# Patient Record
Sex: FEMALE | Race: WHITE | Employment: OTHER | URBAN - METROPOLITAN AREA
[De-identification: names, ages, dates, MRNs, and addresses within clinical notes are randomized per-mention and may not be internally consistent; named-entity substitution may affect disease eponyms.]

---

## 2017-05-09 ENCOUNTER — HOSPITAL ENCOUNTER (OUTPATIENT)
Dept: PREADMISSION TESTING | Age: 77
Discharge: HOME | End: 2017-05-09
Payer: MEDICARE

## 2017-05-09 VITALS
RESPIRATION RATE: 16 BRPM | HEART RATE: 84 BPM | WEIGHT: 119 LBS | HEIGHT: 63 IN | DIASTOLIC BLOOD PRESSURE: 84 MMHG | TEMPERATURE: 97.9 F | SYSTOLIC BLOOD PRESSURE: 177 MMHG | BODY MASS INDEX: 21.09 KG/M2 | OXYGEN SATURATION: 97 %

## 2017-05-09 LAB
ABO + RH BLD: NORMAL
ALBUMIN SERPL BCP-MCNC: 3.8 G/DL (ref 3.5–5)
ALBUMIN/GLOB SERPL: 0.9 {RATIO} (ref 1.1–2.2)
ALP SERPL-CCNC: 133 U/L (ref 45–117)
ALT SERPL-CCNC: 26 U/L (ref 12–78)
ANION GAP BLD CALC-SCNC: 12 MMOL/L (ref 5–15)
APPEARANCE UR: CLEAR
APTT PPP: 28.9 SEC (ref 22.1–32.5)
AST SERPL W P-5'-P-CCNC: 19 U/L (ref 15–37)
ATRIAL RATE: 76 BPM
BACTERIA URNS QL MICRO: NEGATIVE /HPF
BASOPHILS # BLD AUTO: 0 K/UL (ref 0–0.1)
BASOPHILS # BLD: 0 % (ref 0–1)
BILIRUB SERPL-MCNC: 0.3 MG/DL (ref 0.2–1)
BILIRUB UR QL: NEGATIVE
BLOOD GROUP ANTIBODIES SERPL: NORMAL
BUN SERPL-MCNC: 15 MG/DL (ref 6–20)
BUN/CREAT SERPL: 14 (ref 12–20)
CALCIUM SERPL-MCNC: 8.9 MG/DL (ref 8.5–10.1)
CALCULATED P AXIS, ECG09: 42 DEGREES
CALCULATED R AXIS, ECG10: -45 DEGREES
CALCULATED T AXIS, ECG11: 14 DEGREES
CHLORIDE SERPL-SCNC: 98 MMOL/L (ref 97–108)
CO2 SERPL-SCNC: 25 MMOL/L (ref 21–32)
COLOR UR: NORMAL
CREAT SERPL-MCNC: 1.07 MG/DL (ref 0.55–1.02)
DIAGNOSIS, 93000: NORMAL
EOSINOPHIL # BLD: 0.1 K/UL (ref 0–0.4)
EOSINOPHIL NFR BLD: 1 % (ref 0–7)
EPITH CASTS URNS QL MICRO: NORMAL /LPF
ERYTHROCYTE [DISTWIDTH] IN BLOOD BY AUTOMATED COUNT: 13.8 % (ref 11.5–14.5)
EST. AVERAGE GLUCOSE BLD GHB EST-MCNC: 140 MG/DL
GLOBULIN SER CALC-MCNC: 4.1 G/DL (ref 2–4)
GLUCOSE SERPL-MCNC: 85 MG/DL (ref 65–100)
GLUCOSE UR STRIP.AUTO-MCNC: NEGATIVE MG/DL
HBA1C MFR BLD: 6.5 % (ref 4.2–6.3)
HCT VFR BLD AUTO: 35.3 % (ref 35–47)
HGB BLD-MCNC: 12.1 G/DL (ref 11.5–16)
HGB UR QL STRIP: NEGATIVE
HYALINE CASTS URNS QL MICRO: NORMAL /LPF (ref 0–5)
INR PPP: 1 (ref 0.9–1.1)
KETONES UR QL STRIP.AUTO: NEGATIVE MG/DL
LEUKOCYTE ESTERASE UR QL STRIP.AUTO: NEGATIVE
LYMPHOCYTES # BLD AUTO: 21 % (ref 12–49)
LYMPHOCYTES # BLD: 1.4 K/UL (ref 0.8–3.5)
MCH RBC QN AUTO: 30.7 PG (ref 26–34)
MCHC RBC AUTO-ENTMCNC: 34.3 G/DL (ref 30–36.5)
MCV RBC AUTO: 89.6 FL (ref 80–99)
MONOCYTES # BLD: 0.6 K/UL (ref 0–1)
MONOCYTES NFR BLD AUTO: 8 % (ref 5–13)
NEUTS SEG # BLD: 4.8 K/UL (ref 1.8–8)
NEUTS SEG NFR BLD AUTO: 70 % (ref 32–75)
NITRITE UR QL STRIP.AUTO: NEGATIVE
P-R INTERVAL, ECG05: 182 MS
PH UR STRIP: 6 [PH] (ref 5–8)
PLATELET # BLD AUTO: 386 K/UL (ref 150–400)
POTASSIUM SERPL-SCNC: 4 MMOL/L (ref 3.5–5.1)
PROT SERPL-MCNC: 7.9 G/DL (ref 6.4–8.2)
PROT UR STRIP-MCNC: NEGATIVE MG/DL
PROTHROMBIN TIME: 9.7 SEC (ref 9–11.1)
Q-T INTERVAL, ECG07: 406 MS
QRS DURATION, ECG06: 92 MS
QTC CALCULATION (BEZET), ECG08: 456 MS
RBC # BLD AUTO: 3.94 M/UL (ref 3.8–5.2)
RBC #/AREA URNS HPF: NORMAL /HPF (ref 0–5)
SODIUM SERPL-SCNC: 135 MMOL/L (ref 136–145)
SP GR UR REFRACTOMETRY: 1.02 (ref 1–1.03)
SPECIMEN EXP DATE BLD: NORMAL
THERAPEUTIC RANGE,PTTT: NORMAL SECS (ref 58–77)
UA: UC IF INDICATED,UAUC: NORMAL
UROBILINOGEN UR QL STRIP.AUTO: 0.2 EU/DL (ref 0.2–1)
VENTRICULAR RATE, ECG03: 76 BPM
WBC # BLD AUTO: 6.9 K/UL (ref 3.6–11)
WBC URNS QL MICRO: NORMAL /HPF (ref 0–4)

## 2017-05-09 PROCEDURE — 85730 THROMBOPLASTIN TIME PARTIAL: CPT | Performed by: ORTHOPAEDIC SURGERY

## 2017-05-09 PROCEDURE — 83036 HEMOGLOBIN GLYCOSYLATED A1C: CPT | Performed by: ORTHOPAEDIC SURGERY

## 2017-05-09 PROCEDURE — 80053 COMPREHEN METABOLIC PANEL: CPT | Performed by: ORTHOPAEDIC SURGERY

## 2017-05-09 PROCEDURE — 85025 COMPLETE CBC W/AUTO DIFF WBC: CPT | Performed by: ORTHOPAEDIC SURGERY

## 2017-05-09 PROCEDURE — 85610 PROTHROMBIN TIME: CPT | Performed by: ORTHOPAEDIC SURGERY

## 2017-05-09 PROCEDURE — 81001 URINALYSIS AUTO W/SCOPE: CPT | Performed by: ORTHOPAEDIC SURGERY

## 2017-05-09 PROCEDURE — 93005 ELECTROCARDIOGRAM TRACING: CPT

## 2017-05-09 PROCEDURE — 86900 BLOOD TYPING SEROLOGIC ABO: CPT | Performed by: ANESTHESIOLOGY

## 2017-05-09 PROCEDURE — 36415 COLL VENOUS BLD VENIPUNCTURE: CPT | Performed by: ORTHOPAEDIC SURGERY

## 2017-05-09 RX ORDER — METOPROLOL SUCCINATE 25 MG/1
25 TABLET, EXTENDED RELEASE ORAL DAILY
COMMUNITY
End: 2017-05-20

## 2017-05-09 RX ORDER — ACETAMINOPHEN AND CODEINE PHOSPHATE 300; 30 MG/1; MG/1
1 TABLET ORAL
COMMUNITY
End: 2017-05-20

## 2017-05-09 RX ORDER — CYANOCOBALAMIN 1000 UG/ML
1000 INJECTION, SOLUTION INTRAMUSCULAR; SUBCUTANEOUS
COMMUNITY
End: 2021-06-29

## 2017-05-09 RX ORDER — RIZATRIPTAN BENZOATE 5 MG/1
5 TABLET ORAL AS NEEDED
COMMUNITY
End: 2017-05-28

## 2017-05-09 RX ORDER — CALC/MAG/B COMPLEX/D3/HERB 61
TABLET ORAL AS NEEDED
COMMUNITY
End: 2019-04-18

## 2017-05-09 RX ORDER — DICLOFENAC SODIUM 50 MG/1
75 TABLET, DELAYED RELEASE ORAL 2 TIMES DAILY
COMMUNITY
End: 2017-05-20

## 2017-05-09 RX ORDER — FLUTICASONE PROPIONATE AND SALMETEROL 100; 50 UG/1; UG/1
1 POWDER RESPIRATORY (INHALATION) 2 TIMES DAILY
COMMUNITY
End: 2022-02-16

## 2017-05-09 RX ORDER — HYDROCODONE BITARTRATE AND ACETAMINOPHEN 5; 325 MG/1; MG/1
TABLET ORAL
COMMUNITY
End: 2017-05-20

## 2017-05-09 RX ORDER — CHOLECALCIFEROL (VITAMIN D3) 125 MCG
1 CAPSULE ORAL
COMMUNITY
End: 2017-05-20

## 2017-05-09 RX ORDER — LEVOTHYROXINE SODIUM 88 UG/1
88 TABLET ORAL
COMMUNITY

## 2017-05-09 RX ORDER — DILTIAZEM HYDROCHLORIDE 240 MG/1
240 CAPSULE, COATED, EXTENDED RELEASE ORAL DAILY
COMMUNITY
End: 2017-05-28

## 2017-05-09 RX ORDER — CYCLOBENZAPRINE HCL 5 MG
5 TABLET ORAL
COMMUNITY
End: 2017-05-20

## 2017-05-09 NOTE — PERIOP NOTES
[facility]  PREOPERATIVE INSTRUCTIONS    Surgery Date:  5/15/2017  Surgery arrival time given by surgeon: NO   If no,[facility] [address] staff will call you between 4 PM- 8 PM the day before surgery with your arrival time. If your surgery is on a Monday, we will call you the preceding Friday. Please call [phone] after 8 PM if you did not receive your arrival time. 1. Please report at the designated time to the 2nd [address] [facility]. Bring your insurance card, photo identification, and any copayment ( if applicable). 2. You must have a responsible adult to drive you home. You need to have a responsible adult to stay with you the first 24 hours after surgery if you are going home the same day of your surgery and you should not drive a car for 24 hours following your surgery. 3. Nothing to eat or drink after midnight the night before surgery. This includes no water, gum, mints, coffee, juice, etc.  Please note special instructions, if applicable, below for medications. 4. MEDICATIONS TO TAKE THE MORNING OF SURGERY WITH A SIP OF WATER: Cartia XT, Prevacid, Levothyroxine, Metoprolol. Use Advair inhaler. 5. You MAY take your pain medication the day of surgery with a sip of water. 6. No alcoholic beverages 24 hours before or after your surgery. 7. If you are being admitted to the hospital,please leave personal belongings/luggage in your car until you have an assigned hospital room number. 8. Stop Aspirin and/or any non-steroidal anti-inflammatory drugs (i.e. Ibuprofen, Naproxen, Advil, Aleve) as directed by your prescribing physician. You may take Tylenol. Stop herbal supplements 1 week prior to  surgery. 9. If you are currently taking Plavix, Coumadin,or any other blood-thinning/anticoagulant medication contact your prescribing physician for instructions. 10. Please wear comfortable clothes. Wear your glasses instead of contacts. We ask that all money, jewelry and valuables be left at home.  Wear no make up, particularly yves, the day of surgery. 11.  All body piercings, rings,and jewelry need to be removed and left at home. Please wear your hair loose or down. Please no pony-tails, buns, or any metal hair accessories. If you shower the morning of surgery, please do not apply any lotions, powders, or deodorants afterwards. Do not shave any body area within 24 hours of your surgery. 12. Please follow all instructions to avoid any potential surgical cancellation. 13. Should your physical condition change, (i.e. fever, cold, flu, etc.) please notify your surgeon as soon as possible. 14. It is important to be on time. If a situation occurs where you may be delayed, please call:  [phone]  on the day of surgery. 15. The Preadmission Testing staff can be reached at 21 [phone]. 16. Special Instructions:  · Use Chlorhexidine Care Fusion wash and sponges 3 days prior to surgery as instructed. · Incentive spirometer given with instructions to practice at home and bring back to the hospital on the day of surgery. · [facility] will contact you if your Hemoglobin A1C is greater than 7.5. · Ensure/Glucerna  sample, nutritional information, and Ensure/Glucerna coupon given. · Pain pamphlet and Call Don't Fall reminder reviewed with patient. ·  parking is complimentary Monday - Friday 7 am - 5 pm  · Bring PTA Medication list day of surgery with the last doses taken documented  The patient was contacted  in person. She  verbalize  understanding of all instructions does not  need reinforcement.

## 2017-05-09 NOTE — H&P
PAT Pre-Op History & Physical    Patient: Herman Lam                  MRN: [id number]          SSN: [id number]  YOB: 1940          Age: 68 y.o. Sex: female                Subjective:   Patient is a 68 y.o.  female who presents with history of chronic lower back pain that began around the first of the year per patient report. Patient denies any trauma or injury that preceded the onset of her lower back pain. Patient states pain has escalated significantly over the last month. Rates her lower back pain 5/10. States that it radiates from her lower back into her left buttock and down her left leg. Describes the pain as a constant ache that can be cramping or sharp also at times. Also c/o intermittent tingling in her left foot. States that her back  pain is exacerbated by standing or walking and can be diminished by lying down. Has failed NSAIDs, applying heat /ice, PT, narcotic pain medication, and oral steroids. The patient was evaluated in the surgeon's office and it was determined that the most appropriate plan of care is to proceed with surgical intervention. Patient's PCP Anastacio Monsivais MD    The patient was recently treated for a UTI by her PCP's office. Rx for Bactrim DS BID x7 days was filled 5/01/2017. Patient's BP elevated in PAT (R= 170/85, L= 177/84). Patient denies any headache, visual changes,etc. States that her BP has been elevated lately in PCP office also- normally states -150 range.          Past Medical History:   Diagnosis Date    Anemia     Arthritis     osteoporosis    Asthma     Broken ribs     Cancer ([facility] [address]     melanoma     GERD (gastroesophageal reflux disease)     Hypertension     Migraines     Osteoporosis     Rheumatic fever     x2 without residual    Thyroid disease       Past Surgical History:   Procedure Laterality Date    HX ABOVE KNEE AMPUTATION Left 05/2015    remove Tillman's neuroma    HX ADENOIDECTOMY  1946  HX BREAST BIOPSY  1972    HX CATARACT REMOVAL Bilateral 2009, 2011    HX GYN  1969    Hysterectomy    HX HEENT  1996    sinus surgery    HX HEENT      deviated septum    HX ORTHOPAEDIC Left 2006    Tarsal Tunnel Release     HX ORTHOPAEDIC Right 2010,2016    basal joint surgery    HX ORTHOPAEDIC Left 2012    basal joint surgery     HX OTHER SURGICAL      TVT Sling    HX OTHER SURGICAL      Tillman's Neuroma removal     HX SEPTOPLASTY      HX TONSIL AND ADENOIDECTOMY      HX TONSILLECTOMY  1946    HX UROLOGICAL  2010    TVT sling      Prior to Admission medications    Medication Sig Start Date End Date Taking? Authorizing Provider   levothyroxine (SYNTHROID) 100 mcg tablet Take 100 mcg by mouth Daily (before breakfast). Yes Historical Provider   dilTIAZem CD (CARTIA XT) 240 mg ER capsule Take 240 mg by mouth daily. Yes Historical Provider   metoprolol succinate (TOPROL-XL) 25 mg XL tablet Take 25 mg by mouth daily. Yes Historical Provider   fluticasone-salmeterol (ADVAIR DISKUS) 100-50 mcg/dose diskus inhaler Take 1 Puff by inhalation two (2) times a day. Yes Historical Provider   diclofenac EC (VOLTAREN) 50 mg EC tablet Take 75 mg by mouth two (2) times a day. Yes Historical Provider   cyanocobalamin (VITAMIN B-12) 1,000 mcg/mL injection 1,000 mcg by IntraMUSCular route every thirty (30) days. Yes Historical Provider   rizatriptan (MAXALT) 5 mg tablet Take 5 mg by mouth as needed for Migraine. May repeat in 2 hours if needed   Yes Historical Provider   naproxen sodium (ALEVE) 220 mg cap Take 1 Tab by mouth daily as needed. Yes Historical Provider   acetaminophen-codeine (TYLENOL-CODEINE #3) 300-30 mg per tablet Take 1 Tab by mouth every four (4) hours as needed for Pain. Yes Historical Provider   cyclobenzaprine (FLEXERIL) 5 mg tablet Take 5 mg by mouth daily as needed for Muscle Spasm(s).    Yes Historical Provider   HYDROcodone-acetaminophen (NORCO) 5-325 mg per tablet Take  by mouth every four (4) hours as needed for Pain. Yes Historical Provider   lansoprazole (PREVACID) 15 mg capsule Take  by mouth Daily (before breakfast). Yes Historical Provider   Cetirizine (ZYRTEC) 10 mg cap Take 1 Cap by mouth nightly. Yes Historical Provider   losartan (COZAAR) 50 mg tablet Take 50 mg by mouth daily. Yes Aiyana Damon MD   fluticasone (FLONASE) 50 mcg/actuation nasal spray 2 Sprays by Both Nostrils route nightly. Yes Aiyana Damon MD   estradiol (ESTRACE) 1 mg tablet Take 1 mg by mouth daily. Yes Aiyana Damon MD   pregabalin (LYRICA) 50 mg capsule Take 50 mg by mouth nightly as needed. Yes Aiyana Damon MD     Current Outpatient Prescriptions   Medication Sig    levothyroxine (SYNTHROID) 100 mcg tablet Take 100 mcg by mouth Daily (before breakfast).  dilTIAZem CD (CARTIA XT) 240 mg ER capsule Take 240 mg by mouth daily.  metoprolol succinate (TOPROL-XL) 25 mg XL tablet Take 25 mg by mouth daily.  fluticasone-salmeterol (ADVAIR DISKUS) 100-50 mcg/dose diskus inhaler Take 1 Puff by inhalation two (2) times a day.  diclofenac EC (VOLTAREN) 50 mg EC tablet Take 75 mg by mouth two (2) times a day.  cyanocobalamin (VITAMIN B-12) 1,000 mcg/mL injection 1,000 mcg by IntraMUSCular route every thirty (30) days.  rizatriptan (MAXALT) 5 mg tablet Take 5 mg by mouth as needed for Migraine. May repeat in 2 hours if needed    naproxen sodium (ALEVE) 220 mg cap Take 1 Tab by mouth daily as needed.  acetaminophen-codeine (TYLENOL-CODEINE #3) 300-30 mg per tablet Take 1 Tab by mouth every four (4) hours as needed for Pain.  cyclobenzaprine (FLEXERIL) 5 mg tablet Take 5 mg by mouth daily as needed for Muscle Spasm(s).  HYDROcodone-acetaminophen (NORCO) 5-325 mg per tablet Take  by mouth every four (4) hours as needed for Pain.  lansoprazole (PREVACID) 15 mg capsule Take  by mouth Daily (before breakfast).  Cetirizine (ZYRTEC) 10 mg cap Take 1 Cap by mouth nightly.     losartan (COZAAR) 50 mg tablet Take 50 mg by mouth daily.  fluticasone (FLONASE) 50 mcg/actuation nasal spray 2 Sprays by Both Nostrils route nightly.  estradiol (ESTRACE) 1 mg tablet Take 1 mg by mouth daily.  pregabalin (LYRICA) 50 mg capsule Take 50 mg by mouth nightly as needed. No current facility-administered medications for this encounter. Allergies   Allergen Reactions    Ace Inhibitors Angioedema      Social History   Substance Use Topics    Smoking status: Never Smoker    Smokeless tobacco: Never Used    Alcohol use 1.2 oz/week     2 Glasses of wine per week      History   Drug Use No     Family History   Problem Relation Age of Onset    Hypertension Mother     Stroke Mother      TIA    Heart Disease Mother     Alzheimer Mother     Hypertension Father     Asthma Father     Colon Cancer Father     COPD Father     Asthma Brother          Review of Systems    Patient denies difficulty swallowing, mouth sores, or loose teeth. Patient denies any recent dental procedures or any planned prior to surgery. Patient denies chest pain, tightness, pain radiating down left arm, palpitations. Denies dizziness, visual disturbances, or lightheadedness. Patient denies shortness of breath, wheezing, cough, fever, or chills. Patient denies diarrhea, constipation, or abdominal pain. Patient denies urinary problems including dysuria, hesitancy, urgency, or incontinence. Denies skin breakdown, rashes, insect bites or open area. C/o lower back pain. Objective:     Patient Vitals for the past 24 hrs:   Temp Pulse Resp BP SpO2   17 1555 - - - 177/84 -   17 1512 97.9 °F (36.6 °C) 84 16 180/79 97 %     Temp (24hrs), Av.9 °F (36.6 °C), Min:97.9 °F (36.6 °C), Max:97.9 °F (36.6 °C)    Body mass index is 21.08 kg/(m^2). Wt Readings from Last 1 Encounters:   17 54 kg (119 lb)        Physical Exam:     General: Pleasant,  cooperative, no apparent distress, appears stated age.    Eyes: Conjunctivae/corneas clear. EOMs intact. Nose: Nares normal.   Mouth/Throat: Lips, mucosa, and tongue normal. Teeth and gums normal.   Neck: Supple, symmetrical, trachea midline. Back: Symmetric   Lungs: Clear to auscultation bilaterally. Heart: Regular rate and rhythm, S1, S2 normal. No murmur, click, rub or gallop. Abdomen: Soft, non-tender. Bowel sounds normal. No distention. Musculoskeletal:  Lumbar ROM limited by discomfort. Extremities:  Extremities normal, atraumatic, no cyanosis or edema. Calves                                 supple, non tender to palpation. Pulses: 2+ and symmetric bilateral upper extremities. Cap. refill <2 seconds   Skin: Skin color, texture, turgor normal.  Actinic keratosis noted on lower extremities. Neurologic: CN II-XII grossly intact. Alert and oriented x3. Labs:   Recent Results (from the past 67 hour(s))   CULTURE, MRSA    Collection Time: 05/09/17  3:47 PM   Result Value Ref Range    Special Requests: NO SPECIAL REQUESTS      Culture result: MRSA NOT PRESENT      Culture result:            Screening of patient nares for MRSA is for surveillance purposes and, if positive, to facilitate isolation considerations in high risk settings. It is not intended for automatic decolonization interventions per se as regimens are not sufficiently effective to warrant routine use. CBC WITH AUTOMATED DIFF    Collection Time: 05/09/17  4:19 PM   Result Value Ref Range    WBC 6.9 3.6 - 11.0 K/uL    RBC 3.94 3.80 - 5.20 M/uL    HGB 12.1 11.5 - 16.0 g/dL    HCT 35.3 35.0 - 47.0 %    MCV 89.6 80.0 - 99.0 FL    MCH 30.7 26.0 - 34.0 PG    MCHC 34.3 30.0 - 36.5 g/dL    RDW 13.8 11.5 - 14.5 %    PLATELET 535 363 - 178 K/uL    NEUTROPHILS 70 32 - 75 %    LYMPHOCYTES 21 12 - 49 %    MONOCYTES 8 5 - 13 %    EOSINOPHILS 1 0 - 7 %    BASOPHILS 0 0 - 1 %    ABS. NEUTROPHILS 4.8 1.8 - 8.0 K/UL    ABS. LYMPHOCYTES 1.4 0.8 - 3.5 K/UL    ABS. MONOCYTES 0.6 0.0 - 1.0 K/UL    ABS.  EOSINOPHILS 0.1 0.0 - 0.4 K/UL    ABS. BASOPHILS 0.0 0.0 - 0.1 K/UL   METABOLIC PANEL, COMPREHENSIVE    Collection Time: 05/09/17  4:19 PM   Result Value Ref Range    Sodium 135 (L) 136 - 145 mmol/L    Potassium 4.0 3.5 - 5.1 mmol/L    Chloride 98 97 - 108 mmol/L    CO2 25 21 - 32 mmol/L    Anion gap 12 5 - 15 mmol/L    Glucose 85 65 - 100 mg/dL    BUN 15 6 - 20 MG/DL    Creatinine 1.07 (H) 0.55 - 1.02 MG/DL    BUN/Creatinine ratio 14 12 - 20      GFR est AA >60 >60 ml/min/1.73m2    GFR est non-AA 50 (L) >60 ml/min/1.73m2    Calcium 8.9 8.5 - 10.1 MG/DL    Bilirubin, total 0.3 0.2 - 1.0 MG/DL    ALT (SGPT) 26 12 - 78 U/L    AST (SGOT) 19 15 - 37 U/L    Alk.  phosphatase 133 (H) 45 - 117 U/L    Protein, total 7.9 6.4 - 8.2 g/dL    Albumin 3.8 3.5 - 5.0 g/dL    Globulin 4.1 (H) 2.0 - 4.0 g/dL    A-G Ratio 0.9 (L) 1.1 - 2.2     HEMOGLOBIN A1C WITH EAG    Collection Time: 05/09/17  4:19 PM   Result Value Ref Range    Hemoglobin A1c 6.5 (H) 4.2 - 6.3 %    Est. average glucose 140 mg/dL   PROTHROMBIN TIME + INR    Collection Time: 05/09/17  4:19 PM   Result Value Ref Range    INR 1.0 0.9 - 1.1      Prothrombin time 9.7 9.0 - 11.1 sec   PTT    Collection Time: 05/09/17  4:19 PM   Result Value Ref Range    aPTT 28.9 22.1 - 32.5 sec    aPTT, therapeutic range     58.0 - 77.0 SECS   URINALYSIS W/ REFLEX CULTURE    Collection Time: 05/09/17  4:19 PM   Result Value Ref Range    Color YELLOW/STRAW      Appearance CLEAR CLEAR      Specific gravity 1.016 1.003 - 1.030      pH (UA) 6.0 5.0 - 8.0      Protein NEGATIVE  NEG mg/dL    Glucose NEGATIVE  NEG mg/dL    Ketone NEGATIVE  NEG mg/dL    Bilirubin NEGATIVE  NEG      Blood NEGATIVE  NEG      Urobilinogen 0.2 0.2 - 1.0 EU/dL    Nitrites NEGATIVE  NEG      Leukocyte Esterase NEGATIVE  NEG      WBC 0-4 0 - 4 /hpf    RBC 0-5 0 - 5 /hpf    Epithelial cells FEW FEW /lpf    Bacteria NEGATIVE  NEG /hpf    UA:UC IF INDICATED CULTURE NOT INDICATED BY UA RESULT CNI      Hyaline cast 0-2 0 - 5 /lpf TYPE & SCREEN    Collection Time: 05/09/17  4:19 PM   Result Value Ref Range    Crossmatch Expiration 05/18/2017     ABO/Rh(D) A POSITIVE     Antibody screen NEG    EKG, 12 LEAD, INITIAL    Collection Time: 05/09/17  4:34 PM   Result Value Ref Range    Ventricular Rate 76 BPM    Atrial Rate 76 BPM    P-R Interval 182 ms    QRS Duration 92 ms    Q-T Interval 406 ms    QTC Calculation (Bezet) 456 ms    Calculated P Axis 42 degrees    Calculated R Axis -45 degrees    Calculated T Axis 14 degrees    Diagnosis       Normal sinus rhythm  Left anterior fascicular block  Voltage criteria for left ventricular hypertrophy  Abnormal ECG  No previous ECGs available  Confirmed by Bernice Soria MD, [id number] ([id number]) on 5/9/2017 4:53:07 PM         Assessment:     Lumbar stenosis    Elevated BP    Plan:     Scheduled for L4- S1 laminectomy, L4- L5 fusion, instrumentation, TLIF, bone graft. Labs and EKG done per surgeon's orders. Labs reviewed- unremarkable except creatinine elevated slightly (1.07) and GFR decreased (50)- were 0.93/60 on PCP labs drawn 3/222/2017. Also HgbA1C elevated 6.5- was 5.9 in PCP office 3/22/2017. Alk. Phosphatase elevated (133)- was 66 3/22 on PCP labs. CMP  results faxed to surgeon's office and HgbA1C/ CMP results faxed to PCP office. EKG done in PAT compared with EKG from PCP done 4/21/2015- discussed with Dr. Wale Francois (anesthesia). Also discussed METS are \"other\" as patient cannot do much because of back pain but METS>4 until lower back pain escalated. Patient may proceed with surgery without further evaluation/testing per anesthesia. Requested last office note from PCP (Dr. Yolanda Estrella). Received note dated 4/13/2017 , lab results from 3/2017, and copy of spine MRI results. Documents reviewed and placed on chart. PCP notes /66 on 4/13/2017 and 167/65 on 4/07/2017. PCP note attributes elevated BP to pain.     Antonietta Parker NP

## 2017-05-10 LAB
BACTERIA SPEC CULT: NORMAL
BACTERIA SPEC CULT: NORMAL
SERVICE CMNT-IMP: NORMAL

## 2017-05-13 ENCOUNTER — ANESTHESIA EVENT (OUTPATIENT)
Dept: SURGERY | Age: 77
DRG: 459 | End: 2017-05-13
Payer: MEDICARE

## 2017-05-15 ENCOUNTER — ANESTHESIA (OUTPATIENT)
Dept: SURGERY | Age: 77
DRG: 459 | End: 2017-05-15
Payer: MEDICARE

## 2017-05-15 ENCOUNTER — HOSPITAL ENCOUNTER (INPATIENT)
Age: 77
LOS: 5 days | Discharge: HOME HEALTH | DRG: 459 | End: 2017-05-20
Attending: ORTHOPAEDIC SURGERY | Admitting: ORTHOPAEDIC SURGERY
Payer: MEDICARE

## 2017-05-15 ENCOUNTER — APPOINTMENT (OUTPATIENT)
Dept: GENERAL RADIOLOGY | Age: 77
DRG: 459 | End: 2017-05-15
Attending: ORTHOPAEDIC SURGERY
Payer: MEDICARE

## 2017-05-15 PROBLEM — M48.062 LUMBAR STENOSIS WITH NEUROGENIC CLAUDICATION: Status: ACTIVE | Noted: 2017-05-15

## 2017-05-15 PROCEDURE — 74011000250 HC RX REV CODE- 250: Performed by: ORTHOPAEDIC SURGERY

## 2017-05-15 PROCEDURE — C1713 ANCHOR/SCREW BN/BN,TIS/BN: HCPCS | Performed by: ORTHOPAEDIC SURGERY

## 2017-05-15 PROCEDURE — 0SG00AJ FUSION OF LUMBAR VERTEBRAL JOINT WITH INTERBODY FUSION DEVICE, POSTERIOR APPROACH, ANTERIOR COLUMN, OPEN APPROACH: ICD-10-PCS | Performed by: ORTHOPAEDIC SURGERY

## 2017-05-15 PROCEDURE — 77030034850: Performed by: ORTHOPAEDIC SURGERY

## 2017-05-15 PROCEDURE — 77030021668 HC NEB PREFIL KT VYRM -A

## 2017-05-15 PROCEDURE — 77030018846 HC SOL IRR STRL H20 ICUM -A: Performed by: ORTHOPAEDIC SURGERY

## 2017-05-15 PROCEDURE — 0SB20ZZ EXCISION OF LUMBAR VERTEBRAL DISC, OPEN APPROACH: ICD-10-PCS | Performed by: ORTHOPAEDIC SURGERY

## 2017-05-15 PROCEDURE — 77030008684 HC TU ET CUF COVD -B: Performed by: ANESTHESIOLOGY

## 2017-05-15 PROCEDURE — 77030032490 HC SLV COMPR SCD KNE COVD -B: Performed by: ORTHOPAEDIC SURGERY

## 2017-05-15 PROCEDURE — 77030013079 HC BLNKT BAIR HGGR 3M -A: Performed by: ANESTHESIOLOGY

## 2017-05-15 PROCEDURE — 74011000272 HC RX REV CODE- 272: Performed by: ORTHOPAEDIC SURGERY

## 2017-05-15 PROCEDURE — 74011000250 HC RX REV CODE- 250

## 2017-05-15 PROCEDURE — 77030008771 HC TU NG SALEM SUMP -A: Performed by: ANESTHESIOLOGY

## 2017-05-15 PROCEDURE — 01NR0ZZ RELEASE SACRAL NERVE, OPEN APPROACH: ICD-10-PCS | Performed by: ORTHOPAEDIC SURGERY

## 2017-05-15 PROCEDURE — 77030018719 HC DRSG PTCH ANTIMIC J&J -A: Performed by: ORTHOPAEDIC SURGERY

## 2017-05-15 PROCEDURE — 74011250636 HC RX REV CODE- 250/636: Performed by: ORTHOPAEDIC SURGERY

## 2017-05-15 PROCEDURE — 76210000016 HC OR PH I REC 1 TO 1.5 HR: Performed by: ORTHOPAEDIC SURGERY

## 2017-05-15 PROCEDURE — 77030026188 HC BN CANC CHP CRSH PR LIFV -E: Performed by: ORTHOPAEDIC SURGERY

## 2017-05-15 PROCEDURE — 77030037202 HC SPCR SPN BULL TIP PEK VBR IBF REGE -I1: Performed by: ORTHOPAEDIC SURGERY

## 2017-05-15 PROCEDURE — 77030026438 HC STYL ET INTUB CARD -A: Performed by: ANESTHESIOLOGY

## 2017-05-15 PROCEDURE — 77030002933 HC SUT MCRYL J&J -A: Performed by: ORTHOPAEDIC SURGERY

## 2017-05-15 PROCEDURE — 77030029099 HC BN WAX SSPC -A: Performed by: ORTHOPAEDIC SURGERY

## 2017-05-15 PROCEDURE — 65270000029 HC RM PRIVATE

## 2017-05-15 PROCEDURE — 77030033067 HC SUT PDO STRATFX SPIR J&J -B: Performed by: ORTHOPAEDIC SURGERY

## 2017-05-15 PROCEDURE — 77030037134 HC WRAP COMPR BACK THER SOLM -B

## 2017-05-15 PROCEDURE — 74011250637 HC RX REV CODE- 250/637: Performed by: ORTHOPAEDIC SURGERY

## 2017-05-15 PROCEDURE — 77030031139 HC SUT VCRL2 J&J -A: Performed by: ORTHOPAEDIC SURGERY

## 2017-05-15 PROCEDURE — 77030012406 HC DRN WND PENRS BARD -A: Performed by: ORTHOPAEDIC SURGERY

## 2017-05-15 PROCEDURE — 77030004391 HC BUR FLUT MEDT -C: Performed by: ORTHOPAEDIC SURGERY

## 2017-05-15 PROCEDURE — 74011250636 HC RX REV CODE- 250/636

## 2017-05-15 PROCEDURE — 77030020782 HC GWN BAIR PAWS FLX 3M -B

## 2017-05-15 PROCEDURE — 76010000173 HC OR TIME 3 TO 3.5 HR INTENSV-TIER 1: Performed by: ORTHOPAEDIC SURGERY

## 2017-05-15 PROCEDURE — 77030003666 HC NDL SPINAL BD -A: Performed by: ORTHOPAEDIC SURGERY

## 2017-05-15 PROCEDURE — 77030018723 HC ELCTRD BLD COVD -A: Performed by: ORTHOPAEDIC SURGERY

## 2017-05-15 PROCEDURE — 77030034274 HC GRFT BN CHIP ALLGRFT 10CC REGE -I: Performed by: ORTHOPAEDIC SURGERY

## 2017-05-15 PROCEDURE — 76060000037 HC ANESTHESIA 3 TO 3.5 HR: Performed by: ORTHOPAEDIC SURGERY

## 2017-05-15 PROCEDURE — 77030034479 HC ADH SKN CLSR PRINEO J&J -B: Performed by: ORTHOPAEDIC SURGERY

## 2017-05-15 PROCEDURE — 77030027138 HC INCENT SPIROMETER -A

## 2017-05-15 PROCEDURE — 76001 XR FLUOROSCOPY OVER 60 MINUTES: CPT

## 2017-05-15 PROCEDURE — 01NB0ZZ RELEASE LUMBAR NERVE, OPEN APPROACH: ICD-10-PCS | Performed by: ORTHOPAEDIC SURGERY

## 2017-05-15 PROCEDURE — 74011250636 HC RX REV CODE- 250/636: Performed by: ANESTHESIOLOGY

## 2017-05-15 PROCEDURE — 77030032490 HC SLV COMPR SCD KNE COVD -B

## 2017-05-15 PROCEDURE — 77030003161 HC GRFT DURA MTRX INLC -E: Performed by: ORTHOPAEDIC SURGERY

## 2017-05-15 DEVICE — 5.5MM CURVED ROD 40MM TI ALLOY
Type: IMPLANTABLE DEVICE | Site: SPINE LUMBAR | Status: FUNCTIONAL
Brand: TAURUS

## 2017-05-15 DEVICE — SCR BNE SPNE 6.5X45MM TI -- STREAMLINE TL: Type: IMPLANTABLE DEVICE | Site: SPINE LUMBAR | Status: FUNCTIONAL

## 2017-05-15 DEVICE — GRAFT BNE 30CC 1 4MM CANC CRUSH CHIP READIGRFT: Type: IMPLANTABLE DEVICE | Site: SPINE LUMBAR | Status: FUNCTIONAL

## 2017-05-15 DEVICE — SCR SET SPNE STREAMLINE TL --: Type: IMPLANTABLE DEVICE | Site: SPINE LUMBAR | Status: FUNCTIONAL

## 2017-05-15 DEVICE — SPACER SPNL 0.46CC W9XH10X8XL22MM PEEK CNVX BULL TIP TANT: Type: IMPLANTABLE DEVICE | Site: SPINE LUMBAR | Status: FUNCTIONAL

## 2017-05-15 DEVICE — DURAGEN® SUTURABLE DURAL REGENERATION MATRIX, 2 IN X 2 IN (5 CM X 5 CM)
Type: IMPLANTABLE DEVICE | Site: SPINE LUMBAR | Status: FUNCTIONAL
Brand: DURAGEN® SUTURABLE

## 2017-05-15 DEVICE — SCR BNE SPNE 6.5X50MM TI -- STREAMLINE TL: Type: IMPLANTABLE DEVICE | Site: SPINE LUMBAR | Status: FUNCTIONAL

## 2017-05-15 DEVICE — GRAFT BNE SUB 10CC CORT CANC DBM CRYOGENICALLY PRESERVED: Type: IMPLANTABLE DEVICE | Site: SPINE LUMBAR | Status: FUNCTIONAL

## 2017-05-15 RX ORDER — HYDROMORPHONE HYDROCHLORIDE 1 MG/ML
.25-1 INJECTION, SOLUTION INTRAMUSCULAR; INTRAVENOUS; SUBCUTANEOUS
Status: DISCONTINUED | OUTPATIENT
Start: 2017-05-15 | End: 2017-05-15 | Stop reason: HOSPADM

## 2017-05-15 RX ORDER — SUCCINYLCHOLINE CHLORIDE 20 MG/ML
INJECTION INTRAMUSCULAR; INTRAVENOUS AS NEEDED
Status: DISCONTINUED | OUTPATIENT
Start: 2017-05-15 | End: 2017-05-15 | Stop reason: HOSPADM

## 2017-05-15 RX ORDER — ONDANSETRON 2 MG/ML
4 INJECTION INTRAMUSCULAR; INTRAVENOUS
Status: DISCONTINUED | OUTPATIENT
Start: 2017-05-15 | End: 2017-05-20 | Stop reason: HOSPADM

## 2017-05-15 RX ORDER — SODIUM CHLORIDE, SODIUM LACTATE, POTASSIUM CHLORIDE, CALCIUM CHLORIDE 600; 310; 30; 20 MG/100ML; MG/100ML; MG/100ML; MG/100ML
100 INJECTION, SOLUTION INTRAVENOUS CONTINUOUS
Status: DISCONTINUED | OUTPATIENT
Start: 2017-05-15 | End: 2017-05-15 | Stop reason: HOSPADM

## 2017-05-15 RX ORDER — VANCOMYCIN HYDROCHLORIDE 1 G/20ML
INJECTION, POWDER, LYOPHILIZED, FOR SOLUTION INTRAVENOUS AS NEEDED
Status: DISCONTINUED | OUTPATIENT
Start: 2017-05-15 | End: 2017-05-15 | Stop reason: HOSPADM

## 2017-05-15 RX ORDER — SODIUM CHLORIDE 9 MG/ML
125 INJECTION, SOLUTION INTRAVENOUS CONTINUOUS
Status: DISCONTINUED | OUTPATIENT
Start: 2017-05-15 | End: 2017-05-16

## 2017-05-15 RX ORDER — SODIUM CHLORIDE 0.9 % (FLUSH) 0.9 %
5-10 SYRINGE (ML) INJECTION EVERY 8 HOURS
Status: DISCONTINUED | OUTPATIENT
Start: 2017-05-15 | End: 2017-05-20 | Stop reason: HOSPADM

## 2017-05-15 RX ORDER — SODIUM CHLORIDE 0.9 % (FLUSH) 0.9 %
5-10 SYRINGE (ML) INJECTION AS NEEDED
Status: DISCONTINUED | OUTPATIENT
Start: 2017-05-15 | End: 2017-05-20 | Stop reason: HOSPADM

## 2017-05-15 RX ORDER — FENTANYL CITRATE 50 UG/ML
INJECTION, SOLUTION INTRAMUSCULAR; INTRAVENOUS AS NEEDED
Status: DISCONTINUED | OUTPATIENT
Start: 2017-05-15 | End: 2017-05-15 | Stop reason: HOSPADM

## 2017-05-15 RX ORDER — RIZATRIPTAN BENZOATE 5 MG/1
5 TABLET, ORALLY DISINTEGRATING ORAL AS NEEDED
Status: DISCONTINUED | OUTPATIENT
Start: 2017-05-15 | End: 2017-05-20 | Stop reason: HOSPADM

## 2017-05-15 RX ORDER — ROCURONIUM BROMIDE 10 MG/ML
INJECTION, SOLUTION INTRAVENOUS AS NEEDED
Status: DISCONTINUED | OUTPATIENT
Start: 2017-05-15 | End: 2017-05-15 | Stop reason: HOSPADM

## 2017-05-15 RX ORDER — METOPROLOL SUCCINATE 25 MG/1
25 TABLET, EXTENDED RELEASE ORAL DAILY
Status: DISCONTINUED | OUTPATIENT
Start: 2017-05-16 | End: 2017-05-17

## 2017-05-15 RX ORDER — SODIUM CHLORIDE 0.9 % (FLUSH) 0.9 %
5-10 SYRINGE (ML) INJECTION AS NEEDED
Status: DISCONTINUED | OUTPATIENT
Start: 2017-05-15 | End: 2017-05-15 | Stop reason: HOSPADM

## 2017-05-15 RX ORDER — MIDAZOLAM HYDROCHLORIDE 1 MG/ML
INJECTION, SOLUTION INTRAMUSCULAR; INTRAVENOUS AS NEEDED
Status: DISCONTINUED | OUTPATIENT
Start: 2017-05-15 | End: 2017-05-15 | Stop reason: HOSPADM

## 2017-05-15 RX ORDER — OXYCODONE AND ACETAMINOPHEN 10; 325 MG/1; MG/1
1 TABLET ORAL
Status: DISCONTINUED | OUTPATIENT
Start: 2017-05-15 | End: 2017-05-19

## 2017-05-15 RX ORDER — SODIUM CHLORIDE 0.9 % (FLUSH) 0.9 %
5-10 SYRINGE (ML) INJECTION EVERY 8 HOURS
Status: DISCONTINUED | OUTPATIENT
Start: 2017-05-15 | End: 2017-05-15 | Stop reason: HOSPADM

## 2017-05-15 RX ORDER — VANCOMYCIN HYDROCHLORIDE 1 G/20ML
INJECTION, POWDER, LYOPHILIZED, FOR SOLUTION INTRAVENOUS
Status: DISCONTINUED | OUTPATIENT
Start: 1840-12-31 | End: 2017-05-15 | Stop reason: HOSPADM

## 2017-05-15 RX ORDER — FLUTICASONE PROPIONATE 50 MCG
2 SPRAY, SUSPENSION (ML) NASAL
Status: DISCONTINUED | OUTPATIENT
Start: 2017-05-15 | End: 2017-05-20 | Stop reason: HOSPADM

## 2017-05-15 RX ORDER — LEVOTHYROXINE SODIUM 50 UG/1
100 TABLET ORAL
Status: DISCONTINUED | OUTPATIENT
Start: 2017-05-16 | End: 2017-05-20 | Stop reason: HOSPADM

## 2017-05-15 RX ORDER — ACETAMINOPHEN 325 MG/1
650 TABLET ORAL
Status: DISCONTINUED | OUTPATIENT
Start: 2017-05-15 | End: 2017-05-20 | Stop reason: HOSPADM

## 2017-05-15 RX ORDER — ZOLPIDEM TARTRATE 5 MG/1
5 TABLET ORAL
Status: DISCONTINUED | OUTPATIENT
Start: 2017-05-15 | End: 2017-05-20 | Stop reason: HOSPADM

## 2017-05-15 RX ORDER — HYDROMORPHONE HYDROCHLORIDE 2 MG/ML
INJECTION, SOLUTION INTRAMUSCULAR; INTRAVENOUS; SUBCUTANEOUS AS NEEDED
Status: DISCONTINUED | OUTPATIENT
Start: 2017-05-15 | End: 2017-05-15 | Stop reason: HOSPADM

## 2017-05-15 RX ORDER — LIDOCAINE HYDROCHLORIDE 10 MG/ML
0.1 INJECTION, SOLUTION EPIDURAL; INFILTRATION; INTRACAUDAL; PERINEURAL AS NEEDED
Status: DISCONTINUED | OUTPATIENT
Start: 2017-05-15 | End: 2017-05-15 | Stop reason: HOSPADM

## 2017-05-15 RX ORDER — DEXAMETHASONE SODIUM PHOSPHATE 4 MG/ML
INJECTION, SOLUTION INTRA-ARTICULAR; INTRALESIONAL; INTRAMUSCULAR; INTRAVENOUS; SOFT TISSUE AS NEEDED
Status: DISCONTINUED | OUTPATIENT
Start: 2017-05-15 | End: 2017-05-15 | Stop reason: HOSPADM

## 2017-05-15 RX ORDER — CEFAZOLIN SODIUM IN 0.9 % NACL 2 G/50 ML
2 INTRAVENOUS SOLUTION, PIGGYBACK (ML) INTRAVENOUS ONCE
Status: DISCONTINUED | OUTPATIENT
Start: 2017-05-15 | End: 2017-05-15 | Stop reason: HOSPADM

## 2017-05-15 RX ORDER — POVIDONE-IODINE 10 %
SOLUTION, NON-ORAL TOPICAL AS NEEDED
Status: DISCONTINUED | OUTPATIENT
Start: 2017-05-15 | End: 2017-05-15 | Stop reason: HOSPADM

## 2017-05-15 RX ORDER — LIDOCAINE HYDROCHLORIDE 20 MG/ML
INJECTION, SOLUTION EPIDURAL; INFILTRATION; INTRACAUDAL; PERINEURAL AS NEEDED
Status: DISCONTINUED | OUTPATIENT
Start: 2017-05-15 | End: 2017-05-15 | Stop reason: HOSPADM

## 2017-05-15 RX ORDER — CEFAZOLIN SODIUM IN 0.9 % NACL 2 G/50 ML
2 INTRAVENOUS SOLUTION, PIGGYBACK (ML) INTRAVENOUS EVERY 8 HOURS
Status: COMPLETED | OUTPATIENT
Start: 2017-05-15 | End: 2017-05-17

## 2017-05-15 RX ORDER — CETIRIZINE HCL 10 MG
10 TABLET ORAL DAILY
Status: DISCONTINUED | OUTPATIENT
Start: 2017-05-16 | End: 2017-05-16

## 2017-05-15 RX ORDER — HYDROMORPHONE HYDROCHLORIDE 1 MG/ML
1 INJECTION, SOLUTION INTRAMUSCULAR; INTRAVENOUS; SUBCUTANEOUS
Status: DISCONTINUED | OUTPATIENT
Start: 2017-05-15 | End: 2017-05-19

## 2017-05-15 RX ORDER — FLUTICASONE FUROATE AND VILANTEROL 100; 25 UG/1; UG/1
1 POWDER RESPIRATORY (INHALATION) DAILY
Status: DISCONTINUED | OUTPATIENT
Start: 2017-05-16 | End: 2017-05-20 | Stop reason: HOSPADM

## 2017-05-15 RX ORDER — CEFAZOLIN SODIUM 1 G/3ML
INJECTION, POWDER, FOR SOLUTION INTRAMUSCULAR; INTRAVENOUS AS NEEDED
Status: DISCONTINUED | OUTPATIENT
Start: 2017-05-15 | End: 2017-05-15 | Stop reason: HOSPADM

## 2017-05-15 RX ORDER — DOCUSATE SODIUM 100 MG/1
100 CAPSULE, LIQUID FILLED ORAL 2 TIMES DAILY
Status: DISCONTINUED | OUTPATIENT
Start: 2017-05-15 | End: 2017-05-19

## 2017-05-15 RX ORDER — DILTIAZEM HYDROCHLORIDE 240 MG/1
240 CAPSULE, COATED, EXTENDED RELEASE ORAL DAILY
Status: DISCONTINUED | OUTPATIENT
Start: 2017-05-16 | End: 2017-05-16

## 2017-05-15 RX ORDER — PROPOFOL 10 MG/ML
INJECTION, EMULSION INTRAVENOUS AS NEEDED
Status: DISCONTINUED | OUTPATIENT
Start: 2017-05-15 | End: 2017-05-15 | Stop reason: HOSPADM

## 2017-05-15 RX ORDER — LOSARTAN POTASSIUM 50 MG/1
50 TABLET ORAL DAILY
Status: DISCONTINUED | OUTPATIENT
Start: 2017-05-16 | End: 2017-05-20 | Stop reason: HOSPADM

## 2017-05-15 RX ORDER — DIAZEPAM 5 MG/1
5 TABLET ORAL
Status: DISCONTINUED | OUTPATIENT
Start: 2017-05-15 | End: 2017-05-19

## 2017-05-15 RX ORDER — ONDANSETRON 2 MG/ML
INJECTION INTRAMUSCULAR; INTRAVENOUS AS NEEDED
Status: DISCONTINUED | OUTPATIENT
Start: 2017-05-15 | End: 2017-05-15 | Stop reason: HOSPADM

## 2017-05-15 RX ORDER — DIPHENHYDRAMINE HCL 25 MG
25 CAPSULE ORAL
Status: DISCONTINUED | OUTPATIENT
Start: 2017-05-15 | End: 2017-05-20 | Stop reason: HOSPADM

## 2017-05-15 RX ORDER — HYDROMORPHONE HCL IN 0.9% NACL 15 MG/30ML
PATIENT CONTROLLED ANALGESIA VIAL INTRAVENOUS
Status: DISPENSED | OUTPATIENT
Start: 2017-05-15 | End: 2017-05-16

## 2017-05-15 RX ORDER — PROPOFOL 10 MG/ML
VIAL (ML) INTRAVENOUS
Status: DISCONTINUED | OUTPATIENT
Start: 2017-05-15 | End: 2017-05-15 | Stop reason: HOSPADM

## 2017-05-15 RX ORDER — NALOXONE HYDROCHLORIDE 0.4 MG/ML
0.4 INJECTION, SOLUTION INTRAMUSCULAR; INTRAVENOUS; SUBCUTANEOUS AS NEEDED
Status: DISCONTINUED | OUTPATIENT
Start: 2017-05-15 | End: 2017-05-20 | Stop reason: HOSPADM

## 2017-05-15 RX ORDER — PANTOPRAZOLE SODIUM 40 MG/1
40 TABLET, DELAYED RELEASE ORAL
Status: DISCONTINUED | OUTPATIENT
Start: 2017-05-16 | End: 2017-05-20 | Stop reason: HOSPADM

## 2017-05-15 RX ADMIN — ROCURONIUM BROMIDE 15 MG: 10 INJECTION, SOLUTION INTRAVENOUS at 08:39

## 2017-05-15 RX ADMIN — MIDAZOLAM HYDROCHLORIDE 2 MG: 1 INJECTION, SOLUTION INTRAMUSCULAR; INTRAVENOUS at 07:30

## 2017-05-15 RX ADMIN — Medication: at 19:17

## 2017-05-15 RX ADMIN — SODIUM CHLORIDE 125 ML/HR: 900 INJECTION, SOLUTION INTRAVENOUS at 11:30

## 2017-05-15 RX ADMIN — SODIUM CHLORIDE 125 ML/HR: 900 INJECTION, SOLUTION INTRAVENOUS at 19:17

## 2017-05-15 RX ADMIN — DOCUSATE SODIUM 100 MG: 100 CAPSULE ORAL at 15:08

## 2017-05-15 RX ADMIN — Medication: at 11:15

## 2017-05-15 RX ADMIN — OXYCODONE HYDROCHLORIDE AND ACETAMINOPHEN 1 TABLET: 10; 325 TABLET ORAL at 18:18

## 2017-05-15 RX ADMIN — PROPOFOL 140 MG: 10 INJECTION, EMULSION INTRAVENOUS at 07:43

## 2017-05-15 RX ADMIN — CEFAZOLIN 2 G: 1 INJECTION, POWDER, FOR SOLUTION INTRAMUSCULAR; INTRAVENOUS; PARENTERAL at 22:06

## 2017-05-15 RX ADMIN — CEFAZOLIN SODIUM 2 G: 1 INJECTION, POWDER, FOR SOLUTION INTRAMUSCULAR; INTRAVENOUS at 07:57

## 2017-05-15 RX ADMIN — HYDROMORPHONE HYDROCHLORIDE 1 MG: 1 INJECTION, SOLUTION INTRAMUSCULAR; INTRAVENOUS; SUBCUTANEOUS at 11:50

## 2017-05-15 RX ADMIN — SODIUM CHLORIDE, SODIUM LACTATE, POTASSIUM CHLORIDE, AND CALCIUM CHLORIDE: 600; 310; 30; 20 INJECTION, SOLUTION INTRAVENOUS at 09:51

## 2017-05-15 RX ADMIN — ROCURONIUM BROMIDE 5 MG: 10 INJECTION, SOLUTION INTRAVENOUS at 07:43

## 2017-05-15 RX ADMIN — SODIUM CHLORIDE, SODIUM LACTATE, POTASSIUM CHLORIDE, AND CALCIUM CHLORIDE: 600; 310; 30; 20 INJECTION, SOLUTION INTRAVENOUS at 08:01

## 2017-05-15 RX ADMIN — Medication 10 ML: at 22:14

## 2017-05-15 RX ADMIN — HYDROMORPHONE HYDROCHLORIDE 0.5 MG: 2 INJECTION, SOLUTION INTRAMUSCULAR; INTRAVENOUS; SUBCUTANEOUS at 10:25

## 2017-05-15 RX ADMIN — FENTANYL CITRATE 100 MCG: 50 INJECTION, SOLUTION INTRAMUSCULAR; INTRAVENOUS at 07:43

## 2017-05-15 RX ADMIN — LIDOCAINE HYDROCHLORIDE 60 MG: 20 INJECTION, SOLUTION EPIDURAL; INFILTRATION; INTRACAUDAL; PERINEURAL at 07:43

## 2017-05-15 RX ADMIN — DEXAMETHASONE SODIUM PHOSPHATE 8 MG: 4 INJECTION, SOLUTION INTRA-ARTICULAR; INTRALESIONAL; INTRAMUSCULAR; INTRAVENOUS; SOFT TISSUE at 07:56

## 2017-05-15 RX ADMIN — Medication 75 MCG/KG/MIN: at 08:08

## 2017-05-15 RX ADMIN — SODIUM CHLORIDE, SODIUM LACTATE, POTASSIUM CHLORIDE, AND CALCIUM CHLORIDE 100 ML/HR: 600; 310; 30; 20 INJECTION, SOLUTION INTRAVENOUS at 07:30

## 2017-05-15 RX ADMIN — DOCUSATE SODIUM 100 MG: 100 CAPSULE ORAL at 22:13

## 2017-05-15 RX ADMIN — DIAZEPAM 5 MG: 5 TABLET ORAL at 22:06

## 2017-05-15 RX ADMIN — CEFAZOLIN 2 G: 1 INJECTION, POWDER, FOR SOLUTION INTRAMUSCULAR; INTRAVENOUS; PARENTERAL at 15:11

## 2017-05-15 RX ADMIN — OXYCODONE HYDROCHLORIDE AND ACETAMINOPHEN 1 TABLET: 10; 325 TABLET ORAL at 22:13

## 2017-05-15 RX ADMIN — FENTANYL CITRATE 50 MCG: 50 INJECTION, SOLUTION INTRAMUSCULAR; INTRAVENOUS at 08:02

## 2017-05-15 RX ADMIN — SUCCINYLCHOLINE CHLORIDE 100 MG: 20 INJECTION INTRAMUSCULAR; INTRAVENOUS at 07:44

## 2017-05-15 RX ADMIN — HYDROMORPHONE HYDROCHLORIDE 0.5 MG: 2 INJECTION, SOLUTION INTRAMUSCULAR; INTRAVENOUS; SUBCUTANEOUS at 10:15

## 2017-05-15 RX ADMIN — FENTANYL CITRATE 50 MCG: 50 INJECTION, SOLUTION INTRAMUSCULAR; INTRAVENOUS at 08:20

## 2017-05-15 RX ADMIN — FLUTICASONE PROPIONATE 2 SPRAY: 50 SPRAY, METERED NASAL at 22:13

## 2017-05-15 RX ADMIN — ONDANSETRON 4 MG: 2 INJECTION INTRAMUSCULAR; INTRAVENOUS at 10:22

## 2017-05-15 RX ADMIN — FENTANYL CITRATE 50 MCG: 50 INJECTION, SOLUTION INTRAMUSCULAR; INTRAVENOUS at 07:51

## 2017-05-15 NOTE — PROGRESS NOTES
Met with pt, her fiance, son and DIL. Pt lives next door to her fiance and will be staying with him at [address] Pt has a RW, rollator, cane, transport chair, and her fiance has a chair lift. Pt chose [facility]. Referral sent via Natanael Ulien and accepted. CM to follow. Thanks.   Tova Perez LCSW    Care Management Interventions  Transition of Care Consult (CM Consult): [address]: No  Reason Outside [place]: Physician referred to specific agency  Physical Therapy Consult: Yes  Occupational Therapy Consult: Yes  Current Support Network: Own Home, Lives with Spouse  Confirm Follow Up Transport: Family  Plan discussed with Pt/Family/Caregiver: Yes  Freedom of Choice Offered: Yes  Discharge Location  Discharge Placement: Home with home health ([facility])

## 2017-05-15 NOTE — OP NOTES
[address] [facility]  [address] [facility], [address] [facility]    OPERATIVE REPORT      NAME: Lilian Ellsworth    AGE: 68 y.o. YOB: 1940    MEDICAL RECORD NUMBER: [id number]    DATE OF SURGERY: 5/15/2017    PREOPERATIVE DIAGNOSES:  1. Lumbar stenosis. 2. Acquired lumbar spondylolisthesis. POSTOPERATIVE DIAGNOSES:  1. Lumbar stenosis. 2. Acquired lumbar spondylolisthesis. OPERATIVE PROCEDURES:   1. Laminectomy, partial facetectomy, and foraminotomy of L4 and L5.   2. Laminectomy of S1.   3. Posterolateral fusion and posterior lumbar interbody fusion of L4-5. 4. Pedicle screw instrumentation with pedicle screws for L4 and L5 bilaterally. 5. Application of biomechanical intervertebral body device at L4-5 with [place]. 6. Morselized allograft for spine surgery and local autograft for spine surgery with stem cells. SURGEON: Mack Baker MD     ASSISTANT: ERMIAS Diaz    ANESTHESIA: General    ESTIMATED BLOOD LOSS: 541    COMPLICATIONS: None apparent    NEUROMONITORING: We used SSEPs and spontaneous EMGs with pedicle screw stimulation    INDICATION: The patient is a very pleasant 68 y.o. female with debilitating leg pain and back pain. She failed conservative measures. She elected to proceed with operative intervention. She was aware of the risks, benefits, and alternatives. She provided informed consent. DESCRIPTION OF PROCEDURE: The patient was identified in the preoperative holding area. The lumbar spine was marked by me. She was transferred to the operating room where general anesthesia was given. She was also given perioperative antibiotics. She was placed prone on the [place] [facility] table. All bony prominences were well padded. The lumbar spine was prepped and draped in the usual standard fashion. We performed a surgical time out. I made a skin incision from L3 to S1. I exposed the posterior lumbar spine in standard fashion.  I took intraoperative fluoroscopy to verify our levels. I exposed the transverse processes of L4 and L5 bilaterally. I then began my decompression by performing a laminectomy of L4. I also performed a partial facetectomy and foraminotomy to decompress the thecal sac and nerve roots of L4. I also performed a laminectomy of L5 with bilateral partial facetectomy and foraminotomy to decompress the thecal sac and traversing L5 nerve roots. I decompressed the stenosis found within the foramen and lateral to the foramen for L4-L5 on the left side. At this point our transforaminal approach to L4-L5 on the left side was complete with exposure of the left L4-5 disc space. I also performed a laminectomy of S1. I then performed a standard discectomy at L4-5. I prepared the endplates to bleeding bone. I performed trial sizing. I placed a biomechanical device into L4-5 with the appropriate amount of tension and alignment. I placed bone graft. I then cannulated our pedicles for L4 and L5 bilaterally in standard fashion. I probed each pedicle. I copiously irrigated the entire wound. I decorticated our fusion beds bilaterally with a high-speed juancho. I placed our bone graft into our fusions beds bilaterally. I then placed pedicle screws for L4 and L5 bilaterally in standard fashion under fluoroscopic guidance. I had good purchase for each pedicle. I then stimulated all 4 pedicle screws with pedicle screw stimulation. The pedicle screws were found to be within the appropriate range of amplitude. I then placed contoured rods on top of the pedicles bilaterally. The rods were locked to the pedicle screws according to the 's specification with set screws. We had good hemostasis. There was no CSF leaking. The fusion beds were packed with morselized allograft, local autograft, and cells. The exposed neurologic elements were protected with Duragen. I injected the soft tissues with a pain management cocktail.  A deep drain was placed. The wound was closed in layers. A sterile dressing was applied. The patient was extubated and transferred to the recovery room in good medical condition. Dr. Jadyn SCHMIDT, performed the above procedures.      Jadyn Sparrow MD  5/15/2017

## 2017-05-15 NOTE — PERIOP NOTES
TRANSFER - OUT REPORT:    Verbal report given to Leyla Flores on Alvin Nyhan  being transferred to  for routine post - op       Report consisted of patients Situation, Background, Assessment and   Recommendations(SBAR). Information from the following report(s) SBAR and MAR was reviewed with the receiving nurse. Opportunity for questions and clarification was provided.       Patient transported with:   O2 @ 2 liters  Registered Nurse

## 2017-05-15 NOTE — ANESTHESIA PREPROCEDURE EVALUATION
Anesthetic History   No history of anesthetic complications            Review of Systems / Medical History  Patient summary reviewed, nursing notes reviewed and pertinent labs reviewed    Pulmonary            Asthma        Neuro/Psych   Within defined limits           Cardiovascular    Hypertension              Exercise tolerance: >4 METS  Comments: metoprolol 4;30 am   GI/Hepatic/Renal     GERD           Endo/Other      Hypothyroidism       Other Findings            Physical Exam    Airway  Mallampati: II  TM Distance: < 4 cm  Neck ROM: normal range of motion   Mouth opening: Normal     Cardiovascular  Regular rate and rhythm,  S1 and S2 normal,  no murmur, click, rub, or gallop  Rhythm: regular  Rate: normal         Dental    Dentition: Caps/crowns and Implants     Pulmonary  Breath sounds clear to auscultation               Abdominal  GI exam deferred       Other Findings            Anesthetic Plan    ASA: 2  Anesthesia type: general          Induction: Intravenous  Anesthetic plan and risks discussed with: Patient

## 2017-05-15 NOTE — PROGRESS NOTES
Primary Nurse Silverio Vo RN and Angie Black RN performed a dual skin assessment on this patient. No impairment noted except surgical incision to back and hemavac site to (L) lower back.   Linwood score is 19

## 2017-05-15 NOTE — IP AVS SNAPSHOT
Yael Osei [address] 1007 [facility] 
[phone] Patient: Rickie Patel MRN: [id number] [facility]:[birth year] You are allergic to the following Allergen Reactions Ace Inhibitors Angioedema Recent Documentation Height Weight BMI OB Status Smoking Status 1.6 m 53.9 kg 21.05 kg/m2 Postmenopausal Never Smoker Emergency Contacts Name Discharge Info Relation Home Work Mobile Jose Carter DISCHARGE CAREGIVER [3] Boyfriend [17] [phone] DaDoreen calzada DISCHARGE CAREGIVER [3] Daughter [21] [phone] Woo  13. CAREGIVER [3] Child [2] [phone] About your hospitalization You were admitted on:  May 15, 2017 You last received care in the:  [facility] 4M POST SURG ORT 2 You were discharged on:  May 20, 2017 Unit phone number:  [phone] Why you were hospitalized Your primary diagnosis was:  Lumbar Stenosis With Neurogenic Claudication Your diagnoses also included:  Acute Pulmonary Embolism (Hcc), Essential Hypertension, Acquired Hypothyroidism, Asthma, Gerd (Gastroesophageal Reflux Disease), Osteoporosis Providers Seen During Your Hospitalizations Provider Role Specialty Primary office phone Josephine Wang MD Attending Provider Orthopedic Surgery [phone] Your Primary Care Physician (PCP) Primary Care Physician Office Phone Office Fax Adria Dia [phone] Follow-up Information Follow up With Details Comments Contact Info AT [place] [id number] [phone] ERMIAS Rogers In 3 weeks as previously scheduled [address] [facility] [address] 1007 [facility] 
[phone] Juan Mae MD Schedule an appointment as soon as possible for a visit  [address] [facility]AM[facility] 1007 [facility] 
[phone] Current Discharge Medication List  
  
START taking these medications Dose & Instructions Dispensing Information Comments Morning Noon Evening Bedtime * apixaban 5 mg tablet Commonly known as:  Kasey Naylor Your last dose was: Your next dose is:    
   
   
 Dose:  10 mg Take 2 Tabs by mouth two (2) times a day for 13 doses. Quantity:  26 Tab Refills:  0  
     
   
   
   
  
 * apixaban 5 mg tablet Commonly known as:  Kasey Naylor Start taking on:  5/27/2017 Your last dose was: Your next dose is:    
   
   
 Dose:  5 mg Take 1 Tab by mouth two (2) times a day for 184 days. Quantity:  368 Tab Refills:  0  
     
   
   
   
  
 docusate sodium 100 mg capsule Commonly known as:  Nehal Skinner Your last dose was: Your next dose is:    
   
   
 Dose:  100 mg Take 1 Cap by mouth two (2) times a day. Quantity:  60 Cap Refills:  2  
     
   
   
   
  
 oxyCODONE-acetaminophen 5-325 mg per tablet Commonly known as:  PERCOCET Your last dose was: Your next dose is: Take 1-2 tablets by mouth every 4 to 6 hours as needed for pain Quantity:  90 Tab Refills:  0  
     
   
   
   
  
 promethazine 25 mg tablet Commonly known as:  PHENERGAN Your last dose was: Your next dose is:    
   
   
 Dose:  25 mg Take 1 Tab by mouth every six (6) hours as needed for Nausea. Quantity:  60 Tab Refills:  2  
     
   
   
   
  
 * Notice: This list has 2 medication(s) that are the same as other medications prescribed for you. Read the directions carefully, and ask your doctor or other care provider to review them with you. CONTINUE these medications which have CHANGED Dose & Instructions Dispensing Information Comments Morning Noon Evening Bedtime  
 cyclobenzaprine 10 mg tablet Commonly known as:  FLEXERIL What changed: - medication strength 
- how much to take - when to take this Your last dose was: Your next dose is:    
   
   
 Dose:  10 mg Take 1 Tab by mouth three (3) times daily as needed for Muscle Spasm(s). Quantity:  60 Tab Refills:  2  
     
   
   
   
  
 metoprolol succinate 50 mg XL tablet Commonly known as:  TOPROL-XL What changed:   
- medication strength 
- how much to take Your last dose was: Your next dose is:    
   
   
 Dose:  50 mg Take 1 Tab by mouth daily. Quantity:  30 Tab Refills:  0 CONTINUE these medications which have NOT CHANGED Dose & Instructions Dispensing Information Comments Morning Noon Evening Bedtime ADVAIR DISKUS 100-50 mcg/dose diskus inhaler Generic drug:  fluticasone-salmeterol Your last dose was: Your next dose is:    
   
   
 Dose:  1 Puff Take 1 Puff by inhalation two (2) times a day. Refills:  0  
     
   
   
   
  
 CARTIA  mg ER capsule Generic drug:  dilTIAZem CD Your last dose was: Your next dose is:    
   
   
 Dose:  240 mg Take 240 mg by mouth daily. Refills:  0  
     
   
   
   
  
 fluticasone 50 mcg/actuation nasal spray Commonly known as:  Ala Lips Your last dose was: Your next dose is:    
   
   
 Dose:  2 Spray 2 Sprays by Both Nostrils route nightly. Refills:  0  
     
   
   
   
  
 levothyroxine 100 mcg tablet Commonly known as:  SYNTHROID Your last dose was: Your next dose is:    
   
   
 Dose:  100 mcg Take 100 mcg by mouth Daily (before breakfast). Refills:  0  
     
   
   
   
  
 losartan 50 mg tablet Commonly known as:  COZAAR Your last dose was: Your next dose is:    
   
   
 Dose:  50 mg Take 50 mg by mouth daily. Refills:  0 MAXALT 5 mg tablet Generic drug:  rizatriptan Your last dose was: Your next dose is: Dose:  5 mg Take 5 mg by mouth as needed for Migraine. May repeat in 2 hours if needed Refills:  0  
     
   
   
   
  
 pregabalin 50 mg capsule Commonly known as:  Lexie Varner Your last dose was: Your next dose is:    
   
   
 Dose:  50 mg Take 50 mg by mouth nightly as needed. Refills:  0 PREVACID 15 mg capsule Generic drug:  lansoprazole Your last dose was: Your next dose is: Take  by mouth Daily (before breakfast). Refills:  0  
     
   
   
   
  
 VITAMIN B-12 1,000 mcg/mL injection Generic drug:  cyanocobalamin Your last dose was: Your next dose is:    
   
   
 Dose:  1000 mcg  
1,000 mcg by IntraMUSCular route every thirty (30) days. Refills:  0 ZyrTEC 10 mg Cap Generic drug:  Cetirizine Your last dose was: Your next dose is:    
   
   
 Dose:  1 Cap Take 1 Cap by mouth nightly. Refills:  0 STOP taking these medications ALEVE 220 mg Cap Generic drug:  naproxen sodium  
   
  
 diclofenac EC 50 mg EC tablet Commonly known as:  VOLTAREN  
   
  
 estradiol 1 mg tablet Commonly known as:  ESTRACE HYDROcodone-acetaminophen 5-325 mg per tablet Commonly known as:  Deandre [place] #3 300-30 mg per tablet Generic drug:  acetaminophen-codeine Where to Get Your Medications Information on where to get these meds will be given to you by the nurse or doctor. ! Ask your nurse or doctor about these medications  
  apixaban 5 mg tablet  
 apixaban 5 mg tablet  
 cyclobenzaprine 10 mg tablet  
 docusate sodium 100 mg capsule  
 metoprolol succinate 50 mg XL tablet  
 oxyCODONE-acetaminophen 5-325 mg per tablet  
 promethazine 25 mg tablet Discharge Instructions HOME DISCHARGE INSTRUCTIONS Lilian Ellsworth / [id number] : 1940 Admission date: 5/15/2017 Discharge date: 5/19/2017 12:35 PM  
 
New Medications: 
- Eliquis 2 tablets at 2 times/ day for 7 days total. Then take Eliquis 1 tablet, 2 times/ day for 6 months. Changed Medications: 
Increased Toprol XL to 50 mg daily Lumbar Spinal Surgery Discharge Instructions Dr. Renee Tomlinson [address] [place] [address] [phone] Activity:   
? You are going home a well person, be as active as possible. Your only exercise should be walking. Start with short frequent walks and increase your walking distance each day. Start with walking twice a day for 5 minutes and increase your distance each day 2-3 minutes until you reach 25 minutes twice a day. Limit the amount of time you sit to 20-30 minute intervals. Sitting for prolonged periods of time will be uncomfortable for you following your surgery. ? No bending, lifting (of 5lbs or more), twisting, or straining. ? Do not drive until your doctor states you may do so. However, you may ride in a car for short distances. ? If you are required to wear a brace, you should wear it at all times when you are out of bed. You may remove it when sleeping unless your physician advises you against it. ? When you are in the bed, you may lay on your back or on either side. Do not lie on your stomach. ? You may resume sexual relations 6 weeks after surgery. ? Continue to use your incentive spirometer for deep breathing exercises. Driving: ? You may not drive or return to work until instructed by your physician. However, you may ride in the car for short periods of time. Brace: ? If you have a back brace, you should wear your brace at all times when you are out of bed. Do not wear the brace while in bed or showering. ? Remember to always wear a cotton t-shirt underneath your brace. ? Do not bend or twist when your brace is off. Diet: 
? You may resume your regular home diet as tolerated.   If your throat is sore, you should eat soft foods for the first couple of days. ? Be sure to drink plenty of fluids; it is important to keep yourself hydrated. ? Avoid alcoholic beverages and ABSOLUTELY NO tobacco products. Tobacco products will interfere with your healing. If you continue to use tobacco, you may end up needing another surgery in the future. Dressing: You have on a Prineo dressing. This is a waterproof bacteriostatic dressing that requires no post-operative care. Please do not peel the dressing off, or apply any oil based products, as they may expedite the deterioration of the mesh. The dressing will slowly chip off on its own. 
? Do not rub or apply lotion or ointments to incision site. Shower: ? You may shower 5 days after your surgery. ? You may remove your brace during showers. ? NO not use tub baths, swimming pools or Jacuzzis. Medications: 
? Check with your physician before taking any anti-inflammatory medications. (Advil, Aleve, Ibuprofen, Aspirin) ? Take your pain medication as directed ? Do NOT take additional Tylenol if your prescribed pain medication has acetaminophen in it (Endocet/Percocet, Lortab, Norco) ? It is important to have regular bowel movements. Pain medications can be constipating. Stool softeners, warm prune juice, increasing your water intake, and increasing fiber in your diet can help in preventing constipation. ? Do NOT take laxatives if at all possible except in severe situations. It can result in a vicious cycle of constipation and diarrhea. Stockings: ? You have been given T.E.D. stockings to wear. Continue to wear these for 7 days after your discharge. Put them on in the morning and take them off at night. They are used to increase your circulation and prevent blood clots from forming in your legs. Follow-Up ? Please call [facility] to schedule your 1st post-operative appointment. This should be approximately 3 weeks from your surgery date. Notify your physician in you develop any of the following conditions: 
? Fever above 101 degrees for 24 hours. ? Nausea or vomiting. ? Severe headache. 
? Inability to urinate ? Loss of bowel or bladder function (sudden onset of incontinence) ? Changes in sensation in your extremities (numbness, tingling, loss of color). ? Severe pain or pain not relieved by medications. ? Redness, swelling, or drainage from your incision. ? Persistent pain in the chest.  
? Pain in the calf of either leg. 
? Increased weakness (if this is greater than before your surgery). If you have any questions about your dressing contact your Orthopaedic Surgeons office. STOP TAKING TYLENOL #3 AND NORCO/HYDROCODONE. YOU WERE GIVEN A PRESCRIPTION FOR ENDOCET/PERCOCET TODAY AT [place] [address]. DO NOT TAKE ANY OTHER NARCOTIC PAIN MEDICATIONS 
 
STOP TAKING ANY PREVIOUSLY PRESCRIBED FLEXERIL. YOU WERE GIVEN A NEW PRESCRIPTION FOR FLEXERIL TODAY AT [place] [address] 
 
STOP TAKING DICLOFENAC AND ALEVE. 
 
** IMPORTANT: UNDER YOUR HONEYCOMB DRESSING, YOU HAVE A PRINEO ADHESIVE MESH DIRECTLY OVER YOUR INCISION. THIS WAS STERILELY GLUED TO YOUR SKIN DURING SURGERY. DO NOT REMOVE THIS. NO ONE ELSE SHOULD REMOVE IT EITHER. IT WILL COME OFF ON ITS OWN OVER TIME 
 
* WEAR YOUR BRACE AS ADVISED * NO DRIVING UNTIL YOU ARE CLEARED TO DO SO BY YOUR SURGEON 
 
* LIMIT LIFTING, BENDING AND TWISTING. NO LIFTING MORE THAN 5 LBS * MAKE SURE YOU ARE GETTING GOOD NUTRITION (Lean Protein, Vitamin D AND Calcium) * DO NOT TAKE ANY NSAIDs FOR THE FIRST 3 MONTHS AFTER SURGERY (such as Advil/Ibuprofen/Motrin, Aleve/Naproxen/Naprosyn, Diclofenac, Celebrex, Meloxicam, Indomethacin, Goody's powder, BC powder etc.) * NO NICOTINE PRODUCTS * FULLY READ YOUR DISCHARGE INSTRUCTIONS Nutrition Recommendations for Discharge: 
 
Continue Oral Nutrition Supplements at discharge: Ensure Enlive or Ensure Plus 1-2 times per day  
for 30 days unless otherwise directed by your Primary Care Physician. This product can be purchased at your local grocery store or drug store and online. Jacob Pike, RD 
 _ Discharge Orders None [employer] Announcement We are excited to announce that we are making your provider's discharge notes available to you in [employer]. You will see these notes when they are completed and signed by the physician that discharged you from your recent hospital stay. If you have any questions or concerns about any information you see in [employer], please call the Health Information Department where you were seen or reach out to your Primary Care Provider for more information about your plan of care. Introducing [facility]! Dear Melanie Alvarez: Thank you for requesting a [employer] account. Our records indicate that you already have an active [employer] account. You can access your account anytime at [email or web address] Did you know that you can access your hospital and ER discharge instructions at any time in [employer]? You can also review all of your test results from your hospital stay or ER visit. Additional Information If you have questions, please visit the Frequently Asked Questions section of the [employer] website at [email or web address] Remember, [employer] is NOT to be used for urgent needs. For medical emergencies, dial 911. Now available from your iPhone and Android! General Information Please provide this summary of care documentation to your next provider. Patient Signature:  ____________________________________________________________ Date:  ____________________________________________________________  
  
Eduardo Schultz Provider Signature:  ____________________________________________________________ Date:  ____________________________________________________________

## 2017-05-15 NOTE — H&P
Date of Surgery Update:  Herman Field was seen and examined. History and physical has been reviewed. The patient has been examined.  There have been no significant clinical changes since the completion of the originally dated History and Physical.    Signed By: Candi Lai MD     May 15, 2017 7:30 AM

## 2017-05-16 LAB
ANION GAP BLD CALC-SCNC: 9 MMOL/L (ref 5–15)
BUN SERPL-MCNC: 13 MG/DL (ref 6–20)
BUN/CREAT SERPL: 14 (ref 12–20)
CALCIUM SERPL-MCNC: 7.7 MG/DL (ref 8.5–10.1)
CHLORIDE SERPL-SCNC: 104 MMOL/L (ref 97–108)
CO2 SERPL-SCNC: 25 MMOL/L (ref 21–32)
CREAT SERPL-MCNC: 0.92 MG/DL (ref 0.55–1.02)
ERYTHROCYTE [DISTWIDTH] IN BLOOD BY AUTOMATED COUNT: 13.7 % (ref 11.5–14.5)
GLUCOSE SERPL-MCNC: 121 MG/DL (ref 65–100)
HCT VFR BLD AUTO: 25.8 % (ref 35–47)
HGB BLD-MCNC: 8.5 G/DL (ref 11.5–16)
MCH RBC QN AUTO: 29.4 PG (ref 26–34)
MCHC RBC AUTO-ENTMCNC: 32.9 G/DL (ref 30–36.5)
MCV RBC AUTO: 89.3 FL (ref 80–99)
PLATELET # BLD AUTO: 260 K/UL (ref 150–400)
POTASSIUM SERPL-SCNC: 4 MMOL/L (ref 3.5–5.1)
RBC # BLD AUTO: 2.89 M/UL (ref 3.8–5.2)
SODIUM SERPL-SCNC: 138 MMOL/L (ref 136–145)
WBC # BLD AUTO: 13.1 K/UL (ref 3.6–11)

## 2017-05-16 PROCEDURE — 85027 COMPLETE CBC AUTOMATED: CPT | Performed by: ORTHOPAEDIC SURGERY

## 2017-05-16 PROCEDURE — 65270000029 HC RM PRIVATE

## 2017-05-16 PROCEDURE — 77010033678 HC OXYGEN DAILY

## 2017-05-16 PROCEDURE — 97116 GAIT TRAINING THERAPY: CPT

## 2017-05-16 PROCEDURE — 97165 OT EVAL LOW COMPLEX 30 MIN: CPT

## 2017-05-16 PROCEDURE — 97530 THERAPEUTIC ACTIVITIES: CPT

## 2017-05-16 PROCEDURE — 74011250637 HC RX REV CODE- 250/637: Performed by: ORTHOPAEDIC SURGERY

## 2017-05-16 PROCEDURE — 80048 BASIC METABOLIC PNL TOTAL CA: CPT | Performed by: ORTHOPAEDIC SURGERY

## 2017-05-16 PROCEDURE — 74011250636 HC RX REV CODE- 250/636: Performed by: ORTHOPAEDIC SURGERY

## 2017-05-16 PROCEDURE — 36415 COLL VENOUS BLD VENIPUNCTURE: CPT | Performed by: ORTHOPAEDIC SURGERY

## 2017-05-16 PROCEDURE — 97535 SELF CARE MNGMENT TRAINING: CPT

## 2017-05-16 PROCEDURE — 97161 PT EVAL LOW COMPLEX 20 MIN: CPT

## 2017-05-16 RX ORDER — DILTIAZEM HYDROCHLORIDE 240 MG/1
240 CAPSULE, COATED, EXTENDED RELEASE ORAL
Status: DISCONTINUED | OUTPATIENT
Start: 2017-05-16 | End: 2017-05-20 | Stop reason: HOSPADM

## 2017-05-16 RX ORDER — CETIRIZINE HCL 10 MG
10 TABLET ORAL
Status: DISCONTINUED | OUTPATIENT
Start: 2017-05-16 | End: 2017-05-20 | Stop reason: HOSPADM

## 2017-05-16 RX ADMIN — DIAZEPAM 5 MG: 5 TABLET ORAL at 20:54

## 2017-05-16 RX ADMIN — LEVOTHYROXINE SODIUM 100 MCG: 0.05 TABLET ORAL at 05:40

## 2017-05-16 RX ADMIN — CETIRIZINE HYDROCHLORIDE 10 MG: 10 TABLET, FILM COATED ORAL at 20:55

## 2017-05-16 RX ADMIN — DILTIAZEM HYDROCHLORIDE 240 MG: 240 CAPSULE, COATED, EXTENDED RELEASE ORAL at 20:55

## 2017-05-16 RX ADMIN — CEFAZOLIN 2 G: 1 INJECTION, POWDER, FOR SOLUTION INTRAMUSCULAR; INTRAVENOUS; PARENTERAL at 20:55

## 2017-05-16 RX ADMIN — OXYCODONE HYDROCHLORIDE AND ACETAMINOPHEN 1 TABLET: 10; 325 TABLET ORAL at 16:23

## 2017-05-16 RX ADMIN — FLUTICASONE PROPIONATE 2 SPRAY: 50 SPRAY, METERED NASAL at 20:56

## 2017-05-16 RX ADMIN — SODIUM CHLORIDE 125 ML/HR: 900 INJECTION, SOLUTION INTRAVENOUS at 12:07

## 2017-05-16 RX ADMIN — DOCUSATE SODIUM 100 MG: 100 CAPSULE ORAL at 10:05

## 2017-05-16 RX ADMIN — OXYCODONE HYDROCHLORIDE AND ACETAMINOPHEN 1 TABLET: 10; 325 TABLET ORAL at 20:55

## 2017-05-16 RX ADMIN — OXYCODONE HYDROCHLORIDE AND ACETAMINOPHEN 1 TABLET: 10; 325 TABLET ORAL at 12:07

## 2017-05-16 RX ADMIN — Medication 10 ML: at 05:41

## 2017-05-16 RX ADMIN — Medication: at 07:34

## 2017-05-16 RX ADMIN — DOCUSATE SODIUM 100 MG: 100 CAPSULE ORAL at 17:35

## 2017-05-16 RX ADMIN — SODIUM CHLORIDE 125 ML/HR: 900 INJECTION, SOLUTION INTRAVENOUS at 03:00

## 2017-05-16 RX ADMIN — Medication 10 ML: at 14:35

## 2017-05-16 RX ADMIN — METOPROLOL SUCCINATE 25 MG: 25 TABLET, FILM COATED, EXTENDED RELEASE ORAL at 12:07

## 2017-05-16 RX ADMIN — CEFAZOLIN 2 G: 1 INJECTION, POWDER, FOR SOLUTION INTRAMUSCULAR; INTRAVENOUS; PARENTERAL at 05:40

## 2017-05-16 RX ADMIN — PANTOPRAZOLE SODIUM 40 MG: 40 TABLET, DELAYED RELEASE ORAL at 05:40

## 2017-05-16 RX ADMIN — OXYCODONE HYDROCHLORIDE AND ACETAMINOPHEN 1 TABLET: 10; 325 TABLET ORAL at 02:32

## 2017-05-16 RX ADMIN — FLUTICASONE FUROATE AND VILANTEROL TRIFENATATE 1 PUFF: 100; 25 POWDER RESPIRATORY (INHALATION) at 10:10

## 2017-05-16 RX ADMIN — Medication 10 ML: at 20:56

## 2017-05-16 RX ADMIN — DIAZEPAM 5 MG: 5 TABLET ORAL at 05:40

## 2017-05-16 RX ADMIN — CEFAZOLIN 2 G: 1 INJECTION, POWDER, FOR SOLUTION INTRAMUSCULAR; INTRAVENOUS; PARENTERAL at 14:35

## 2017-05-16 NOTE — PROGRESS NOTES
Bedside and Verbal shift change report given to Mark Poon (oncoming nurse) by Tito Gannon (offgoing nurse). Report included the following information SBAR, Kardex and Recent Results.

## 2017-05-16 NOTE — PROGRESS NOTES
Bedside and Verbal shift change report given to Brett Salmon (oncoming nurse) by Rachele Mata RN (offgoing nurse). Report included the following information SBAR, Kardex, OR Summary, Procedure Summary, Intake/Output and MAR.

## 2017-05-16 NOTE — PROGRESS NOTES
ORTHOPAEDIC LUMBAR FUSION PROGRESS NOTE    NAME:     Liz Cosme   :       1940   MRN:       [id number]   DATE:      2017    POD:    1 Day Post-Op  S/P:    Procedure(s):  L4-S1 LAMINECTOMY / L4-5 FUSION /INSTRUMENTATION / TLIF / BONEGRAFT    SUBJECTIVE:    C/O back pain along surgical incision  No leg pain or numbness  Denies nausea/vomiting, chest pain, headache or shortness of breath  Pain controlled    Hgb 8.5, will monitor    Recent Labs      17   HGB  8.5*   HCT  25.8*   NA  138   K  4.0   CL  104   CO2  25   BUN  13   CREA  0.92   GLU  121*     Patient Vitals for the past 12 hrs:   BP Temp Pulse Resp SpO2   17 0419 115/57 98.2 °F (36.8 °C) 92 16 96 %   17 0235 151/58 98.4 °F (36.9 °C) 80 16 95 %   17 0020 140/65 98.8 °F (37.1 °C) 84 16 95 %       EXAM:  Dressings clean, dry and intact   Positive strength/ROM bilat lower ext.   Neuro intact to sensation  Calves, soft & nontender  BL LEs NVID      PLAN:  D/C PCA, change to PO pain medications  PT/OT, OOB  Advance regular diet      Alem Prince, [address]  Orthopaedic Surgery  Physician Assistant to Dr. Alessandra Gonzalez

## 2017-05-16 NOTE — INTERDISCIPLINARY ROUNDS
[address]    Patient Information    Name: Magdi Gonzalez  Age: 68 y.o. Admission Date: 5/15/2017  Surgery/Procedure: Procedure(s):  L4-S1 LAMINECTOMY / L4-5 FUSION /INSTRUMENTATION / TLIF / BONEGRAFT  Attending Provider: Liana Novak MD  Surgeon: Ainsley Bardales   Problem List: Active Problems:    Lumbar stenosis with neurogenic claudication (5/15/2017)      During rounds the following quality care indicators and evidence based practices were addressed :       PT/OT: Patient mobility - walked to door and back with PT. Pain Assessment  Pain Intensity 1: 5 (05/15/17 9480)  Pain Location 1: Back  Pain Intervention(s) 1: Medication (see MAR)  Patient Stated Pain Goal: 3    Pain meds: Dilaudid PCA, percocet  Antibiotics completed: Yes  Anticoagulation:      SCDs: in place  Bowels:     RRAT Score:      Readmit Risk Tool  Support Systems: Child(rodrigo), Family member(s)    Discharge Management/Planning:    Readmit Risk Assessment Information:   Low Risk            9       Total Score        4 More than 1 Admission in calendar year    5 Patient Insurance is Medicare, Medicaid or Self Pay        Criteria that do not apply:    Relationship with PCP    Patient Living Status    Patient Length of Stay > 5    Charlson Comorbidity Score         Readmit Risk Tool  Support Systems: Child(rodrigo), Family member(s)    [address]: at home care  Rehab/SNF Facility:     Anticipated Date of Discharge: Thursday    Interdisciplinary team rounds were held with the following team members: Nurse Practitioner, Case Management, Nursing, Pharmacy, and Rehab. See clinical pathway and/or care plan for interventions and desired outcomes.

## 2017-05-16 NOTE — PROGRESS NOTES
Problem: Mobility Impaired (Adult and Pediatric)  Goal: *Acute Goals and Plan of Care (Insert Text)  Physical Therapy Goals  Initiated 5/16/2017  1. Patient will move from supine to sit and sit to supine , scoot up and down and roll side to side in bed with modified independence within 7 day(s). 2. Patient will transfer from bed to chair and chair to bed with modified independence using the least restrictive device within 7 day(s). 3. Patient will perform sit to stand with modified independence within 7 day(s). 4. Patient will ambulate with modified independence for 150 feet with the least restrictive device within 7 day(s). 5. Patient will ascend/descend 4 stairs with 1 handrail(s) with minimal assistance/contact guard assist within 7 day(s). PHYSICAL THERAPY EVALUATION  Patient: Mary Roberts (77 y.o. female)  Date: 5/16/2017  Primary Diagnosis: LUMBAR STENOSIS   Lumbar stenosis with neurogenic claudication  Procedure(s) (LRB):  L4-S1 LAMINECTOMY / L4-5 FUSION /INSTRUMENTATION / TLIF / BONEGRAFT (N/A) 1 Day Post-Op   Precautions:   Back, Fall (LSO brace)      ASSESSMENT :  Based on the objective data described below, the patient presents with anxiety, incisional back pain, mild radicular pain to left hip, intact sensation, and decreased but intact strength at least 3/5 or greater throughout BLEs. Patient with decreased balance first time standing post op spine surgery. Patient was amb with occasional use of cane prior to amb but independent community ambulator. She is staying with demetrice at discharge (demetrice lives next door to her) and he has stair lift into home. She has steps she will eventually need to negotiate at her home. Patient educated on spine precautions and fall prevention - up with assistance only. Patient verbalizes good understanding and up in chair in NAD. Anticipate good progress with PT and home with HHPT.  NO DME needs     Patient will benefit from skilled intervention to address the above impairments. Patients rehabilitation potential is considered to be Excellent  Factors which may influence rehabilitation potential include:   [X]         None noted  [ ]         Mental ability/status  [ ]         Medical condition  [ ]         Home/family situation and support systems  [ ]         Safety awareness  [ ]         Pain tolerance/management  [ ]         Other:        PLAN :  Recommendations and Planned Interventions:  [X]           Bed Mobility Training             [ ]    Neuromuscular Re-Education  [X]           Transfer Training                   [ ]    Orthotic/Prosthetic Training  [X]           Gait Training                         [ ]    Modalities  [X]           Therapeutic Exercises           [ ]    Edema Management/Control  [X]           Therapeutic Activities            [X]    Patient and Family Training/Education  [ ]           Other (comment):     Frequency/Duration: Patient will be followed by physical therapy  twice daily to address goals. Discharge Recommendations: Home Health  Further Equipment Recommendations for Discharge: none       SUBJECTIVE:   Patient stated The pain in my legs is much better since sx.  My left side was more involved      OBJECTIVE DATA SUMMARY:   HISTORY:    Past Medical History:   Diagnosis Date    Anemia      Arthritis       osteoporosis    Asthma      Broken ribs 2006     fell    Cancer ([facility] [address]       melanoma     GERD (gastroesophageal reflux disease)      Hypertension      Migraines      Osteoporosis      Rheumatic fever       x2 without residual    Thyroid disease       Past Surgical History:   Procedure Laterality Date    HX ABOVE KNEE AMPUTATION Left 05/2015     remove Tillman's neuroma    HX BREAST BIOPSY   1972    HX CATARACT REMOVAL Bilateral 2009, 2011    HX GYN   1969     Hysterectomy    HX HEENT   1996     sinus surgery    HX HEENT         deviated septum    HX ORTHOPAEDIC Left 2006     Tarsal Tunnel Release     HX ORTHOPAEDIC Right 2010,2016     basal joint surgery    HX ORTHOPAEDIC Left 2012     basal joint surgery     HX OTHER SURGICAL         TVT Sling    HX OTHER SURGICAL         Tillman's Neuroma removal     HX SEPTOPLASTY        HX TONSIL AND ADENOIDECTOMY        HX TONSILLECTOMY   1946    HX UROLOGICAL   2010     TVT sling     Prior Level of Function/Home Situation: independent community amb with SPC occassionally  Personal factors and/or comorbidities impacting plan of care: Osteoporosis , falls     Home Situation  Home Environment: Private residence  # Steps to Enter: 10  One/Two Story Residence: Two story, live on 1st floor  Lift Chair Available: Yes  Living Alone: Yes  Support Systems: Child(rodrigo), Family member(s)  Patient Expects to be Discharged to[de-identified] Private residence  Current DME Used/Available at Home: None     EXAMINATION/PRESENTATION/DECISION MAKING:   Critical Behavior:  Neurologic State: Alert, Appropriate for age  Orientation Level: Oriented X4  Cognition: Appropriate for age attention/concentration     Hearing: Auditory  Auditory Impairment: Hard of hearing, bilateral, Hearing aid(s)  Hearing Aids/Status: Bilateral  Skin:  hemovac drain and PCA     Range Of Motion: WFLs x BLES                          Strength:  WFLs at least 3/5 or greater BLEs                       Tone & Sensation:    intact to LT BLES                 Functional Mobility:  Bed Mobility:  Rolling: Minimum assistance  Supine to Sit: Moderate assistance     Scooting: Minimum assistance  Transfers:  Sit to Stand: Moderate assistance  Stand to Sit: Moderate assistance  Stand Pivot Transfers: Moderate assistance     Bed to Chair: Moderate assistance              Balance: static stance good Dynamic : Fair but requires constant support RW     Ambulation/Gait Training:  Distance (ft): 45 Feet (ft)  Assistive Device: Brace/Splint;Gait belt;Walker, rolling  Ambulation - Level of Assistance: Minimal assistance; Additional time;Assist x2        Gait Abnormalities: Antalgic;Decreased step clearance                                              Therapeutic Exercises:   Initiated ankle pumps and heel slides     Functional Measure:  Barthel Index:      Bathin  Bladder: 10  Bowels: 10  Groomin  Dressin  Feeding: 10  Mobility: 10  Stairs: 0  Toilet Use: 5  Transfer (Bed to Chair and Back): 10  Total: 65         Barthel and G-code impairment scale:  Percentage of impairment CH  0% CI  1-19% CJ  20-39% CK  40-59% CL  60-79% CM  80-99% CN  100%   Barthel Score 0-100 100 99-80 79-60 59-40 20-39 1-19    0   Barthel Score 0-20 20 17-19 13-16 9-12 5-8 1-4 0      The Barthel ADL Index: Guidelines  1. The index should be used as a record of what a patient does, not as a record of what a patient could do. 2. The main aim is to establish degree of independence from any help, physical or verbal, however minor and for whatever reason. 3. The need for supervision renders the patient not independent. 4. A patient's performance should be established using the best available evidence. Asking the patient, friends/relatives and nurses are the usual sources, but direct observation and common sense are also important. However direct testing is not needed. 5. Usually the patient's performance over the preceding 24-48 hours is important, but occasionally longer periods will be relevant. 6. Middle categories imply that the patient supplies over 50 per cent of the effort. 7. Use of aids to be independent is allowed. Sukhjinder Moe., Barthel, D.W. ([id number]). Functional evaluation: the Barthel Index. [address] [facility] (14)2. BARBARA Cooper, Benton Grey., [facility].[address] (). Measuring the change indisability after inpatient rehabilitation; comparison of the responsiveness of the Barthel Index and Functional [place] Measure. Journal of Neurology, Neurosurgery, and Psychiatry, 66(4), 206-423.   JUAN C Bentley, JOSHUA Ames, & Osmani Caba M.A. (2004.) Assessment of post-stroke quality of life in cost-effectiveness studies: The usefulness of the Barthel Index and the EuroQoL-5D. Quality of Life Research, 13, 428-43         G codes: In compliance with CMSs Claims Based Outcome Reporting, the following G-code set was chosen for this patient based on their primary functional limitation being treated: The outcome measure chosen to determine the severity of the functional limitation was the Barthel Index with a score of 65/100 which was correlated with the impairment scale. · Mobility - Walking and Moving Around:               - CURRENT STATUS:    CJ - 20%-39% impaired, limited or restricted               - GOAL STATUS:           CI - 1%-19% impaired, limited or restricted               - D/C STATUS:                       ---------------To be determined---------------      Physical Therapy Evaluation Charge Determination   History Examination Presentation Decision-Making   LOW Complexity : Zero comorbidities / personal factors that will impact the outcome / POC LOW Complexity : 1-2 Standardized tests and measures addressing body structure, function, activity limitation and / or participation in recreation  LOW Complexity : Stable, uncomplicated  Other outcome measures Barthel Index LOW       Based on the above components, the patient evaluation is determined to be of the following complexity level: LOW      Pain:  Pain Scale 1: Numeric (0 - 10)  Pain Intensity 1: 7              Activity Tolerance:   Fair- /88 110 96% RA with elevated pain after mobility, informed RN  Please refer to the flowsheet for vital signs taken during this treatment.   After treatment:   [X]         Patient left in no apparent distress sitting up in chair  [ ]         Patient left in no apparent distress in bed  [X]         Call bell left within reach  [X]         Nursing notified  [X]         Caregiver present  [ ]         Bed alarm activated      COMMUNICATION/EDUCATION:   The patients plan of care was discussed with: Registered Nurse.  [X]         Fall prevention education was provided and the patient/caregiver indicated understanding. [X]         Patient/family have participated as able in goal setting and plan of care. [ ]         Patient/family agree to work toward stated goals and plan of care. [ ]         Patient understands intent and goals of therapy, but is neutral about his/her participation. [ ]         Patient is unable to participate in goal setting and plan of care.      Thank you for this referral.  Antonia Daniel, PT   Time Calculation: 40 mins

## 2017-05-16 NOTE — PROGRESS NOTES
Problem: Self Care Deficits Care Plan (Adult)  Goal: *Acute Goals and Plan of Care (Insert Text)  Occupational Therapy Goals  Initiated 5/16/2017    1. Patient will perform lower body dressing with supervision/set-up using adaptive dressing aides PRN within 7 days. 2. Patient will perform toilet transfer with supervision/set-up using rolling walker within 7 days. 3. Patient will perform all aspects of toileting at modified independence within 7 days. 4. Patient will don/doff back brace at supervision/set-up within 7 days. 5. Patient will verbalize/demonstrate 3/3 back precautions during ADL tasks without cues within 7 days. OCCUPATIONAL THERAPY EVALUATION  Patient: Polo Palumbo (89 y.o. female)  Date: 5/16/2017  Primary Diagnosis: LUMBAR STENOSIS   Lumbar stenosis with neurogenic claudication  Procedure(s) (LRB):  L4-S1 LAMINECTOMY / L4-5 FUSION /INSTRUMENTATION / TLIF / BONEGRAFT (N/A) 1 Day Post-Op   Precautions:  Back, Fall (LSO brace)      ASSESSMENT :  Based on the objective data described below, the patient presents with decreased activity tolerance following admission on 5/15 for spinal surgery. PTA, patient lives alone and was completely independent with all care. Patient has arranged to stay with her fiance at discharge; He lives next door, will be available to assist 24/7, and has a lot of DME available at his home (stair lift, grab bars, shower chair, ect). Patient will need a BSC to be placed over standard commode; Per patient preference, asisted with searching for product online to be delivered prior to d/c home. Patient was able to recall all spinal precautions; She required mod verbal cues to maintain with activity. Additionally, patient was educated on log roll technique, brace application, body mechanics, environmental set-up, and adaptive ADL techniques. Patient required min A for both bed mobility and OOB transfers using rolling walker.   Patient is independent with feeding and grooming, required min A for UB dressing (including donning brace), and mod to max A for LB ADLs. Patient would benefit from continued skilled OT to progress towards goals with focus on LB dressing with use of long handled reacher, toileting hygiene, and bathing. Suspect she will not have any follow-up OT needs at discharge. Patient will benefit from skilled intervention to address the above impairments. Patients rehabilitation potential is considered to be Good  Factors which may influence rehabilitation potential include:   [X]             None noted  [ ]             Mental ability/status  [ ]             Medical condition  [ ]             Home/family situation and support systems  [ ]             Safety awareness  [ ]             Pain tolerance/management  [ ]             Other:        PLAN :  Recommendations and Planned Interventions:  [X]               Self Care Training                  [X]        Therapeutic Activities  [X]               Functional Mobility Training    [ ]        Cognitive Retraining  [X]               Therapeutic Exercises           [X]        Endurance Activities  [ ]               Balance Training                   [ ]        Neuromuscular Re-Education  [ ]               Visual/Perceptual Training     [X]   Home Safety Training  [X]               Patient Education                 [X]        Family Training/Education  [ ]               Other (comment):     Frequency/Duration: Patient will be followed by occupational therapy 5 times a week to address goals. Discharge Recommendations: Likely none for OT  Further Equipment Recommendations for Discharge: bedside commode- per patient request, assisted her with searching and purchasing BSC from [facility] to be delivered to her home prior to discharge       SUBJECTIVE:   Patient stated Sandra Rahman will definitely need that for home can you help me find it on [facility].  re: BSC      OBJECTIVE DATA SUMMARY:   HISTORY:   Past Medical History: Diagnosis Date    Anemia      Arthritis       osteoporosis    Asthma      Broken ribs 2006     fell    Cancer ([facility] [address]       melanoma     GERD (gastroesophageal reflux disease)      Hypertension      Migraines      Osteoporosis      Rheumatic fever       x2 without residual    Thyroid disease       Past Surgical History:   Procedure Laterality Date    HX ABOVE KNEE AMPUTATION Left 05/2015     remove Tillman's neuroma    HX BREAST BIOPSY   1972    HX CATARACT REMOVAL Bilateral 2009, 2011    HX GYN   1969     Hysterectomy    HX HEENT   1996     sinus surgery    HX HEENT         deviated septum    HX ORTHOPAEDIC Left 2006     Tarsal Tunnel Release     HX ORTHOPAEDIC Right 2010,2016     basal joint surgery    HX ORTHOPAEDIC Left 2012     basal joint surgery     HX OTHER SURGICAL         TVT Sling    HX OTHER SURGICAL         Tillman's Neuroma removal     HX SEPTOPLASTY        HX TONSIL AND ADENOIDECTOMY        HX TONSILLECTOMY   1946    HX UROLOGICAL   2010     TVT sling        Prior Level of Function/Home Situation: Patient lives alone. Patient reports independence to mod I level with ADLs and functional mobility PTA. Home Situation  Home Environment: Private residence  # Steps to Enter: 10  One/Two Story Residence: Two story  Lift Chair Available: Yes  Living Alone: Yes (plans to stay with fiance)  Support Systems: Family member(s), Friends \ neighbors (fiance)  Patient Expects to be Discharged to[de-identified] Private residence  Current DME Used/Available at Home: frankie Nuñez, [address] chair, Grab bars, Cane, straight  Tub or Shower Type: Shower  [X]  Right hand dominant             [ ]  Left hand dominant     EXAMINATION OF PERFORMANCE DEFICITS:  Cognitive/Behavioral Status:  Neurologic State: Alert  Orientation Level: Oriented X4  Cognition: Appropriate decision making; Appropriate for age attention/concentration; Appropriate safety awareness  Perception: Appears intact  Perseveration: No perseveration noted  Safety/Judgement: Awareness of environment;Good awareness of safety precautions     Skin: Intact in the uppers     Edema: None noted in the uppers     Hearing: Auditory  Auditory Impairment: Hard of hearing, bilateral, Hearing aid(s)  Hearing Aids/Status: Bilateral     Vision/Perceptual:    Tracking: Able to track stimulus in all quadrants w/o difficulty    Diplopia: No    Acuity: Within Defined Limits       Range of Motion:  WDL in the uppers     Strength:  Decreased but functional in the uppers      Coordination:  Fine Motor Skills-Upper: Left Intact; Right Intact    Gross Motor Skills-Upper: Left Intact; Right Intact     Tone & Sensation:  Tone: normal  Sensation: Intact     Balance:  Sitting: Intact  Standing: Intact; With support     Functional Mobility and Transfers for ADLs:  Bed Mobility:  Rolling: Minimum assistance;Assist x1;Additional time (mod verbal cues for log roll technique)  Supine to Sit: Minimum assistance;Assist x1;Additional time  Sit to Supine:  (pt remained up at end of tx)  Scooting: Supervision     Transfers:  Sit to Stand: Minimum assistance;Assist x1  Stand to Sit: Minimum assistance;Assist x1  Bed to Chair: Minimum assistance;Assist x1  Toilet Transfer : Minimum assistance;Assist x1     ADL Assessment:  Feeding: Independent     Oral Facial Hygiene/Grooming: Independent (from sitting position)     Bathing: Maximum assistance     Upper Body Dressing: Minimum assistance (including back brace application)     Lower Body Dressing: Maximum assistance     Toileting: Moderate assistance      Dressing brace: Patient instructed and demonstrated to don/doff velcro on brace using dominant side, keeping non-dominant side intact. Patient instructed and demonstrated in meantime of being able to stand with back against wall to don/doff brace, to don/doff seated using lap and bed/chair surface to support brace while manipulating.   Home safety: Patient instructed on home modifications and safety (raise height of ADL objects, appropriate height of chair surfaces, recliner safety, change of floor surfaces, clear pathways) to increase independence and fall prevention. Patient indicated understanding. Standing: Patient instructed to walk up to sink/counter top/surfaces, step into walker, square off while using objects, slide objects along surfaces, to increase adherence to back precautions and fall prevention. Patient instructed to increase amount of time standing in order to increase independence and tolerance with ADLs. Cognitive Retraining  Safety/Judgement: Awareness of environment;Good awareness of safety precautions     Functional Measure:  Barthel Index:      Bathin  Bladder: 0 (maddox intact)  Bowels: 10  Groomin  Dressin  Feeding: 10  Mobility: 10  Stairs: 0  Toilet Use: 5  Transfer (Bed to Chair and Back): 10  Total: 55         Barthel and G-code impairment scale:  Percentage of impairment CH  0% CI  1-19% CJ  20-39% CK  40-59% CL  60-79% CM  80-99% CN  100%   Barthel Score 0-100 100 99-80 79-60 59-40 20-39 1-19    0   Barthel Score 0-20 20 17-19 13-16 9-12 5-8 1-4 0      The Barthel ADL Index: Guidelines  1. The index should be used as a record of what a patient does, not as a record of what a patient could do. 2. The main aim is to establish degree of independence from any help, physical or verbal, however minor and for whatever reason. 3. The need for supervision renders the patient not independent. 4. A patient's performance should be established using the best available evidence. Asking the patient, friends/relatives and nurses are the usual sources, but direct observation and common sense are also important. However direct testing is not needed. 5. Usually the patient's performance over the preceding 24-48 hours is important, but occasionally longer periods will be relevant.   6. Middle categories imply that the patient supplies over 50 per cent of the effort. 7. Use of aids to be independent is allowed. Tosin Edward., Barthel, D.W. ([id number]). Functional evaluation: the Barthel Index. [address] (14)2. BARBARA Da Silva, Jatin Mejia.Kelly., [place], [address] (1999). Measuring the change indisability after inpatient rehabilitation; comparison of the responsiveness of the Barthel Index and Functional [place] Measure. Journal of Neurology, Neurosurgery, and Psychiatry, 66(4), 306-934. GODWIN FrostJ.BACILIO, JOSHUA Ames, & Gray Alvarado M.A. (2004.) Assessment of post-stroke quality of life in cost-effectiveness studies: The usefulness of the Barthel Index and the EuroQoL-5D. Quality of Life Research, 13, 227-54         G codes: In compliance with CMSs Claims Based Outcome Reporting, the following G-code set was chosen for this patient based on their primary functional limitation being treated: The outcome measure chosen to determine the severity of the functional limitation was the Barthel Index with a score of 55/100 which was correlated with the impairment scale.       · Self Care:               - CURRENT STATUS:    CK - 40%-59% impaired, limited or restricted               - GOAL STATUS:           CJ - 20%-39% impaired, limited or restricted               - D/C STATUS:                       ---------------To be determined---------------      Occupational Therapy Evaluation Charge Determination   History Examination Decision-Making   LOW Complexity : Brief history review  LOW Complexity : 1-3 performance deficits relating to physical, cognitive , or psychosocial skils that result in activity limitations and / or participation restrictions  LOW Complexity : No comorbidities that affect functional and no verbal or physical assistance needed to complete eval tasks       Based on the above components, the patient evaluation is determined to be of the following complexity level: LOW   Pain:  Pain Scale 1: Numeric (0 - 10)  Pain Intensity 1: 7              Activity Tolerance:   Patient tolerated eval well. After treatment:   [X] Patient left in no apparent distress sitting up in chair  [ ] Patient left in no apparent distress in bed  [X] Call bell left within reach  [X] Nursing notified  [ ] Caregiver present  [X] Chair alarm activated      COMMUNICATION/EDUCATION:   The patients plan of care was discussed with: Physical Therapist, Registered Nurse and patient. .  [X] Home safety education was provided and the patient/caregiver indicated understanding. [X] Patient/family have participated as able in goal setting and plan of care. [X] Patient/family agree to work toward stated goals and plan of care. [ ] Patient understands intent and goals of therapy, but is neutral about his/her participation. [ ] Patient is unable to participate in goal setting and plan of care. This patients plan of care is appropriate for delegation to [facility].      Thank you for this referral.  Darius Bailey OTR/L  Time Calculation: 44 mins

## 2017-05-16 NOTE — PROGRESS NOTES
Problem: Mobility Impaired (Adult and Pediatric)  Goal: *Acute Goals and Plan of Care (Insert Text)  Physical Therapy Goals  Initiated 5/16/2017  1. Patient will move from supine to sit and sit to supine , scoot up and down and roll side to side in bed with modified independence within 7 day(s). 2. Patient will transfer from bed to chair and chair to bed with modified independence using the least restrictive device within 7 day(s). 3. Patient will perform sit to stand with modified independence within 7 day(s). 4. Patient will ambulate with modified independence for 150 feet with the least restrictive device within 7 day(s). 5. Patient will ascend/descend 4 stairs with 1 handrail(s) with minimal assistance/contact guard assist within 7 day(s). PHYSICAL THERAPY TREATMENT  Patient: Herman Lam (33 y.o. female)  Date: 5/16/2017  Precautions: Back, Fall (LSO brace)      ASSESSMENT:  Better this afternoon. Progressed to 150' - smooth steady step through bernadette with less hesitancy and pain. Back to bed as patient reports she been up twice this afternoon previously. Progressing well. PCA due to be discontinued later this pm per RN. Progression toward goals:  [ ]      Improving appropriately and progressing toward goals  [X]      Improving slowly and progressing toward goals  [ ]      Not making progress toward goals and plan of care will be adjusted       PLAN:  Patient continues to benefit from skilled intervention to address the above impairments. Continue treatment per established plan of care. Discharge Recommendations:  Home Health  Further Equipment Recommendations for Discharge:  none       SUBJECTIVE:   Patient stated I feel like I have done day's work.    The patient stated 3/3 back precautions. Reviewed all 3 with patient.       OBJECTIVE DATA SUMMARY:   Functional Mobility Training:  Bed Mobility:  Log Rolling: Minimum assistance;Assist x1;Additional time (mod verbal cues for log roll technique)  Supine to Sit: Minimum assistance;Assist x1;Additional time  Sit to Supine:  (pt remained up at end of tx)  Scooting: Supervision        Brace donned with minimal assistance/contact guard assist   Transfers:  Sit to Stand: Minimum assistance;Assist x1  Stand to Sit: Minimum assistance;Assist x1  Stand Pivot Transfers: Minimum assistance     Bed to Chair: Minimum assistance;Assist x1                    Ambulation/Gait Training:  Distance (ft): 150 Feet (ft)  Assistive Device: Brace/Splint;Gait belt;Walker, rolling  Ambulation - Level of Assistance: Minimal assistance; Additional time;Assist x2        Gait Abnormalities: Antalgic;Decreased step clearance        Therapeutic Exercises: Ankle pumps  Pain:  Pain Scale 1: Numeric (0 - 10)  Pain Intensity 1: 3  Pain Location 1: Back  Pain Orientation 1: Mid  Pain Description 1: Aching; Sore  Pain Intervention(s) 1: Medication (see MAR)  Activity Tolerance:   fair  Please refer to the flowsheet for vital signs taken during this treatment.   After treatment:   [ ]  Patient left in no apparent distress sitting up in chair  [X]  Patient left in no apparent distress in bed  [X]  Call bell left within reach  [ ]  Nursing notified  [X]  Caregiver present  [ ]  Bed alarm activated      COMMUNICATION/COLLABORATION:   The patients plan of care was discussed with: Registered Nurse     Kathia Corea PT   Time Calculation: 30 mins

## 2017-05-17 LAB
ANION GAP BLD CALC-SCNC: 7 MMOL/L (ref 5–15)
BUN SERPL-MCNC: 10 MG/DL (ref 6–20)
BUN/CREAT SERPL: 14 (ref 12–20)
CALCIUM SERPL-MCNC: 8.3 MG/DL (ref 8.5–10.1)
CHLORIDE SERPL-SCNC: 106 MMOL/L (ref 97–108)
CO2 SERPL-SCNC: 27 MMOL/L (ref 21–32)
CREAT SERPL-MCNC: 0.73 MG/DL (ref 0.55–1.02)
ERYTHROCYTE [DISTWIDTH] IN BLOOD BY AUTOMATED COUNT: 14.4 % (ref 11.5–14.5)
GLUCOSE SERPL-MCNC: 97 MG/DL (ref 65–100)
HCT VFR BLD AUTO: 27 % (ref 35–47)
HGB BLD-MCNC: 8.5 G/DL (ref 11.5–16)
MCH RBC QN AUTO: 29.2 PG (ref 26–34)
MCHC RBC AUTO-ENTMCNC: 31.5 G/DL (ref 30–36.5)
MCV RBC AUTO: 92.8 FL (ref 80–99)
PLATELET # BLD AUTO: 249 K/UL (ref 150–400)
POTASSIUM SERPL-SCNC: 3.8 MMOL/L (ref 3.5–5.1)
RBC # BLD AUTO: 2.91 M/UL (ref 3.8–5.2)
SODIUM SERPL-SCNC: 140 MMOL/L (ref 136–145)
WBC # BLD AUTO: 7.2 K/UL (ref 3.6–11)

## 2017-05-17 PROCEDURE — 85027 COMPLETE CBC AUTOMATED: CPT | Performed by: ORTHOPAEDIC SURGERY

## 2017-05-17 PROCEDURE — 80048 BASIC METABOLIC PNL TOTAL CA: CPT | Performed by: ORTHOPAEDIC SURGERY

## 2017-05-17 PROCEDURE — 74011250637 HC RX REV CODE- 250/637: Performed by: PHYSICIAN ASSISTANT

## 2017-05-17 PROCEDURE — 65270000029 HC RM PRIVATE

## 2017-05-17 PROCEDURE — 74011250637 HC RX REV CODE- 250/637: Performed by: ORTHOPAEDIC SURGERY

## 2017-05-17 PROCEDURE — 97535 SELF CARE MNGMENT TRAINING: CPT

## 2017-05-17 PROCEDURE — 36415 COLL VENOUS BLD VENIPUNCTURE: CPT | Performed by: ORTHOPAEDIC SURGERY

## 2017-05-17 PROCEDURE — 74011250637 HC RX REV CODE- 250/637: Performed by: FAMILY MEDICINE

## 2017-05-17 PROCEDURE — 97530 THERAPEUTIC ACTIVITIES: CPT

## 2017-05-17 PROCEDURE — 74011250636 HC RX REV CODE- 250/636: Performed by: ORTHOPAEDIC SURGERY

## 2017-05-17 PROCEDURE — 74011250636 HC RX REV CODE- 250/636: Performed by: FAMILY MEDICINE

## 2017-05-17 PROCEDURE — 97116 GAIT TRAINING THERAPY: CPT

## 2017-05-17 RX ORDER — HYDRALAZINE HYDROCHLORIDE 20 MG/ML
10 INJECTION INTRAMUSCULAR; INTRAVENOUS ONCE
Status: COMPLETED | OUTPATIENT
Start: 2017-05-17 | End: 2017-05-17

## 2017-05-17 RX ORDER — HYDRALAZINE HYDROCHLORIDE 20 MG/ML
10 INJECTION INTRAMUSCULAR; INTRAVENOUS
Status: DISCONTINUED | OUTPATIENT
Start: 2017-05-17 | End: 2017-05-17

## 2017-05-17 RX ORDER — METOPROLOL SUCCINATE 50 MG/1
50 TABLET, EXTENDED RELEASE ORAL DAILY
Status: DISCONTINUED | OUTPATIENT
Start: 2017-05-18 | End: 2017-05-20 | Stop reason: HOSPADM

## 2017-05-17 RX ORDER — HYDRALAZINE HYDROCHLORIDE 20 MG/ML
20 INJECTION INTRAMUSCULAR; INTRAVENOUS
Status: DISCONTINUED | OUTPATIENT
Start: 2017-05-17 | End: 2017-05-18

## 2017-05-17 RX ORDER — MAG HYDROX/ALUMINUM HYD/SIMETH 200-200-20
30 SUSPENSION, ORAL (FINAL DOSE FORM) ORAL
Status: DISCONTINUED | OUTPATIENT
Start: 2017-05-17 | End: 2017-05-20 | Stop reason: HOSPADM

## 2017-05-17 RX ORDER — CYCLOBENZAPRINE HCL 10 MG
5 TABLET ORAL
Status: DISCONTINUED | OUTPATIENT
Start: 2017-05-17 | End: 2017-05-20 | Stop reason: HOSPADM

## 2017-05-17 RX ADMIN — HYDROMORPHONE HYDROCHLORIDE 1 MG: 1 INJECTION, SOLUTION INTRAMUSCULAR; INTRAVENOUS; SUBCUTANEOUS at 16:29

## 2017-05-17 RX ADMIN — ALUMINUM HYDROXIDE, MAGNESIUM HYDROXIDE, AND SIMETHICONE 30 ML: 200; 200; 20 SUSPENSION ORAL at 21:10

## 2017-05-17 RX ADMIN — CEFAZOLIN 2 G: 1 INJECTION, POWDER, FOR SOLUTION INTRAMUSCULAR; INTRAVENOUS; PARENTERAL at 05:18

## 2017-05-17 RX ADMIN — OXYCODONE HYDROCHLORIDE AND ACETAMINOPHEN 1 TABLET: 10; 325 TABLET ORAL at 22:06

## 2017-05-17 RX ADMIN — DIAZEPAM 5 MG: 5 TABLET ORAL at 20:33

## 2017-05-17 RX ADMIN — LEVOTHYROXINE SODIUM 100 MCG: 0.05 TABLET ORAL at 05:20

## 2017-05-17 RX ADMIN — CYCLOBENZAPRINE HYDROCHLORIDE 5 MG: 10 TABLET, FILM COATED ORAL at 14:09

## 2017-05-17 RX ADMIN — OXYCODONE HYDROCHLORIDE AND ACETAMINOPHEN 1 TABLET: 10; 325 TABLET ORAL at 14:09

## 2017-05-17 RX ADMIN — DIAZEPAM 5 MG: 5 TABLET ORAL at 03:08

## 2017-05-17 RX ADMIN — OXYCODONE HYDROCHLORIDE AND ACETAMINOPHEN 1 TABLET: 10; 325 TABLET ORAL at 05:18

## 2017-05-17 RX ADMIN — CYCLOBENZAPRINE HYDROCHLORIDE 5 MG: 10 TABLET, FILM COATED ORAL at 22:01

## 2017-05-17 RX ADMIN — OXYCODONE HYDROCHLORIDE AND ACETAMINOPHEN 1 TABLET: 10; 325 TABLET ORAL at 18:46

## 2017-05-17 RX ADMIN — METOPROLOL SUCCINATE 25 MG: 25 TABLET, FILM COATED, EXTENDED RELEASE ORAL at 08:05

## 2017-05-17 RX ADMIN — CYCLOBENZAPRINE HYDROCHLORIDE 5 MG: 10 TABLET, FILM COATED ORAL at 00:32

## 2017-05-17 RX ADMIN — DIAZEPAM 5 MG: 5 TABLET ORAL at 10:36

## 2017-05-17 RX ADMIN — HYDRALAZINE HYDROCHLORIDE 10 MG: 20 INJECTION INTRAMUSCULAR; INTRAVENOUS at 18:47

## 2017-05-17 RX ADMIN — DOCUSATE SODIUM 100 MG: 100 CAPSULE ORAL at 18:46

## 2017-05-17 RX ADMIN — FLUTICASONE PROPIONATE 2 SPRAY: 50 SPRAY, METERED NASAL at 20:34

## 2017-05-17 RX ADMIN — Medication 10 ML: at 22:01

## 2017-05-17 RX ADMIN — FLUTICASONE FUROATE AND VILANTEROL TRIFENATATE 1 PUFF: 100; 25 POWDER RESPIRATORY (INHALATION) at 14:09

## 2017-05-17 RX ADMIN — DILTIAZEM HYDROCHLORIDE 240 MG: 240 CAPSULE, COATED, EXTENDED RELEASE ORAL at 20:33

## 2017-05-17 RX ADMIN — DOCUSATE SODIUM 100 MG: 100 CAPSULE ORAL at 08:05

## 2017-05-17 RX ADMIN — PANTOPRAZOLE SODIUM 40 MG: 40 TABLET, DELAYED RELEASE ORAL at 05:23

## 2017-05-17 RX ADMIN — Medication 10 ML: at 05:18

## 2017-05-17 RX ADMIN — LOSARTAN POTASSIUM 50 MG: 50 TABLET, FILM COATED ORAL at 08:05

## 2017-05-17 RX ADMIN — OXYCODONE HYDROCHLORIDE AND ACETAMINOPHEN 1 TABLET: 10; 325 TABLET ORAL at 00:32

## 2017-05-17 RX ADMIN — CETIRIZINE HYDROCHLORIDE 10 MG: 10 TABLET, FILM COATED ORAL at 20:33

## 2017-05-17 RX ADMIN — OXYCODONE HYDROCHLORIDE AND ACETAMINOPHEN 1 TABLET: 10; 325 TABLET ORAL at 10:36

## 2017-05-17 NOTE — PROGRESS NOTES
Patient having muscle spasm from top of foot to L outer thigh. She takes flexeril at home. Called ortho on call. Order received.

## 2017-05-17 NOTE — PROGRESS NOTES
Elevated Blood pressure 204/96  Contacted Kristen, [address] regarding patient's elevated blood pressure. Which was elevated this morning and normalized temporarily after morning meds, but is elevated again with no other BP meds scheduled. Patient otherwise asymptomatic NO reports of chest pain shoulder pain, H/A, no unilateral weakness, or facial droop or speech deficit. Debby Elizalde to order hospitalist consult to manage blood pressure.

## 2017-05-17 NOTE — ROUTINE PROCESS
Bedside and Verbal shift change report given to Asia Mohan RN (oncoming nurse) by Mike Pathak RN (offgoing nurse). Report included the following information SBAR, Procedure Summary, Intake/Output and MAR.

## 2017-05-17 NOTE — PROGRESS NOTES
Problem: Self Care Deficits Care Plan (Adult)  Goal: *Acute Goals and Plan of Care (Insert Text)  Occupational Therapy Goals  Initiated 5/16/2017    1. Patient will perform lower body dressing with supervision/set-up using adaptive dressing aides PRN within 7 days. 2. Patient will perform toilet transfer with supervision/set-up using rolling walker within 7 days. 3. Patient will perform all aspects of toileting at modified independence within 7 days. 4. Patient will don/doff back brace at supervision/set-up within 7 days. 5. Patient will verbalize/demonstrate 3/3 back precautions during ADL tasks without cues within 7 days. OCCUPATIONAL THERAPY TREATMENT  Patient: Erika Alas (3 y.o. female)  Date: 5/17/2017  Diagnosis: LUMBAR STENOSIS   Lumbar stenosis with neurogenic claudication <principal problem not specified>  Procedure(s) (LRB):  L4-S1 LAMINECTOMY / L4-5 FUSION /INSTRUMENTATION / TLIF / BONEGRAFT (N/A) 2 Days Post-Op  Precautions: Back, Fall (LSO brace)   Chart, occupational therapy assessment, plan of care, and goals were reviewed. ASSESSMENT:  Ms. David Black was received in bed agreeable to activity with fiance present today. Patient limited today by increased pain and associated anxiety related to pain; RN provided medication for both during treatment. She was able to recall all precautions however required mod verbal cues/reminders to maintain with activity. Patient required up to mod A to come to sitting EOB. Re-education provided for back brace application and min A needed. Patient able to transfer into the bathroom with min A using rolling walker for toileting and LB dressing. She initially requested to complete sponge bath however declined once on her feet due to increased pain; RN confirms PCT already assisted pt with bathing this AM.  Patient educated on safe and proper use of reacher both for LB dressing and other functional activities, mod A required to don pants.   Patient was returned to the chair with PT entering room at end of tx. Patient's progress slow today due to poor pain control. Will continue to follow for progression of OT goals. Progression toward goals:  [X]          Improving appropriately and progressing toward goals  [ ]          Improving slowly and progressing toward goals  [ ]          Not making progress toward goals and plan of care will be adjusted       PLAN:  Patient continues to benefit from skilled intervention to address the above impairments. Continue treatment per established plan of care. Discharge Recommendations:  Home health vs. None for OT pending progress  Further Equipment Recommendations for Discharge:  Ordered BSC from [facility] to be delivered prior to d/c home; owns 2 reachers; no new equipment recs        SUBJECTIVE:   Patient stated The pain has really got me worked up today.   Patient with increased pain + anxiety today as compared to previous tx day. OBJECTIVE DATA SUMMARY:   Cognitive/Behavioral Status:  Neurologic State: Alert  Orientation Level: Oriented X4  Cognition: Follows commands  Perception: Appears intact  Perseveration: No perseveration noted  Safety/Judgement: Awareness of environment;Good awareness of safety precautions  Functional Mobility and Transfers for ADLs:              Bed Mobility:  Rolling: Assist x1;Additional time;Minimum assistance  Supine to Sit: Moderate assistance; Additional time;Assist x1  Sit to Supine:  (pt remained up at end of tx )  Scooting: Minimum assistance; Additional time;Assist x1                Transfers:  Sit to Stand: Minimum assistance;Assist x1;Additional time  Functional Transfers  Toilet Transfer : Minimum assistance;Assist x1;Additional time     Balance:   good-sitting   good to fair overall- standing     ADL Intervention:  Grooming  Grooming Assistance: Supervision/set up  Washing Face: Supervision/set-up  Brushing/Combing Hair: Supervision/set-up  Cues: Verbal cues provided Upper Body Dressing Assistance  Dressing Assistance: Minimum assistance  Orthotics(Brace): Minimum assistance  Shirt simulation with hospital gown: Minimum  assistance  Cues: Verbal cues provided;Physical assistance;Visual cues provided     Lower Body Dressing Assistance  Pants With Elastic Waist: Moderate assistance  Leg Crossed Method Used: No (attempted-unable to maintain position )  Position Performed: Seated in chair  Cues: Verbal cues provided;Physical assistance  Adaptive Equipment Used: Reacher     Toileting  Toileting Assistance: Moderate assistance  Bladder Hygiene: Minimum assistance  Bowel Hygiene: Minimum assistance  Clothing Management: Moderate assistance  Cues: Verbal cues provided;Physical assistance for pants up  Adaptive Equipment: Grab bars     Dressing brace: Patient instructed and demonstrated to don/doff velcro on brace using dominant side, keeping non-dominant side intact. Patient instructed and demonstrated in meantime of being able to stand with back against wall to don/doff brace, to don/doff seated using lap and bed/chair surface to support brace while manipulating. Home safety: Patient instructed on home modifications and safety (raise height of ADL objects, appropriate height of chair surfaces, recliner safety, change of floor surfaces, clear pathways) to increase independence and fall prevention. Patient indicated understanding. Standing: Patient instructed to walk up to sink/counter top/surfaces, step into walker, square off while using objects, slide objects along surfaces, to increase adherence to back precautions and fall prevention. Patient instructed to increase amount of time standing in order to increase independence and tolerance with ADLs.        Cognitive Retraining  Safety/Judgement: Awareness of environment;Good awareness of safety precautions     Pain:  Pain Scale 1: Numeric (0 - 10)  Pain Intensity 1: 8  Pain Location 1: Back  Pain Orientation 1: Mid  Pain Description 1: Gnawing; Shooting  Pain Intervention(s) 1: Medication administered by RN during tx; Educated on movement, positioning, and ice for pain relief     Activity Tolerance:    Patient with fair activity tolerance-limited by pain and increased anxiety  After treatment:   [X]  Patient left in no apparent distress sitting up in chair with PT entering room   [ ]  Patient left in no apparent distress in bed  [X]  Call bell left within reach  [X]  Nursing notified  [X]  Caregiver present-fiance  [ ]  Bed alarm activated      COMMUNICATION/COLLABORATION:   The patients plan of care was discussed with: Physical Therapist, Registered Nurse and patient.      Regan Logan OTR/L  Time Calculation: 41 mins

## 2017-05-17 NOTE — PROGRESS NOTES
ORTHOPAEDIC LUMBAR FUSION PROGRESS NOTE    NAME:     Erika Alas   :       1940   MRN:       [id number]   DATE:      2017    POD:    2 Days Post-Op  S/P:    Procedure(s):  L4-S1 LAMINECTOMY / L4-5 FUSION /INSTRUMENTATION / TLIF / BONEGRAFT    SUBJECTIVE:    C/O back pain along surgical incision, has had some L leg spasms  No radiating leg pain or numbness  Denies nausea/vomiting, chest pain, headache or shortness of breath  Pain controlled    Recent Labs      17   0312   HGB  8.5*   HCT  27.0*   NA  140   K  3.8   CL  106   CO2  27   BUN  10   CREA  0.73   GLU  97     Patient Vitals for the past 12 hrs:   BP Temp Pulse Resp SpO2   17 0737 (!) 204/86 - - - -   17 0731 (!) 190/101 98.4 °F (36.9 °C) 82 16 96 %   17 0403 162/69 98.4 °F (36.9 °C) 80 18 95 %   17 0335 197/77 98.5 °F (36.9 °C) 80 18 95 %   17 0303 (!) 200/92 98.5 °F (36.9 °C) 88 18 93 %   17 2256 182/78 97.6 °F (36.4 °C) 96 18 94 %       EXAM:  Dressings clean, dry and intact   Positive strength/ROM bilat lower ext.   Neuro intact to sensation  Calves, soft & nontender  BL LEs NVID      PLAN:  Continue PO pain medications  PT/OT, OOB  Advance regular diet      Beverley Kohler [place]  Orthopaedic Surgery  Physician Assistant to Dr. Rob Keyes

## 2017-05-17 NOTE — PROGRESS NOTES
Bedside and Verbal shift change report given to Shyla Schuler (oncoming nurse) by Emerita Bonner (offgoing nurse). Report included the following information SBAR, Kardex and Recent Results.

## 2017-05-17 NOTE — CONSULTS
[address] Medicine Residency       Medical Consult Report    Patient:  Herman Lam  MRN:  [id number]  YOB: 1940  Age:  68 y.o. Primary care provider:  Anastacio Monsivais MD    Date of admission:  5/15/2017    Date of consultation:  5/17/2017    Requesting physician:  Dr Candi Lai    Reason for consultation:  Medical management                                History of present illness  Herman Lam is a 68 y.o. female s/p L4-S1 LAMINECTOMY / L4-5 FUSION /INSTRUMENTATION / TLIF / BONEGRAFT POD 1 and we were consulted for medical management of HTN, hypothyroidism, osteoporosis, asthma, anemia, GERD and migraine. Patient denies any complaints. Tolerating PO. No nausea, vomiting, abdominal pain, chest pain, SOB, palpitations. Reports having elevated BP recently at PCP in 150s-160s/80s.        Past Medical History:   Diagnosis Date    Anemia     Arthritis     osteoporosis    Asthma     Broken ribs 2006    fell    Cancer ([facility] [address]     melanoma     GERD (gastroesophageal reflux disease)     Hypertension     Migraines     Osteoporosis     Rheumatic fever     x2 without residual    Thyroid disease       Past Surgical History:   Procedure Laterality Date    HX ABOVE KNEE AMPUTATION Left 05/2015    remove Tillman's neuroma    HX BREAST BIOPSY  1972    HX CATARACT REMOVAL Bilateral 2009, 2011    HX GYN  1969    Hysterectomy    HX HEENT  1996    sinus surgery    HX HEENT      deviated septum    HX ORTHOPAEDIC Left 2006    Tarsal Tunnel Release     HX ORTHOPAEDIC Right 2010,2016    basal joint surgery    HX ORTHOPAEDIC Left 2012    basal joint surgery     HX OTHER SURGICAL      TVT Sling    HX OTHER SURGICAL      Tillman's Neuroma removal     HX SEPTOPLASTY      HX TONSIL AND ADENOIDECTOMY      HX TONSILLECTOMY  1946    HX UROLOGICAL  2010    TVT sling        Prior to Admission medications    Medication Sig Start Date End Date Taking? Authorizing Provider   levothyroxine (SYNTHROID) 100 mcg tablet Take 100 mcg by mouth Daily (before breakfast). Yes Historical Provider   dilTIAZem CD (CARTIA XT) 240 mg ER capsule Take 240 mg by mouth daily. Yes Historical Provider   metoprolol succinate (TOPROL-XL) 25 mg XL tablet Take 25 mg by mouth daily. Yes Historical Provider   fluticasone-salmeterol (ADVAIR DISKUS) 100-50 mcg/dose diskus inhaler Take 1 Puff by inhalation two (2) times a day. Yes Historical Provider   cyanocobalamin (VITAMIN B-12) 1,000 mcg/mL injection 1,000 mcg by IntraMUSCular route every thirty (30) days. Yes Historical Provider   cyclobenzaprine (FLEXERIL) 5 mg tablet Take 5 mg by mouth daily as needed for Muscle Spasm(s). Yes Historical Provider   HYDROcodone-acetaminophen (NORCO) 5-325 mg per tablet Take  by mouth every four (4) hours as needed for Pain. Yes Historical Provider   lansoprazole (PREVACID) 15 mg capsule Take  by mouth Daily (before breakfast). Yes Historical Provider   Cetirizine (ZYRTEC) 10 mg cap Take 1 Cap by mouth nightly. Yes Historical Provider   losartan (COZAAR) 50 mg tablet Take 50 mg by mouth daily. Yes Aiyana Damon MD   fluticasone (FLONASE) 50 mcg/actuation nasal spray 2 Sprays by Both Nostrils route nightly. Yes Aiyana Damon MD   estradiol (ESTRACE) 1 mg tablet Take 1 mg by mouth daily. Yes Aiyana Damon MD   diclofenac EC (VOLTAREN) 50 mg EC tablet Take 75 mg by mouth two (2) times a day. Historical Provider   rizatriptan (MAXALT) 5 mg tablet Take 5 mg by mouth as needed for Migraine. May repeat in 2 hours if needed    Historical Provider   naproxen sodium (ALEVE) 220 mg cap Take 1 Tab by mouth daily as needed. Historical Provider   acetaminophen-codeine (TYLENOL-CODEINE #3) 300-30 mg per tablet Take 1 Tab by mouth every four (4) hours as needed for Pain. Historical Provider   pregabalin (LYRICA) 50 mg capsule Take 50 mg by mouth nightly as needed. Phys Marisol, MD     Current Facility-Administered Medications   Medication Dose Route Frequency    cyclobenzaprine (FLEXERIL) tablet 5 mg  5 mg Oral TID PRN    cetirizine (ZYRTEC) tablet 10 mg  10 mg Oral QHS    dilTIAZem CD (CARDIZEM CD) capsule 240 mg  240 mg Oral QHS    losartan (COZAAR) tablet 50 mg  50 mg Oral DAILY    rizatriptan (MAXALT-MLT) rapid dissolve tablet 5 mg  5 mg Oral PRN    fluticasone-vilanterol (BREO ELLIPTA) 100mcg-25mcg/puff  1 Puff Inhalation DAILY    metoprolol succinate (TOPROL-XL) XL tablet 25 mg  25 mg Oral DAILY    fluticasone (FLONASE) 50 mcg/actuation nasal spray 2 Spray  2 Spray Both Nostrils QHS    pantoprazole (PROTONIX) tablet 40 mg  40 mg Oral ACB    levothyroxine (SYNTHROID) tablet 100 mcg  100 mcg Oral ACB    sodium chloride (NS) flush 5-10 mL  5-10 mL IntraVENous Q8H    sodium chloride (NS) flush 5-10 mL  5-10 mL IntraVENous PRN    diazePAM (VALIUM) tablet 5 mg  5 mg Oral Q6H PRN    acetaminophen (TYLENOL) tablet 650 mg  650 mg Oral Q4H PRN    oxyCODONE-acetaminophen (PERCOCET 10)  mg per tablet 1 Tab  1 Tab Oral Q4H PRN    HYDROmorphone (PF) (DILAUDID) injection 1 mg  1 mg IntraVENous Q4H PRN    naloxone (NARCAN) injection 0.4 mg  0.4 mg IntraVENous PRN    ondansetron (ZOFRAN) injection 4 mg  4 mg IntraVENous Q4H PRN    diphenhydrAMINE (BENADRYL) capsule 25 mg  25 mg Oral Q4H PRN    docusate sodium (COLACE) capsule 100 mg  100 mg Oral BID    zolpidem (AMBIEN) tablet 5 mg  5 mg Oral QHS PRN     Allergies   Allergen Reactions    Ace Inhibitors Angioedema      Family History   Problem Relation Age of Onset    Hypertension Mother     Stroke Mother      TIA    Heart Disease Mother     Alzheimer Mother     Hypertension Father     Asthma Father     Colon Cancer Father     COPD Father     Asthma Brother         Social history  Living situation  x  Independent, fiancee lives next door     With family care      Assisted living      SNF Ambulates  x  Independently      With cane       Assisted walker             History   Smoking Status    Never Smoker   Smokeless Tobacco    Never Used       Illicit drug use  x  None     Prior use      Current use        Review of systems  Positives are bolded, otherwise negative    Constitutional: fever, chills, decreased appetite, fatigue  Eyes: blurry vision  Ears, Nose, Mouth, Throat, and Face: ear pain, nasal congestion, rhinorrhea, sore throat  Respiratory: cough, SOB  Cardiovascular: chest pain, palpitations   Gastrointestinal: nausea, vomiting, diarrhea, abdominal pain, No BM since admission, is passing gas  Genitourinary: increased frequency of urination, burning  Integument/Breast: rash  Hematologic/Lymphatic: bleeding, bruising  Musculoskeletal: back pain  Neurological: headache, unilateral weakness  Extremities: edema  Behavioral/Psychiatric: anxiety, depression    Physical Examination   Visit Vitals    BP (!) 204/94 (BP 1 Location: Left arm, BP Patient Position: At rest)    Pulse 83    Temp 98.4 °F (36.9 °C)    Resp 18    Ht 5' 3\" (1.6 m)    Wt 118 lb 13.3 oz (53.9 kg)    SpO2 94%    BMI 21.05 kg/m2       O2 Flow Rate (L/min): 2 l/min     O2 Device: Room air    General:  Alert, cooperative, no distress    Head:  Normocephalic, without obvious abnormality, atraumatic   Eyes:  Conjunctivae/corneas clear. PERRL, EOMs intact   E/N/M/T: Nares normal. Septum midline.  No nasal drainage or sinus tenderness  Lips, mucosa, and tongue normal   Teeth and gums normal  Clear oropharynx   Neck: Normal appearance and movements, symmetrical, trachea midline  No palpable adenopathy  No thyroid enlargement, tenderness or nodules  No carotid bruit   Normal JVP   Lungs:   Symmetrical chest expansion and respiratory effort  Clear to auscultation bilaterally   Chest wall:  No tenderness or deformity   Heart:  Regular rhythm   Sounds normal; no murmur, click, rub or gallop   Abdomen:   Soft, no tenderness  Bowel sounds normal  No masses or hepatosplenomegaly   Back: Dressing clean and intact, vac in place   Extremities: Extremities normal, atraumatic  No cyanosis or edema  No DVT signs   Pulses 2+ and symmetric all extremities   Musculo-      skeletal: Gait not tested  Normal symmetry, ROM, strength and tone   Neuro: Normal cranial nerves  Normal reflexes and sensation   Psych: Alert, oriented x3  Normal affect, judgement and insight     24 Hour Results:  Recent Results (from the past 24 hour(s))   METABOLIC PANEL, BASIC    Collection Time: 05/17/17  3:12 AM   Result Value Ref Range    Sodium 140 136 - 145 mmol/L    Potassium 3.8 3.5 - 5.1 mmol/L    Chloride 106 97 - 108 mmol/L    CO2 27 21 - 32 mmol/L    Anion gap 7 5 - 15 mmol/L    Glucose 97 65 - 100 mg/dL    BUN 10 6 - 20 MG/DL    Creatinine 0.73 0.55 - 1.02 MG/DL    BUN/Creatinine ratio 14 12 - 20      GFR est AA >60 >60 ml/min/1.73m2    GFR est non-AA >60 >60 ml/min/1.73m2    Calcium 8.3 (L) 8.5 - 10.1 MG/DL   CBC W/O DIFF    Collection Time: 05/17/17  3:12 AM   Result Value Ref Range    WBC 7.2 3.6 - 11.0 K/uL    RBC 2.91 (L) 3.80 - 5.20 M/uL    HGB 8.5 (L) 11.5 - 16.0 g/dL    HCT 27.0 (L) 35.0 - 47.0 %    MCV 92.8 80.0 - 99.0 FL    MCH 29.2 26.0 - 34.0 PG    MCHC 31.5 30.0 - 36.5 g/dL    RDW 14.4 11.5 - 14.5 %    PLATELET 829 047 - 442 K/uL     Recent Labs      05/17/17 0312 05/16/17 0229   WBC  7.2  13.1*   HGB  8.5*  8.5*   HCT  27.0*  25.8*   PLT  249  260     Recent Labs      05/17/17 0312 05/16/17 0229   NA  140  138   K  3.8  4.0   CL  106  104   CO2  27  25   GLU  97  121*   BUN  10  13   CREA  0.73  0.92   CA  8.3*  7.7*     Impression/Recomendations   Edilberto Golden is a 68 y.o. female s/p L4-S1 LAMINECTOMY / L4-5 FUSION /INSTRUMENTATION / TLIF / BONEGRAFT POD 1 and we were consulted for medical management of HTN, hypothyroidism, osteoporosis, asthma, anemia, GERD and migraine. Hypertension: 204/94.  Goal <150/90  - hydralazine 10 mg IV now, may repeat if BP continues to be elevated > 180/100  - as BP elevated at home, will increase home toprol Xl to 50 mg every day  - continue home diltiazem CD and cozaar  - discussed as hx of angioedema w/ lisinopril, should ideally avoid cozaar, patient will further discuss this w/ PCP outpatient  - monitor BP per unit protocol  - need fu w/ PCP w/in 1 week after discharge    Hypothyroidism: No TSH on file. - continue home sythroid    Asthma: stable   - continue home advair ([facility] formulary available)    GERD: stable  - continue home prevacid (protonox formulary available)    Osteoporosis: no DEXA on file. Reports being on estrace. - discussed that w/ age, hx of recent surgery and prolonged use of estrace that she is at higher risk of blood clots w/ estrace. Recommend discontinuation of estrace at discharge    Post op:  L4-S1 LAMINECTOMY / L4-5 FUSION /INSTRUMENTATION / TLIF / BONEGRAFT POD 1   - management per primary team       DVT prophylaxis: as per primary team, SCDs  Pain control: as per primary team  Dispo: as per primary team     Thank you very much for allowing us to participate in the care of this pleasant patient. The family medicine service will continue to follow the patient's medical progress along with you. Signed by:     Breanna Lopez MD   Family Medicine Resident (PGY-3)    May 17, 2017       [facility] Attending Addendum:  I saw and evaluated the patient, performing the key elements of the service. I discussed the findings, assessment and plan with the resident and agree with the resident's findings and plan as documented in the resident's note.     The patient was seen on 5/18/2017   This lady is s/p laminectomy  We are consulted for management of medical problems  She reports feeling dizzy whenever she stands up    Vitals:    05/18/17 0052 05/18/17 0139 05/18/17 0412 05/18/17 0755   BP: 162/72 142/57 138/64 152/74   Pulse: (!) 110 (!) 110 (!) 104 (!) 102   Resp:   19 18   Temp:   97.8 °F (36.6 °C) 98.5 °F (36.9 °C)   SpO2:   94% 98%   Weight:       Height:         VS-tachycardic  Lungs-clear      Assessment/Plan:   S/p Laminectomy  Near syncopal episodes-will check orthostatic vitals  Ct chest for persistent tachycardia- r/o PE    Hospital Problems  Never Reviewed          Codes Class Noted POA    Lumbar stenosis with neurogenic claudication ICD-10-CM: M48.06  ICD-9-CM: 724.03  5/15/2017 Unknown

## 2017-05-17 NOTE — PROGRESS NOTES
Problem: Mobility Impaired (Adult and Pediatric)  Goal: *Acute Goals and Plan of Care (Insert Text)  Physical Therapy Goals  Initiated 5/16/2017  1. Patient will move from supine to sit and sit to supine , scoot up and down and roll side to side in bed with modified independence within 7 day(s). 2. Patient will transfer from bed to chair and chair to bed with modified independence using the least restrictive device within 7 day(s). 3. Patient will perform sit to stand with modified independence within 7 day(s). 4. Patient will ambulate with modified independence for 150 feet with the least restrictive device within 7 day(s). 5. Patient will ascend/descend 4 stairs with 1 handrail(s) with minimal assistance/contact guard assist within 7 day(s). PHYSICAL THERAPY TREATMENT  Patient: Mandy Martell (70 y.o. female)  Date: 5/17/2017  Precautions: Back, Fall (LSO brace)      ASSESSMENT:  Please see am notation as well. Significant pain control issue today. Patient with poor strength throughout upper body and BLE. Decreased step clearance noted more pronounced LLE. Patient with high pain and anxiety and BP substantially elevated throughout today 200s/90s. Patient did not have strength BLEs for management of pants during toileting without loss of balance and full assistance. Pending progress tomorrow may need to consider alternate discharge plan to rehab vs home with home health as originally planned. Progression toward goals:  [ ]      Improving appropriately and progressing toward goals  [ ]      Improving slowly and progressing toward goals  [ ]      Not making progress toward goals and plan of care will be adjusted       PLAN:  Patient continues to benefit from skilled intervention to address the above impairments. Continue treatment per established plan of care.   Discharge Recommendations:  Rehab vs Home Health  Further Equipment Recommendations for Discharge:  None new       SUBJECTIVE:   Patient stated The pain is overwhelming.    The patient stated 3/3 back precautions. Reviewed all 3 with patient. OBJECTIVE DATA SUMMARY:   Functional Mobility Training:  Bed Mobility:  Log Rolling: Assist x1;Additional time;Minimum assistance  Supine to Sit: Moderate assistance; Additional time;Assist x1  Sit to Supine: Moderate assistance; Additional time;Assist x1  Scooting: Minimum assistance; Additional time;Assist x1        Brace donned with  moderate assistance    Transfers:  Sit to Stand: Minimum assistance; Additional time  Stand to Sit: Moderate assistance; Additional time  Stand Pivot Transfers: Additional time     Bed to Chair: Minimum assistance; Additional time                    Ambulation/Gait Training:  Distance (ft): 50 Feet (ft)  Assistive Device: Brace/Splint;Gait belt;Walker, rolling  Ambulation - Level of Assistance: Minimal assistance        Gait Abnormalities: Antalgic;Decreased step clearance (left foot- decreased clearance)                 Stairs - Level of Assistance:  (unable to attempt)     Therapeutic Exercises: Ankle pumps, gentle heel slides  Pain:  Pain Scale 1: Numeric (0 - 10)  Pain Intensity 1: 5  Pain Location 1: Back  Pain Orientation 1: Lower;Mid  Pain Description 1: Aching  Pain Intervention(s) 1: Medication (see MAR)  Activity Tolerance:   poor  Please refer to the flowsheet for vital signs taken during this treatment.   After treatment:   [ ]  Patient left in no apparent distress sitting up in chair  [X]  Patient left in no apparent distress in bed  [X]  Call bell left within reach  [X]  Nursing notified  [ ]  Caregiver present  [ ]  Bed alarm activated      COMMUNICATION/COLLABORATION:   The patients plan of care was discussed with: Registered Nurse     Олег Harry, PT   Time Calculation: 19 mins

## 2017-05-17 NOTE — PROGRESS NOTES
Problem: Mobility Impaired (Adult and Pediatric)  Goal: *Acute Goals and Plan of Care (Insert Text)  Physical Therapy Goals  Initiated 5/16/2017  1. Patient will move from supine to sit and sit to supine , scoot up and down and roll side to side in bed with modified independence within 7 day(s). 2. Patient will transfer from bed to chair and chair to bed with modified independence using the least restrictive device within 7 day(s). 3. Patient will perform sit to stand with modified independence within 7 day(s). 4. Patient will ambulate with modified independence for 150 feet with the least restrictive device within 7 day(s). 5. Patient will ascend/descend 4 stairs with 1 handrail(s) with minimal assistance/contact guard assist within 7 day(s). PHYSICAL THERAPY TREATMENT  Patient: Narcisa Dodd (52 y.o. female)  Date: 5/17/2017  Diagnosis: LUMBAR STENOSIS   Lumbar stenosis with neurogenic claudication <principal problem not specified>  Procedure(s) (LRB):  L4-S1 LAMINECTOMY / L4-5 FUSION /INSTRUMENTATION / TLIF / BONEGRAFT (N/A) 2 Days Post-Op  Precautions: Back, Fall (LSO brace)      ASSESSMENT:  PCA has been discontinued. Patient's pain quite elevated this am 10/10 with movement- ache all over but centralized back pain. Patient received finishing OT and deliberating sitting in chair vs BTB. Patient agreed to minimal walk with PT and then focus on bed mobility. Reviewed precautions and patient still struggles with pain with sit<>stand and avoiding bending spine. Patient still struggles bed mobility requiring mod assist and cues. High pain and high anxiety. Mild impair decreased clearance left foot with decreased heel strike with gait. No loss of balance or foot/toe drag noted this session.  Will see pre medicated for pain and anxiety later this pm.     Progression toward goals:  [ ]      Improving appropriately and progressing toward goals  [X]      Improving slowly and progressing toward goals  [ ] Not making progress toward goals and plan of care will be adjusted       PLAN:  Patient continues to benefit from skilled intervention to address the above impairments. Continue treatment per established plan of care. Discharge Recommendations:  Home Health  Further Equipment Recommendations for Discharge:  None new       SUBJECTIVE:   Patient stated I am hurting all over.    The patient stated 3/3 back precautions. Reviewed all 3 with patient. OBJECTIVE DATA SUMMARY:   Critical Behavior:  Neurologic State: Alert  Orientation Level: Oriented X4  Cognition: Follows commands  Safety/Judgement: Awareness of environment, Good awareness of safety precautions  Functional Mobility Training:  Bed Mobility:  Log Rolling: Assist x1;Additional time;Minimum assistance  Supine to Sit: Moderate assistance; Additional time;Assist x1  Sit to Supine: Moderate assistance; Additional time;Assist x1  Scooting: Minimum assistance; Additional time;Assist x1        Brace doffed with total assist  Transfers:  Sit to Stand: Minimum assistance; Additional time  Stand to Sit: Moderate assistance; Additional time  Stand Pivot Transfers: Additional time     Bed to Chair: Minimum assistance; Additional time                    Balance:  Sitting: Intact  Standing: Impaired  Standing - Static: Constant support;Good  Standing - Dynamic : Fair  Ambulation/Gait Training:  Distance (ft): 50 Feet (ft)  Assistive Device: Brace/Splint;Gait belt;Walker, rolling  Ambulation - Level of Assistance: Minimal assistance        Gait Abnormalities: Antalgic;Decreased step clearance (left foot- decreased clearance)           Stairs - Level of Assistance:  (unable to attempt)     Pain:  Pain Scale 1: Numeric (0 - 10)  Pain Intensity 1: 4  Pain Location 1: Back  Pain Orientation 1: Mid  Pain Description 1: Gnawing; Shooting  Pain Intervention(s) 1: Medication (see MAR)  Activity Tolerance:   Poor- pain  Please refer to the flowsheet for vital signs taken during this treatment.   After treatment:   [ ]  Patient left in no apparent distress sitting up in chair  [X]  Patient left in no apparent distress in bed  [X]  Call bell left within reach  [X]  Nursing notified  [X]  Caregiver present  [ ]  Bed alarm activated      COMMUNICATION/COLLABORATION:   The patients plan of care was discussed with: Registered Nurse     Harsha Goodwin, PT   Time Calculation: 18 mins

## 2017-05-18 ENCOUNTER — APPOINTMENT (OUTPATIENT)
Dept: CT IMAGING | Age: 77
DRG: 459 | End: 2017-05-18
Attending: FAMILY MEDICINE
Payer: MEDICARE

## 2017-05-18 LAB
ANION GAP BLD CALC-SCNC: 8 MMOL/L (ref 5–15)
APTT PPP: 26.9 SEC (ref 22.1–32.5)
APTT PPP: 34.1 SEC (ref 22.1–32.5)
BUN SERPL-MCNC: 10 MG/DL (ref 6–20)
BUN/CREAT SERPL: 12 (ref 12–20)
CALCIUM SERPL-MCNC: 8.2 MG/DL (ref 8.5–10.1)
CHLORIDE SERPL-SCNC: 101 MMOL/L (ref 97–108)
CO2 SERPL-SCNC: 29 MMOL/L (ref 21–32)
CREAT SERPL-MCNC: 0.85 MG/DL (ref 0.55–1.02)
ERYTHROCYTE [DISTWIDTH] IN BLOOD BY AUTOMATED COUNT: 14.1 % (ref 11.5–14.5)
GLUCOSE SERPL-MCNC: 100 MG/DL (ref 65–100)
HCT VFR BLD AUTO: 29.3 % (ref 35–47)
HGB BLD-MCNC: 9.2 G/DL (ref 11.5–16)
MCH RBC QN AUTO: 28.9 PG (ref 26–34)
MCHC RBC AUTO-ENTMCNC: 31.4 G/DL (ref 30–36.5)
MCV RBC AUTO: 92.1 FL (ref 80–99)
PLATELET # BLD AUTO: 292 K/UL (ref 150–400)
POTASSIUM SERPL-SCNC: 2.9 MMOL/L (ref 3.5–5.1)
RBC # BLD AUTO: 3.18 M/UL (ref 3.8–5.2)
SODIUM SERPL-SCNC: 138 MMOL/L (ref 136–145)
THERAPEUTIC RANGE,PTTT: ABNORMAL SECS (ref 58–77)
THERAPEUTIC RANGE,PTTT: NORMAL SECS (ref 58–77)
WBC # BLD AUTO: 8.3 K/UL (ref 3.6–11)

## 2017-05-18 PROCEDURE — 74011250637 HC RX REV CODE- 250/637: Performed by: PHYSICIAN ASSISTANT

## 2017-05-18 PROCEDURE — 74011250637 HC RX REV CODE- 250/637: Performed by: FAMILY MEDICINE

## 2017-05-18 PROCEDURE — 85730 THROMBOPLASTIN TIME PARTIAL: CPT

## 2017-05-18 PROCEDURE — 85730 THROMBOPLASTIN TIME PARTIAL: CPT | Performed by: FAMILY MEDICINE

## 2017-05-18 PROCEDURE — 74011250636 HC RX REV CODE- 250/636: Performed by: FAMILY MEDICINE

## 2017-05-18 PROCEDURE — 74011250637 HC RX REV CODE- 250/637: Performed by: ORTHOPAEDIC SURGERY

## 2017-05-18 PROCEDURE — 65270000029 HC RM PRIVATE

## 2017-05-18 PROCEDURE — 97116 GAIT TRAINING THERAPY: CPT

## 2017-05-18 PROCEDURE — 36415 COLL VENOUS BLD VENIPUNCTURE: CPT | Performed by: PHYSICIAN ASSISTANT

## 2017-05-18 PROCEDURE — 97535 SELF CARE MNGMENT TRAINING: CPT

## 2017-05-18 PROCEDURE — 97530 THERAPEUTIC ACTIVITIES: CPT

## 2017-05-18 PROCEDURE — 74011636320 HC RX REV CODE- 636/320: Performed by: RADIOLOGY

## 2017-05-18 PROCEDURE — 80048 BASIC METABOLIC PNL TOTAL CA: CPT | Performed by: PHYSICIAN ASSISTANT

## 2017-05-18 PROCEDURE — 85027 COMPLETE CBC AUTOMATED: CPT | Performed by: PHYSICIAN ASSISTANT

## 2017-05-18 PROCEDURE — 71275 CT ANGIOGRAPHY CHEST: CPT

## 2017-05-18 RX ORDER — POTASSIUM CHLORIDE 1.5 G/1.77G
40 POWDER, FOR SOLUTION ORAL
Status: COMPLETED | OUTPATIENT
Start: 2017-05-18 | End: 2017-05-18

## 2017-05-18 RX ORDER — POTASSIUM CHLORIDE 7.45 MG/ML
10 INJECTION INTRAVENOUS
Status: DISCONTINUED | OUTPATIENT
Start: 2017-05-18 | End: 2017-05-18

## 2017-05-18 RX ORDER — HEPARIN SODIUM 10000 [USP'U]/100ML
18-36 INJECTION, SOLUTION INTRAVENOUS
Status: DISCONTINUED | OUTPATIENT
Start: 2017-05-18 | End: 2017-05-19

## 2017-05-18 RX ORDER — HEPARIN SODIUM 5000 [USP'U]/ML
80 INJECTION, SOLUTION INTRAVENOUS; SUBCUTANEOUS ONCE
Status: DISCONTINUED | OUTPATIENT
Start: 2017-05-18 | End: 2017-05-18

## 2017-05-18 RX ADMIN — Medication 10 ML: at 05:12

## 2017-05-18 RX ADMIN — POTASSIUM CHLORIDE 40 MEQ: 1.5 POWDER, FOR SOLUTION ORAL at 11:44

## 2017-05-18 RX ADMIN — FLUTICASONE PROPIONATE 2 SPRAY: 50 SPRAY, METERED NASAL at 22:48

## 2017-05-18 RX ADMIN — CYCLOBENZAPRINE HYDROCHLORIDE 5 MG: 10 TABLET, FILM COATED ORAL at 22:46

## 2017-05-18 RX ADMIN — OXYCODONE HYDROCHLORIDE AND ACETAMINOPHEN 1 TABLET: 10; 325 TABLET ORAL at 09:38

## 2017-05-18 RX ADMIN — OXYCODONE HYDROCHLORIDE AND ACETAMINOPHEN 1 TABLET: 10; 325 TABLET ORAL at 14:58

## 2017-05-18 RX ADMIN — FLUTICASONE FUROATE AND VILANTEROL TRIFENATATE 1 PUFF: 100; 25 POWDER RESPIRATORY (INHALATION) at 09:00

## 2017-05-18 RX ADMIN — DILTIAZEM HYDROCHLORIDE 240 MG: 240 CAPSULE, COATED, EXTENDED RELEASE ORAL at 20:42

## 2017-05-18 RX ADMIN — POTASSIUM CHLORIDE 40 MEQ: 1.5 POWDER, FOR SOLUTION ORAL at 09:37

## 2017-05-18 RX ADMIN — PANTOPRAZOLE SODIUM 40 MG: 40 TABLET, DELAYED RELEASE ORAL at 06:34

## 2017-05-18 RX ADMIN — OXYCODONE HYDROCHLORIDE AND ACETAMINOPHEN 1 TABLET: 10; 325 TABLET ORAL at 02:57

## 2017-05-18 RX ADMIN — CETIRIZINE HYDROCHLORIDE 10 MG: 10 TABLET, FILM COATED ORAL at 20:42

## 2017-05-18 RX ADMIN — LOSARTAN POTASSIUM 50 MG: 50 TABLET, FILM COATED ORAL at 09:37

## 2017-05-18 RX ADMIN — IOPAMIDOL 67 ML: 755 INJECTION, SOLUTION INTRAVENOUS at 09:02

## 2017-05-18 RX ADMIN — HEPARIN SODIUM 4300 UNITS: 5000 INJECTION, SOLUTION INTRAVENOUS; SUBCUTANEOUS at 21:35

## 2017-05-18 RX ADMIN — HEPARIN SODIUM AND DEXTROSE 18 UNITS/KG/HR: 10000; 5 INJECTION INTRAVENOUS at 11:44

## 2017-05-18 RX ADMIN — LEVOTHYROXINE SODIUM 100 MCG: 0.05 TABLET ORAL at 06:34

## 2017-05-18 RX ADMIN — Medication 10 ML: at 11:50

## 2017-05-18 RX ADMIN — OXYCODONE HYDROCHLORIDE AND ACETAMINOPHEN 1 TABLET: 10; 325 TABLET ORAL at 20:43

## 2017-05-18 RX ADMIN — DIAZEPAM 5 MG: 5 TABLET ORAL at 09:38

## 2017-05-18 RX ADMIN — HYDRALAZINE HYDROCHLORIDE 20 MG: 20 INJECTION INTRAMUSCULAR; INTRAVENOUS at 00:45

## 2017-05-18 RX ADMIN — POTASSIUM CHLORIDE 10 MEQ: 10 INJECTION, SOLUTION INTRAVENOUS at 06:34

## 2017-05-18 RX ADMIN — CYCLOBENZAPRINE HYDROCHLORIDE 5 MG: 10 TABLET, FILM COATED ORAL at 14:58

## 2017-05-18 RX ADMIN — METOPROLOL SUCCINATE 50 MG: 50 TABLET, FILM COATED, EXTENDED RELEASE ORAL at 09:37

## 2017-05-18 RX ADMIN — POTASSIUM CHLORIDE 40 MEQ: 1.5 POWDER, FOR SOLUTION ORAL at 16:06

## 2017-05-18 RX ADMIN — DOCUSATE SODIUM 100 MG: 100 CAPSULE ORAL at 09:37

## 2017-05-18 RX ADMIN — Medication 10 ML: at 20:48

## 2017-05-18 NOTE — INTERDISCIPLINARY ROUNDS
[address]    Patient Information    Name: Fish Grade  Age: 68 y.o. Admission Date: 5/15/2017  Surgery/Procedure: Procedure(s):  L4-S1 LAMINECTOMY / L4-5 FUSION /INSTRUMENTATION / TLIF / BONEGRAFT  Attending Provider: Jerson Beck MD  Surgeon: Caterina Hebert   Problem List: Active Problems:    Lumbar stenosis with neurogenic claudication (5/15/2017)      During rounds the following quality care indicators and evidence based practices were addressed :       PT/OT: Patient mobility - Walked in the [place]sey with therapy. Bed Mobility Training  Rolling: Assist x1, Additional time, Minimum assistance  Supine to Sit: Moderate assistance, Additional time, Assist x1  Sit to Supine: Moderate assistance, Additional time, Assist x1  Scooting: Minimum assistance, Additional time, Assist x1  Transfer Training  Sit to Stand: Minimum assistance, Additional time  Stand to Sit: Moderate assistance, Additional time  Bed to Chair: Minimum assistance, Additional time      Gait Training  Assistive Device: Brace/Splint, Gait belt, Walker, rolling  Ambulation - Level of Assistance: Minimal assistance  Distance (ft): 50 Feet (ft)  Stairs - Level of Assistance:  (unable to attempt)          Pain Assessment  Pain Intensity 1: 4 (05/18/17 1010)  Pain Location 1: Back  Pain Intervention(s) 1: Medication (see MAR)  Patient Stated Pain Goal: 3    Pain meds: oxycodone, diludid  Antibiotics completed:  Yes  Anticoagulation:  Heparin IV (due to dx of PE)    SCDs: in place  Bowels:     RRAT Score:      Readmit Risk Tool  Support Systems: Spouse/Significant Other/Partner, Family member(s)  Relationship with Primary Physician Group: Seen at least one time within the past 6 months    Discharge Management/Planning:    Readmit Risk Assessment Information:   Medium Risk            14       Total Score        3 Relationship with PCP    2 Patient Living Status    4 More than 1 Admission in calendar year    5 Patient Insurance is Medicare, Medicaid or Self Pay        Criteria that do not apply:    Patient Length of Stay > 5    Charlson Comorbidity Score         Readmit Risk Tool  Support Systems: Spouse/Significant Other/Partner, Family member(s)  Relationship with Primary Physician Group: Seen at least one time within the past 6 months    [facility]     Anticipated Date of Discharge: TBD    Interdisciplinary team rounds were held with the following team members: Nurse Practitioner, Case Management, Nursing, Pharmacy, and Rehab. See clinical pathway and/or care plan for interventions and desired outcomes. Michelle Mosher

## 2017-05-18 NOTE — PROGRESS NOTES
[facility] Residents regarding pt c/o reflux, no PRNs ordered. MD to place orders. 200 - [facility] notified of lab results, K+ 2.9, BP now WNL, and tachycardia. MD to place orders.

## 2017-05-18 NOTE — PROGRESS NOTES
Attempted to work with the patient for Physical Therapy, chart reviewed and discussed with nurse patient just finished working with OT and feels exhausted this afternoon requested to skip therapy. Notified nurse who agreed to monitor patient.  Thank you

## 2017-05-18 NOTE — PROGRESS NOTES
Problem: Self Care Deficits Care Plan (Adult)  Goal: *Acute Goals and Plan of Care (Insert Text)  Occupational Therapy Goals  Initiated 5/16/2017    1. Patient will perform lower body dressing with supervision/set-up using adaptive dressing aides PRN within 7 days. 2. Patient will perform toilet transfer with supervision/set-up using rolling walker within 7 days. 3. Patient will perform all aspects of toileting at modified independence within 7 days. 4. Patient will don/doff back brace at supervision/set-up within 7 days. 5. Patient will verbalize/demonstrate 3/3 back precautions during ADL tasks without cues within 7 days. OCCUPATIONAL THERAPY TREATMENT  Patient: Mandy Martell (85 y.o. female)  Date: 5/18/2017  Diagnosis: LUMBAR STENOSIS   Lumbar stenosis with neurogenic claudication <principal problem not specified>  Procedure(s) (LRB):  L4-S1 LAMINECTOMY / L4-5 FUSION /INSTRUMENTATION / TLIF / BONEGRAFT (N/A) 3 Days Post-Op  Precautions: Back, Fall (LSO brace)  Chart, occupational therapy assessment, plan of care, and goals were reviewed. ASSESSMENT:  Pt agreeable to OT. She was educated as to sock aide and donned right sock with min assist and verbal cueing. She is familiar with reacher and long handle shoe horn. She was educated as to long handle bath sponge. Pt needed min assist to don back brace correctly. She ambulated to restroom and transferred to commode with min assist and verbal cueing for hand placement . She needed min assist for clothing management and supervision for safety. Recommend home health. Progression toward goals:  [ ]          Improving appropriately and progressing toward goals  [X]          Improving slowly and progressing toward goals  [ ]          Not making progress toward goals and plan of care will be adjusted       PLAN:  Patient continues to benefit from skilled intervention to address the above impairments.   Continue treatment per established plan of care.  Discharge Recommendations:  Home health  Further Equipment Recommendations for Discharge: Bedside commode       SUBJECTIVE:   Patient stated I ordered a bedside commode.       OBJECTIVE DATA SUMMARY:   Cognitive/Behavioral Status:  Neurologic State: Alert  Orientation Level: Oriented X4  Cognition: Appropriate decision making           Functional Mobility and Transfers for ADLs:              Bed Mobility:  Rolling: Contact guard assistance  Supine to Sit: Contact guard assistance  Sit to Supine: Contact guard assistance  Scooting: Contact guard assistance                Transfers:  Sit to Stand: Contact guard assistance  Functional Transfers  Toilet Transfer : Minimum assistance  Adaptive Equipment: Bedside commode;Walker (comment)     Balance:  Sitting: Intact  Standing: Impaired; With support  Standing - Static: Good  Standing - Dynamic : Fair  ADL Intervention:                                Lower Body Dressing Assistance  Socks: Minimum assistance  Leg Crossed Method Used: No  Position Performed: Seated in chair  Cues: Don;Physical assistance;Verbal cues provided  Adaptive Equipment Used: Reacher;Sock aid  Compensatory techniques taught           Pain:  Pain Scale 1: Numeric (0 - 10)  Pain Intensity 1: 5  Pain Location 1: Back  Pain Orientation 1: Posterior  Pain Description 1: Aching  Pain Intervention(s) 1: Medication (see MAR)     Activity Tolerance:    Fair  Please refer to the flowsheet for vital signs taken during this treatment.   After treatment:   [ ]  Patient left in no apparent distress sitting up in chair  [X]  Patient left in no apparent distress in bed  [X]  Call bell left within reach  [X]  Nursing notified  [ ]  Caregiver present  [ ]  Bed alarm activated      COMMUNICATION/COLLABORATION:   The patients plan of care was discussed with: Occupational Therapist and Registered Nurse     JOEL Hamilton  Time Calculation: 30 mins

## 2017-05-18 NOTE — PROGRESS NOTES
ORTHOPAEDIC LUMBAR FUSION PROGRESS NOTE    NAME:     Aram Green   :       1940   MRN:       [id number]   DATE:      2017    POD:    3 Days Post-Op  S/P:    Procedure(s):  L4-S1 LAMINECTOMY / L4-5 FUSION /INSTRUMENTATION / TLIF / BONEGRAFT    SUBJECTIVE:    C/O back pain along surgical incision  No leg pain or numbness  Pt states that she has had 2 episodes of lightheadedness/near syncope  Denies nausea/vomiting, palpitations, chest pain, headache or shortness of breath    Recent Labs      17   0138   HGB  9.2*   HCT  29.3*   NA  138   K  2.9*   CL  101   CO2  29   BUN  10   CREA  0.85   GLU  100     Patient Vitals for the past 12 hrs:   BP Temp Pulse Resp SpO2   17 0755 152/74 98.5 °F (36.9 °C) (!) 102 18 98 %   17 0412 138/64 97.8 °F (36.6 °C) (!) 104 19 94 %   17 0139 142/57 - (!) 110 - -   17 0052 162/72 - (!) 110 - -   17 0045 197/85 - (!) 104 - -   17 0001 (!) 183/94 - (!) 104 - -   17 2328 184/90 98.7 °F (37.1 °C) (!) 102 19 95 %       EXAM:  Dressings clean, dry and intact   Positive strength/ROM bilat lower ext. Neuro intact to sensation  Calves, soft & nontender  BL LEs NVID      PLAN:  Continue PO pain medications  PT/OT, OOB w/ assistance  Advance regular diet    Spoke w/ Family Medicine Resident. They are treating her K+ of 2.9  They have a low threshold for performing a chest CTA if orthostatics and work-up are negative. Informed Resident that I agree.         Jamil Pollock, [address]  Orthopaedic Surgery  Physician Assistant to Dr. Jamil Cast

## 2017-05-18 NOTE — PROGRESS NOTES
Problem: Mobility Impaired (Adult and Pediatric)  Goal: *Acute Goals and Plan of Care (Insert Text)  Physical Therapy Goals  Initiated 5/16/2017  1. Patient will move from supine to sit and sit to supine , scoot up and down and roll side to side in bed with modified independence within 7 day(s). 2. Patient will transfer from bed to chair and chair to bed with modified independence using the least restrictive device within 7 day(s). 3. Patient will perform sit to stand with modified independence within 7 day(s). 4. Patient will ambulate with modified independence for 150 feet with the least restrictive device within 7 day(s). 5. Patient will ascend/descend 4 stairs with 1 handrail(s) with minimal assistance/contact guard assist within 7 day(s). PHYSICAL THERAPY TREATMENT  Patient: Herman Lam (58 y.o. female)  Date: 5/18/2017  Diagnosis: LUMBAR STENOSIS   Lumbar stenosis with neurogenic claudication <principal problem not specified>  Procedure(s) (LRB):  L4-S1 LAMINECTOMY / L4-5 FUSION /INSTRUMENTATION / TLIF / BONEGRAFT (N/A) 3 Days Post-Op  Precautions: Back, Fall (LSO brace)      ASSESSMENT:  Based on the objective data described below, patient tolerated treatment very well. Sit on the edge of bed with CGA, sit to stand CGA, ambulate with rolling walker CGA, decreased pace, but steady. OOB to chair as tolerated, performed some active range of motion exercise on both LE all planes. Reviewed back precaution and donning of back brace, spouse in the room and agreed with ll goals set for the patient. Notified nurse who agreed to monitor and assist back to bed when ask. Progression toward goals:  [X]      Improving appropriately and progressing toward goals  [ ]      Improving slowly and progressing toward goals  [ ]      Not making progress toward goals and plan of care will be adjusted       PLAN:  Patient continues to benefit from skilled intervention to address the above impairments.   Continue treatment per established plan of care. Discharge Recommendations:  Home Health  Further Equipment Recommendations for Discharge:  Already has DME's       SUBJECTIVE:   Patient stated Ok I'm ready.    The patient stated 3/3 back precautions. Reviewed all 3 with patient. OBJECTIVE DATA SUMMARY:   Critical Behavior:  Neurologic State: Alert  Orientation Level: Oriented X4  Cognition: Appropriate decision making  Safety/Judgement: Awareness of environment, Good awareness of safety precautions  Functional Mobility Training:  Bed Mobility:  Log Rolling: Contact guard assistance  Supine to Sit: Contact guard assistance  Sit to Supine: Contact guard assistance  Scooting: Contact guard assistance        Brace donned with  supervision/set-up   Transfers:  Sit to Stand: Contact guard assistance  Stand to Sit: Contact guard assistance  Stand Pivot Transfers: Contact guard assistance     Bed to Chair: Contact guard assistance                    Balance:  Sitting: Intact  Standing: Impaired; With support  Standing - Static: Good  Standing - Dynamic : Fair  Ambulation/Gait Training:  Distance (ft): 100 Feet (ft)  Assistive Device: Walker, rolling;Gait belt  Ambulation - Level of Assistance: Contact guard assistance     Gait Description (WDL): Exceptions to WDL  Gait Abnormalities: Antalgic                                      Therapeutic Exercises:    Instructed patient to continue active range of motion exercise on both legs while up on chair or on bed. Pain:  Pain Scale 1: Numeric (0 - 10)  Pain Intensity 1: 4  Pain Location 1: Back  Pain Orientation 1: Posterior  Pain Description 1: Aching  Pain Intervention(s) 1: Medication (see MAR)  Activity Tolerance:   Good. Please refer to the flowsheet for vital signs taken during this treatment.   After treatment:   [X]  Patient left in no apparent distress sitting up in chair  [ ]  Patient left in no apparent distress in bed  [X]  Call bell left within reach  [X]  Nursing notified  [X]  Caregiver present  [ ]  Bed alarm activated      COMMUNICATION/COLLABORATION:   The patients plan of care was discussed with: Registered Nurse and patient     Yaniv Flannery, PT,WCC.    Time Calculation: 23 mins

## 2017-05-18 NOTE — PROGRESS NOTES
Rounded on [facility] patients and provided Anointing of the Sick at request of patient    Fr. Keven Matthews

## 2017-05-18 NOTE — ROUTINE PROCESS
1104: Orders from Olimpia Daniels NP to pull drain. Bedside and Verbal shift change report given to Jonathan Boudreaux RN (oncoming nurse) by Mike Pathak RN (offgoing nurse). Report included the following information SBAR, Procedure Summary, Intake/Output and MAR.

## 2017-05-18 NOTE — PROGRESS NOTES
[address] [facility] RESIDENCY PROGRAM  PROGRESS NOTE     5/18/2017  PCP: Lucero Reid MD     Assessment/Plan:   Dominique Duran is a 68 y.o. female s/p L4-S1 LAMINECTOMY / L4-5 FUSION /INSTRUMENTATION / TLIF / BONEGRAFT POD# 2 and we were consulted for medical management of HTN, hypothyroidism, osteoporosis, asthma, anemia, GERD and migraine.     POD #2 L4-S1 Laminectomy, L4-5 fusion, instrumentation, TLIF, bonegraft: per primary     Tachycardia: HR up to 110; on RA, asymptomatic. Possibly 2/2 pain; however, will need to r/o PE, as pt postop  - CTA chest to r/o PE    Dizziness/ Lightheadedness: possibly orthostatic as sx positional.   - orthostatic VS     Hypertension: 152/74 this AM; however, up to 180s-190s/ 90s. Goal <150/90  - increase home toprol Xl to 50 mg daily  - diltiazem  mg QHS  - cozaar 50 mg daily   - discussed as hx of angioedema w/ lisinopril, should ideally avoid cozaar, patient will further discuss this w/ PCP outpatient  - monitor VS per unit protocol  - need fu w/ PCP w/in 1 week after discharge     Hypothyroidism: No TSH on file. - synthroid 100 mcg daily      Asthma: stable   - continue home advair ([employer] formulary available)     GERD: stable  - continue home prevacid (protonox formulary available)     Osteoporosis: no DEXA on file. Reports being on estrace. discussed that w/ age, hx of recent surgery and prolonged use of estrace that she is at higher risk of blood clots w/ estrace. Recommend discontinuation of estrace at discharge     PT/OT, disposition, DVT Prophylaxis & Pain Management: per primary     Thank you very much for allowing us to participate in the care of this pleasant patient. The family medicine service will continue to follow the patient's medical progress along with you. Please do not hesitate to page with any questions or to discuss the case.     Pt to be discussed with Dr. Denis Stratton, Attending Physician         Subjective:   Pt was seen and examined at bedside. Afebrile and hemodynamically stable. Concerns overnight include: lightheadedness while pt stood up to use the bedside commode. Denies chest pain, SOB, nausea, vomiting, abdominal pain. Allergies: Allergies   Allergen Reactions    Ace Inhibitors Angioedema       Objective:   Physical examination  Visit Vitals    /64 (BP 1 Location: Left arm, BP Patient Position: At rest)    Pulse (!) 104    Temp 97.8 °F (36.6 °C)    Resp 19    Ht 5' 3\" (1.6 m)    Wt 118 lb 13.3 oz (53.9 kg)    SpO2 94%    BMI 21.05 kg/m2      Temp (24hrs), Av.3 °F (36.8 °C), Min:97.8 °F (36.6 °C), Max:98.7 °F (37.1 °C)     O2 Flow Rate (L/min): 2 l/min   O2 Device: Room air      Date 17 - 17 0617 - 17 0659   Shift 8370-2154 0266-4861 24 Hour Total 6392-5041 1024-6486 24 Hour Total   I  N  T  A  K  E   P.O. 350  350         P. O. 350  350       Shift Total  (mL/kg) 350  (6.5)  350  (6.5)      O  U  T  P  U  T   Urine  (mL/kg/hr) 1600  (2.5) 300  (0.5) 1900  (1.5)         Urine Voided 400 300 700         Urine Occurrence(s) 1 x 2 x 3 x         Urine Output (mL) ([REMOVED] Urinary Catheter 05/15/17 2- way; [place]) 1200  1200       Drains 60 50 110         Output (ml) (Hemovac Anterior;Mid;Left Neck) 60 50 110       Shift Total  (mL/kg) 1660  (30.8) 350  (6.5) 2010  (37.3)      NET -1310 -350 -1660      Weight (kg) 53.9 53.9 53.9 53.9 53.9 53.9         Last 3 shifts:    1901 -  0700  In: 350 [P.O.:350]  Out: 5065 [Urine:4875; Drains:190]    General:   Alert, oriented, NAD   HEENT:   NCAT, EOMI, oral mucosa moist   Lungs:   CTAB, no wheezing. Heart:   Regular rhythm, tachycardia, no murmur   Abdomen:    Soft, non-tender, nondistended. No rebound or guarding. Extremities:  No edema or DVT signs   Pulses:  Symmetric all extremities   Skin:  Warm and dry. No rashes or lesions   Neurologic:  Oriented.  No focal deficits     Data Review:     Recent Labs 05/18/17   0138  05/17/17   0312  05/16/17 0229   WBC  8.3  7.2  13.1*   HGB  9.2*  8.5*  8.5*   HCT  29.3*  27.0*  25.8*   PLT  292  249  260     Recent Labs      05/18/17   0138  05/17/17 0312  05/16/17 0229   NA  138  140  138   K  2.9*  3.8  4.0   CL  101  106  104   CO2  29  27  25   GLU  100  97  121*   BUN  10  10  13   CREA  0.85  0.73  0.92   CA  8.2*  8.3*  7.7*     Medications reviewed  Current Facility-Administered Medications   Medication Dose Route Frequency    potassium chloride 10 mEq in 100 ml IVPB  10 mEq IntraVENous Q1H    cyclobenzaprine (FLEXERIL) tablet 5 mg  5 mg Oral TID PRN    metoprolol succinate (TOPROL-XL) XL tablet 50 mg  50 mg Oral DAILY    alum-mag hydroxide-simeth (MYLANTA) oral suspension 30 mL  30 mL Oral Q4H PRN    cetirizine (ZYRTEC) tablet 10 mg  10 mg Oral QHS    dilTIAZem CD (CARDIZEM CD) capsule 240 mg  240 mg Oral QHS    losartan (COZAAR) tablet 50 mg  50 mg Oral DAILY    rizatriptan (MAXALT-MLT) rapid dissolve tablet 5 mg  5 mg Oral PRN    fluticasone-vilanterol (BREO ELLIPTA) 100mcg-25mcg/puff  1 Puff Inhalation DAILY    fluticasone (FLONASE) 50 mcg/actuation nasal spray 2 Spray  2 Spray Both Nostrils QHS    pantoprazole (PROTONIX) tablet 40 mg  40 mg Oral ACB    levothyroxine (SYNTHROID) tablet 100 mcg  100 mcg Oral ACB    sodium chloride (NS) flush 5-10 mL  5-10 mL IntraVENous Q8H    sodium chloride (NS) flush 5-10 mL  5-10 mL IntraVENous PRN    diazePAM (VALIUM) tablet 5 mg  5 mg Oral Q6H PRN    acetaminophen (TYLENOL) tablet 650 mg  650 mg Oral Q4H PRN    oxyCODONE-acetaminophen (PERCOCET 10)  mg per tablet 1 Tab  1 Tab Oral Q4H PRN    HYDROmorphone (PF) (DILAUDID) injection 1 mg  1 mg IntraVENous Q4H PRN    naloxone (NARCAN) injection 0.4 mg  0.4 mg IntraVENous PRN    ondansetron (ZOFRAN) injection 4 mg  4 mg IntraVENous Q4H PRN    diphenhydrAMINE (BENADRYL) capsule 25 mg  25 mg Oral Q4H PRN    docusate sodium (COLACE) capsule 100 mg 100 mg Oral BID    zolpidem (AMBIEN) tablet 5 mg  5 mg Oral QHS PRN         Signed:   Bobo Page MD   Resident, Family Medicine        Attending note: Attending note to follow. .. I saw and evaluated the patient, performing the key elements of the service. I discussed the findings, assessment and plan with the resident and agree with the resident's findings and plan as documented in the resident's note.

## 2017-05-19 PROBLEM — I26.99 ACUTE PULMONARY EMBOLISM (HCC): Status: ACTIVE | Noted: 2017-05-19

## 2017-05-19 LAB
ANION GAP BLD CALC-SCNC: 7 MMOL/L (ref 5–15)
APTT PPP: 67.5 SEC (ref 22.1–32.5)
BUN SERPL-MCNC: 15 MG/DL (ref 6–20)
BUN/CREAT SERPL: 18 (ref 12–20)
CALCIUM SERPL-MCNC: 8.9 MG/DL (ref 8.5–10.1)
CHLORIDE SERPL-SCNC: 102 MMOL/L (ref 97–108)
CO2 SERPL-SCNC: 27 MMOL/L (ref 21–32)
CREAT SERPL-MCNC: 0.82 MG/DL (ref 0.55–1.02)
ERYTHROCYTE [DISTWIDTH] IN BLOOD BY AUTOMATED COUNT: 14.1 % (ref 11.5–14.5)
GLUCOSE SERPL-MCNC: 114 MG/DL (ref 65–100)
HCT VFR BLD AUTO: 30.2 % (ref 35–47)
HGB BLD-MCNC: 9.7 G/DL (ref 11.5–16)
MCH RBC QN AUTO: 29.4 PG (ref 26–34)
MCHC RBC AUTO-ENTMCNC: 32.1 G/DL (ref 30–36.5)
MCV RBC AUTO: 91.5 FL (ref 80–99)
PLATELET # BLD AUTO: 284 K/UL (ref 150–400)
POTASSIUM SERPL-SCNC: 4.2 MMOL/L (ref 3.5–5.1)
RBC # BLD AUTO: 3.3 M/UL (ref 3.8–5.2)
SODIUM SERPL-SCNC: 136 MMOL/L (ref 136–145)
THERAPEUTIC RANGE,PTTT: ABNORMAL SECS (ref 58–77)
WBC # BLD AUTO: 7.5 K/UL (ref 3.6–11)

## 2017-05-19 PROCEDURE — 97116 GAIT TRAINING THERAPY: CPT

## 2017-05-19 PROCEDURE — 85730 THROMBOPLASTIN TIME PARTIAL: CPT | Performed by: FAMILY MEDICINE

## 2017-05-19 PROCEDURE — 80048 BASIC METABOLIC PNL TOTAL CA: CPT | Performed by: PHYSICIAN ASSISTANT

## 2017-05-19 PROCEDURE — 85027 COMPLETE CBC AUTOMATED: CPT | Performed by: PHYSICIAN ASSISTANT

## 2017-05-19 PROCEDURE — 97535 SELF CARE MNGMENT TRAINING: CPT

## 2017-05-19 PROCEDURE — 74011250637 HC RX REV CODE- 250/637: Performed by: ORTHOPAEDIC SURGERY

## 2017-05-19 PROCEDURE — 74011250637 HC RX REV CODE- 250/637: Performed by: PHYSICIAN ASSISTANT

## 2017-05-19 PROCEDURE — 74011250636 HC RX REV CODE- 250/636: Performed by: NURSE PRACTITIONER

## 2017-05-19 PROCEDURE — 65270000029 HC RM PRIVATE

## 2017-05-19 PROCEDURE — 74011250637 HC RX REV CODE- 250/637: Performed by: FAMILY MEDICINE

## 2017-05-19 PROCEDURE — 36415 COLL VENOUS BLD VENIPUNCTURE: CPT | Performed by: PHYSICIAN ASSISTANT

## 2017-05-19 PROCEDURE — 74011250637 HC RX REV CODE- 250/637: Performed by: NURSE PRACTITIONER

## 2017-05-19 PROCEDURE — 74011250636 HC RX REV CODE- 250/636: Performed by: FAMILY MEDICINE

## 2017-05-19 RX ORDER — METOPROLOL SUCCINATE 50 MG/1
50 TABLET, EXTENDED RELEASE ORAL DAILY
Qty: 30 TAB | Refills: 0 | Status: SHIPPED | OUTPATIENT
Start: 2017-05-19 | End: 2019-04-18

## 2017-05-19 RX ORDER — OXYCODONE AND ACETAMINOPHEN 5; 325 MG/1; MG/1
1 TABLET ORAL
Status: DISCONTINUED | OUTPATIENT
Start: 2017-05-19 | End: 2017-05-20 | Stop reason: HOSPADM

## 2017-05-19 RX ORDER — HYDROMORPHONE HYDROCHLORIDE 1 MG/ML
1 INJECTION, SOLUTION INTRAMUSCULAR; INTRAVENOUS; SUBCUTANEOUS
Status: DISCONTINUED | OUTPATIENT
Start: 2017-05-19 | End: 2017-05-20 | Stop reason: HOSPADM

## 2017-05-19 RX ORDER — POLYETHYLENE GLYCOL 3350 17 G/17G
17 POWDER, FOR SOLUTION ORAL 2 TIMES DAILY
Status: DISCONTINUED | OUTPATIENT
Start: 2017-05-19 | End: 2017-05-20 | Stop reason: HOSPADM

## 2017-05-19 RX ORDER — OXYCODONE AND ACETAMINOPHEN 10; 325 MG/1; MG/1
1 TABLET ORAL
Status: DISCONTINUED | OUTPATIENT
Start: 2017-05-19 | End: 2017-05-20 | Stop reason: HOSPADM

## 2017-05-19 RX ORDER — AMOXICILLIN 250 MG
1 CAPSULE ORAL 2 TIMES DAILY
Status: DISCONTINUED | OUTPATIENT
Start: 2017-05-19 | End: 2017-05-20 | Stop reason: HOSPADM

## 2017-05-19 RX ADMIN — DILTIAZEM HYDROCHLORIDE 240 MG: 240 CAPSULE, COATED, EXTENDED RELEASE ORAL at 20:06

## 2017-05-19 RX ADMIN — FLUTICASONE PROPIONATE 2 SPRAY: 50 SPRAY, METERED NASAL at 20:08

## 2017-05-19 RX ADMIN — OXYCODONE HYDROCHLORIDE AND ACETAMINOPHEN 1 TABLET: 10; 325 TABLET ORAL at 15:13

## 2017-05-19 RX ADMIN — HEPARIN SODIUM AND DEXTROSE 22 UNITS/KG/HR: 10000; 5 INJECTION INTRAVENOUS at 07:30

## 2017-05-19 RX ADMIN — Medication 10 ML: at 15:14

## 2017-05-19 RX ADMIN — OXYCODONE HYDROCHLORIDE AND ACETAMINOPHEN 1 TABLET: 10; 325 TABLET ORAL at 00:48

## 2017-05-19 RX ADMIN — METOPROLOL SUCCINATE 50 MG: 50 TABLET, FILM COATED, EXTENDED RELEASE ORAL at 10:00

## 2017-05-19 RX ADMIN — APIXABAN 10 MG: 5 TABLET, FILM COATED ORAL at 12:28

## 2017-05-19 RX ADMIN — HYDROMORPHONE HYDROCHLORIDE 1 MG: 1 INJECTION, SOLUTION INTRAMUSCULAR; INTRAVENOUS; SUBCUTANEOUS at 23:42

## 2017-05-19 RX ADMIN — LEVOTHYROXINE SODIUM 100 MCG: 0.05 TABLET ORAL at 06:22

## 2017-05-19 RX ADMIN — OXYCODONE HYDROCHLORIDE AND ACETAMINOPHEN 1 TABLET: 10; 325 TABLET ORAL at 20:07

## 2017-05-19 RX ADMIN — APIXABAN 10 MG: 5 TABLET, FILM COATED ORAL at 17:12

## 2017-05-19 RX ADMIN — PANTOPRAZOLE SODIUM 40 MG: 40 TABLET, DELAYED RELEASE ORAL at 06:22

## 2017-05-19 RX ADMIN — CETIRIZINE HYDROCHLORIDE 10 MG: 10 TABLET, FILM COATED ORAL at 20:06

## 2017-05-19 RX ADMIN — DOCUSATE SODIUM -SENNOSIDES 1 TABLET: 50; 8.6 TABLET, COATED ORAL at 10:00

## 2017-05-19 RX ADMIN — POLYETHYLENE GLYCOL 3350 17 G: 17 POWDER, FOR SOLUTION ORAL at 17:12

## 2017-05-19 RX ADMIN — OXYCODONE HYDROCHLORIDE AND ACETAMINOPHEN 1 TABLET: 10; 325 TABLET ORAL at 10:00

## 2017-05-19 RX ADMIN — LOSARTAN POTASSIUM 50 MG: 50 TABLET, FILM COATED ORAL at 10:00

## 2017-05-19 RX ADMIN — Medication 10 ML: at 21:41

## 2017-05-19 RX ADMIN — OXYCODONE HYDROCHLORIDE AND ACETAMINOPHEN 1 TABLET: 10; 325 TABLET ORAL at 04:38

## 2017-05-19 RX ADMIN — DOCUSATE SODIUM -SENNOSIDES 1 TABLET: 50; 8.6 TABLET, COATED ORAL at 17:12

## 2017-05-19 RX ADMIN — CYCLOBENZAPRINE HYDROCHLORIDE 5 MG: 10 TABLET, FILM COATED ORAL at 15:14

## 2017-05-19 RX ADMIN — POLYETHYLENE GLYCOL 3350 17 G: 17 POWDER, FOR SOLUTION ORAL at 10:06

## 2017-05-19 RX ADMIN — DIAZEPAM 5 MG: 5 TABLET ORAL at 10:00

## 2017-05-19 RX ADMIN — FLUTICASONE FUROATE AND VILANTEROL TRIFENATATE 1 PUFF: 100; 25 POWDER RESPIRATORY (INHALATION) at 10:01

## 2017-05-19 RX ADMIN — CYCLOBENZAPRINE HYDROCHLORIDE 5 MG: 10 TABLET, FILM COATED ORAL at 07:29

## 2017-05-19 RX ADMIN — Medication 10 ML: at 06:23

## 2017-05-19 NOTE — PROGRESS NOTES
Problem: Mobility Impaired (Adult and Pediatric)  Goal: *Acute Goals and Plan of Care (Insert Text)  Physical Therapy Goals  Initiated 5/16/2017  1. Patient will move from supine to sit and sit to supine , scoot up and down and roll side to side in bed with modified independence within 7 day(s). 2. Patient will transfer from bed to chair and chair to bed with modified independence using the least restrictive device within 7 day(s). 3. Patient will perform sit to stand with modified independence within 7 day(s). 4. Patient will ambulate with modified independence for 150 feet with the least restrictive device within 7 day(s). 5. Patient will ascend/descend 4 stairs with 1 handrail(s) with minimal assistance/contact guard assist within 7 day(s). PHYSICAL THERAPY TREATMENT  Patient: Aram Green (10 y.o. female)  Date: 5/19/2017  Diagnosis: LUMBAR STENOSIS   Lumbar stenosis with neurogenic claudication <principal problem not specified>  Procedure(s) (LRB):  L4-S1 LAMINECTOMY / L4-5 FUSION /INSTRUMENTATION / TLIF / BONEGRAFT (N/A) 4 Days Post-Op  Precautions: Back, Fall (LSO brace)      ASSESSMENT:  Patient very groggy in am an unable to participate in PT session after getting valium and pain medications through night for spasms in her feet. Patient awake with  at bedside this afternoon upon PT arrival, agreeable to walk with PT but still reporting overall fatigue. Denied dizziness or weakness in LEs but just states she feels \"out of it\" compared with yesterday. Ambulated with SBA today and decreased antalgia but bernadette still slowed. Patient returned to chair at bedside upon completion of gait training and requested to defer therex til later in afternoon but  agreed to remind her to perform. Cont to progress but limited today due to medication effects.   Progression toward goals:  [X]      Improving appropriately and progressing toward goals  [ ]      Improving slowly and progressing toward goals  [ ]      Not making progress toward goals and plan of care will be adjusted       PLAN:  Patient continues to benefit from skilled intervention to address the above impairments. Continue treatment per established plan of care. Discharge Recommendations:  Home Health  Further Equipment Recommendations for Discharge:  None       SUBJECTIVE:   Patient stated I'm just not feeling myself still but better than this morning.    The patient stated 2/3 back precautions. Reviewed all 3 with patient. OBJECTIVE DATA SUMMARY:   Critical Behavior:  Neurologic State: Alert  Orientation Level: Oriented X4  Cognition: Follows commands  Safety/Judgement: Awareness of environment, Good awareness of safety precautions  Functional Mobility Training:  Bed Mobility:  Log Rolling: Stand-by asssistance; Additional time  Supine to Sit: Stand-by asssistance; Additional time              Brace donned with  minimal assistance/contact guard assist   Transfers:  Sit to Stand: Contact guard assistance;Assist x1  Stand to Sit: Contact guard assistance;Assist x1  Stand Pivot Transfers: Contact guard assistance                          Balance:  Sitting: Intact  Standing: Intact; With support  Standing - Static: Good  Standing - Dynamic : Fair  Ambulation/Gait Training:  Distance (ft): 75 Feet (ft)  Assistive Device: Walker, rolling;Gait belt  Ambulation - Level of Assistance: Contact guard assistance        Gait Abnormalities: Antalgic                                                 Stairs: NT           Therapeutic Exercises:   Declined  Pain:  Pain Scale 1: Numeric (0 - 10)  Pain Intensity 1: 2  Pain Location 1: Back  Pain Orientation 1: Mid  Pain Description 1: Sore  Pain Intervention(s) 1: Medication (see MAR)  Activity Tolerance:   Decreased from yesterday but overall improving     Please refer to the flowsheet for vital signs taken during this treatment.   After treatment:   [X]  Patient left in no apparent distress sitting up in chair  [ ]  Patient left in no apparent distress in bed  [X]  Call bell left within reach  [X]  Nursing notified  [X]  Caregiver present  [ ]  Bed alarm activated      COMMUNICATION/COLLABORATION:   The patients plan of care was discussed with: Registered Nurse     Jasmin Sanchez, PT   Time Calculation: 20 mins

## 2017-05-19 NOTE — ROUTINE PROCESS
Bedside and Verbal shift change report given to Rell Pemberton RN (oncoming nurse) by Benoit Tang RN (offgoing nurse). Report included the following information SBAR, Procedure Summary, Intake/Output and MAR.

## 2017-05-19 NOTE — PROGRESS NOTES
Consult for Eliquis auth noted. Info for prior auth for Eliquis [id number] and script # [id number]. Script is currently on hold awaiting prior auth at [facility] on [facility] at [phone]    Thanks.   Vero Crockett LCSW

## 2017-05-19 NOTE — PROGRESS NOTES
Problem: Self Care Deficits Care Plan (Adult)  Goal: *Acute Goals and Plan of Care (Insert Text)  Occupational Therapy Goals  Initiated 5/16/2017    1. Patient will perform lower body dressing with supervision/set-up using adaptive dressing aides PRN within 7 days. 2. Patient will perform toilet transfer with supervision/set-up using rolling walker within 7 days. 3. Patient will perform all aspects of toileting at modified independence within 7 days. 4. Patient will don/doff back brace at supervision/set-up within 7 days. 5. Patient will verbalize/demonstrate 3/3 back precautions during ADL tasks without cues within 7 days. OCCUPATIONAL THERAPY TREATMENT  Patient: [facility] (80 y.o. female)  Date: 5/19/2017  Diagnosis: LUMBAR STENOSIS   Lumbar stenosis with neurogenic claudication <principal problem not specified>  Procedure(s) (LRB):  L4-S1 LAMINECTOMY / L4-5 FUSION /INSTRUMENTATION / TLIF / BONEGRAFT (N/A) 4 Days Post-Op  Precautions: Back, Fall (LSO brace)  Chart, occupational therapy assessment, plan of care, and goals were reviewed. ASSESSMENT:  Pt agreeable to OT and stating she feels much better than this AM.  Pt donned her back brace with set-up. After ambulation to the bathroom she transferred to [facility] with contact guard and was able to perform her own bladder hygiene. She washed zenaida area in standing with set-up and donned pants with use of reacher with set-up and verbal cueing as to technique. She finished bathing her upper body in sitting with set-up and donned pullover shirt with independence. Pt brushed her teeth after set-up. RN aware pt up in the chair. Recommend home health.    Progression toward goals:  [X]          Improving appropriately and progressing toward goals  [ ]          Improving slowly and progressing toward goals  [ ]          Not making progress toward goals and plan of care will be adjusted       PLAN:  Patient continues to benefit from skilled intervention to address the above impairments. Continue treatment per established plan of care. Discharge Recommendations:  Home health  Further Equipment Recommendations for Discharge:  Pt has DME       SUBJECTIVE:   Patient stated I would like to get dressed.       OBJECTIVE DATA SUMMARY:   Cognitive/Behavioral Status:  Neurologic State: Alert  Orientation Level: Oriented X4  Cognition: Follows commands           Functional Mobility and Transfers for ADLs:              Bed Mobility:  Rolling: Stand-by asssistance; Additional time  Supine to Sit: Stand-by asssistance; Additional time                      Transfers:  Sit to Stand: Contact guard assistance;Assist x1  Functional Transfers  Toilet Transfer : Contact guard assistance     Balance:  Sitting: Intact  Standing: Intact; With support  Standing - Static: Good  Standing - Dynamic : Fair  ADL Intervention:        Grooming  Washing Hands: Contact guard assistance (standing at sink)  Brushing Teeth: Supervision/set-up (seated in chair)     Upper Body Bathing  Bathing Assistance: Supervision/set-up  Position Performed: Seated in chair     Lower Body Bathing  Perineal  : Contact guard assistance  Position Performed: Standing     Upper Body Dressing Assistance  Dressing Assistance: Supervision/set-up  Pullover Shirt: Supervision/set-up     Lower Body Dressing Assistance  Pants With Elastic Waist: Contact guard assistance  Leg Crossed Method Used: No  Position Performed: Seated in chair;Standing  Adaptive Equipment Used: Reacher     Toileting  Bladder Hygiene: Contact guard assistance           Pain:  Pain Scale 1: Numeric (0 - 10)  Pain Intensity 1: 2  Pain Location 1: Back  Pain Orientation 1: Mid  Pain Description 1: Sore        Activity Tolerance:    No signs/symptoms of distress or discomfort during OT  Please refer to the flowsheet for vital signs taken during this treatment.   After treatment:   [X]  Patient left in no apparent distress sitting up in chair  [ ]  Patient left in no apparent distress in bed  [X]  Call bell left within reach  [X]  Nursing notified  [ ]  Caregiver present  [ ]  Bed alarm activated      COMMUNICATION/COLLABORATION:   The patients plan of care was discussed with: Occupational Therapist and Registered Nurse     JOEL Hayden  Time Calculation: 30 mins

## 2017-05-19 NOTE — PROGRESS NOTES
Bedside and Verbal shift change report given to [facility] (oncoming nurse) by JAIME Ramírez RN (offgoing nurse). Report given with SBAR, Kardex, Intake/Output, MAR and Recent Results.

## 2017-05-19 NOTE — PROGRESS NOTES
If pt is dc'd over the weekend, please fax: MD AMANDA note, and PT note to [facility] fax: [phone]    Thanks.   Remi Jacob LCSW

## 2017-05-19 NOTE — PROGRESS NOTES
ORTHOPAEDIC LUMBAR FUSION PROGRESS NOTE    NAME:     Herman Lam   :       1940   MRN:       [id number]   DATE:      2017    POD:    4 Days Post-Op  S/P:    Procedure(s):  L4-S1 LAMINECTOMY / L4-5 FUSION /INSTRUMENTATION / TLIF / BONEGRAFT    SUBJECTIVE:    C/O back pain along surgical incision  No leg pain or numbness  Denies nausea/vomiting, chest pain, headache or shortness of breath  Pain controlled    Recent Labs      17   0346   HGB  9.7*   HCT  30.2*   NA  136   K  4.2   CL  102   CO2  27   BUN  15   CREA  0.82   GLU  114*     Patient Vitals for the past 12 hrs:   BP Temp Pulse Resp SpO2   17 0840 142/61 98.3 °F (36.8 °C) 82 18 96 %   17 0344 141/68 98.2 °F (36.8 °C) 84 18 95 %       EXAM:  Dressings clean, dry and intact   Positive strength/ROM bilat lower ext.   Neuro intact to sensation  Calves, soft & nontender  BL LEs NVID      PLAN:  Continue PO pain medications  PT/OT, OOB  Advance regular diet  Hemovac out, can be transitioned to an oral anticoagulant today      Rosario Brower [place]  Orthopaedic Surgery  Physician Assistant to Dr. Candi Lai

## 2017-05-19 NOTE — PROGRESS NOTES
[facility] RESIDENCY PROGRAM  PROGRESS NOTE     5/19/2017  PCP: Ankita Hudson MD     Assessment/Plan:   Aram Green is a 68 y.o. female s/p L4-S1 LAMINECTOMY / L4-5 FUSION /INSTRUMENTATION / TLIF / BONEGRAFT POD#3 and we were consulted for medical management of HTN, hypothyroidism, osteoporosis, asthma, anemia, GERD and migraine.     POD #3 L4-S1 Laminectomy, L4-5 fusion, instrumentation, TLIF, bonegraft: per primary     PE: pt found to have small LLL segmental PE 5/18 on CTA, which was ordered due to persistent tachycardia. HR improved to 80s-90s, on RA. Started on heparin drip; PTT remained therapeutic range. - start eliquis 10mg BID x 1 week. Then transition to Eliquis 5mg BID for 3-6 months  - d/c heparin drip.    - f/u 10 AM PTT    Hypertension: 141/68 this AM; remained 140s-160s/60s-80s. Goal <150/90  - Toprol Xl 50 mg daily  - diltiazem  mg QHS  - cozaar 50 mg daily   - as hx of angioedema w/ lisinopril, should ideally avoid cozaar, patient will further discuss this w/ PCP outpatient  - monitor VS per unit protocol  - need fu w/ PCP w/in 1 week after discharge     Hypothyroidism: No TSH on file. - synthroid 100 mcg daily      Asthma: stable   - continue home advair ([employer] formulary available)     GERD: stable  - continue home prevacid (protonox formulary available)     Osteoporosis: no DEXA on file. Reports being on estrace. discussed that w/ age, hx of recent surgery and prolonged use of estrace that she is at higher risk of blood clots w/ estrace. Recommend discontinuation of estrace at discharge     Dizziness/ Lightheadedness: resolved    PT/OT, disposition, DVT Prophylaxis & Pain Management: per primary     Patient stable for discharge from family medicine standpoint. Thank you very much for allowing us to participate in the care of this pleasant patient. The family medicine service will continue to follow the patient's medical progress along with you. Please do not hesitate to page with any questions or to discuss the case. Pt to be discussed with Dr. Dannielle Vazquez, Attending Physician         Subjective:   Pt was seen and examined at bedside. Afebrile and hemodynamically stable. Concerns overnight include: none. States pain has improved. States no further dizziness upon moving to commode. Denies chest pain, SOB, nausea, vomiting, abdominal pain. Allergies: Allergies   Allergen Reactions    Ace Inhibitors Angioedema       Objective:   Physical examination  Visit Vitals    /68 (BP 1 Location: Left arm, BP Patient Position: At rest)    Pulse 84    Temp 98.2 °F (36.8 °C)    Resp 18    Ht 5' 3\" (1.6 m)    Wt 118 lb 13.3 oz (53.9 kg)    SpO2 95%    BMI 21.05 kg/m2      Temp (24hrs), Av.4 °F (36.9 °C), Min:97.9 °F (36.6 °C), Max:98.7 °F (37.1 °C)     O2 Flow Rate (L/min): 2 l/min   O2 Device: Room air      Date 17 - 17 - 17 0659   Shift 5276-2628 0703-2540 24 Hour Total 3609-8663 7973-1897 24 Hour Total   I  N  T  A  K  E   I.V.  (mL/kg/hr)  168.8  (0.3) 168.8  (0.1)         Heparin Volume  168.8 168.8       Shift Total  (mL/kg)  168.8  (3.1) 168.8  (3.1)      O  U  T  P  U  T   Urine  (mL/kg/hr)            Urine Occurrence(s)  1 x 1 x       Drains 10  10         Output (ml) ([REMOVED] Hemovac Anterior;Mid;Left Neck) 10  10       Shift Total  (mL/kg) 10  (0.2)  10  (0.2)      NET -10 168.8 158.8      Weight (kg) 53.9 53.9 53.9 53.9 53.9 53.9         Last 3 shifts:     1901 -  0700  In: 168.8 [I.V.:168.8]  Out: 360 [Urine:300; Drains:60]    General:   Alert, oriented, NAD   HEENT:   NCAT, EOMI, oral mucosa moist   Lungs:   CTAB, no wheezing. Heart:   Regular rhythm, tachycardia, no murmur   Abdomen:    Soft, non-tender, nondistended. No rebound or guarding. Back:  Extremities:  surgical site c/d/i. ATTP. No erythema.    No edema or DVT signs   Pulses:  Symmetric all extremities   Skin: Warm and dry. No rashes or lesions   Neurologic:  Oriented.  No focal deficits     Data Review:     Recent Labs      05/19/17   0346  05/18/17   0138  05/17/17   0312   WBC  7.5  8.3  7.2   HGB  9.7*  9.2*  8.5*   HCT  30.2*  29.3*  27.0*   PLT  284  292  249     Recent Labs      05/19/17   0346  05/18/17   0138  05/17/17   0312   NA  136  138  140   K  4.2  2.9*  3.8   CL  102  101  106   CO2  27  29  27   GLU  114*  100  97   BUN  15  10  10   CREA  0.82  0.85  0.73   CA  8.9  8.2*  8.3*     Medications reviewed  Current Facility-Administered Medications   Medication Dose Route Frequency    senna-docusate (PERICOLACE) 8.6-50 mg per tablet 1 Tab  1 Tab Oral BID    polyethylene glycol (MIRALAX) packet 17 g  17 g Oral BID    heparin 25,000 units in D5W 250 ml infusion  18-36 Units/kg/hr IntraVENous TITRATE    cyclobenzaprine (FLEXERIL) tablet 5 mg  5 mg Oral TID PRN    metoprolol succinate (TOPROL-XL) XL tablet 50 mg  50 mg Oral DAILY    alum-mag hydroxide-simeth (MYLANTA) oral suspension 30 mL  30 mL Oral Q4H PRN    cetirizine (ZYRTEC) tablet 10 mg  10 mg Oral QHS    dilTIAZem CD (CARDIZEM CD) capsule 240 mg  240 mg Oral QHS    losartan (COZAAR) tablet 50 mg  50 mg Oral DAILY    rizatriptan (MAXALT-MLT) rapid dissolve tablet 5 mg  5 mg Oral PRN    fluticasone-vilanterol (BREO ELLIPTA) 100mcg-25mcg/puff  1 Puff Inhalation DAILY    fluticasone (FLONASE) 50 mcg/actuation nasal spray 2 Spray  2 Spray Both Nostrils QHS    pantoprazole (PROTONIX) tablet 40 mg  40 mg Oral ACB    levothyroxine (SYNTHROID) tablet 100 mcg  100 mcg Oral ACB    sodium chloride (NS) flush 5-10 mL  5-10 mL IntraVENous Q8H    sodium chloride (NS) flush 5-10 mL  5-10 mL IntraVENous PRN    diazePAM (VALIUM) tablet 5 mg  5 mg Oral Q6H PRN    acetaminophen (TYLENOL) tablet 650 mg  650 mg Oral Q4H PRN    oxyCODONE-acetaminophen (PERCOCET 10)  mg per tablet 1 Tab  1 Tab Oral Q4H PRN    HYDROmorphone (PF) (DILAUDID) injection 1 mg  1 mg IntraVENous Q4H PRN    naloxone (NARCAN) injection 0.4 mg  0.4 mg IntraVENous PRN    ondansetron (ZOFRAN) injection 4 mg  4 mg IntraVENous Q4H PRN    diphenhydrAMINE (BENADRYL) capsule 25 mg  25 mg Oral Q4H PRN    zolpidem (AMBIEN) tablet 5 mg  5 mg Oral QHS PRN         Signed:   Migue Glass MD   Resident, Family Medicine        Attending note: Attending note to follow. .. [facility] Medicine Residency Attending Addendum:  I saw and evaluated the patient, performing the key elements of the service. I discussed the findings, assessment and plan with the resident and agree with the resident's findings and plan as documented in the resident's note.     The patient was seen on 5/19/17  Breathing better  No complaints    Vitals:    05/19/17 1850 05/20/17 0306 05/20/17 0754 05/20/17 0759   BP: 156/74 154/68 192/84 178/74   Pulse: 86 90 80    Resp:  17 18    Temp:  97.8 °F (36.6 °C) 98 °F (36.7 °C)    SpO2:  97% 95%    Weight:       Height:         Lungs: clear      Assessment/Plan:   S/p laminectomy  We are consulted for medical management  Pt with PE-continue anticoagulation  eliquis on discharge    Hospital Problems  Never Reviewed          Codes Class Noted POA    Essential hypertension (Chronic) ICD-10-CM: I10  ICD-9-CM: 401.9  5/20/2017 Yes        Acquired hypothyroidism (Chronic) ICD-10-CM: E03.9  ICD-9-CM: 244.9  5/20/2017 Yes        Asthma (Chronic) ICD-10-CM: J45.909  ICD-9-CM: 493.90  5/20/2017 Yes        GERD (gastroesophageal reflux disease) (Chronic) ICD-10-CM: K21.9  ICD-9-CM: 530.81  5/20/2017 Yes        Osteoporosis (Chronic) ICD-10-CM: M81.0  ICD-9-CM: 733.00  5/20/2017 Yes        Acute pulmonary embolism (HCC) ICD-10-CM: I26.99  ICD-9-CM: 415.19  5/19/2017 No        * (Principal)Lumbar stenosis with neurogenic claudication ICD-10-CM: M48.06  ICD-9-CM: 724.03  5/15/2017 Yes

## 2017-05-19 NOTE — PROGRESS NOTES
Nutrition Assessment:    RECOMMENDATIONS/INTERVENTION(S):   Continue Regular diet  Start Ensure Enlive TID - use in between meals  Monitor PO, labs, wt, skin integrity    ASSESSMENT:   5/19:  Pt seen for LOS. 68 yof admitted for lumbar stenosis. S/p L4-S1 LAMINECTOMY / L4-5 FUSION /INSTRUMENTATION / TLIF / BONEGRAFT. Underwt BMI per age. Wt stable x 3 mo per wt hx record. Labs/meds reviewed. K previously low. . Visited pt this afternoon.  at bedside. Pain improving. Reports of decreased appetite PTA 2/2 pain. Meals still attempted - no wt loss. Ensure intake PTA. Current PO intake poor to fair. MD noted starting Ensure w/ meals but no order placed post-op. RD to start Ensure Enlive. Encouraged meal intake & use of ONS in between meals while healing taking place. Skin: surgical wound to spine, no PI or edema noted. SUBJECTIVE/OBJECTIVE:     Diet Order: Regular  % Eaten:  Patient Vitals for the past 72 hrs:   % Diet Eaten   05/19/17 0730 40 %   05/17/17 0737 80 %     Pertinent Medications: [x] Reviewed - protonix, miralax, pericolace, mylanta, zofran    Chemistries:  Lab Results   Component Value Date/Time    Sodium 136 05/19/2017 03:46 AM    Potassium 4.2 05/19/2017 03:46 AM    Chloride 102 05/19/2017 03:46 AM    CO2 27 05/19/2017 03:46 AM    Anion gap 7 05/19/2017 03:46 AM    Glucose 114 05/19/2017 03:46 AM    BUN 15 05/19/2017 03:46 AM    Creatinine 0.82 05/19/2017 03:46 AM    BUN/Creatinine ratio 18 05/19/2017 03:46 AM    GFR est AA >60 05/19/2017 03:46 AM    GFR est non-AA >60 05/19/2017 03:46 AM    Calcium 8.9 05/19/2017 03:46 AM    AST (SGOT) 19 05/09/2017 04:19 PM    Alk.  phosphatase 133 05/09/2017 04:19 PM    Protein, total 7.9 05/09/2017 04:19 PM    Albumin 3.8 05/09/2017 04:19 PM    Globulin 4.1 05/09/2017 04:19 PM    A-G Ratio 0.9 05/09/2017 04:19 PM    ALT (SGPT) 26 05/09/2017 04:19 PM      Anthropometrics: Height: 5' 3\" (160 cm) Weight: 53.9 kg (118 lb 13.3 oz)    IBW (%IBW): 52.2 kg (115 lb) (103.33 %) UBW (%UBW):   (  %)    BMI: Body mass index is 21.05 kg/(m^2). This BMI is indicative of:   [x] Underweight - per age   [] Normal    [] Overweight    []  Obesity    []  Extreme Obesity (BMI>40)  Estimated Nutrition Needs (Based on): 1547 Kcals/day (RMR (998) x 1.3 AF + 250) , 75 g (1.4 g/kg x actual body wt) Protein  Carbohydrate: At Least 130 g/day  Fluids:  mL/day    Last BM: 5/15, abd soft    [x]Active     []Hyperactive  []Hypoactive       [] Absent   BS  Skin:    [] Intact   [x] Incision  [] Breakdown   [] DTI   [] Tears/Excoriation/Abrasion  []Edema [] Other:    Wt Readings from Last 30 Encounters:   05/19/17 53.9 kg (118 lb 13.3 oz)   05/09/17 54 kg (119 lb)   03/16/16 53.5 kg (118 lb)      NUTRITION DIAGNOSES:   Problem:  Inadequate protein-energy intake     Etiology: related to decreased appetite & PO intake     Signs/Symptoms: as evidenced by Poor to fair PO intake as compared to increased energy and protein needs      NUTRITION INTERVENTIONS:  Meals/Snacks: General/healthful diet   Supplements: Commercial supplement              GOAL:   Tolerate/consume >/= 50% of meals & ONS in the next 2-4 days    Cultural, Alevism, or Ethnic Dietary Needs: None     LEARNING NEEDS (Diet, Food/Nutrient-Drug Interaction):    [] None Identified   [x] Identified and Education Provided/Documented   [] Identified and Pt declined/was not appropriate      [] Interdisciplinary Care Plan Reviewed/Documented    [x] Discharge Needs:    See D/C note   [] No Nutrition Related Discharge Needs    NUTRITION RISK:   Pt Is At Nutrition Risk  [x]     No Nutrition Risk Identified  []       PT SEEN FOR:    []  MD Consult: []Calorie Count      []Diabetic Diet Education        []Diet Education     []Electrolyte Management     []General Nutrition Management and Supplements     []Management of Tube Feeding     []TPN Recommendations    []  RN Referral:  []MST score >=2     []Enteral/Parenteral Nutrition PTA     []Pregnant: Gestational DM or Multigestation                 [] Pressure Ulcer    []  Low BMI      [x]  Length of Stay       [] Dysphagia Diet         [] Ventilator  []  Follow-up     Previous Recommendations:   [] Implemented          [] Not Implemented          [x] Not Applicable    Previous Goal:   [] Met              [] Progressing Towards Goal              [] Not Progressing Towards Goal   [x] Not Applicable            Jennifer Rodrigez, [address] [place] [address]   Pager [phone]

## 2017-05-19 NOTE — PROGRESS NOTES
Bedside and Verbal shift change report given to [employer], RN (oncoming nurse) by TOMASZ Patel (offgoing nurse). Report included the following information SBAR, Kardex, MAR and Recent Results.

## 2017-05-19 NOTE — PROGRESS NOTES
Attempted to work with the patient for Physical Therapy, chart reviewed and discussed with nurse patient still feeling groggy from the valium given earlier asked to skip morning therapy for now spouse at bedside and agreed. We will continue to follow up with the patient for therapy. Thank you.

## 2017-05-19 NOTE — PROGRESS NOTES
Attempted to work with the patient for Occupational Therapy, chart reviewed and discussed with nurse patient still feeling groggy from the valium given earlier asked to skip morning therapy for now spouse at bedside and agreed. We will continue to follow up with the patient for therapy. Thank you.

## 2017-05-19 NOTE — INTERDISCIPLINARY ROUNDS
[address]    Patient Information    Name: Santi Carrington  Age: 68 y.o. Admission Date: 5/15/2017  Surgery/Procedure: Procedure(s):  L4-S1 LAMINECTOMY / L4-5 FUSION /INSTRUMENTATION / TLIF / BONEGRAFT  Attending Provider: Roula Garvey MD  Surgeon: [unfilled]   Problem List: Active Problems:    Lumbar stenosis with neurogenic claudication (5/15/2017)      During rounds the following quality care indicators and evidence based practices were addressed :       PT/OT: Patient mobility - has not been seen today  Bed Mobility Training  Rolling: Contact guard assistance  Supine to Sit: Contact guard assistance  Sit to Supine: Contact guard assistance  Scooting: Contact guard assistance  Transfer Training  Sit to Stand: Contact guard assistance  Stand to Sit: Contact guard assistance  Bed to Chair: Contact guard assistance      Gait Training  Assistive Device: Walker, rolling, Gait belt  Ambulation - Level of Assistance: Contact guard assistance  Distance (ft): 100 Feet (ft)  Stairs - Level of Assistance:  (unable to attempt)          Pain Assessment  Pain Intensity 1: 2 (05/19/17 0515)  Pain Location 1: Foot  Pain Intervention(s) 1: Medication (see MAR)  Patient Stated Pain Goal: 2    Pain meds: percocet, dilaudid  Antibiotics completed:  Yes  Anticoagulation:  eliquis  Bernard: N   Goals For Today: Progress with PT, pain control    RRAT Score:      Readmit Risk Tool  Support Systems: Spouse/Significant Other/Partner, Family member(s)  Relationship with Primary Physician Group: Seen at least one time within the past 6 months    Discharge Management/Planning:    Readmit Risk Assessment Information:   Medium Risk            14       Total Score        3 Relationship with PCP    2 Patient Living Status    4 More than 1 Admission in calendar year    5 Patient Insurance is Medicare, Medicaid or Self Pay        Criteria that do not apply:    Patient Length of Stay > 5    Charlson Comorbidity Score Readmit Risk Tool  Support Systems: Spouse/Significant Other/Partner, Family member(s)  Relationship with Primary Physician Group: Seen at least one time within the past 6 months    [address]: unknown  Rehab/SNF Facility:  Anticipated Date of Discharge: D     Interdisciplinary team rounds were held with the following team members: Nurse Practitioner, Case Management, Nursing, Pharmacy, and Rehab. See clinical pathway and/or care plan for interventions and desired outcomes.

## 2017-05-20 VITALS
BODY MASS INDEX: 21.05 KG/M2 | OXYGEN SATURATION: 95 % | TEMPERATURE: 98 F | RESPIRATION RATE: 18 BRPM | HEIGHT: 63 IN | HEART RATE: 80 BPM | WEIGHT: 118.83 LBS | SYSTOLIC BLOOD PRESSURE: 178 MMHG | DIASTOLIC BLOOD PRESSURE: 74 MMHG

## 2017-05-20 PROBLEM — E03.9 ACQUIRED HYPOTHYROIDISM: Chronic | Status: ACTIVE | Noted: 2017-05-20

## 2017-05-20 PROBLEM — M81.0 OSTEOPOROSIS: Chronic | Status: ACTIVE | Noted: 2017-05-20

## 2017-05-20 PROBLEM — J45.909 ASTHMA: Chronic | Status: ACTIVE | Noted: 2017-05-20

## 2017-05-20 PROBLEM — I10 ESSENTIAL HYPERTENSION: Chronic | Status: ACTIVE | Noted: 2017-05-20

## 2017-05-20 PROBLEM — K21.9 GERD (GASTROESOPHAGEAL REFLUX DISEASE): Chronic | Status: ACTIVE | Noted: 2017-05-20

## 2017-05-20 LAB
ANION GAP BLD CALC-SCNC: 8 MMOL/L (ref 5–15)
APTT PPP: 29.6 SEC (ref 22.1–32.5)
BUN SERPL-MCNC: 12 MG/DL (ref 6–20)
BUN/CREAT SERPL: 14 (ref 12–20)
CALCIUM SERPL-MCNC: 8.6 MG/DL (ref 8.5–10.1)
CHLORIDE SERPL-SCNC: 101 MMOL/L (ref 97–108)
CO2 SERPL-SCNC: 28 MMOL/L (ref 21–32)
CREAT SERPL-MCNC: 0.86 MG/DL (ref 0.55–1.02)
ERYTHROCYTE [DISTWIDTH] IN BLOOD BY AUTOMATED COUNT: 14 % (ref 11.5–14.5)
GLUCOSE SERPL-MCNC: 97 MG/DL (ref 65–100)
HCT VFR BLD AUTO: 28.1 % (ref 35–47)
HGB BLD-MCNC: 9 G/DL (ref 11.5–16)
MCH RBC QN AUTO: 29.5 PG (ref 26–34)
MCHC RBC AUTO-ENTMCNC: 32 G/DL (ref 30–36.5)
MCV RBC AUTO: 92.1 FL (ref 80–99)
PLATELET # BLD AUTO: 303 K/UL (ref 150–400)
POTASSIUM SERPL-SCNC: 4.5 MMOL/L (ref 3.5–5.1)
RBC # BLD AUTO: 3.05 M/UL (ref 3.8–5.2)
SODIUM SERPL-SCNC: 137 MMOL/L (ref 136–145)
THERAPEUTIC RANGE,PTTT: NORMAL SECS (ref 58–77)
WBC # BLD AUTO: 6.7 K/UL (ref 3.6–11)

## 2017-05-20 PROCEDURE — 36415 COLL VENOUS BLD VENIPUNCTURE: CPT | Performed by: ORTHOPAEDIC SURGERY

## 2017-05-20 PROCEDURE — 97535 SELF CARE MNGMENT TRAINING: CPT

## 2017-05-20 PROCEDURE — 97530 THERAPEUTIC ACTIVITIES: CPT

## 2017-05-20 PROCEDURE — 74011250637 HC RX REV CODE- 250/637: Performed by: PHYSICIAN ASSISTANT

## 2017-05-20 PROCEDURE — 97116 GAIT TRAINING THERAPY: CPT

## 2017-05-20 PROCEDURE — 74011250637 HC RX REV CODE- 250/637: Performed by: FAMILY MEDICINE

## 2017-05-20 PROCEDURE — 80048 BASIC METABOLIC PNL TOTAL CA: CPT | Performed by: ORTHOPAEDIC SURGERY

## 2017-05-20 PROCEDURE — 74011250637 HC RX REV CODE- 250/637: Performed by: ORTHOPAEDIC SURGERY

## 2017-05-20 PROCEDURE — 85027 COMPLETE CBC AUTOMATED: CPT | Performed by: ORTHOPAEDIC SURGERY

## 2017-05-20 PROCEDURE — 74011250637 HC RX REV CODE- 250/637: Performed by: NURSE PRACTITIONER

## 2017-05-20 PROCEDURE — 85730 THROMBOPLASTIN TIME PARTIAL: CPT | Performed by: FAMILY MEDICINE

## 2017-05-20 RX ORDER — OXYCODONE AND ACETAMINOPHEN 5; 325 MG/1; MG/1
TABLET ORAL
Qty: 90 TAB | Refills: 0 | Status: SHIPPED
Start: 2017-05-20 | End: 2019-04-18

## 2017-05-20 RX ORDER — DOCUSATE SODIUM 100 MG/1
100 CAPSULE, LIQUID FILLED ORAL 2 TIMES DAILY
Qty: 60 CAP | Refills: 2 | Status: SHIPPED
Start: 2017-05-20 | End: 2019-04-18

## 2017-05-20 RX ORDER — CYCLOBENZAPRINE HCL 10 MG
10 TABLET ORAL
Qty: 60 TAB | Refills: 2 | Status: SHIPPED
Start: 2017-05-20 | End: 2019-04-18

## 2017-05-20 RX ORDER — PROMETHAZINE HYDROCHLORIDE 25 MG/1
25 TABLET ORAL
Qty: 60 TAB | Refills: 2 | Status: SHIPPED
Start: 2017-05-20 | End: 2017-05-28

## 2017-05-20 RX ADMIN — OXYCODONE HYDROCHLORIDE AND ACETAMINOPHEN 1 TABLET: 10; 325 TABLET ORAL at 08:00

## 2017-05-20 RX ADMIN — CYCLOBENZAPRINE HYDROCHLORIDE 5 MG: 10 TABLET, FILM COATED ORAL at 06:33

## 2017-05-20 RX ADMIN — FLUTICASONE FUROATE AND VILANTEROL TRIFENATATE 1 PUFF: 100; 25 POWDER RESPIRATORY (INHALATION) at 08:10

## 2017-05-20 RX ADMIN — APIXABAN 10 MG: 5 TABLET, FILM COATED ORAL at 08:00

## 2017-05-20 RX ADMIN — LOSARTAN POTASSIUM 50 MG: 50 TABLET, FILM COATED ORAL at 08:00

## 2017-05-20 RX ADMIN — OXYCODONE HYDROCHLORIDE AND ACETAMINOPHEN 1 TABLET: 10; 325 TABLET ORAL at 03:13

## 2017-05-20 RX ADMIN — POLYETHYLENE GLYCOL 3350 17 G: 17 POWDER, FOR SOLUTION ORAL at 08:00

## 2017-05-20 RX ADMIN — DOCUSATE SODIUM -SENNOSIDES 1 TABLET: 50; 8.6 TABLET, COATED ORAL at 08:00

## 2017-05-20 RX ADMIN — METOPROLOL SUCCINATE 50 MG: 50 TABLET, FILM COATED, EXTENDED RELEASE ORAL at 08:00

## 2017-05-20 RX ADMIN — Medication 10 ML: at 06:33

## 2017-05-20 RX ADMIN — OXYCODONE HYDROCHLORIDE AND ACETAMINOPHEN 1 TABLET: 10; 325 TABLET ORAL at 12:46

## 2017-05-20 RX ADMIN — PANTOPRAZOLE SODIUM 40 MG: 40 TABLET, DELAYED RELEASE ORAL at 06:32

## 2017-05-20 RX ADMIN — LEVOTHYROXINE SODIUM 100 MCG: 0.05 TABLET ORAL at 06:32

## 2017-05-20 NOTE — PROGRESS NOTES
Problem: Self Care Deficits Care Plan (Adult)  Goal: *Acute Goals and Plan of Care (Insert Text)  Occupational Therapy Goals  Initiated 5/16/2017    1. Patient will perform lower body dressing with supervision/set-up using adaptive dressing aides PRN within 7 days. 2. Patient will perform toilet transfer with supervision/set-up using rolling walker within 7 days. 3. Patient will perform all aspects of toileting at modified independence within 7 days. 4. Patient will don/doff back brace at supervision/set-up within 7 days. 5. Patient will verbalize/demonstrate 3/3 back precautions during ADL tasks without cues within 7 days. OCCUPATIONAL THERAPY TREATMENT  Patient: Magdi Gonzalez (38 y.o. female)  Date: 5/20/2017  Diagnosis: LUMBAR STENOSIS   Lumbar stenosis with neurogenic claudication Lumbar stenosis with neurogenic claudication  Procedure(s) (LRB):  L4-S1 LAMINECTOMY / L4-5 FUSION /INSTRUMENTATION / TLIF / BONEGRAFT (N/A) 5 Days Post-Op  Precautions: Back, Fall (LSO brace)       ASSESSMENT:  Patient received supine in bed, boyfriend at bedside. Patient reports no questions regarding home set up or safe techniques. Patient able to perform transfer to commode/ hygiene tasks with CGA today. Patient educated with ADL safety within the home and both she and boyfriend verbalized understanding. Progression toward goals:  [X]          Improving appropriately and progressing toward goals  [ ]          Improving slowly and progressing toward goals  [ ]          Not making progress toward goals and plan of care will be adjusted       PLAN:  Patient continues to benefit from skilled intervention to address the above impairments. Continue treatment per established plan of care. Discharge Recommendations:  Home Health  Further Equipment Recommendations for Discharge:  None noted        SUBJECTIVE:   Patient stated I wont have the bedrails at home.       OBJECTIVE DATA SUMMARY:   Cognitive/Behavioral Status:  Neurologic State: Alert  Orientation Level: Oriented X4  Cognition: Appropriate decision making, Follows commands  Safety/Judgement: Awareness of environment     Functional Mobility and Transfers for ADLs:  Bed Mobility:  Rolling: Supervision  Supine to Sit: Supervision  Sit to Supine: Supervision; Additional time     Transfers:  Sit to Stand: Contact guard assistance  Functional Transfers  Bathroom Mobility: Contact guard assistance  Toilet Transfer : Contact guard assistance     Balance:  Sitting: Intact  Standing: Intact; With support     ADL Intervention and Instruction:           Toileting  Toileting Assistance: Contact guard assistance  Bladder Hygiene: Contact guard assistance  Clothing Management: Contact guard assistance  Adaptive Equipment: Grab bars     Cognitive Retraining  Safety/Judgement: Awareness of environment  Bathing: Patient instructed and indicated understanding when bathing to not submerge wound in water, stand to shower or sponge bathe, cover wound with plastic and tape to ensure no water reaches bandage/wound without cues. Dressing lower body: Patient instructed to don brace first and on the benefits to remain seated to don all clothing to increase independence with precautions and pain management. Toileting: Patient instructed on the benefits of using flushable wet wipes for zenaida care. Also, the benefits of a reacher to aid in clothing management. Home safety: Patient instructed and indicated understanding on home modifications and safety (raise height of ADL objects, appropriate height of chair surfaces, recliner safety, change of floor surfaces, clear pathways) to increase independence and fall prevention. Standing: Patient instructed and indicated understanding to walk up to sink/counter top/surfaces, step into walker, square off while using objects, slide objects along surfaces, to increase adherence to back precautions and fall prevention.   Patient instructed to increase amount of time standing in order to increase independence and tolerance with ADLs. During prolonged standing, can open cabinet door or place foot on stool to decrease spinal pressure/increase pain. Patient instructed and indicated understanding the benefits of maintaining activity tolerance, functional mobility, and independence with self care tasks during acute stay  to ensure safe return home and to baseline. Encouraged patient to increase frequency and duration OOB, not sitting longer than 30 mins without marching/walking with staff, be out of bed for all meals, perform daily ADLs (as approved by RN/MD regarding bathing etc), and performing functional mobility to/from bathroom. Patient instruction and indicated understanding on body mechanics, ergonomics and gravitational force on the spine during different body positions to plan activities in prep for return home to complete instrumental ADLs and back to work safely. Pain:  Pain Scale 1: Numeric (0 - 10)  Pain Intensity 1: 6  Pain Location 1: Back  Pain Orientation 1: Posterior  Pain Description 1: Aching  Pain Intervention(s) 1: Medication (see MAR)  Activity Tolerance:   VSS  Please refer to the flowsheet for vital signs taken during this treatment.   After treatment:   [ ] Patient left in no apparent distress sitting up in chair  [X] Patient left in no apparent distress in bed  [X] Call bell left within reach  [X] Nursing notified  [X] Caregiver present  [ ] Bed alarm activated      COMMUNICATION/COLLABORATION:   The patients plan of care was discussed with: Physical Therapist and Registered Nurse     JOCELINE Pierre/L  Time Calculation: 15 mins

## 2017-05-20 NOTE — DISCHARGE INSTRUCTIONS
HOME DISCHARGE INSTRUCTIONS    Rickie Patel / [id number] : 1940    Admission date: 5/15/2017 Discharge date: 2017 12:35 PM     New Medications:  - Eliquis 2 tablets at 2 times/ day for 7 days total. Then take Eliquis 1 tablet, 2 times/ day for 6 months. Changed Medications:  Increased Toprol XL to 50 mg daily                          Lumbar Spinal Surgery Discharge Instructions   Dr. Myrtie Kayser  [phone]    Activity:    [facility] You are going home a well person, be as active as possible. Your only exercise should be walking. Start with short frequent walks and increase your walking distance each day. Start with walking twice a day for 5 minutes and increase your distance each day 2-3 minutes until you reach 25 minutes twice a day. Limit the amount of time you sit to 20-30 minute intervals. Sitting for prolonged periods of time will be uncomfortable for you following your surgery.  No bending, lifting (of 5lbs or more), twisting, or straining.  Do not drive until your doctor states you may do so. However, you may ride in a car for short distances.  If you are required to wear a brace, you should wear it at all times when you are out of bed. You may remove it when sleeping unless your physician advises you against it.  When you are in the bed, you may lay on your back or on either side. Do not lie on your stomach.  You may resume sexual relations 6 weeks after surgery.  Continue to use your incentive spirometer for deep breathing exercises. Driving:   You may not drive or return to work until instructed by your physician. However, you may ride in the car for short periods of time. Brace:   If you have a back brace, you should wear your brace at all times when you are out of bed. Do not wear the brace while in bed or showering.  Remember to always wear a cotton t-shirt underneath your brace.  Do not bend or twist when your brace is off.     Diet:   You may resume your regular home diet as tolerated. If your throat is sore, you should eat soft foods for the first couple of days.  Be sure to drink plenty of fluids; it is important to keep yourself hydrated.  Avoid alcoholic beverages and ABSOLUTELY NO tobacco products. Tobacco products will interfere with your healing. If you continue to use tobacco, you may end up needing another surgery in the future. Dressing: You have on a Prineo dressing. This is a waterproof bacteriostatic dressing that requires no post-operative care. Please do not peel the dressing off, or apply any oil based products, as they may expedite the deterioration of the mesh. The dressing will slowly chip off on its own.  Do not rub or apply lotion or ointments to incision site. Shower:   You may shower 5 days after your surgery.  You may remove your brace during showers.  NO not use tub baths, swimming pools or Jacuzzis. Medications:   Check with your physician before taking any anti-inflammatory medications. (Advil, Aleve, Ibuprofen, Aspirin)   Take your pain medication as directed   Do NOT take additional Tylenol if your prescribed pain medication has acetaminophen in it (Endocet/Percocet, Lortab, Norco)   It is important to have regular bowel movements. Pain medications can be constipating. Stool softeners, warm prune juice, increasing your water intake, and increasing fiber in your diet can help in preventing constipation.  Do NOT take laxatives if at all possible except in severe situations. It can result in a vicious cycle of constipation and diarrhea. Stockings:   You have been given T.E.D. stockings to wear. Continue to wear these for 7 days after your discharge. Put them on in the morning and take them off at night. They are used to increase your circulation and prevent blood clots from forming in your legs. Follow-Up    Please call ASAP to schedule your 1st post-operative appointment.  This should be approximately 3 weeks from your surgery date. Notify your physician in you develop any of the following conditions:   Fever above 101 degrees for 24 hours.  Nausea or vomiting.  Severe headache.  Inability to urinate   Loss of bowel or bladder function (sudden onset of incontinence)   Changes in sensation in your extremities (numbness, tingling, loss of color).  Severe pain or pain not relieved by medications.  Redness, swelling, or drainage from your incision.  Persistent pain in the chest.    Pain in the calf of either leg.  Increased weakness (if this is greater than before your surgery). If you have any questions about your dressing contact your Orthopaedic Surgeons office. STOP TAKING TYLENOL #3 AND NORCO/HYDROCODONE. YOU WERE GIVEN A PRESCRIPTION FOR ENDOCET/PERCOCET TODAY AT [place] [address]. DO NOT TAKE ANY OTHER NARCOTIC PAIN MEDICATIONS    STOP TAKING ANY PREVIOUSLY PRESCRIBED FLEXERIL. YOU WERE GIVEN A NEW PRESCRIPTION FOR FLEXERIL TODAY AT [place] [address]    STOP TAKING DICLOFENAC AND ALEVE.    ** IMPORTANT: UNDER YOUR HONEYCOMB DRESSING, YOU HAVE A PRINEO ADHESIVE MESH DIRECTLY OVER YOUR INCISION. THIS WAS STERILELY GLUED TO YOUR SKIN DURING SURGERY. DO NOT REMOVE THIS. NO ONE ELSE SHOULD REMOVE IT EITHER. IT WILL COME OFF ON ITS OWN OVER TIME    * WEAR YOUR BRACE AS ADVISED    * NO DRIVING UNTIL YOU ARE CLEARED TO DO SO BY YOUR SURGEON    * LIMIT LIFTING, BENDING AND TWISTING.  NO LIFTING MORE THAN 5 LBS    * MAKE SURE YOU ARE GETTING GOOD NUTRITION (Lean Protein, Vitamin D AND Calcium)    * DO NOT TAKE ANY NSAIDs FOR THE FIRST 3 MONTHS AFTER SURGERY (such as Advil/Ibuprofen/Motrin, Aleve/Naproxen/Naprosyn, Diclofenac, Celebrex, Meloxicam, Indomethacin, Goody's powder, BC powder etc.)    * NO NICOTINE PRODUCTS    * FULLY READ YOUR DISCHARGE INSTRUCTIONS      Nutrition Recommendations for Discharge:    Continue Oral Nutrition Supplements at discharge:   Ensure Enlive or Ensure Plus 1-2 times per day   for 30 days unless otherwise directed by your Primary Care Physician. This product can be purchased at your local grocery store or drug store and online.      Yelitza Ramey, RD   _

## 2017-05-20 NOTE — DISCHARGE SUMMARY
DISCHARGE SUMMARY     Patient: Arian Menard                             Medical Record Number: [id number]                : 1940  Age: 68 y.o. Admit Date: 5/15/2017  Discharge Date:   Admission Diagnosis: LUMBAR STENOSIS   Lumbar stenosis with neurogenic claudication  Discharge Diagnosis: LUMBAR STENOSIS   Procedures: Procedure(s):  L4-S1 LAMINECTOMY / L4-5 FUSION /INSTRUMENTATION / TLIF / BONEGRAFT  Surgeon: Felipe Ceron MD  Co-surgeon:   Assistants:   Anesthesia: General  Complications: None     History of Present Illness:  Arian Menard is a 68 y.o. female with a history of intractible low back and radiculopathy. Despite conservative management and after clinical and radiographic evaluation, it was determined that she suffered from lumbar stenosis  and lumbar spondylolisthesis and would benefit from Procedure(s):  L4-S1 LAMINECTOMY / L4-5 FUSION /INSTRUMENTATION / TLIF / BONEGRAFT, which she consented to undergo after a discussion of the risks, benefits, alternatives, rehab concerns, and potential complications of surgery. Hospital Course:  Arian Menard tolerated the procedure well. She was transferred  to the recovery room in stable condition. After a brief stay, the patient was then transferred to the Orthopedic floor. On postoperative day #1, the dressing was clean and dry and she was neurovascularly intact. The patient was afebrile and vital signs were stable. Calves were soft and non-tender bilaterally. Arian Menard developed 2 episodes of lightheadedness/near syncope on post-op day 3. It was also noted that she had an elevated HR. She was treated for hypokalemia. A CTA of her chest was also ordered to rule out PE. Her chest CTA was positive for pulmonary emboli. She was started on a heparin drip. Her hemovac drain was removed the following day. She was then converted to an oral anti-coagulant. She was cleared by PT and Family medicine for discharge home.   Arian Menard made excellent progress with physical therapy and was discharged to Home with Home Health in stable condition on postoperative day 5. She was provided with routine postoperative instructions and advised to follow up in my office in 3 weeks following discharge from the hospital.  She was given prescriptions for medication to control post-operative symptoms. She will also follow up with her primary care physician    Discharge Medications:  Current Discharge Medication List      START taking these medications    Details   oxyCODONE-acetaminophen (PERCOCET) 5-325 mg per tablet Take 1-2 tablets by mouth every 4 to 6 hours as needed for pain  Qty: 90 Tab, Refills: 0      promethazine (PHENERGAN) 25 mg tablet Take 1 Tab by mouth every six (6) hours as needed for Nausea. Qty: 60 Tab, Refills: 2      docusate sodium (COLACE) 100 mg capsule Take 1 Cap by mouth two (2) times a day. Qty: 60 Cap, Refills: 2      !! apixaban (ELIQUIS) 5 mg tablet Take 2 Tabs by mouth two (2) times a day for 13 doses. Qty: 26 Tab, Refills: 0      !! apixaban (ELIQUIS) 5 mg tablet Take 1 Tab by mouth two (2) times a day for 184 days. Qty: 368 Tab, Refills: 0       !! - Potential duplicate medications found. Please discuss with provider. CONTINUE these medications which have CHANGED    Details   cyclobenzaprine (FLEXERIL) 10 mg tablet Take 1 Tab by mouth three (3) times daily as needed for Muscle Spasm(s). Qty: 60 Tab, Refills: 2      metoprolol succinate (TOPROL-XL) 50 mg XL tablet Take 1 Tab by mouth daily. Qty: 30 Tab, Refills: 0         CONTINUE these medications which have NOT CHANGED    Details   levothyroxine (SYNTHROID) 100 mcg tablet Take 100 mcg by mouth Daily (before breakfast). dilTIAZem CD (CARTIA XT) 240 mg ER capsule Take 240 mg by mouth daily. fluticasone-salmeterol (ADVAIR DISKUS) 100-50 mcg/dose diskus inhaler Take 1 Puff by inhalation two (2) times a day.       cyanocobalamin (VITAMIN B-12) 1,000 mcg/mL injection 1,000 mcg by IntraMUSCular route every thirty (30) days. lansoprazole (PREVACID) 15 mg capsule Take  by mouth Daily (before breakfast). Cetirizine (ZYRTEC) 10 mg cap Take 1 Cap by mouth nightly. losartan (COZAAR) 50 mg tablet Take 50 mg by mouth daily. fluticasone (FLONASE) 50 mcg/actuation nasal spray 2 Sprays by Both Nostrils route nightly. rizatriptan (MAXALT) 5 mg tablet Take 5 mg by mouth as needed for Migraine. May repeat in 2 hours if needed      pregabalin (LYRICA) 50 mg capsule Take 50 mg by mouth nightly as needed.          STOP taking these medications       HYDROcodone-acetaminophen (NORCO) 5-325 mg per tablet Comments:   Reason for Stopping:         estradiol (ESTRACE) 1 mg tablet Comments:   Reason for Stopping:         diclofenac EC (VOLTAREN) 50 mg EC tablet Comments:   Reason for Stopping:         naproxen sodium (ALEVE) 220 mg cap Comments:   Reason for Stopping:         acetaminophen-codeine (TYLENOL-CODEINE #3) 300-30 mg per tablet Comments:   Reason for Stopping:               ERMIAS Quintanilla  5/20/2017  Orthopaedic Surgery  Physician Assistant to Dr. Jerson Beck

## 2017-05-20 NOTE — PROGRESS NOTES
ORTHOPAEDIC LUMBAR FUSION PROGRESS NOTE    NAME:     Santi Carrington   :       1940   MRN:       [id number]   DATE:      2017    POD:    5 Days Post-Op  S/P:    Procedure(s):  L4-S1 LAMINECTOMY / L4-5 FUSION /INSTRUMENTATION / TLIF / BONEGRAFT    SUBJECTIVE:    C/O back pain along surgical incision, has some occ left leg cramping  Reports having some mild tingling in her L toes  No radiating leg pain  Denies nausea/vomiting, chest pain, headache or shortness of breath  Pain controlled    Recent Labs      17   0635   HGB  9.0*   HCT  28.1*   NA  137   K  4.5   CL  101   CO2  28   BUN  12   CREA  0.86   GLU  97     Patient Vitals for the past 12 hrs:   BP Temp Pulse Resp SpO2   17 0759 178/74 - - - -   17 0754 192/84 98 °F (36.7 °C) 80 18 95 %   17 0306 154/68 97.8 °F (36.6 °C) 90 17 97 %       EXAM:  Dressings clean, dry and intact   Positive strength/ROM bilat lower ext.   Neuro intact to sensation  Calves, soft & nontender  BL LEs NVID      PLAN:  Continue PO pain medications  PT/OT, OOB  Advance regular diet  On Eliquis, being followed by Family Medicine (spoke w/ Dr. Laura Gallegos yesterday, pt is cleared from a medical standpoint)  Awaiting PT clearance for D/C      Bharti Louis, [address]  Orthopaedic Surgery  Physician Assistant to Dr. Roula Garvey

## 2017-05-20 NOTE — PROGRESS NOTES
Problem: Mobility Impaired (Adult and Pediatric)  Goal: *Acute Goals and Plan of Care (Insert Text)  Physical Therapy Goals  Initiated 5/16/2017  1. Patient will move from supine to sit and sit to supine , scoot up and down and roll side to side in bed with modified independence within 7 day(s). 2. Patient will transfer from bed to chair and chair to bed with modified independence using the least restrictive device within 7 day(s). 3. Patient will perform sit to stand with modified independence within 7 day(s). 4. Patient will ambulate with modified independence for 150 feet with the least restrictive device within 7 day(s). 5. Patient will ascend/descend 4 stairs with 1 handrail(s) with minimal assistance/contact guard assist within 7 day(s). PHYSICAL THERAPY TREATMENT  Patient: Jenny Alamo (44 y.o. female)  Date: 5/20/2017  Diagnosis: LUMBAR STENOSIS   Lumbar stenosis with neurogenic claudication Lumbar stenosis with neurogenic claudication  Procedure(s) (LRB):  L4-S1 LAMINECTOMY / L4-5 FUSION /INSTRUMENTATION / TLIF / BONEGRAFT (N/A) 5 Days Post-Op  Precautions: Back, Fall (LSO brace)      ASSESSMENT:  Patient received in bed alert and ready for PT. Boyfriend at bedside. Patient with 2/10 pain and recall of 2/3 spinal precautions. Patient sat on edge of bed and donned brace with CGA/min assist. She ambulated 50 feet with rolling walker and went up and down 4 steps with CGA. Patient has high anxiety and gets a bit shaky at times. Boyfriend observing and able to assist patient upon discharge. He has gait belt if needed. Patient also has stair lift into house where she will stay. She will need HHPT but has all DME. She is cleared for discharge from a PT standpoint.   Progression toward goals:  [X]      Improving appropriately and progressing toward goals  [ ]      Improving slowly and progressing toward goals  [ ]      Not making progress toward goals and plan of care will be adjusted PLAN:  Patient continues to benefit from skilled intervention to address the above impairments. Continue treatment per established plan of care. Discharge Recommendations:  Home Health  Further Equipment Recommendations for Discharge:  none       SUBJECTIVE:   Patient stated I feel pretty good. Mariah Barrientos   The patient stated 2/3 back precautions. Reviewed all 3 with patient. OBJECTIVE DATA SUMMARY:   Critical Behavior:  Neurologic State: Alert  Orientation Level: Oriented X4  Cognition: Follows commands  Safety/Judgement: Awareness of environment, Good awareness of safety precautions  Functional Mobility Training:  Bed Mobility:  Log Rolling: Supervision  Supine to Sit: Supervision  Sit to Supine: Supervision; Additional time           Brace donned with  minimal assistance/contact guard assist   Transfers:  Sit to Stand: Contact guard assistance  Stand to Sit: Contact guard assistance                             Balance:  Sitting: Intact  Standing: Intact; With support  Ambulation/Gait Training:  Distance (ft): 50 Feet (ft)  Assistive Device: Gait belt;Brace/Splint; Walker, rolling  Ambulation - Level of Assistance: Contact guard assistance                                               Stairs:  Number of Stairs Trained: 4  Stairs - Level of Assistance: Contact guard assistance  Rail Use: Right  (single point cane on left)  Pain:  Pain Scale 1: Numeric (0 - 10)  Pain Intensity 1: 2  Pain Location 1: Back  Pain Orientation 1: Posterior  Activity Tolerance:   good  Please refer to the flowsheet for vital signs taken during this treatment.   After treatment:   [ ]  Patient left in no apparent distress sitting up in chair  [X]  Patient left in no apparent distress in bed  [X]  Call bell left within reach  [X]  Nursing notified  [X]  Caregiver present  [X]  Bed alarm activated      COMMUNICATION/COLLABORATION:   The patients plan of care was discussed with: Registered Nurse     Andie Amezcua, PT   Time Calculation: 25 mins

## 2017-05-20 NOTE — PROGRESS NOTES
0930.  Patient reporting slight numbness in her L foot this morning resolved by warm cloth &  massaging foot. Andrei Bowles notified. Neuro vascular assessment WDL. Dorsi/plantar felxion in tact. 1135. Andrei Bowles notified that patient cleared therapy. 1140. Call to [facility] regarding eliquis prescriptions. Clarification received.

## 2017-05-20 NOTE — PROGRESS NOTES
[address] [facility] RESIDENCY PROGRAM  PROGRESS NOTE     5/20/2017  PCP: Lucero Reid MD     Assessment/Plan:   Dominique Duran is a 68 y.o. female s/p L4-S1 LAMINECTOMY / L4-5 FUSION Liza Worley / Stephen Villegas / Bharath Bill POD#4 and we were consulted for medical management of HTN, hypothyroidism, osteoporosis, asthma, anemia, GERD and migraine.     POD #4 L4-S1 Laminectomy, L4-5 fusion, instrumentation, TLIF, bonegraft: per primary     PE: pt found to have small LLL segmental PE 5/18 on CTA, which was ordered due to persistent tachycardia. HR improved to 80s-90s, on RA. Started on heparin drip; PTT remained therapeutic range. - Continue eliquis 10mg BID x 1 week. Then transition to Eliquis 5mg BID for 3-6 months    Hypertension: 141/68 this AM; remained 140s-160s/60s-80s. Goal <150/90  - Toprol Xl 50 mg daily  - diltiazem  mg QHS  - cozaar 50 mg daily   - as hx of angioedema w/ lisinopril, should ideally avoid cozaar, patient will further discuss this w/ PCP outpatient  - monitor VS per unit protocol  - need fu w/ PCP w/in 1 week after discharge     Hypothyroidism: No TSH on file. - synthroid 100 mcg daily      Asthma: stable   - continue home advair ([employer] formulary available)     GERD: stable  - continue home prevacid (protonox formulary available)     Osteoporosis: no DEXA on file. Reports being on estrace. discussed that w/ age, hx of recent surgery and prolonged use of estrace that she is at higher risk of blood clots w/ estrace. Recommend discontinuation of estrace at discharge     Dizziness/ Lightheadedness: resolved    PT/OT, disposition, DVT Prophylaxis & Pain Management: per primary     Patient stable for discharge from family medicine standpoint. Thank you very much for allowing us to participate in the care of this pleasant patient. The family medicine service will continue to follow the patient's medical progress along with you.  Please do not hesitate to page with any questions or to discuss the case. Pt to be discussed with Dr. Dillon Lozano, Attending Physician         Subjective:   Pt was seen and examined at bedside. Afebrile and hemodynamically stable. Concerns overnight include: none. States pain has improved. This  Morning : denies chest pain, SOB, nausea, vomiting, abdominal pain. Allergies: Allergies   Allergen Reactions    Ace Inhibitors Angioedema       Objective:   Physical examination  Visit Vitals    /68 (BP 1 Location: Left arm, BP Patient Position: At rest)    Pulse 90    Temp 97.8 °F (36.6 °C)    Resp 17    Ht 5' 3\" (1.6 m)    Wt 118 lb 13.3 oz (53.9 kg)    SpO2 97%    BMI 21.05 kg/m2      Temp (24hrs), Av °F (36.7 °C), Min:97.6 °F (36.4 °C), Max:98.3 °F (36.8 °C)     O2 Flow Rate (L/min): 2 l/min   O2 Device: Room air      Date 17 - 17 - 17 0659   Shift 4885-6537 9330-8035 24 Hour Total 7807-7685 3815-9309 24 Hour Total   I  N  T  A  K  E   P.O. 440  440         P. O. 440  440       Shift Total  (mL/kg) 440  (8.2)  440  (8.2)      O  U  T  P  U  T   Urine  (mL/kg/hr)            Urine Occurrence(s) 1 x 1 x 2 x       Shift Total  (mL/kg)           440      Weight (kg) 53.9 53.9 53.9 53.9 53.9 53.9         Last 3 shifts:    701 - 1900  In: 608.8 [P.O.:440; I.V.:168.8]  Out: 10 [Drains:10]    General:   Alert, oriented, NAD   HEENT:   NCAT, EOMI, oral mucosa moist   Lungs:   CTAB, no wheezing. Heart:   Regular rhythm, tachycardia, no murmur   Abdomen:    Soft, non-tender, nondistended. No rebound or guarding. Back:  Extremities:  surgical site c/d/i. ATTP. No erythema. No edema or DVT signs   Pulses:  Symmetric all extremities   Skin:  Warm and dry. No rashes or lesions   Neurologic:  Oriented.  No focal deficits     Data Review:     Recent Labs      17   0346  17   0138   WBC  7.5  8.3   HGB  9.7*  9.2*   HCT  30.2*  29.3*   PLT  284  292     Recent Labs 05/19/17   0346  05/18/17   0138   NA  136  138   K  4.2  2.9*   CL  102  101   CO2  27  29   GLU  114*  100   BUN  15  10   CREA  0.82  0.85   CA  8.9  8.2*     Medications reviewed  Current Facility-Administered Medications   Medication Dose Route Frequency    senna-docusate (PERICOLACE) 8.6-50 mg per tablet 1 Tab  1 Tab Oral BID    polyethylene glycol (MIRALAX) packet 17 g  17 g Oral BID    apixaban (ELIQUIS) tablet 10 mg  10 mg Oral BID    oxyCODONE-acetaminophen (PERCOCET 10)  mg per tablet 1 Tab  1 Tab Oral Q4H PRN    oxyCODONE-acetaminophen (PERCOCET) 5-325 mg per tablet 1 Tab  1 Tab Oral Q4H PRN    HYDROmorphone (PF) (DILAUDID) injection 1 mg  1 mg IntraVENous Q4H PRN    cyclobenzaprine (FLEXERIL) tablet 5 mg  5 mg Oral TID PRN    metoprolol succinate (TOPROL-XL) XL tablet 50 mg  50 mg Oral DAILY    alum-mag hydroxide-simeth (MYLANTA) oral suspension 30 mL  30 mL Oral Q4H PRN    cetirizine (ZYRTEC) tablet 10 mg  10 mg Oral QHS    dilTIAZem CD (CARDIZEM CD) capsule 240 mg  240 mg Oral QHS    losartan (COZAAR) tablet 50 mg  50 mg Oral DAILY    rizatriptan (MAXALT-MLT) rapid dissolve tablet 5 mg  5 mg Oral PRN    fluticasone-vilanterol (BREO ELLIPTA) 100mcg-25mcg/puff  1 Puff Inhalation DAILY    fluticasone (FLONASE) 50 mcg/actuation nasal spray 2 Spray  2 Spray Both Nostrils QHS    pantoprazole (PROTONIX) tablet 40 mg  40 mg Oral ACB    levothyroxine (SYNTHROID) tablet 100 mcg  100 mcg Oral ACB    sodium chloride (NS) flush 5-10 mL  5-10 mL IntraVENous Q8H    sodium chloride (NS) flush 5-10 mL  5-10 mL IntraVENous PRN    acetaminophen (TYLENOL) tablet 650 mg  650 mg Oral Q4H PRN    naloxone (NARCAN) injection 0.4 mg  0.4 mg IntraVENous PRN    ondansetron (ZOFRAN) injection 4 mg  4 mg IntraVENous Q4H PRN    diphenhydrAMINE (BENADRYL) capsule 25 mg  25 mg Oral Q4H PRN    zolpidem (AMBIEN) tablet 5 mg  5 mg Oral QHS PRN         Signed:   Mike Macias MD   Resident, Family Medicine        Attending note: Attending note to follow. .. [address] [facility]  Medicine Residency Attending Addendum:  I saw and evaluated the patient, performing the key elements of the service. I discussed the findings, assessment and plan with the resident and agree with the resident's findings and plan as documented in the resident's note.     The patient was seen on 5/20/17  No complaints    Vitals:    05/19/17 1850 05/20/17 0306 05/20/17 0754 05/20/17 0759   BP: 156/74 154/68 192/84 178/74   Pulse: 86 90 80    Resp:  17 18    Temp:  97.8 °F (36.6 °C) 98 °F (36.7 °C)    SpO2:  97% 95%    Weight:       Height:         Lungs-clear      Assessment/Plan:   S/p laminectomy  We are consulted for medical managemnt  Pt is stable for discharge on [facility] Problems  Never Reviewed          Codes Class Noted POA    Essential hypertension (Chronic) ICD-10-CM: I10  ICD-9-CM: 401.9  5/20/2017 Yes        Acquired hypothyroidism (Chronic) ICD-10-CM: E03.9  ICD-9-CM: 244.9  5/20/2017 Yes        Asthma (Chronic) ICD-10-CM: J45.909  ICD-9-CM: 493.90  5/20/2017 Yes        GERD (gastroesophageal reflux disease) (Chronic) ICD-10-CM: K21.9  ICD-9-CM: 530.81  5/20/2017 Yes        Osteoporosis (Chronic) ICD-10-CM: M81.0  ICD-9-CM: 733.00  5/20/2017 Yes        Acute pulmonary embolism ([facility] [address] ICD-10-CM: I26.99  ICD-9-CM: 415.19  5/19/2017 No        * (Principal)Lumbar stenosis with neurogenic claudication ICD-10-CM: M48.06  ICD-9-CM: 724.03  5/15/2017 Yes

## 2017-05-23 NOTE — ANESTHESIA POSTPROCEDURE EVALUATION
Post-Anesthesia Evaluation and Assessment    Patient: Jenny Alamo MRN: [id number]  SSN: [id number]    YOB: 1940  Age: 68 y.o. Sex: female       Cardiovascular Function/Vital Signs  Visit Vitals    /74 (BP 1 Location: Right arm, BP Patient Position: At rest)    Pulse 80    Temp 36.7 °C (98 °F)    Resp 18    Ht 5' 3\" (1.6 m)    Wt 53.9 kg (118 lb 13.3 oz)    SpO2 95%    BMI 21.05 kg/m2       Patient is status post general anesthesia for Procedure(s):  L4-S1 LAMINECTOMY / L4-5 FUSION /INSTRUMENTATION / TLIF / BONEGRAFT. Nausea/Vomiting: None    Postoperative hydration reviewed and adequate. Pain:  Pain Scale 1: Numeric (0 - 10) (05/20/17 1246)  Pain Intensity 1: 7 (05/20/17 1246)   Managed    Neurological Status:   Neuro (WDL): Exceptions to WDL (05/15/17 1200)  Neuro  Neurologic State: Alert (05/20/17 1100)  Orientation Level: Oriented X4 (05/20/17 1100)  Cognition: Appropriate decision making; Follows commands (05/20/17 1100)  Speech: Clear (05/20/17 0754)  Assessment L Pupil: Brisk (05/18/17 1538)  Size L Pupil (mm): 3 (05/18/17 1538)  Assessment R Pupil: Brisk (05/18/17 1538)  Size R Pupil (mm): 3 (05/18/17 1538)  LUE Motor Response: Purposeful (05/20/17 0754)  LLE Motor Response: Purposeful;Numbness (05/20/17 0754)  RUE Motor Response: Purposeful (05/20/17 0754)  RLE Motor Response: Purposeful (05/20/17 0754)   At baseline    Mental Status and Level of Consciousness: Arousable    Pulmonary Status:   O2 Device: Room air (05/19/17 0700)   Adequate oxygenation and airway patent    Complications related to anesthesia: None    Post-anesthesia assessment completed.  No concerns    Signed By: Douglas Sherman MD     May 23, 2017

## 2017-05-25 ENCOUNTER — TELEPHONE (OUTPATIENT)
Dept: FAMILY MEDICINE CLINIC | Age: 77
End: 2017-05-25

## 2017-05-25 NOTE — TELEPHONE ENCOUNTER
Kristal Mars, ph [phone]. Daughter said she called two days ago about this issue.  (chart is not reflecting that request)     Caden Latonia she was not with her mother when she was discharged from the hospital. Caden Lincoln she has a prescription question about what is on the discharge papers of the patient. There are two orders for eliquis. One dispensed for so many days and another dispensed for another number of days. However she is saying both of the prescriptions ordered were for the same amount. (not showing the difference as was to be ordered on the discharge papers)    Call daughter.

## 2017-05-28 ENCOUNTER — HOSPITAL ENCOUNTER (EMERGENCY)
Age: 77
Discharge: HOME | End: 2017-05-28
Attending: EMERGENCY MEDICINE
Payer: MEDICARE

## 2017-05-28 ENCOUNTER — APPOINTMENT (OUTPATIENT)
Dept: CT IMAGING | Age: 77
End: 2017-05-28
Attending: EMERGENCY MEDICINE
Payer: MEDICARE

## 2017-05-28 VITALS
BODY MASS INDEX: 20.91 KG/M2 | SYSTOLIC BLOOD PRESSURE: 165 MMHG | HEIGHT: 63 IN | OXYGEN SATURATION: 98 % | DIASTOLIC BLOOD PRESSURE: 93 MMHG | WEIGHT: 118 LBS | RESPIRATION RATE: 20 BRPM | HEART RATE: 103 BPM | TEMPERATURE: 98 F

## 2017-05-28 DIAGNOSIS — R06.02 SOB (SHORTNESS OF BREATH): Primary | ICD-10-CM

## 2017-05-28 LAB
ALBUMIN SERPL BCP-MCNC: 3.4 G/DL (ref 3.5–5)
ALBUMIN/GLOB SERPL: 0.7 {RATIO} (ref 1.1–2.2)
ALP SERPL-CCNC: 95 U/L (ref 45–117)
ALT SERPL-CCNC: 16 U/L (ref 12–78)
ANION GAP BLD CALC-SCNC: 9 MMOL/L (ref 5–15)
APTT PPP: 28.2 SEC (ref 22.1–32.5)
AST SERPL W P-5'-P-CCNC: 18 U/L (ref 15–37)
BASOPHILS # BLD AUTO: 0 K/UL (ref 0–0.1)
BASOPHILS # BLD: 0 % (ref 0–1)
BILIRUB SERPL-MCNC: 0.2 MG/DL (ref 0.2–1)
BNP SERPL-MCNC: 241 PG/ML (ref 0–450)
BUN SERPL-MCNC: 14 MG/DL (ref 6–20)
BUN/CREAT SERPL: 15 (ref 12–20)
CALCIUM SERPL-MCNC: 9.4 MG/DL (ref 8.5–10.1)
CHLORIDE SERPL-SCNC: 98 MMOL/L (ref 97–108)
CO2 SERPL-SCNC: 26 MMOL/L (ref 21–32)
CREAT SERPL-MCNC: 0.92 MG/DL (ref 0.55–1.02)
DIFFERENTIAL METHOD BLD: ABNORMAL
EOSINOPHIL # BLD: 0.1 K/UL (ref 0–0.4)
EOSINOPHIL NFR BLD: 1 % (ref 0–7)
ERYTHROCYTE [DISTWIDTH] IN BLOOD BY AUTOMATED COUNT: 13.6 % (ref 11.5–14.5)
GLOBULIN SER CALC-MCNC: 4.6 G/DL (ref 2–4)
GLUCOSE SERPL-MCNC: 113 MG/DL (ref 65–100)
HCT VFR BLD AUTO: 31.5 % (ref 35–47)
HGB BLD-MCNC: 10.4 G/DL (ref 11.5–16)
INR PPP: 1.1 (ref 0.9–1.1)
LYMPHOCYTES # BLD AUTO: 15 % (ref 12–49)
LYMPHOCYTES # BLD: 1.5 K/UL (ref 0.8–3.5)
MCH RBC QN AUTO: 30.1 PG (ref 26–34)
MCHC RBC AUTO-ENTMCNC: 33 G/DL (ref 30–36.5)
MCV RBC AUTO: 91 FL (ref 80–99)
MONOCYTES # BLD: 0.7 K/UL (ref 0–1)
MONOCYTES NFR BLD AUTO: 8 % (ref 5–13)
NEUTS SEG # BLD: 7.1 K/UL (ref 1.8–8)
NEUTS SEG NFR BLD AUTO: 76 % (ref 32–75)
PLATELET # BLD AUTO: 456 K/UL (ref 150–400)
POTASSIUM SERPL-SCNC: 3.5 MMOL/L (ref 3.5–5.1)
PROT SERPL-MCNC: 8 G/DL (ref 6.4–8.2)
PROTHROMBIN TIME: 10.4 SEC (ref 9–11.1)
RBC # BLD AUTO: 3.46 M/UL (ref 3.8–5.2)
SODIUM SERPL-SCNC: 133 MMOL/L (ref 136–145)
THERAPEUTIC RANGE,PTTT: NORMAL SECS (ref 58–77)
TROPONIN I SERPL-MCNC: <0.04 NG/ML
WBC # BLD AUTO: 9.4 K/UL (ref 3.6–11)

## 2017-05-28 PROCEDURE — 71275 CT ANGIOGRAPHY CHEST: CPT

## 2017-05-28 PROCEDURE — 74011636320 HC RX REV CODE- 636/320: Performed by: EMERGENCY MEDICINE

## 2017-05-28 PROCEDURE — 80053 COMPREHEN METABOLIC PANEL: CPT | Performed by: EMERGENCY MEDICINE

## 2017-05-28 PROCEDURE — 83880 ASSAY OF NATRIURETIC PEPTIDE: CPT | Performed by: EMERGENCY MEDICINE

## 2017-05-28 PROCEDURE — 93005 ELECTROCARDIOGRAM TRACING: CPT

## 2017-05-28 PROCEDURE — 85730 THROMBOPLASTIN TIME PARTIAL: CPT | Performed by: EMERGENCY MEDICINE

## 2017-05-28 PROCEDURE — 84484 ASSAY OF TROPONIN QUANT: CPT | Performed by: EMERGENCY MEDICINE

## 2017-05-28 PROCEDURE — 85610 PROTHROMBIN TIME: CPT | Performed by: EMERGENCY MEDICINE

## 2017-05-28 PROCEDURE — 36415 COLL VENOUS BLD VENIPUNCTURE: CPT | Performed by: EMERGENCY MEDICINE

## 2017-05-28 PROCEDURE — 85025 COMPLETE CBC W/AUTO DIFF WBC: CPT | Performed by: EMERGENCY MEDICINE

## 2017-05-28 PROCEDURE — 99285 EMERGENCY DEPT VISIT HI MDM: CPT

## 2017-05-28 RX ORDER — SODIUM CHLORIDE 0.9 % (FLUSH) 0.9 %
10 SYRINGE (ML) INJECTION
Status: DISCONTINUED | OUTPATIENT
Start: 2017-05-28 | End: 2017-05-29 | Stop reason: HOSPADM

## 2017-05-28 RX ADMIN — IOPAMIDOL 100 ML: 755 INJECTION, SOLUTION INTRAVENOUS at 21:41

## 2017-05-29 NOTE — DISCHARGE INSTRUCTIONS
We hope that we have addressed all of your medical concerns. The examination and treatment you received in the Emergency Department were for an emergent problem and were not intended as complete care. It is important that you follow up with your healthcare provider(s) for ongoing care. If your symptoms worsen or do not improve as expected, and you are unable to reach your usual health care provider(s), you should return to the Emergency Department. Today's healthcare is undergoing tremendous change, and patient satisfaction surveys are one of the many tools to assess the quality of medical care. You may receive a survey from the [employer] regarding your experience in the Emergency Department. I hope that your experience has been completely positive, particularly the medical care that I provided. As such, please participate in the survey; anything less than excellent does not meet my expectations or intentions. [facility] and 8 [facility] participate in nationally recognized quality of care measures. If your blood pressure is greater than 120/80, as reported below, we urge that you seek medical care to address the potential of high blood pressure, commonly known as hypertension. Hypertension can be hereditary or can be caused by certain medical conditions, pain, stress, or \"white coat syndrome. \"       Please make an appointment with your health care provider(s) for follow up of your Emergency Department visit.        VITALS:   Patient Vitals for the past 8 hrs:   Temp Pulse Resp BP SpO2   05/28/17 2240 - (!) 108 23 (!) 165/93 97 %   05/28/17 2120 - 95 18 (!) 187/91 99 %   05/28/17 2100 - 93 17 190/89 100 %   05/28/17 2046 98 °F (36.7 °C) (!) 105 23 198/86 99 %   05/28/17 2040 - 94 19 179/87 98 %   05/28/17 2033 - 96 25 - 98 %   05/28/17 2030 - - - - 98 %   05/28/17 2027 - - - 198/86 -          Thank you for allowing us to provide you with medical care today. We realize that you have many choices for your emergency care needs. Please choose us in the future for any continued health care needs. Gigi Christophe Mcke[place], [employer].   Office: [phone]            Recent Results (from the past 24 hour(s))   EKG, 12 LEAD, INITIAL    Collection Time: 05/28/17  8:42 PM   Result Value Ref Range    Ventricular Rate 93 BPM    Atrial Rate 93 BPM    P-R Interval 176 ms    QRS Duration 90 ms    Q-T Interval 374 ms    QTC Calculation (Bezet) 465 ms    Calculated P Axis 37 degrees    Calculated R Axis -44 degrees    Calculated T Axis 11 degrees    Diagnosis       Sinus rhythm with occasional premature ventricular complexes  Possible Left atrial enlargement  Left axis deviation  Left ventricular hypertrophy  Abnormal ECG  When compared with ECG of 09-MAY-2017 16:34,  premature ventricular complexes are now present     CBC WITH AUTOMATED DIFF    Collection Time: 05/28/17  8:56 PM   Result Value Ref Range    WBC 9.4 3.6 - 11.0 K/uL    RBC 3.46 (L) 3.80 - 5.20 M/uL    HGB 10.4 (L) 11.5 - 16.0 g/dL    HCT 31.5 (L) 35.0 - 47.0 %    MCV 91.0 80.0 - 99.0 FL    MCH 30.1 26.0 - 34.0 PG    MCHC 33.0 30.0 - 36.5 g/dL    RDW 13.6 11.5 - 14.5 %    PLATELET 080 (H) 656 - 400 K/uL    NEUTROPHILS 76 (H) 32 - 75 %    LYMPHOCYTES 15 12 - 49 %    MONOCYTES 8 5 - 13 %    EOSINOPHILS 1 0 - 7 %    BASOPHILS 0 0 - 1 %    ABS. NEUTROPHILS 7.1 1.8 - 8.0 K/UL    ABS. LYMPHOCYTES 1.5 0.8 - 3.5 K/UL    ABS. MONOCYTES 0.7 0.0 - 1.0 K/UL    ABS. EOSINOPHILS 0.1 0.0 - 0.4 K/UL    ABS.  BASOPHILS 0.0 0.0 - 0.1 K/UL    DF AUTOMATED     METABOLIC PANEL, COMPREHENSIVE    Collection Time: 05/28/17  8:56 PM   Result Value Ref Range    Sodium 133 (L) 136 - 145 mmol/L    Potassium 3.5 3.5 - 5.1 mmol/L    Chloride 98 97 - 108 mmol/L    CO2 26 21 - 32 mmol/L    Anion gap 9 5 - 15 mmol/L    Glucose 113 (H) 65 - 100 mg/dL    BUN 14 6 - 20 MG/DL Creatinine 0.92 0.55 - 1.02 MG/DL    BUN/Creatinine ratio 15 12 - 20      GFR est AA >60 >60 ml/min/1.73m2    GFR est non-AA 59 (L) >60 ml/min/1.73m2    Calcium 9.4 8.5 - 10.1 MG/DL    Bilirubin, total 0.2 0.2 - 1.0 MG/DL    ALT (SGPT) 16 12 - 78 U/L    AST (SGOT) 18 15 - 37 U/L    Alk. phosphatase 95 45 - 117 U/L    Protein, total 8.0 6.4 - 8.2 g/dL    Albumin 3.4 (L) 3.5 - 5.0 g/dL    Globulin 4.6 (H) 2.0 - 4.0 g/dL    A-G Ratio 0.7 (L) 1.1 - 2.2     PROTHROMBIN TIME + INR    Collection Time: 05/28/17  8:56 PM   Result Value Ref Range    INR 1.1 0.9 - 1.1      Prothrombin time 10.4 9.0 - 11.1 sec   PTT    Collection Time: 05/28/17  8:56 PM   Result Value Ref Range    aPTT 28.2 22.1 - 32.5 sec    aPTT, therapeutic range     58.0 - 77.0 SECS   PRO-BNP    Collection Time: 05/28/17  8:56 PM   Result Value Ref Range    NT pro- 0 - 450 PG/ML   TROPONIN I    Collection Time: 05/28/17  9:00 PM   Result Value Ref Range    Troponin-I, Qt. <0.04 <0.08 ng/mL       Cta Chest W Or W Wo Cont    Result Date: 5/28/2017  EXAM:  CTA CHEST W OR W WO CONT INDICATION:   recent PE. On Eliquis but with SOB and elevated HR. COMPARISON: CT thorax 5/18/2017. Chales Minus CONTRAST: mL of Isovue-370. TECHNIQUE: Precontrast  images were obtained to localize the volume for acquisition. Multislice helical CT arteriography was performed from the diaphragm to the thoracic inlet during uneventful rapid bolus intravenous contrast administration. Lung and soft tissue windows were generated. Coronal and sagittal images were generated and 3D post processing consisting of coronal maximum intensity images was performed. CT dose reduction was achieved through use of a standardized protocol tailored for this examination and automatic exposure control for dose modulation. FINDINGS: The lungs are clear of mass, nodule, airspace disease or edema.  There is stable appearance to small hypodensities defect within the peripheral left lower lobe pulmonary arterial branch. No new pulmonary emboli are identified. There is no mediastinal or hilar adenopathy or mass. The heart is normal size without pericardial effusion. There is no left ventricular strain. The aorta is atherosclerotic and enhances normally with no aneurysm or dissection. The visualized portions of the upper abdominal organs are normal.     IMPRESSION: Persistent small left lower lobe pulmonary arterial branch embolus. No new emboli or other acute findings identified. Shortness of Breath: Care Instructions  Your Care Instructions  Shortness of breath has many causes. Sometimes conditions such as anxiety can lead to shortness of breath. Some people get mild shortness of breath when they exercise. Trouble breathing also can be a symptom of a serious problem, such as asthma, lung disease, emphysema, heart problems, and pneumonia. If your shortness of breath continues, you may need tests and treatment. Watch for any changes in your breathing and other symptoms. Follow-up care is a key part of your treatment and safety. Be sure to make and go to all appointments, and call your doctor if you are having problems. Its also a good idea to know your test results and keep a list of the medicines you take. How can you care for yourself at home? · Do not smoke or allow others to smoke around you. If you need help quitting, talk to your doctor about stop-smoking programs and medicines. These can increase your chances of quitting for good. · Get plenty of rest and sleep. · Take your medicines exactly as prescribed. Call your doctor if you think you are having a problem with your medicine. · Find healthy ways to deal with stress. ¨ Exercise daily. ¨ Get plenty of sleep. ¨ Eat regularly and well. When should you call for help? Call 911 anytime you think you may need emergency care. For example, call if:  · You have severe shortness of breath. · You have symptoms of a heart attack.  These may include:  ¨ Chest pain or pressure, or a strange feeling in the chest.  ¨ Sweating. ¨ Shortness of breath. ¨ Nausea or vomiting. ¨ Pain, pressure, or a strange feeling in the back, neck, jaw, or upper belly or in one or both shoulders or arms. ¨ Lightheadedness or sudden weakness. ¨ A fast or irregular heartbeat. After you call 911, the  may tell you to chew 1 adult-strength or 2 to 4 low-dose aspirin. Wait for an ambulance. Do not try to drive yourself. Call your doctor now or seek immediate medical care if:  · Your shortness of breath gets worse or you start to wheeze. Wheezing is a high-pitched sound when you breathe. · You wake up at night out of breath or have to prop your head up on several pillows to breathe. · You are short of breath after only light activity or while at rest.  Watch closely for changes in your health, and be sure to contact your doctor if:  · You do not get better over the next 1 to 2 days. Where can you learn more? Go to http://brad-camilo.info/. Enter S780 in the search box to learn more about \"Shortness of Breath: Care Instructions. \"  Current as of: May 23, 2016  Content Version: 11.2  © 9299-9175 [employer]. Care instructions adapted under license by [employer] (which disclaims liability or warranty for this information). If you have questions about a medical condition or this instruction, always ask your healthcare professional. [place] [address] any warranty or liability for your use of this information. [employer] Activation    Thank you for requesting access to [employer]. Please follow the instructions below to securely access and download your online medical record. [employer] allows you to send messages to your doctor, view your test results, renew your prescriptions, schedule appointments, and more. How Do I Sign Up? 1. In your internet browser, go to [email or web address].[employer]  2.  Click on the First Time User? Click Here link in the Sign In box. You will be redirect to the New Member Sign Up page. 3. Enter your [employer] Access Code exactly as it appears below. You will not need to use this code after youve completed the sign-up process. If you do not sign up before the expiration date, you must request a new code. MyChart Access Code: Activation code not generated  Current Wink[employer] Status: Active (This is the date your [employer]t access code will )    4. Enter the last four digits of your Social Security Number (xxxx) and Date of Birth (mm/dd/yyyy) as indicated and click Submit. You will be taken to the next sign-up page. 5. Create a [employer] ID. This will be your Winkcam login ID and cannot be changed, so think of one that is secure and easy to remember. 6. Create a W[employer] password. You can change your password at any time. 7. Enter your Password Reset Question and Answer. This can be used at a later time if you forget your password. 8. Enter your e-mail address. You will receive e-mail notification when new information is available in [address] 9. Click Sign Up. You can now view and download portions of your medical record. 10. Click the Download Summary menu link to download a portable copy of your medical information. Additional Information    If you have questions, please visit the Frequently Asked Questions section of the W[employer] website at [email or web address] Remember, [employer] is NOT to be used for urgent needs. For medical emergencies, dial 911.

## 2017-05-29 NOTE — ED TRIAGE NOTES
Pt reports SOB that started at 10:00 am today. Denies chest pain. Pt had spinal surgery 2 weeks ago at [facility]. Pt had PT 2-3 days post op. Pt is taking eloquis.

## 2017-05-29 NOTE — ED NOTES
Patient discharged home after receiving discharge instructions from MD/PA. Patient and family voiced understanding and doesn't have any questions at this time. Patient in no distress at this time. Family wheeled pt out to car and pt has a ride home.  Both IV's removed from pt prior to d/c

## 2017-05-29 NOTE — ED PROVIDER NOTES
HPI Comments: The patient presents to the ED with SOB. Of note, she is s/p surgery by Dr. Giovana Soares on 5/15/17. She had \"1. Laminectomy, partial facetectomy, and foraminotomy of L4 and L5. 2. Laminectomy of S1.   3. Posterolateral fusion and posterior lumbar interbody fusion of L4-5. 4. Pedicle screw instrumentation with pedicle screws for L4 and L5 bilaterally. 5. Application of biomechanical intervertebral body device at L4-5 with [place]. 6. Morselized allograft for spine surgery and local autograft for spine surgery with stem cells. \" The surgery was performed for spinal stenosis. Her post op course was complicated by \"Small left lower lobe segmental pulmonary emboli\" for which she was treated with Eliquis. She had been doing well until today when she noted increased HR to 112 and shortness of breath. She has been compliant with the Eliquis. She denies any chest pain, fever, or cough. She notes pain is well controlled and she did not take any pain meds today. She does not numbness to L leg which she has had intermittently and was present before her surgery. She denies any other concerns. Patient is a 68 y.o. female presenting with shortness of breath. The history is provided by the patient. Shortness of Breath   Pertinent negatives include no fever, no headaches, no sore throat, no cough, no chest pain, no vomiting, no abdominal pain and no rash.         Past Medical History:   Diagnosis Date    Anemia     Arthritis     osteoporosis    Asthma     Broken ribs 2006    fell    Cancer ([facility] [address]     melanoma     GERD (gastroesophageal reflux disease)     Hypertension     Migraines     Osteoporosis     Rheumatic fever     x2 without residual    Thyroid disease        Past Surgical History:   Procedure Laterality Date    HX ABOVE KNEE AMPUTATION Left 05/2015    remove Tillman's neuroma    HX BREAST BIOPSY  1972    HX CATARACT REMOVAL Bilateral 2009, 2011   [facility]    Hysterectomy    HX HEENT  1996 sinus surgery    HX HEENT      deviated septum    HX ORTHOPAEDIC Left 2006    Tarsal Tunnel Release     HX ORTHOPAEDIC Right 2010,2016    basal joint surgery    HX ORTHOPAEDIC Left 2012    basal joint surgery     HX ORTHOPAEDIC      back surgery     HX OTHER SURGICAL      TVT Sling    HX OTHER SURGICAL      Tillman's Neuroma removal     HX SEPTOPLASTY      HX TONSIL AND ADENOIDECTOMY      HX TONSILLECTOMY  1946    HX UROLOGICAL  2010    TVT sling         Family History:   Problem Relation Age of Onset    Hypertension Mother     Stroke Mother      TIA    Heart Disease Mother     Alzheimer Mother     Hypertension Father     Asthma Father     Colon Cancer Father     COPD Father     Asthma Brother        Social History     Social History    Marital status:      Spouse name: N/A    Number of children: N/A    Years of education: N/A     Occupational History    Not on file. Social History Main Topics    Smoking status: Never Smoker    Smokeless tobacco: Never Used    Alcohol use No    Drug use: No    Sexual activity: Not on file     Other Topics Concern    Not on file     Social History Narrative         ALLERGIES: Ace inhibitors    Review of Systems   Constitutional: Negative for appetite change and fever. HENT: Negative for congestion, nosebleeds and sore throat. Eyes: Negative for discharge. Respiratory: Positive for shortness of breath. Negative for cough. Cardiovascular: Negative for chest pain. Gastrointestinal: Negative for abdominal pain, diarrhea, nausea and vomiting. Genitourinary: Negative for dysuria. Musculoskeletal: Negative. Skin: Negative for rash. Neurological: Positive for numbness. Negative for weakness and headaches. Hematological: Negative for adenopathy. Psychiatric/Behavioral: Negative. All other systems reviewed and are negative. There were no vitals filed for this visit.          Physical Exam   Constitutional: She is oriented to person, place, and time. She appears well-developed and well-nourished. HENT:   Head: Normocephalic and atraumatic. Mouth/Throat: Oropharynx is clear and moist.   Eyes: Conjunctivae are normal.   Neck: Normal range of motion. Neck supple. Cardiovascular: Normal rate, regular rhythm and normal heart sounds. Pulmonary/Chest: Effort normal and breath sounds normal.   clear   Abdominal: Soft. Bowel sounds are normal. There is no tenderness. Musculoskeletal: Normal range of motion. She exhibits no edema or tenderness. Neurological: She is alert and oriented to person, place, and time. Numbness to L lower leg and foot. Skin: Skin is warm and dry. Incision lower back is clean, dry, intact. Psychiatric: She has a normal mood and affect. Her behavior is normal.   Nursing note and vitals reviewed. [facility]  ED Course       Procedures    ED EKG interpretation:  Rhythm: normal sinus rhythm; and regular . Rate (approx.): 93; Axis: LAD; P wave: normal; QRS interval: normal ; ST/T wave: normal; PVCs. LVH. This EKG was interpreted by Darryl Urbano MD,ED Provider. A/P:  1. SOB - unclear etiology. CT without new PEs. ? Atelectasis. Advised to use incentive spirometer which she states she has at home. 2. Paresthesia - present pre-op. F/U with PCP.

## 2017-05-29 NOTE — ED NOTES
Pt back from CT. Radiology started additional IV to left AC (22 diffusix). Flushed 20g in right AC with 10 cc NS without difficulty. Pt remains on cardiac monitor x 4. Will continue to monitor.

## 2017-05-30 LAB
ATRIAL RATE: 93 BPM
CALCULATED P AXIS, ECG09: 37 DEGREES
CALCULATED R AXIS, ECG10: -44 DEGREES
CALCULATED T AXIS, ECG11: 11 DEGREES
DIAGNOSIS, 93000: NORMAL
P-R INTERVAL, ECG05: 176 MS
Q-T INTERVAL, ECG07: 374 MS
QRS DURATION, ECG06: 90 MS
QTC CALCULATION (BEZET), ECG08: 465 MS
VENTRICULAR RATE, ECG03: 93 BPM

## 2018-04-10 ENCOUNTER — HOSPITAL ENCOUNTER (OUTPATIENT)
Dept: MRI IMAGING | Age: 78
Discharge: HOME | End: 2018-04-10
Attending: ORTHOPAEDIC SURGERY
Payer: MEDICARE

## 2018-04-10 DIAGNOSIS — M54.50 LOW BACK PAIN: ICD-10-CM

## 2018-04-10 DIAGNOSIS — M54.16 LUMBAR RADICULOPATHY: ICD-10-CM

## 2018-04-10 LAB — CREAT BLD-MCNC: 0.8 MG/DL (ref 0.6–1.3)

## 2018-04-10 PROCEDURE — 74011250636 HC RX REV CODE- 250/636: Performed by: ORTHOPAEDIC SURGERY

## 2018-04-10 PROCEDURE — 72158 MRI LUMBAR SPINE W/O & W/DYE: CPT

## 2018-04-10 PROCEDURE — A9576 INJ PROHANCE MULTIPACK: HCPCS | Performed by: ORTHOPAEDIC SURGERY

## 2018-04-10 PROCEDURE — 82565 ASSAY OF CREATININE: CPT

## 2018-04-10 RX ADMIN — GADOTERIDOL 10 ML: 279.3 INJECTION, SOLUTION INTRAVENOUS at 13:24

## 2019-04-18 ENCOUNTER — APPOINTMENT (OUTPATIENT)
Dept: NON INVASIVE DIAGNOSTICS | Age: 79
DRG: 312 | End: 2019-04-18
Attending: INTERNAL MEDICINE
Payer: MEDICARE

## 2019-04-18 ENCOUNTER — APPOINTMENT (OUTPATIENT)
Dept: GENERAL RADIOLOGY | Age: 79
DRG: 312 | End: 2019-04-18
Attending: EMERGENCY MEDICINE
Payer: MEDICARE

## 2019-04-18 ENCOUNTER — APPOINTMENT (OUTPATIENT)
Dept: CT IMAGING | Age: 79
DRG: 312 | End: 2019-04-18
Attending: EMERGENCY MEDICINE
Payer: MEDICARE

## 2019-04-18 ENCOUNTER — HOSPITAL ENCOUNTER (INPATIENT)
Age: 79
LOS: 2 days | Discharge: HOME | DRG: 312 | End: 2019-04-20
Attending: EMERGENCY MEDICINE | Admitting: INTERNAL MEDICINE
Payer: MEDICARE

## 2019-04-18 DIAGNOSIS — R00.1 BRADYCARDIA: ICD-10-CM

## 2019-04-18 DIAGNOSIS — R55 SYNCOPE AND COLLAPSE: Primary | ICD-10-CM

## 2019-04-18 PROBLEM — E87.6 HYPOKALEMIA: Status: ACTIVE | Noted: 2019-04-18

## 2019-04-18 PROBLEM — J45.20 ASTHMA, MILD INTERMITTENT: Status: ACTIVE | Noted: 2019-04-18

## 2019-04-18 LAB
ALBUMIN SERPL-MCNC: 3.2 G/DL (ref 3.5–5)
ALBUMIN/GLOB SERPL: 0.9 {RATIO} (ref 1.1–2.2)
ALP SERPL-CCNC: 65 U/L (ref 45–117)
ALT SERPL-CCNC: 20 U/L (ref 12–78)
ANION GAP SERPL CALC-SCNC: 6 MMOL/L (ref 5–15)
AST SERPL-CCNC: 15 U/L (ref 15–37)
ATRIAL RATE: 46 BPM
BASOPHILS # BLD: 0 K/UL (ref 0–0.1)
BASOPHILS NFR BLD: 0 % (ref 0–1)
BILIRUB SERPL-MCNC: 0.3 MG/DL (ref 0.2–1)
BUN SERPL-MCNC: 26 MG/DL (ref 6–20)
BUN/CREAT SERPL: 21 (ref 12–20)
CALCIUM SERPL-MCNC: 8 MG/DL (ref 8.5–10.1)
CALCULATED P AXIS, ECG09: 13 DEGREES
CALCULATED R AXIS, ECG10: -34 DEGREES
CALCULATED T AXIS, ECG11: 23 DEGREES
CHLORIDE SERPL-SCNC: 109 MMOL/L (ref 97–108)
CO2 SERPL-SCNC: 24 MMOL/L (ref 21–32)
COMMENT, HOLDF: NORMAL
CREAT SERPL-MCNC: 1.24 MG/DL (ref 0.55–1.02)
DIAGNOSIS, 93000: NORMAL
DIFFERENTIAL METHOD BLD: ABNORMAL
ECHO AO ROOT DIAM: 2.62 CM
ECHO AV AREA PEAK VELOCITY: 2 CM2
ECHO AV PEAK GRADIENT: 7.4 MMHG
ECHO AV PEAK VELOCITY: 135.68 CM/S
ECHO EST RA PRESSURE: 3 MMHG
ECHO LA MAJOR AXIS: 3.24 CM
ECHO LA TO AORTIC ROOT RATIO: 1.23
ECHO LA VOL 2C: 45.94 ML (ref 22–52)
ECHO LA VOL 4C: 71.38 ML (ref 22–52)
ECHO LA VOLUME INDEX A2C: 29.62 ML/M2 (ref 16–28)
ECHO LA VOLUME INDEX A4C: 46.02 ML/M2 (ref 16–28)
ECHO LV INTERNAL DIMENSION DIASTOLIC: 4.15 CM (ref 3.9–5.3)
ECHO LV INTERNAL DIMENSION SYSTOLIC: 2.72 CM
ECHO LV IVSD: 0.91 CM (ref 0.6–0.9)
ECHO LV MASS 2D: 118.3 G (ref 67–162)
ECHO LV MASS INDEX 2D: 76.3 G/M2 (ref 43–95)
ECHO LV POSTERIOR WALL DIASTOLIC: 0.77 CM (ref 0.6–0.9)
ECHO LVOT DIAM: 1.87 CM
ECHO LVOT PEAK GRADIENT: 3.9 MMHG
ECHO LVOT PEAK VELOCITY: 99.05 CM/S
ECHO MV A VELOCITY: 79.66 CM/S
ECHO MV E DECELERATION TIME (DT): 409.9 MS
ECHO MV E VELOCITY: 116.1 CM/S
ECHO MV E/A RATIO: 1.46
ECHO MV REGURGITANT PEAK GRADIENT: 116.8 MMHG
ECHO MV REGURGITANT PEAK VELOCITY: 540.47 CM/S
ECHO PULMONARY ARTERY SYSTOLIC PRESSURE (PASP): 41.1 MMHG
ECHO PV MAX VELOCITY: 72.97 CM/S
ECHO PV PEAK GRADIENT: 2.1 MMHG
ECHO RIGHT VENTRICULAR SYSTOLIC PRESSURE (RVSP): 41.1 MMHG
ECHO RV INTERNAL DIMENSION: 3.47 CM
ECHO TV REGURGITANT MAX VELOCITY: 308.55 CM/S
ECHO TV REGURGITANT PEAK GRADIENT: 38.1 MMHG
EOSINOPHIL # BLD: 0.1 K/UL (ref 0–0.4)
EOSINOPHIL NFR BLD: 1 % (ref 0–7)
ERYTHROCYTE [DISTWIDTH] IN BLOOD BY AUTOMATED COUNT: 13.7 % (ref 11.5–14.5)
GLOBULIN SER CALC-MCNC: 3.7 G/DL (ref 2–4)
GLUCOSE SERPL-MCNC: 113 MG/DL (ref 65–100)
HCT VFR BLD AUTO: 31.1 % (ref 35–47)
HGB BLD-MCNC: 10.1 G/DL (ref 11.5–16)
IMM GRANULOCYTES # BLD AUTO: 0.1 K/UL (ref 0–0.04)
IMM GRANULOCYTES NFR BLD AUTO: 1 % (ref 0–0.5)
LYMPHOCYTES # BLD: 1.6 K/UL (ref 0.8–3.5)
LYMPHOCYTES NFR BLD: 16 % (ref 12–49)
MAGNESIUM SERPL-MCNC: 1.7 MG/DL (ref 1.6–2.4)
MCH RBC QN AUTO: 30 PG (ref 26–34)
MCHC RBC AUTO-ENTMCNC: 32.5 G/DL (ref 30–36.5)
MCV RBC AUTO: 92.3 FL (ref 80–99)
MONOCYTES # BLD: 0.6 K/UL (ref 0–1)
MONOCYTES NFR BLD: 6 % (ref 5–13)
NEUTS SEG # BLD: 7.9 K/UL (ref 1.8–8)
NEUTS SEG NFR BLD: 76 % (ref 32–75)
NRBC # BLD: 0 K/UL (ref 0–0.01)
NRBC BLD-RTO: 0 PER 100 WBC
P-R INTERVAL, ECG05: 204 MS
PLATELET # BLD AUTO: 215 K/UL (ref 150–400)
PMV BLD AUTO: 9.2 FL (ref 8.9–12.9)
POTASSIUM SERPL-SCNC: 3.2 MMOL/L (ref 3.5–5.1)
PROT SERPL-MCNC: 6.9 G/DL (ref 6.4–8.2)
Q-T INTERVAL, ECG07: 506 MS
QRS DURATION, ECG06: 104 MS
QTC CALCULATION (BEZET), ECG08: 442 MS
RBC # BLD AUTO: 3.37 M/UL (ref 3.8–5.2)
SAMPLES BEING HELD,HOLD: NORMAL
SODIUM SERPL-SCNC: 139 MMOL/L (ref 136–145)
T3FREE SERPL-MCNC: 1.7 PG/ML (ref 2.2–4)
T4 FREE SERPL-MCNC: 1.4 NG/DL (ref 0.8–1.5)
TROPONIN I SERPL-MCNC: <0.05 NG/ML
TSH SERPL DL<=0.05 MIU/L-ACNC: 0.11 UIU/ML (ref 0.36–3.74)
VENTRICULAR RATE, ECG03: 46 BPM
WBC # BLD AUTO: 10.2 K/UL (ref 3.6–11)

## 2019-04-18 PROCEDURE — 80053 COMPREHEN METABOLIC PANEL: CPT

## 2019-04-18 PROCEDURE — 84484 ASSAY OF TROPONIN QUANT: CPT

## 2019-04-18 PROCEDURE — 93306 TTE W/DOPPLER COMPLETE: CPT

## 2019-04-18 PROCEDURE — 84439 ASSAY OF FREE THYROXINE: CPT

## 2019-04-18 PROCEDURE — 84481 FREE ASSAY (FT-3): CPT

## 2019-04-18 PROCEDURE — 74011250636 HC RX REV CODE- 250/636: Performed by: EMERGENCY MEDICINE

## 2019-04-18 PROCEDURE — 83735 ASSAY OF MAGNESIUM: CPT

## 2019-04-18 PROCEDURE — 96360 HYDRATION IV INFUSION INIT: CPT

## 2019-04-18 PROCEDURE — 74011000250 HC RX REV CODE- 250: Performed by: INTERNAL MEDICINE

## 2019-04-18 PROCEDURE — 85025 COMPLETE CBC W/AUTO DIFF WBC: CPT

## 2019-04-18 PROCEDURE — 99285 EMERGENCY DEPT VISIT HI MDM: CPT

## 2019-04-18 PROCEDURE — 71046 X-RAY EXAM CHEST 2 VIEWS: CPT

## 2019-04-18 PROCEDURE — 74011250637 HC RX REV CODE- 250/637: Performed by: INTERNAL MEDICINE

## 2019-04-18 PROCEDURE — 36415 COLL VENOUS BLD VENIPUNCTURE: CPT

## 2019-04-18 PROCEDURE — 93005 ELECTROCARDIOGRAM TRACING: CPT

## 2019-04-18 PROCEDURE — 70450 CT HEAD/BRAIN W/O DYE: CPT

## 2019-04-18 PROCEDURE — 84443 ASSAY THYROID STIM HORMONE: CPT

## 2019-04-18 PROCEDURE — 74011250637 HC RX REV CODE- 250/637: Performed by: STUDENT IN AN ORGANIZED HEALTH CARE EDUCATION/TRAINING PROGRAM

## 2019-04-18 PROCEDURE — 65660000000 HC RM CCU STEPDOWN

## 2019-04-18 PROCEDURE — 94640 AIRWAY INHALATION TREATMENT: CPT

## 2019-04-18 PROCEDURE — 74011250636 HC RX REV CODE- 250/636: Performed by: INTERNAL MEDICINE

## 2019-04-18 RX ORDER — ONDANSETRON 2 MG/ML
4 INJECTION INTRAMUSCULAR; INTRAVENOUS
Status: DISCONTINUED | OUTPATIENT
Start: 2019-04-18 | End: 2019-04-20 | Stop reason: HOSPADM

## 2019-04-18 RX ORDER — DICLOFENAC SODIUM 50 MG/1
50 TABLET, DELAYED RELEASE ORAL 2 TIMES DAILY
COMMUNITY
End: 2021-06-29

## 2019-04-18 RX ORDER — SODIUM CHLORIDE 0.9 % (FLUSH) 0.9 %
5-40 SYRINGE (ML) INJECTION EVERY 8 HOURS
Status: DISCONTINUED | OUTPATIENT
Start: 2019-04-18 | End: 2019-04-20 | Stop reason: HOSPADM

## 2019-04-18 RX ORDER — ESTRADIOL 0.5 MG/1
0.5 TABLET ORAL
COMMUNITY

## 2019-04-18 RX ORDER — DICLOFENAC SODIUM 25 MG/1
50 TABLET, DELAYED RELEASE ORAL ONCE
Status: COMPLETED | OUTPATIENT
Start: 2019-04-18 | End: 2019-04-18

## 2019-04-18 RX ORDER — PANTOPRAZOLE SODIUM 40 MG/1
40 TABLET, DELAYED RELEASE ORAL
Status: DISCONTINUED | OUTPATIENT
Start: 2019-04-18 | End: 2019-04-20 | Stop reason: HOSPADM

## 2019-04-18 RX ORDER — ENOXAPARIN SODIUM 100 MG/ML
40 INJECTION SUBCUTANEOUS EVERY 24 HOURS
Status: DISCONTINUED | OUTPATIENT
Start: 2019-04-18 | End: 2019-04-20 | Stop reason: HOSPADM

## 2019-04-18 RX ORDER — HYDROCODONE BITARTRATE AND ACETAMINOPHEN 5; 325 MG/1; MG/1
0.5 TABLET ORAL
Status: DISCONTINUED | OUTPATIENT
Start: 2019-04-18 | End: 2019-04-20 | Stop reason: HOSPADM

## 2019-04-18 RX ORDER — POTASSIUM CHLORIDE 750 MG/1
10 TABLET, FILM COATED, EXTENDED RELEASE ORAL DAILY
COMMUNITY
End: 2019-05-09 | Stop reason: DRUGHIGH

## 2019-04-18 RX ORDER — BUPROPION HYDROCHLORIDE 150 MG/1
150 TABLET ORAL
Status: DISCONTINUED | OUTPATIENT
Start: 2019-04-18 | End: 2019-04-20 | Stop reason: HOSPADM

## 2019-04-18 RX ORDER — NALOXONE HYDROCHLORIDE 0.4 MG/ML
0.4 INJECTION, SOLUTION INTRAMUSCULAR; INTRAVENOUS; SUBCUTANEOUS AS NEEDED
Status: DISCONTINUED | OUTPATIENT
Start: 2019-04-18 | End: 2019-04-20 | Stop reason: HOSPADM

## 2019-04-18 RX ORDER — POTASSIUM CHLORIDE 1.5 G/1.77G
40 POWDER, FOR SOLUTION ORAL
Status: COMPLETED | OUTPATIENT
Start: 2019-04-18 | End: 2019-04-18

## 2019-04-18 RX ORDER — FAMOTIDINE 20 MG/1
20 TABLET, FILM COATED ORAL DAILY
COMMUNITY
End: 2021-06-29

## 2019-04-18 RX ORDER — SODIUM CHLORIDE 9 MG/ML
75 INJECTION, SOLUTION INTRAVENOUS CONTINUOUS
Status: DISCONTINUED | OUTPATIENT
Start: 2019-04-18 | End: 2019-04-19

## 2019-04-18 RX ORDER — BUPROPION HYDROCHLORIDE 150 MG/1
150 TABLET ORAL
COMMUNITY
End: 2021-06-29

## 2019-04-18 RX ORDER — HYDROCODONE BITARTRATE AND ACETAMINOPHEN 5; 325 MG/1; MG/1
0.5 TABLET ORAL
COMMUNITY

## 2019-04-18 RX ORDER — SODIUM CHLORIDE 0.9 % (FLUSH) 0.9 %
5-40 SYRINGE (ML) INJECTION AS NEEDED
Status: DISCONTINUED | OUTPATIENT
Start: 2019-04-18 | End: 2019-04-20 | Stop reason: HOSPADM

## 2019-04-18 RX ORDER — ESTRADIOL 1 MG/1
1 TABLET ORAL
Status: DISCONTINUED | OUTPATIENT
Start: 2019-04-18 | End: 2019-04-20 | Stop reason: HOSPADM

## 2019-04-18 RX ORDER — GUAIFENESIN 100 MG/5ML
100 SOLUTION ORAL
Status: DISCONTINUED | OUTPATIENT
Start: 2019-04-18 | End: 2019-04-20 | Stop reason: HOSPADM

## 2019-04-18 RX ORDER — DICLOFENAC SODIUM 25 MG/1
50 TABLET, DELAYED RELEASE ORAL DAILY
Status: DISCONTINUED | OUTPATIENT
Start: 2019-04-19 | End: 2019-04-20 | Stop reason: HOSPADM

## 2019-04-18 RX ORDER — ACETAMINOPHEN 325 MG/1
650 TABLET ORAL
Status: DISCONTINUED | OUTPATIENT
Start: 2019-04-18 | End: 2019-04-20 | Stop reason: HOSPADM

## 2019-04-18 RX ORDER — LEVOTHYROXINE SODIUM 112 UG/1
112 TABLET ORAL
Status: DISCONTINUED | OUTPATIENT
Start: 2019-04-19 | End: 2019-04-20 | Stop reason: HOSPADM

## 2019-04-18 RX ORDER — CARVEDILOL 12.5 MG/1
12.5 TABLET ORAL 2 TIMES DAILY WITH MEALS
COMMUNITY
End: 2019-04-20

## 2019-04-18 RX ORDER — CETIRIZINE HCL 10 MG
10 TABLET ORAL
Status: DISCONTINUED | OUTPATIENT
Start: 2019-04-18 | End: 2019-04-20 | Stop reason: HOSPADM

## 2019-04-18 RX ORDER — FLUTICASONE PROPIONATE 50 MCG
2 SPRAY, SUSPENSION (ML) NASAL
Status: DISCONTINUED | OUTPATIENT
Start: 2019-04-18 | End: 2019-04-20 | Stop reason: HOSPADM

## 2019-04-18 RX ORDER — PREDNISONE 5 MG/1
7.5 TABLET ORAL DAILY
Status: DISCONTINUED | OUTPATIENT
Start: 2019-04-19 | End: 2019-04-20 | Stop reason: HOSPADM

## 2019-04-18 RX ORDER — TRIAMTERENE/HYDROCHLOROTHIAZID 37.5-25 MG
1 TABLET ORAL DAILY
COMMUNITY
End: 2019-04-20

## 2019-04-18 RX ORDER — AZITHROMYCIN 250 MG/1
250 TABLET, FILM COATED ORAL DAILY
Status: COMPLETED | OUTPATIENT
Start: 2019-04-18 | End: 2019-04-18

## 2019-04-18 RX ORDER — DILTIAZEM HYDROCHLORIDE EXTENDED-RELEASE TABLETS 240 MG/1
480 TABLET, EXTENDED RELEASE ORAL
COMMUNITY
End: 2019-04-20

## 2019-04-18 RX ORDER — AZITHROMYCIN 250 MG/1
250 TABLET, FILM COATED ORAL SEE ADMIN INSTRUCTIONS
COMMUNITY
Start: 2019-04-14 | End: 2019-04-20

## 2019-04-18 RX ORDER — PREDNISONE 5 MG/1
7.5 TABLET ORAL DAILY
COMMUNITY
End: 2022-02-16

## 2019-04-18 RX ORDER — POTASSIUM CHLORIDE 750 MG/1
20 TABLET, FILM COATED, EXTENDED RELEASE ORAL DAILY
Status: DISCONTINUED | OUTPATIENT
Start: 2019-04-18 | End: 2019-04-20 | Stop reason: HOSPADM

## 2019-04-18 RX ORDER — CLONIDINE 0.2 MG/24H
1 PATCH, EXTENDED RELEASE TRANSDERMAL
COMMUNITY
Start: 2019-04-02 | End: 2022-02-16

## 2019-04-18 RX ADMIN — PANTOPRAZOLE SODIUM 40 MG: 40 TABLET, DELAYED RELEASE ORAL at 14:17

## 2019-04-18 RX ADMIN — GUAIFENESIN 100 MG: 200 SOLUTION ORAL at 23:42

## 2019-04-18 RX ADMIN — SODIUM CHLORIDE 1000 ML: 900 INJECTION, SOLUTION INTRAVENOUS at 12:01

## 2019-04-18 RX ADMIN — ENOXAPARIN SODIUM 40 MG: 40 INJECTION SUBCUTANEOUS at 21:57

## 2019-04-18 RX ADMIN — BUPROPION HYDROCHLORIDE 150 MG: 150 TABLET, FILM COATED, EXTENDED RELEASE ORAL at 21:59

## 2019-04-18 RX ADMIN — ESTRADIOL 1 MG: 1 TABLET ORAL at 21:59

## 2019-04-18 RX ADMIN — FLUTICASONE PROPIONATE 2 SPRAY: 50 SPRAY, METERED NASAL at 21:56

## 2019-04-18 RX ADMIN — HYDROCODONE BITARTRATE AND ACETAMINOPHEN 0.5 TABLET: 5; 325 TABLET ORAL at 20:28

## 2019-04-18 RX ADMIN — Medication 10 ML: at 22:12

## 2019-04-18 RX ADMIN — POTASSIUM CHLORIDE 40 MEQ: 1.5 POWDER, FOR SOLUTION ORAL at 12:27

## 2019-04-18 RX ADMIN — DICLOFENAC SODIUM 50 MG: 25 TABLET, DELAYED RELEASE ORAL at 21:58

## 2019-04-18 RX ADMIN — POTASSIUM CHLORIDE 20 MEQ: 750 TABLET, EXTENDED RELEASE ORAL at 14:41

## 2019-04-18 RX ADMIN — SODIUM CHLORIDE 75 ML/HR: 900 INJECTION, SOLUTION INTRAVENOUS at 14:40

## 2019-04-18 RX ADMIN — ALBUTEROL SULFATE 1 DOSE: 2.5 SOLUTION RESPIRATORY (INHALATION) at 22:16

## 2019-04-18 RX ADMIN — CETIRIZINE HYDROCHLORIDE 10 MG: 10 TABLET, FILM COATED ORAL at 21:58

## 2019-04-18 RX ADMIN — GUAIFENESIN 100 MG: 200 SOLUTION ORAL at 18:41

## 2019-04-18 RX ADMIN — AZITHROMYCIN 250 MG: 250 TABLET, FILM COATED ORAL at 18:41

## 2019-04-18 RX ADMIN — HYDROCODONE BITARTRATE AND ACETAMINOPHEN 0.5 TABLET: 5; 325 TABLET ORAL at 14:17

## 2019-04-18 NOTE — PROGRESS NOTES
TRANSFER - IN REPORT: 
 
Verbal report received from Mireille(name) on Shey  being received from ED(unit) for routine progression of care. Report consisted of patients Situation, Background, Assessment and  
Recommendations(SBAR). Information from the following report(s) SBAR, ED Summary, Intake/Output, MAR, Recent Results, Med Rec Status and Cardiac Rhythm Sinus Lossie Curry 40's was reviewed with the receiving nurse. Opportunity for questions and clarification was provided. Assessment completed upon patients arrival to unit and care assumed. 1915: Bedside and Verbal shift change report given to Mingo Cervantes and Alec Espinoza (oncoming nurse) by Kamille Holguin (offgoing nurse).  Report included the following information SBAR, Kardex, Procedure Summary, Intake/Output, MAR, Recent Results, Med Rec Status and Cardiac Rhythm SB.

## 2019-04-18 NOTE — PROGRESS NOTES
4/18/2019 
1:28 PM 
Case management note Reason for Admission:   Syncopal episode and bradycardia Patient is   who lives alone, drives and performs ADL's independently. She was at demist office and wasn't feeling well, called her son to take her home. He took her [facility] EMS was called. HR was found to be in 30's. Atropine was given. She has hx of hypertension which she reported she took her BP meds this am. 
 
                
RRAT Score:          10 Plan for utilizing home health:      Not for this admission Current Advanced Directive/Advance Care Plan: confirmed AD on file Likelihood of Readmission:  Low, patient is independent, good family support Transition of Care Plan: 1. PCP follow up 2. Cardiac follow up 3. Home with family assitance 4. CM to follow till discharge Care Management Interventions PCP Verified by CM: Yes(dr. Sola antoine no nn) Mode of Transport at Discharge: Self Transition of Care Consult (CM Consult): Discharge Planning Plan discussed with Pt/Family/Caregiver: Yes Discharge Location Discharge Placement: Home with family assistance Sheba [place], Celia N Farrukh Martinez

## 2019-04-18 NOTE — ED NOTES
TRANSFER - OUT REPORT: 
 
Verbal report given to Maria Esther Lee RN(name) on Carmela Carrizales  being transferred to [address] [place] [address](unit) for routine progression of care Report consisted of patients Situation, Background, Assessment and  
Recommendations(SBAR). Information from the following report(s) SBAR, ED Summary, Intake/Output, MAR, Recent Results and Cardiac Rhythm sinus bradycardia was reviewed with the receiving nurse. Lines:  
Peripheral IV 04/18/19 Right Wrist (Active) Site Assessment Clean, dry, & intact 4/18/2019 11:17 AM  
Phlebitis Assessment 0 4/18/2019 11:17 AM  
Infiltration Assessment 0 4/18/2019 11:17 AM  
Dressing Status Clean, dry, & intact 4/18/2019 11:17 AM  
Dressing Type Transparent 4/18/2019 11:17 AM  
Hub Color/Line Status Pink 4/18/2019 11:17 AM  
   
Peripheral IV 04/18/19 Right Antecubital (Active) Site Assessment Clean, dry, & intact 4/18/2019 11:18 AM  
Phlebitis Assessment 0 4/18/2019 11:18 AM  
Infiltration Assessment 0 4/18/2019 11:18 AM  
Dressing Status Clean, dry, & intact 4/18/2019 11:18 AM  
Dressing Type Transparent 4/18/2019 11:18 AM  
Hub Color/Line Status Pink 4/18/2019 11:18 AM  
  
 
Opportunity for questions and clarification was provided. Patient transported with: 
Monitor Paramedic

## 2019-04-18 NOTE — ED PROVIDER NOTES
68yo lady w/ hx HTN, presents for syncope earlier today. Pt states waking up \"not feeling right\" this AM, measured her BP to be ~99/48, significantly lower than her BL 130s SBP. She then drove herself to the dentist, but felt unwell so called her son to take her to an urgent care center. There, she slumped over in her seat twice, initially for <30 secs, but then lost consciousness ~1min (per son). She seemed confused, combative, and diaphoretic upon regaining consciousness, but this lasted for ~ 1 min. EMS found pt's HR to be in 30s, given 2x atropine. Of note, pt recently started taking clonidine for management of HTN. Pt's son states her speech was slow but not slurred. Pt herself denies facial droop/dysarthria. No numbness/tingling/weakness. No chest pain/palpitations/SOB. Denies recent falls/head impact. Past Medical History:  
Diagnosis Date  Anemia  Arthritis   
 osteoporosis  Asthma  Broken ribs 2006  
 fell  Cancer ([facility] [address] melanoma  GERD (gastroesophageal reflux disease)  Hypertension  Migraines  Osteoporosis  Rheumatic fever x2 without residual  
 Thyroid disease Past Surgical History:  
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  
 remove Tillman's neuroma [facility]  HX CATARACT REMOVAL Bilateral 2009, 2011 [facility] Hysterectomy  HX HEENT  1996  
 sinus surgery  HX HEENT    
 deviated septum  HX ORTHOPAEDIC Left 2006 Tarsal Tunnel Release Red Dupes ORTHOPAEDIC Right [id number]  
 basal joint surgery  HX ORTHOPAEDIC Left 2012  
 basal joint surgery  HX ORTHOPAEDIC    
 back surgery  HX OTHER SURGICAL    
 TVT Sling  HX OTHER SURGICAL Tillman's Neuroma removal   
 HX SEPTOPLASTY  HX TONSIL AND ADENOIDECTOMY [facility] HX UROLOGICAL  2010 TVT sling Family History:  
Problem Relation Age of Onset  Hypertension Mother  Stroke Mother TIA  Heart Disease Mother  Alzheimer Mother  Hypertension Father  Asthma Father  Colon Cancer Father  COPD Father  Asthma Brother Social History Socioeconomic History  Marital status:  Spouse name: Not on file  Number of children: Not on file  Years of education: Not on file  Highest education level: Not on file Occupational History  Not on file Social Needs  Financial resource strain: Not on file  Food insecurity:  
  Worry: Not on file Inability: Not on file  Transportation needs:  
  Medical: Not on file Non-medical: Not on file Tobacco Use  Smoking status: Never Smoker  Smokeless tobacco: Never Used Substance and Sexual Activity  Alcohol use: No  
 Drug use: No  
 Sexual activity: Not on file Lifestyle  Physical activity:  
  Days per week: Not on file Minutes per session: Not on file  Stress: Not on file Relationships  Social connections:  
  Talks on phone: Not on file Gets together: Not on file Attends Christian service: Not on file Active member of club or organization: Not on file Attends meetings of clubs or organizations: Not on file Relationship status: Not on file  Intimate partner violence:  
  Fear of current or ex partner: Not on file Emotionally abused: Not on file Physically abused: Not on file Forced sexual activity: Not on file Other Topics Concern  Not on file Social History Narrative  Not on file ALLERGIES: Ace inhibitors Review of Systems Constitutional: Negative for chills and fever. HENT: Negative for congestion and rhinorrhea. Eyes: Negative for photophobia and visual disturbance. Respiratory: Negative for chest tightness and shortness of breath. Cardiovascular: Negative for chest pain, palpitations and leg swelling. Gastrointestinal: Negative for abdominal pain, constipation, diarrhea, nausea and vomiting. Genitourinary: Negative for difficulty urinating and hematuria. Musculoskeletal: Positive for back pain. Negative for neck stiffness.  
     (chronic) Neurological: Positive for syncope. Negative for seizures, facial asymmetry, speech difficulty, weakness, numbness and headaches. Psychiatric/Behavioral: Negative for agitation and confusion. Vitals:  
 04/18/19 1059 BP: 136/52 Pulse: (!) 47 Resp: 16 Temp: 97.4 °F (36.3 °C) SpO2: 98% Weight: 54 kg (119 lb) Height: 5' 3\" (1.6 m) Physical Exam  
Constitutional: She is oriented to person, place, and time. No distress. HENT:  
Head: Normocephalic and atraumatic. Eyes: Pupils are equal, round, and reactive to light. EOM are normal. Right eye exhibits no discharge. Left eye exhibits no discharge. Neck: Normal range of motion. Cardiovascular: Regular rhythm and intact distal pulses. Bradycardia present. Pulmonary/Chest: Effort normal and breath sounds normal.  
Abdominal: Soft. Bowel sounds are normal. There is no tenderness. Musculoskeletal: She exhibits no edema or tenderness. Neurological: She is alert and oriented to person, place, and time. No cranial nerve deficit or sensory deficit. She exhibits normal muscle tone. Skin: Skin is warm and dry. MDM Number of Diagnoses or Management Options Diagnosis management comments: Syncope, bradycardia 
- CBC, CMP, Mg, Trop 
- EKG, CXR, CT head - IV bolus Patient Progress Patient progress: stable EKG: sinus bradycardia (rate 46). LAD seen on EKG done last year. New incomplete RBBB, No ST-elevation/depression CBC - Hgb 10.1 @ BL 
CMP - K 3.2 --> replete PO Mg wnl Trop negx1 TSH - 0.11 CXR no acute cardiopulmonary process CT head no acute abnormalities Consulted hospitalist for admission, for cardiac monitoring, possible echo. Discussed pt w/ Dr. Fernando Louis, to admit pt. Procedures Pt seen and discussed w/ Dr. Megan Amato, ED Attending Radha Khan MD 
Family Medicine Resident

## 2019-04-18 NOTE — ED NOTES
Notified by Kathleen Tobar., pharmacist about pt's HR in the 42's pt currently had a clonidine patch on that requires removal. Clonidine patch removed by this RN. Pt requesting water.

## 2019-04-18 NOTE — H&P
[address] 
[phone] Admission History and Physical 
 
 
NAME:              Craig Son :   1940 MRN:  [id number] PCP:  Jose Treviño MD  
 
Date:     2019 Chief  Complaint: Syncope and collapse History Of Presenting Illness: Ms. Brielle Kirkland is a 66 y.o. female who is being admitted for syncope and collapse associated with bradycardia. Ms. Brielle Kirkland presented to our Emergency Department today complaining of having had several syncopal episodes. Symptoms started while at her dentists office where she was noted to be hypotensive. She was sent to an urgent [facility] where she was also noted to be bradycardic. She was given atropine with resolution of her symptoms. She has had uncontrolled BP for which she has been on high doses of anti hypertensives and was recently started on a clonidine patch. She had no chest pain or SOB. No cough or fever. In out ED, she remains bradycardic but without symptoms. EKG showed sinus bradycardia and a CT scan head was unremarkable. She will be admitted for further management. Allergies Allergen Reactions  Ace Inhibitors Angioedema  Arb-Angiotensin Receptor Antagonist Other (comments) Told not to take by provider due to severe reaction to ace inhibitors Prior to Admission medications Medication Sig Start Date End Date Taking? Authorizing Provider  
azithromycin (ZITHROMAX) 250 mg tablet Take 250 mg by mouth See Admin Instructions. Take two pills on day one then one pill daily to complete 5 days 19 Yes Provider, Historical  
buPROPion XL (WELLBUTRIN XL) 150 mg tablet Take 150 mg by mouth nightly.    Yes Provider, Historical  
carvedilol (COREG) 12.5 mg tablet Take 12.5 mg by mouth two (2) times daily (with meals). Yes Provider, Historical  
cloNIDine (CATAPRES) 0.1 mg/24 hr ptwk 1 Patch by TransDERmal route Every Friday. 4/2/19  Yes Provider, Historical  
diclofenac EC (VOLTAREN) 50 mg EC tablet Take 50 mg by mouth daily. Indications: spinal stenosis   Yes Provider, Historical  
dilTIAZem ER (CARDIZEM LA) 240 mg Tb24 tablet Take 480 mg by mouth nightly. Yes Provider, Historical  
estradiol (ESTRACE) 1 mg tablet Take 1 mg by mouth nightly. Yes Provider, Historical  
HYDROcodone-acetaminophen (NORCO) 5-325 mg per tablet Take 0.5 Tabs by mouth every eight (8) hours as needed for Pain. Yes Provider, Historical  
potassium chloride SR (KLOR-CON 10) 10 mEq tablet Take 10 mEq by mouth daily. Takes with triamterene/hydrochlorothiazide   Yes Provider, Historical  
predniSONE (DELTASONE) 5 mg tablet Take 7.5 mg by mouth daily. Indications: pain from spinal stenosis   Yes Provider, Historical  
triamterene-hydroCHLOROthiazide (MAXZIDE) 37.5-25 mg per tablet Take 1 Tab by mouth daily. Yes Provider, Historical  
famotidine (PEPCID) 20 mg tablet Take 20 mg by mouth daily. Takes with diclofenac   Yes Provider, Historical  
levothyroxine (SYNTHROID) 112 mcg tablet Take 112 mcg by mouth Daily (before breakfast). Yes Provider, Historical  
fluticasone-salmeterol (ADVAIR DISKUS) 100-50 mcg/dose diskus inhaler Take 1 Puff by inhalation two (2) times a day. Yes Provider, Historical  
cyanocobalamin (VITAMIN B-12) 1,000 mcg/mL injection 1,000 mcg by IntraMUSCular route every thirty (30) days. Yes Provider, Historical  
Cetirizine (ZYRTEC) 10 mg cap Take 1 Cap by mouth nightly. Yes Provider, Historical  
fluticasone (FLONASE) 50 mcg/actuation nasal spray 2 Sprays by Both Nostrils route nightly. Yes Other, MD Aiyana  
 
 
Past Medical History:  
Diagnosis Date  Anemia  Arthritis   
 osteoporosis  Asthma  Broken ribs 2006  
 fell  Cancer ([facility] [address] melanoma  GERD (gastroesophageal reflux disease)  Hypertension  Migraines  Osteoporosis  Rheumatic fever x2 without residual  
 Thyroid disease Past Surgical History:  
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  
 remove Tillman's neuroma [facility]  HX CATARACT REMOVAL Bilateral 2009, 2011 [facility] Hysterectomy  HX HEENT  1996  
 sinus surgery  HX HEENT    
 deviated septum  HX ORTHOPAEDIC Left 2006 Tarsal Tunnel Release Santi Jeong ORTHOPAEDIC Right [id number]  
 basal joint surgery  HX ORTHOPAEDIC Left 2012  
 basal joint surgery  HX ORTHOPAEDIC    
 back surgery  HX OTHER SURGICAL    
 TVT Sling  HX OTHER SURGICAL Tillman's Neuroma removal   
 HX SEPTOPLASTY  HX TONSIL AND ADENOIDECTOMY [facility] [facility] HX UROLOGICAL  2010 TVT sling Social History Tobacco Use  Smoking status: Never Smoker  Smokeless tobacco: Never Used Substance Use Topics  Alcohol use: No  
  
 
Family History Problem Relation Age of Onset  Hypertension Mother  Stroke Mother TIA  Heart Disease Mother  Alzheimer Mother  Hypertension Father  Asthma Father  Colon Cancer Father  COPD Father  Asthma Brother Review of Systems: 
Constitutional ROS: no fever, chills, rigors or night sweats Respiratory ROS: no cough, sputum, hemoptysis, dyspnea or pleuritic pain. Cardiovascular ROS: no chest pain, palpitations, orthopnea, PND but syncope Endocrine ROS: no polydispsia, polyuria, heat or cold intolerance or major weight change. Gastrointestinal ROS: no dysphagia, abdominal pain, nausea, vomiting or diarrhea Genito-Urinary ROS: no dysuria, frequency, hematuria, retention or flank pain Musculoskeletal ROS: no joint pain, swelling or muscular tenderness Neurological ROS: no headache, confusion, focal weakness or any other neurological symptoms Psychiatric ROS: no depression, anxiety, mood swings Dermatological ROS: no rash, pruritis, or urticaria Heme-Lymph ROS: no swollen glands, bleeding Examination: 
 
Constitutional:   
Visit Vitals /49 Pulse (!) 43 Temp 97.4 °F (36.3 °C) Resp 19 Ht 5' 3\" (1.6 m) Wt 54 kg (119 lb) SpO2 (!) 88% BMI 21.08 kg/m² General:  Weak and ill looking patient in no acute distress Eyes: Pink conjunctivae, PERRLA with no discharge. Normal eye movements Ear, Nose, Mouth & Throat: No ottorrhea, rhinorrhea, non tender sinuses, dry mucous membranes Respiratory:  No accessory muscle use, clear breath sounds without crackles or wheezes Cardiovascular:  No JVD or murmurs, sinus bradycardia, without thrills, bruits or peripheral edema. GI & :  Soft abdomen, non-tender, non-distended, normoactive bowel sounds with no palpable organomegaly Heme:  No cervical or axillary adenopathy. Musculoskeletal:  No cyanosis, clubbing, atrophy or deformities Skin:  No rashes, bruising or ulcers Neurological: Awake and alert, speech is clear, CNs 2-12 are grossly intact and otherwise non focal 
Psychiatric:  Has a fair insight and is oriented x 3 
________________________________________________________________________ Data Review: 
 
Labs: 
 
Recent Labs 04/18/19 
1119 WBC 10.2 HGB 10.1* HCT 31.1*  
 Recent Labs 04/18/19 
1119   
K 3.2*  
* CO2 24 * BUN 26* CREA 1.24* CA 8.0*  
MG 1.7 ALB 3.2* SGOT 15 ALT 20 No components found for: [place] No results for input(s): PH, PCO2, PO2, HCO3, FIO2 in the last 72 hours. No results for input(s): INR in the last 72 hours. No lab exists for component: INREXT Radiological Studies:   
 
Chest X-ray and CT scan head - reviewed and are unremarkable. Other Medical tests: 
 
Personally reviewed 12 lead EKG - sinus bradycardia. I have also reviewed available old medical records. Assessment & Impression: Ms. Renny Candelaroi is a 66 y.o. female being evaluated for:  
 
Principal Problem: 
  Syncope and collapse (4/18/2019) Active Problems: 
  Lumbar stenosis with neurogenic claudication (5/15/2017) Essential hypertension (5/20/2017) Acquired hypothyroidism (5/20/2017) GERD (gastroesophageal reflux disease) (5/20/2017) Bradycardia (4/18/2019) Asthma, mild intermittent (4/18/2019) Hypokalemia (4/18/2019) Plan of management: 
 
Syncope and collapse (4/18/2019) /  Bradycardia (4/18/2019): I suspect her symptoms are related to multiple and likely interacting medications - on Coreg, Clonidine, high dose Diltiazem. Admit to a stepdown unit. Hold these medications for now. Obtain an Echocardiogram. Check orthostatics and hydrate. Consult cardiology for further recommendations. Fall precautions Essential hypertension (5/20/2017): BP well controlled at this time. Will need medications review. Resume Hyzaar once patient more stable. Acquired hypothyroidism (5/20/2017): check TSH. Resume levothyroxine GERD (gastroesophageal reflux disease) (5/20/2017): she needs a PPi. On Prednisone, potassium and NSAIDs Asthma, mild intermittent (4/18/2019): not wheezing. Nebs as needed Hypokalemia (4/18/2019): due to diuretic use. Replete. May need dose adjustment at discharge Lumbar stenosis with neurogenic claudication (5/15/2017): severe. Resume Norco, prednisone and monitor Code Status:  Full Surrogate decision maker: Family Risk of deterioration: high Total time spent for the care of the patient: 79 Minutes Care Plan discussed with: Patient, Family, Nursing Staff and ED physician Discussed:  Code Status, Care Plan and D/C Planning Prophylaxis:  Lovenox and H2B/PPI Probable Disposition:  Home w/Family 
        
___________________________________________________ Attending Physician: Anne Goldman MD

## 2019-04-18 NOTE — ED TRIAGE NOTES
The patient was at her dentist's office when she began to feel poorly, she then went to [employer] with her son and experienced a syncopal episode while sitting in a chair in the waiting area, duration unknown. She did not fall to the ground. EMS states they found her HR in the 30s and administered 2 doses of Atropine and her HR increased in to the 50s and the patient states she began to feel better. The patient is now alert and oriented and denies any chest pain or dizziness at this time.

## 2019-04-18 NOTE — CONSULTS
Cardiology Consultation Note                                 Lynda Escobar MD, [address], [place],  [facility]                         Phone [phone]; Fax [phone]            2019 10:55 AM  Alicia Don MD  :  1940   MRN:  [id number]     CC: passed out  Reason for consult: bradycardia and syncope  Admission Diagnosis: Syncope and collapse [R55]    ASSESSMENT/RECOMMENDATIONS:   1)Syncope and hypotension  -I think this is precipitated by her being on high dose of Cardizem, coreg and addition of clonidine.   -will wait for drugs to wash out . If hR is up in the am would ambulate to see if she has chronotropic competence.  -If BP goes up would place on Norvasc 5 mg daily    2) Essential hypertension    3) Hypokalemia  -supplement and follow magnesium levels. 4) · Echo today ; Aortic valve sclerosis with no significant stenosis. · Moderate mitral valve regurgitation. · Mild tricuspid valve regurgitation is present. Mild to moderate   pulmonary hypertension is present. · Mild pulmonic valve regurgitation is present. · Estimated left ventricular ejection fraction is 56 - 60%. Trent Donovan is a 66 y.o. female I am seeing for {dizziness nd syncope. Her BP this am was SBP of 98. She went to the dentist and they felt her HR was in the 40's. Then passed out twice. She has had long hx of hypertension and her HR had been low for some time in the 50's. NO sx's of presyncope until placed on clonidine . she states clonidine as helped with her BP.-today,DM,FMHx,+postmenopausal, HTN   Denies chest pain or SOB. she has had a upper respiratoy infection. Has been on prednisone and has significant wheezing today.     Allergies   Allergen Reactions    Ace Inhibitors Angioedema    Arb-Angiotensin Receptor Antagonist Other (comments)     Told not to take by provider due to severe reaction to ace inhibitors         Past Medical History:   Diagnosis Date    Anemia     Arthritis     osteoporosis    Asthma     Broken ribs 2006    fell    Cancer ([facility] [address]     melanoma     GERD (gastroesophageal reflux disease)     Hypertension     Migraines     Osteoporosis     Rheumatic fever     x2 without residual    Thyroid disease         Past Surgical History:   Procedure Laterality Date    HX ABOVE KNEE AMPUTATION Left 2015    remove Tillman's neuroma    HX BREAST BIOPSY  1972    HX CATARACT REMOVAL Bilateral ,     HX GYN  1969    Hysterectomy    HX HEENT  1996    sinus surgery    HX HEENT      deviated septum    HX ORTHOPAEDIC Left 2006    Tarsal Tunnel Release     HX ORTHOPAEDIC Right ,    basal joint surgery    HX ORTHOPAEDIC Left     basal joint surgery     HX ORTHOPAEDIC      back surgery     HX OTHER SURGICAL      TVT Sling    HX OTHER SURGICAL      Tillman's Neuroma removal     HX SEPTOPLASTY      HX TONSIL AND ADENOIDECTOMY      HX TONSILLECTOMY  1946    HX UROLOGICAL      TVT sling        . Home Medications:  Prior to Admission Medications   Prescriptions Last Dose Informant Patient Reported? Taking? Cetirizine (ZYRTEC) 10 mg cap 2019 at Unknown time  Yes Yes   Sig: Take 1 Cap by mouth nightly. HYDROcodone-acetaminophen (NORCO) 5-325 mg per tablet 2019 at 9am  Yes Yes   Sig: Take 0.5 Tabs by mouth every eight (8) hours as needed for Pain. azithromycin (ZITHROMAX) 250 mg tablet 2019 at Unknown time  Yes Yes   Sig: Take 250 mg by mouth See Admin Instructions. Take two pills on day one then one pill daily to complete 5 days   buPROPion XL (WELLBUTRIN XL) 150 mg tablet 2019 at Unknown time  Yes Yes   Sig: Take 150 mg by mouth nightly. carvedilol (COREG) 12.5 mg tablet 2019 at am  Yes Yes   Sig: Take 12.5 mg by mouth two (2) times daily (with meals).    cloNIDine (CATAPRES) 0.1 mg/24 hr ptwk 2019  Yes Yes   Si Patch by TransDERmal route Every Friday. cyanocobalamin (VITAMIN B-12) 1,000 mcg/mL injection 3/18/2019 at Unknown time  Yes Yes   Si,000 mcg by IntraMUSCular route every thirty (30) days. diclofenac EC (VOLTAREN) 50 mg EC tablet 2019 at Unknown time  Yes Yes   Sig: Take 50 mg by mouth daily. Indications: spinal stenosis   dilTIAZem ER (CARDIZEM LA) 240 mg Tb24 tablet 2019 at Unknown time  Yes Yes   Sig: Take 480 mg by mouth nightly. estradiol (ESTRACE) 1 mg tablet 2019 at Unknown time  Yes Yes   Sig: Take 1 mg by mouth nightly. famotidine (PEPCID) 20 mg tablet 2019 at Unknown time  Yes Yes   Sig: Take 20 mg by mouth daily. Takes with diclofenac   fluticasone (FLONASE) 50 mcg/actuation nasal spray 2019 at Unknown time  Yes Yes   Si Sprays by Both Nostrils route nightly. fluticasone-salmeterol (ADVAIR DISKUS) 100-50 mcg/dose diskus inhaler 2019 at Unknown time  Yes Yes   Sig: Take 1 Puff by inhalation two (2) times a day. levothyroxine (SYNTHROID) 112 mcg tablet 2019 at Unknown time  Yes Yes   Sig: Take 112 mcg by mouth Daily (before breakfast). potassium chloride SR (KLOR-CON 10) 10 mEq tablet 4/15/2019  Yes Yes   Sig: Take 10 mEq by mouth daily. Takes with triamterene/hydrochlorothiazide   predniSONE (DELTASONE) 5 mg tablet 2019 at Unknown time  Yes Yes   Sig: Take 7.5 mg by mouth daily. Indications: pain from spinal stenosis   triamterene-hydroCHLOROthiazide (MAXZIDE) 37.5-25 mg per tablet 4/15/2019  Yes Yes   Sig: Take 1 Tab by mouth daily.       Facility-Administered Medications: None       Hospital Medications:  Current Facility-Administered Medications   Medication Dose Route Frequency    sodium chloride (NS) flush 5-40 mL  5-40 mL IntraVENous Q8H    sodium chloride (NS) flush 5-40 mL  5-40 mL IntraVENous PRN    acetaminophen (TYLENOL) tablet 650 mg  650 mg Oral Q4H PRN    naloxone (NARCAN) injection 0.4 mg  0.4 mg IntraVENous PRN    ondansetron (ZOFRAN) injection 4 mg  4 mg IntraVENous Q4H PRN    enoxaparin (LOVENOX) injection 40 mg  40 mg SubCUTAneous Q24H    HYDROcodone-acetaminophen (NORCO) 5-325 mg per tablet 0.5 Tab  0.5 Tab Oral Q8H PRN    0.9% sodium chloride infusion  75 mL/hr IntraVENous CONTINUOUS    pantoprazole (PROTONIX) tablet 40 mg  40 mg Oral ACB    buPROPion XL (WELLBUTRIN XL) tablet 150 mg  150 mg Oral QHS    cetirizine (ZYRTEC) tablet 10 mg  10 mg Oral QHS    [START ON 4/19/2019] diclofenac EC (VOLTAREN) tablet 50 mg  50 mg Oral DAILY    estradiol (ESTRACE) tablet 1 mg  1 mg Oral QHS    fluticasone propionate (FLONASE) 50 mcg/actuation nasal spray 2 Spray  2 Spray Both Nostrils QHS    [START ON 4/19/2019] predniSONE (DELTASONE) tablet 7.5 mg  7.5 mg Oral DAILY    potassium chloride SR (KLOR-CON 10) tablet 20 mEq  20 mEq Oral DAILY    [START ON 4/19/2019] levothyroxine (SYNTHROID) tablet 112 mcg  112 mcg Oral ACB          OBJECTIVE       Laboratory and Imaging have been reviewed and are notable for      ECG:  Date:  {SB      Diagnostic Tests:     Recent Labs     04/18/19  1119   TROIQ <0.05     Recent Labs     04/18/19  1119      K 3.2*   CO2 24   BUN 26*   CREA 1.24*   *   MG 1.7   WBC 10.2   HGB 10.1*   HCT 31.1*            Cardiac work up to date:                   Social History:  Social History     Tobacco Use    Smoking status: Never Smoker    Smokeless tobacco: Never Used   Substance Use Topics    Alcohol use: No       Family History:  Family History   Problem Relation Age of Onset    Hypertension Mother     Stroke Mother         TIA    Heart Disease Mother     Alzheimer Mother     Hypertension Father     Asthma Father     Colon Cancer Father     COPD Father     Asthma Brother        Review of Symptoms:  A comprehensive review of systems was negative except for that written in the HPI.     Physical Exam:      Visit Vitals  /51   Pulse (!) 42   Temp 97.8 °F (36.6 °C)   Resp 21   Ht 5' 3\" (1.6 m)   Wt 119 lb (54 kg)   SpO2 97%   BMI 21.08 kg/m²     General Appearance:  Well developed, well nourished,alert and oriented x 3, and individual in no acute distress. Ears/Nose/Mouth/Throat:   Hearing grossly normal.Normal oral mucosa,no scleral icterus     Neck: Supple no JVD or bruits,no cervical lymphadenopathy   Chest:    rhonchi + wheezing   Cardiovascular:  Slow rate with 2/6 SM   Abdomen:   Soft, non-tender, bowel sounds are active. No abdominal bruits   Extremities: No edema bilaterally. Pulses detected, no varicosities   Skin: Warm and dry. No bruising  Neuro  Moves all extermities and neurologically intact                                                       I have discussed the diagnosis with the patient and the intended plan as seen in the above orders. Questions were answered concerning future plans. I have discussed medication side effects and warnings with the patient as well. Kristina Dolan is in agreement to the plan listed above and wishes to proceed. she  was instructed not to smoke, eat heart healthy diet  and to exercise. Thank you for this consult.       Nash Blanchard MD

## 2019-04-18 NOTE — PROGRESS NOTES
[facility]: MED RECONCILIATION Comments/Recommendations:  
Patient is awake, alert, and knowledgeable about home medications Verifies allergies Preferred pharmacy updated. Reports compliance to prescribed regimen The patient describes a history of treatment resistant hypertension. On 3/5/19 she was started on doxazosin 2 mg daily but she only took it for a few days as it made her very sleepy. The clonidine 0.1 mg patch was started on 4/2/19. The patient has a blood pressure monitor at home and reports her blood pressure was low this morning but a number is not reported. Prior to starting clonidine the patient reports a systolic BP in the 609X despite multiple blood pressure medications. Patient reports the clonidine patch is currently in place on her right hip. Drug drug interaction noted - Concurrent use of CARVEDILOL and CLONIDINE may result in increased risk of sinus bradycardia; exaggerated clonidine withdrawal response (acute hypertension). Medications added:  
 
Azithromycin Bupropion Carvedilol Clonidine Diclofenac Diltiazem Estradiol Hydrocodone/apap Potassium chloride Prednisone Triamterene/hctz Famotidine B12 Medications removed: 
 
Cyclobenzaprine Docusate Lansoprazole Losartan Metoprolol succinate Oxycodone/apap Medications adjusted: 
 
None Allergies: Ace inhibitors and Arb-angiotensin receptor antagonist 
 
Prior to Admission Medications:  
Prior to Admission Medications Prescriptions Last Dose Informant Patient Reported? Taking? Cetirizine (ZYRTEC) 10 mg cap 4/17/2019 at Unknown time  Yes Yes Sig: Take 1 Cap by mouth nightly. HYDROcodone-acetaminophen (NORCO) 5-325 mg per tablet 4/18/2019 at 9am  Yes Yes Sig: Take 0.5 Tabs by mouth every eight (8) hours as needed for Pain. azithromycin (ZITHROMAX) 250 mg tablet 4/17/2019 at Unknown time  Yes Yes Sig: Take 250 mg by mouth See Admin Instructions.  Take two pills on day one then one pill daily to complete 5 days buPROPion XL (WELLBUTRIN XL) 150 mg tablet 2019 at Unknown time  Yes Yes Sig: Take 150 mg by mouth nightly. carvedilol (COREG) 12.5 mg tablet 2019 at am  Yes Yes Sig: Take 12.5 mg by mouth two (2) times daily (with meals). cloNIDine (CATAPRES) 0.1 mg/24 hr ptwk 2019  Yes Yes Si Patch by TransDERmal route Every Friday. cyanocobalamin (VITAMIN B-12) 1,000 mcg/mL injection 3/18/2019 at Unknown time  Yes Yes Si,000 mcg by IntraMUSCular route every thirty (30) days. diclofenac EC (VOLTAREN) 50 mg EC tablet 2019 at Unknown time  Yes Yes Sig: Take 50 mg by mouth daily. Indications: spinal stenosis  
dilTIAZem ER (CARDIZEM LA) 240 mg Tb24 tablet 2019 at Unknown time  Yes Yes Sig: Take 480 mg by mouth nightly. estradiol (ESTRACE) 1 mg tablet 2019 at Unknown time  Yes Yes Sig: Take 1 mg by mouth nightly. famotidine (PEPCID) 20 mg tablet 2019 at Unknown time  Yes Yes Sig: Take 20 mg by mouth daily. Takes with diclofenac  
fluticasone (FLONASE) 50 mcg/actuation nasal spray 2019 at Unknown time  Yes Yes Si Sprays by Both Nostrils route nightly. fluticasone-salmeterol (ADVAIR DISKUS) 100-50 mcg/dose diskus inhaler 2019 at Unknown time  Yes Yes Sig: Take 1 Puff by inhalation two (2) times a day. levothyroxine (SYNTHROID) 112 mcg tablet 2019 at Unknown time  Yes Yes Sig: Take 112 mcg by mouth Daily (before breakfast). potassium chloride SR (KLOR-CON 10) 10 mEq tablet 4/15/2019  Yes Yes Sig: Take 10 mEq by mouth daily. Takes with triamterene/hydrochlorothiazide  
predniSONE (DELTASONE) 5 mg tablet 2019 at Unknown time  Yes Yes Sig: Take 7.5 mg by mouth daily. Indications: pain from spinal stenosis  
triamterene-hydroCHLOROthiazide (MAXZIDE) 37.5-25 mg per tablet 4/15/2019  Yes Yes Sig: Take 1 Tab by mouth daily. Facility-Administered Medications: None Thank you, Yamileth Brace, PharmD, BCPS

## 2019-04-18 NOTE — PROGRESS NOTES
Shift Change: 
Bedside and Verbal shift change report given to Alexandra Giles, RN (oncoming nurse) by Noah Frank RN (offgoing nurse). Report included the following information SBAR and Kardex. Shift Summary: 
2015:  Pt complains of pain 5/10, next dose of hydrocodone due @ 2210. Called Dr. Damian Stiles advised I can give the hydrocodone now (2021) and tylenol if needed. Will continue to monitor pain. 2130: Pt was using albuterol inhaler at beside from home, adv pt to not use home meds will call on call DR to order albuterol treatment PRN 
2135: Noticed patient is ordered to start voltaren to start tomorrow. Called Dr. Damian Stiles and asked to start voltaren tonight, Dr. Damian Stiles authorized tonight dose. Also authorized albuterol treatment PRN for pt. Putting order in and will update pt. 2210: Pt request PRN neb treatment, paged resp team 
[id number] [phone] 83: Pt complains of pain at iv site, site assessed, pt wishes to get new IV. Placed new IV in L wrist, flushed and taped. 0345: Reviewed pt labs; Hgb 9.4 noted. Also SBP in 160-170 overnight. Will continue to monitor. 0420: Pt SBP 150s. 0545: Pt still having trouble with coughing and stuffy nose, gave prn dose of robotussin 9677: Called dr Manisha Dawn, made him aware pt is having some expiratory wheezing and crackles in the bases, Damian Stiles advised to stop IV fluids for now, awaiting next steps. End of Shift Report[de-identified] 
Bedside and Verbal shift change report given to Patricio Sanchez (oncoming nurse) by Alexandra Giles, RN (offgoing nurse). Report included the following information SBAR and Kardex.

## 2019-04-18 NOTE — PROGRESS NOTES
Pt complaint with gripper sox and calling prior to ambulating. Call bell is in reach, bed alarm on and family at bedside. Pain is controlled with home regiment. Pt denies SOB, lightheaded, or dizziness but still bradycardic.

## 2019-04-19 LAB
ALBUMIN SERPL-MCNC: 2.9 G/DL (ref 3.5–5)
ALBUMIN/GLOB SERPL: 0.8 {RATIO} (ref 1.1–2.2)
ALP SERPL-CCNC: 62 U/L (ref 45–117)
ALT SERPL-CCNC: 20 U/L (ref 12–78)
ANION GAP SERPL CALC-SCNC: 7 MMOL/L (ref 5–15)
AST SERPL-CCNC: 16 U/L (ref 15–37)
BASOPHILS # BLD: 0 K/UL (ref 0–0.1)
BASOPHILS NFR BLD: 0 % (ref 0–1)
BILIRUB SERPL-MCNC: 0.4 MG/DL (ref 0.2–1)
BNP SERPL-MCNC: 1003 PG/ML
BUN SERPL-MCNC: 16 MG/DL (ref 6–20)
BUN/CREAT SERPL: 18 (ref 12–20)
CALCIUM SERPL-MCNC: 7.9 MG/DL (ref 8.5–10.1)
CHLORIDE SERPL-SCNC: 113 MMOL/L (ref 97–108)
CO2 SERPL-SCNC: 21 MMOL/L (ref 21–32)
CREAT SERPL-MCNC: 0.91 MG/DL (ref 0.55–1.02)
DIFFERENTIAL METHOD BLD: ABNORMAL
EOSINOPHIL # BLD: 0.1 K/UL (ref 0–0.4)
EOSINOPHIL NFR BLD: 1 % (ref 0–7)
ERYTHROCYTE [DISTWIDTH] IN BLOOD BY AUTOMATED COUNT: 13.6 % (ref 11.5–14.5)
GLOBULIN SER CALC-MCNC: 3.6 G/DL (ref 2–4)
GLUCOSE SERPL-MCNC: 80 MG/DL (ref 65–100)
HCT VFR BLD AUTO: 29.1 % (ref 35–47)
HGB BLD-MCNC: 9.4 G/DL (ref 11.5–16)
IMM GRANULOCYTES # BLD AUTO: 0 K/UL (ref 0–0.04)
IMM GRANULOCYTES NFR BLD AUTO: 0 % (ref 0–0.5)
LYMPHOCYTES # BLD: 2.3 K/UL (ref 0.8–3.5)
LYMPHOCYTES NFR BLD: 31 % (ref 12–49)
MCH RBC QN AUTO: 29.7 PG (ref 26–34)
MCHC RBC AUTO-ENTMCNC: 32.3 G/DL (ref 30–36.5)
MCV RBC AUTO: 91.8 FL (ref 80–99)
MONOCYTES # BLD: 0.4 K/UL (ref 0–1)
MONOCYTES NFR BLD: 6 % (ref 5–13)
NEUTS SEG # BLD: 4.5 K/UL (ref 1.8–8)
NEUTS SEG NFR BLD: 62 % (ref 32–75)
NRBC # BLD: 0 K/UL (ref 0–0.01)
NRBC BLD-RTO: 0 PER 100 WBC
PLATELET # BLD AUTO: 188 K/UL (ref 150–400)
PMV BLD AUTO: 9 FL (ref 8.9–12.9)
POTASSIUM SERPL-SCNC: 3.9 MMOL/L (ref 3.5–5.1)
PROT SERPL-MCNC: 6.5 G/DL (ref 6.4–8.2)
RBC # BLD AUTO: 3.17 M/UL (ref 3.8–5.2)
SODIUM SERPL-SCNC: 141 MMOL/L (ref 136–145)
WBC # BLD AUTO: 7.3 K/UL (ref 3.6–11)

## 2019-04-19 PROCEDURE — 83880 ASSAY OF NATRIURETIC PEPTIDE: CPT

## 2019-04-19 PROCEDURE — 94640 AIRWAY INHALATION TREATMENT: CPT

## 2019-04-19 PROCEDURE — 85025 COMPLETE CBC W/AUTO DIFF WBC: CPT

## 2019-04-19 PROCEDURE — 74011250637 HC RX REV CODE- 250/637: Performed by: NURSE PRACTITIONER

## 2019-04-19 PROCEDURE — 97161 PT EVAL LOW COMPLEX 20 MIN: CPT

## 2019-04-19 PROCEDURE — 77030027138 HC INCENT SPIROMETER -A

## 2019-04-19 PROCEDURE — 74011250637 HC RX REV CODE- 250/637: Performed by: INTERNAL MEDICINE

## 2019-04-19 PROCEDURE — 74011250637 HC RX REV CODE- 250/637: Performed by: SPECIALIST

## 2019-04-19 PROCEDURE — 65660000000 HC RM CCU STEPDOWN

## 2019-04-19 PROCEDURE — 97165 OT EVAL LOW COMPLEX 30 MIN: CPT

## 2019-04-19 PROCEDURE — 36415 COLL VENOUS BLD VENIPUNCTURE: CPT

## 2019-04-19 PROCEDURE — 74011636637 HC RX REV CODE- 636/637: Performed by: INTERNAL MEDICINE

## 2019-04-19 PROCEDURE — 74011250636 HC RX REV CODE- 250/636: Performed by: INTERNAL MEDICINE

## 2019-04-19 PROCEDURE — 74011000250 HC RX REV CODE- 250: Performed by: INTERNAL MEDICINE

## 2019-04-19 PROCEDURE — 80053 COMPREHEN METABOLIC PANEL: CPT

## 2019-04-19 RX ORDER — AMLODIPINE BESYLATE 5 MG/1
10 TABLET ORAL DAILY
Status: DISCONTINUED | OUTPATIENT
Start: 2019-04-20 | End: 2019-04-20 | Stop reason: HOSPADM

## 2019-04-19 RX ORDER — AMLODIPINE BESYLATE 5 MG/1
5 TABLET ORAL DAILY
Status: DISCONTINUED | OUTPATIENT
Start: 2019-04-19 | End: 2019-04-19

## 2019-04-19 RX ORDER — BUMETANIDE 0.25 MG/ML
1 INJECTION INTRAMUSCULAR; INTRAVENOUS ONCE
Status: COMPLETED | OUTPATIENT
Start: 2019-04-19 | End: 2019-04-19

## 2019-04-19 RX ORDER — ARFORMOTEROL TARTRATE 15 UG/2ML
15 SOLUTION RESPIRATORY (INHALATION)
Status: DISCONTINUED | OUTPATIENT
Start: 2019-04-19 | End: 2019-04-20 | Stop reason: HOSPADM

## 2019-04-19 RX ORDER — BUDESONIDE 0.5 MG/2ML
500 INHALANT ORAL
Status: DISCONTINUED | OUTPATIENT
Start: 2019-04-19 | End: 2019-04-20 | Stop reason: HOSPADM

## 2019-04-19 RX ORDER — CHLORTHALIDONE 25 MG/1
25 TABLET ORAL DAILY
Status: DISCONTINUED | OUTPATIENT
Start: 2019-04-19 | End: 2019-04-20 | Stop reason: HOSPADM

## 2019-04-19 RX ORDER — LORATADINE 10 MG/1
10 TABLET ORAL DAILY
Status: DISCONTINUED | OUTPATIENT
Start: 2019-04-19 | End: 2019-04-19

## 2019-04-19 RX ORDER — CLONIDINE HYDROCHLORIDE 0.1 MG/1
0.1 TABLET ORAL EVERY 8 HOURS
Status: DISCONTINUED | OUTPATIENT
Start: 2019-04-20 | End: 2019-04-20 | Stop reason: HOSPADM

## 2019-04-19 RX ORDER — HYDRALAZINE HYDROCHLORIDE 20 MG/ML
10 INJECTION INTRAMUSCULAR; INTRAVENOUS
Status: DISCONTINUED | OUTPATIENT
Start: 2019-04-19 | End: 2019-04-20 | Stop reason: HOSPADM

## 2019-04-19 RX ORDER — AMLODIPINE BESYLATE 5 MG/1
5 TABLET ORAL ONCE
Status: COMPLETED | OUTPATIENT
Start: 2019-04-19 | End: 2019-04-19

## 2019-04-19 RX ORDER — CLONIDINE HYDROCHLORIDE 0.1 MG/1
0.1 TABLET ORAL EVERY 12 HOURS
Status: DISCONTINUED | OUTPATIENT
Start: 2019-04-20 | End: 2019-04-19

## 2019-04-19 RX ADMIN — Medication 10 ML: at 06:37

## 2019-04-19 RX ADMIN — GUAIFENESIN 100 MG: 200 SOLUTION ORAL at 14:50

## 2019-04-19 RX ADMIN — GUAIFENESIN 100 MG: 200 SOLUTION ORAL at 05:31

## 2019-04-19 RX ADMIN — AMLODIPINE BESYLATE 5 MG: 5 TABLET ORAL at 14:50

## 2019-04-19 RX ADMIN — ARFORMOTEROL TARTRATE 15 MCG: 15 SOLUTION RESPIRATORY (INHALATION) at 11:10

## 2019-04-19 RX ADMIN — CETIRIZINE HYDROCHLORIDE 10 MG: 10 TABLET, FILM COATED ORAL at 21:32

## 2019-04-19 RX ADMIN — BUDESONIDE 500 MCG: 0.5 INHALANT RESPIRATORY (INHALATION) at 18:44

## 2019-04-19 RX ADMIN — Medication 10 ML: at 15:39

## 2019-04-19 RX ADMIN — BUDESONIDE 500 MCG: 0.5 INHALANT RESPIRATORY (INHALATION) at 11:10

## 2019-04-19 RX ADMIN — GUAIFENESIN 100 MG: 200 SOLUTION ORAL at 23:43

## 2019-04-19 RX ADMIN — HYDROCODONE BITARTRATE AND ACETAMINOPHEN 0.5 TABLET: 5; 325 TABLET ORAL at 21:32

## 2019-04-19 RX ADMIN — ESTRADIOL 1 MG: 1 TABLET ORAL at 21:32

## 2019-04-19 RX ADMIN — CHLORTHALIDONE 25 MG: 25 TABLET ORAL at 17:30

## 2019-04-19 RX ADMIN — PANTOPRAZOLE SODIUM 40 MG: 40 TABLET, DELAYED RELEASE ORAL at 05:32

## 2019-04-19 RX ADMIN — ALBUTEROL SULFATE 1 DOSE: 2.5 SOLUTION RESPIRATORY (INHALATION) at 03:10

## 2019-04-19 RX ADMIN — AMLODIPINE BESYLATE 5 MG: 5 TABLET ORAL at 08:33

## 2019-04-19 RX ADMIN — BUPROPION HYDROCHLORIDE 150 MG: 150 TABLET, FILM COATED, EXTENDED RELEASE ORAL at 21:32

## 2019-04-19 RX ADMIN — POTASSIUM CHLORIDE 20 MEQ: 750 TABLET, EXTENDED RELEASE ORAL at 08:33

## 2019-04-19 RX ADMIN — ARFORMOTEROL TARTRATE 15 MCG: 15 SOLUTION RESPIRATORY (INHALATION) at 18:44

## 2019-04-19 RX ADMIN — Medication 10 ML: at 21:39

## 2019-04-19 RX ADMIN — DICLOFENAC SODIUM 50 MG: 25 TABLET, DELAYED RELEASE ORAL at 08:33

## 2019-04-19 RX ADMIN — ENOXAPARIN SODIUM 40 MG: 40 INJECTION SUBCUTANEOUS at 21:33

## 2019-04-19 RX ADMIN — PREDNISONE 7.5 MG: 5 TABLET ORAL at 08:33

## 2019-04-19 RX ADMIN — SODIUM CHLORIDE 75 ML/HR: 900 INJECTION, SOLUTION INTRAVENOUS at 04:20

## 2019-04-19 RX ADMIN — LEVOTHYROXINE SODIUM 112 MCG: 112 TABLET ORAL at 05:32

## 2019-04-19 RX ADMIN — BUMETANIDE 1 MG: 0.25 INJECTION INTRAMUSCULAR; INTRAVENOUS at 11:10

## 2019-04-19 RX ADMIN — FLUTICASONE PROPIONATE 2 SPRAY: 50 SPRAY, METERED NASAL at 22:15

## 2019-04-19 RX ADMIN — METHYLPREDNISOLONE SODIUM SUCCINATE 40 MG: 40 INJECTION, POWDER, FOR SOLUTION INTRAMUSCULAR; INTRAVENOUS at 09:47

## 2019-04-19 NOTE — PROGRESS NOTES
Occupational Therapy EVALUATION/discharge Patient: Mahi Lincoln (41 y.o. female) Date: 4/19/2019 Primary Diagnosis: Syncope and collapse [R55] Precautions:     
 
ASSESSMENT:  
Based on the objective data described below, the patient presents with hospital admission secondary to syncopal episode. Patient received supine in bed with HOB elevated. She is pleasant and agreeable. Patient BP at rest reading 195/74 with HR at 75. Upon standing BP reading 188/80 with HR at 83. Patient able to demonstrate bed mobility, functional transfers and ADL tasks with independence today. She reports feeling somewhat SOB at end of session and O2 sats reading 94% on room air. Patient educated on pacing techniques to use during current symptoms. Patient verbalized understanding. She is hopeful to discharge home later today. Further skilled acute occupational therapy is not indicated at this time. Discharge Recommendations: None Further Equipment Recommendations for Discharge:none noted SUBJECTIVE:  
Patient stated I am so much better.  OBJECTIVE DATA SUMMARY:  
HISTORY:  
Past Medical History:  
Diagnosis Date  Anemia  Arthritis   
 osteoporosis  Asthma  Broken ribs 2006  
 fell  Cancer ([facility] [address] melanoma  GERD (gastroesophageal reflux disease)  Hypertension  Migraines  Osteoporosis  Rheumatic fever x2 without residual  
 Thyroid disease Past Surgical History:  
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  
 remove Tillman's neuroma [facility]  HX CATARACT REMOVAL Bilateral 2009, 2011 [facility] Hysterectomy  HX HEENT  1996  
 sinus surgery  HX HEENT    
 deviated septum  HX ORTHOPAEDIC Left 2006 Tarsal Tunnel Release [facility] Stairs ORTHOPAEDIC Right [id number]  
 basal joint surgery  HX ORTHOPAEDIC Left 2012  
 basal joint surgery  HX ORTHOPAEDIC    
 back surgery  HX OTHER SURGICAL TVT Sling  HX OTHER SURGICAL Tillman's Neuroma removal   
 HX SEPTOPLASTY  HX TONSIL AND ADENOIDECTOMY [address] [facility] HX UROLOGICAL  2010 TVT sling Prior Level of Function/Environment/Context: independent Occupations in which the patient is/was successful, what are the barriers preventing that success:  
Performance Patterns (routines, roles, habits, and rituals):  
Personal Interests and/or values:  
Expanded or extensive additional review of patient history:  
 
Home Situation Home Environment: Private residence # Steps to Enter: 5 Rails to Enter: Yes Hand Rails : Right One/Two Story Residence: Two story Living Alone: Yes Support Systems: Child(ordrigo), Family member(s) Patient Expects to be Discharged to[de-identified] Private residence Current DME Used/Available at Home: Cane, straight, Walker, rolling, Wheelchair, Raised toilet seat Tub or Shower Type: Shower Hand dominance: Right EXAMINATION OF PERFORMANCE DEFICITS: 
Cognitive/Behavioral Status: 
Neurologic State: Alert Orientation Level: Oriented X4 Cognition: Appropriate decision making; Follows commands Perception: Appears intact Perseveration: No perseveration noted Safety/Judgement: Awareness of environment Skin: intact as seen Edema: none noted Hearing: Auditory Auditory Impairment: None Vision/Perceptual:   
    
    
    
  
    
    
Corrective Lenses: Reading glasses Range of Motion: 
AROM: Within functional limits PROM: Within functional limits Strength: 
Strength: Within functional limits Coordination: 
Coordination: Within functional limits Fine Motor Skills-Upper: Left Intact; Right Intact Gross Motor Skills-Upper: Left Intact; Right Intact Tone & Sensation: 
Tone: Normal 
Sensation: Intact Balance: 
Sitting: Intact Standing: Intact Functional Mobility and Transfers for ADLs:Bed Mobility: 
Supine to Sit: Independent Scooting: Independent Transfers: 
Sit to Stand: Independent Stand to Sit: Independent Bed to Chair: Independent Bathroom Mobility: Independent Toilet Transfer : Independent ADL Assessment: 
Feeding: Independent Oral Facial Hygiene/Grooming: Independent Bathing: Independent Upper Body Dressing: Independent Lower Body Dressing: Independent Toileting: Independent ADL Intervention and task modifications: 
  
 
  
 
  
 
  
 
  
 
  
 
  
 
Cognitive Retraining Safety/Judgement: Awareness of environment Therapeutic Exercise: 
  
Functional Measure: 
Barthel Index: 
 
Bathin Bladder: 10 Bowels: 10 
Groomin Dressing: 10 Feeding: 10 Mobility: 15 
Stairs: 0 Toilet Use: 10 Transfer (Bed to Chair and Back): 15 Total: 90/100 Percentage of impairment  
0% 1-19% 20-39% 40-59% 60-79% 80-99% 100% Barthel Score 0-100 100 99-80 79-60 59-40 20-39 1-19 
 0 The Barthel ADL Index: Guidelines 1. The index should be used as a record of what a patient does, not as a record of what a patient could do. 2. The main aim is to establish degree of independence from any help, physical or verbal, however minor and for whatever reason. 3. The need for supervision renders the patient not independent. 4. A patient's performance should be established using the best available evidence. Asking the patient, friends/relatives and nurses are the usual sources, but direct observation and common sense are also important. However direct testing is not needed. 5. Usually the patient's performance over the preceding 24-48 hours is important, but occasionally longer periods will be relevant. 6. Middle categories imply that the patient supplies over 50 per cent of the effort. 7. Use of aids to be independent is allowed. Shikha Ferrell., Barthel, D.W. (5483). Functional evaluation: the Barthel Index. [address] [facility] (14)2. BARBARA Stewart, Mateo Reddy.Foster., [place], [address] (1999). Measuring the change indisability after inpatient rehabilitation; comparison of the responsiveness of the Barthel Index and Functional [place] Measure. Journal of Neurology, Neurosurgery, and Psychiatry, 66(4), 712-422. JUAN C Granados, JOSHUA Ames, & Ariadna Henry M.A. (2004.) Assessment of post-stroke quality of life in cost-effectiveness studies: The usefulness of the Barthel Index and the EuroQoL-5D. [facility], 13, 127-51 Occupational Therapy Evaluation Charge Determination History Examination Decision-Making LOW Complexity : Brief history review  LOW Complexity : 1-3 performance deficits relating to physical, cognitive , or psychosocial skils that result in activity limitations and / or participation restrictions  LOW Complexity : No comorbidities that affect functional and no verbal or physical assistance needed to complete eval tasks Based on the above components, the patient evaluation is determined to be of the following complexity level: LOW Pain: 
Pain Scale 1: Numeric (0 - 10) Pain Intensity 1: 0 Activity Tolerance: VSS though BP elevated Please refer to the flowsheet for vital signs taken during this treatment. After treatment:  
[x]  Patient left in no apparent distress sitting up in chair 
[]  Patient left in no apparent distress in bed 
[x]  Call bell left within reach [x]  Nursing notified 
[x]  Caregiver present 
[]  Bed alarm activated COMMUNICATION/EDUCATION:  
Communication/Collaboration: 
[x]      Home safety education was provided and the patient/caregiver indicated understanding. [x]      Patient/family have participated as able and agree with findings and recommendations. []      Patient is unable to participate in plan of care at this time. Findings and recommendations were discussed with: Physical Therapist and Registered Nurse Rolando Cruz, OTR/L Time Calculation: 13 mins

## 2019-04-19 NOTE — PROGRESS NOTES
Problem: Falls - Risk of 
Goal: *Absence of Falls Description Document Gume Christopher Fall Risk and appropriate interventions in the flowsheet. Outcome: Progressing Towards Goal 
  
Problem: Patient Education: Go to Patient Education Activity Goal: Patient/Family Education Outcome: Progressing Towards Goal 
  
Problem: Pain Goal: *Control of Pain Outcome: Progressing Towards Goal 
Goal: *PALLIATIVE CARE:  Alleviation of Pain Outcome: Progressing Towards Goal 
  
Problem: Patient Education: Go to Patient Education Activity Goal: Patient/Family Education Outcome: Progressing Towards Goal 
  
Problem: Syncope Goal: *Absence of injury Outcome: Progressing Towards Goal 
Goal: Decrease or eliminate episodes of syncope Outcome: Progressing Towards Goal 
  
Problem: Patient Education: Go to Patient Education Activity Goal: Patient/Family Education Outcome: Progressing Towards Goal

## 2019-04-19 NOTE — PROGRESS NOTES
physical Therapy EVALUATION/DISCHARGE Patient: Kayla Corley (6 y.o. female) Date: 4/19/2019 Primary Diagnosis: Syncope and collapse [R55] Precautions:   Fall ASSESSMENT : 
Based on the objective data described below, the patient presents with low blood pressure and 2 syncopal episodes and dyspnea. Patient found to have polypharmacy interactions from her cardiac medications. Patient has been evaluated by cardiology and cleared to work with therapy. Patient prior to admission lives alone in a 2 story home and does not require an assistive device prior to admission. Patient today received sitting up in chair. Patient today is hypertensive prior to activity and post activity, nursing is aware. See vitals below. Patient is currently Independent for all upright mobility. Dyspnea on exertion, but O2 sats >90%. She demonstrates no loss of balance or instability. She currently is at her baseline for mobility and will not require any additional PT while in the hospital . Further skilled acute physical therapy is not indicated at this time. PLAN : 
Discharge Recommendations: None Further Equipment Recommendations for Discharge: none SUBJECTIVE:  
Patient stated i have been short of breath since I had bronchitis.  OBJECTIVE DATA SUMMARY:  
HISTORY:   
Past Medical History:  
Diagnosis Date  Anemia  Arthritis   
 osteoporosis  Asthma  Broken ribs 2006  
 fell  Cancer ([facility] [address] melanoma  GERD (gastroesophageal reflux disease)  Hypertension  Migraines  Osteoporosis  Rheumatic fever x2 without residual  
 Thyroid disease Past Surgical History:  
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  
 remove Tillman's neuroma [facility]  HX CATARACT REMOVAL Bilateral 2009, 2011 [facility] Hysterectomy  HX HEENT  1996  
 sinus surgery  HX HEENT    
 deviated septum  HX ORTHOPAEDIC Left 2006 Tarsal Tunnel Release Luis Quinones ORTHOPAEDIC Right [id number]  
 basal joint surgery  HX ORTHOPAEDIC Left 2012  
 basal joint surgery  HX ORTHOPAEDIC    
 back surgery  HX OTHER SURGICAL    
 TVT Sling  HX OTHER SURGICAL Tillman's Neuroma removal   
 HX SEPTOPLASTY  HX TONSIL AND ADENOIDECTOMY [address] [facility] HX UROLOGICAL  2010 TVT sling Prior Level of Function/Home Situation: see below Personal factors and/or comorbidities impacting plan of care:  
 
Home Situation Home Environment: Private residence # Steps to Enter: 5 Rails to Enter: Yes Hand Rails : Right One/Two Story Residence: Two story Living Alone: Yes Support Systems: Child(rodrigo), Family member(s) Patient Expects to be Discharged to[de-identified] Private residence Current DME Used/Available at Home: Cane, straight, Walker, rolling, Wheelchair, Raised toilet seat Tub or Shower Type: Shower EXAMINATION/PRESENTATION/DECISION MAKING:  
Vitals:  
 04/19/19 1110 04/19/19 1142 04/19/19 1153 04/19/19 1215 BP: 177/65 198/66 199/66 171/64 BP 1 Location:  Right arm Left leg Right arm BP Patient Position:  Pre-activity Post activity At rest  
Pulse: 76 78 74 89 Resp:    24 Temp:    98.2 °F (36.8 °C) SpO2:  95% 94% 95% Weight:      
Height:      
 
Critical Behavior: 
Neurologic State: Alert Orientation Level: Oriented X4 Cognition: Appropriate decision making, Follows commands Safety/Judgement: Awareness of environment Hearing: Auditory Auditory Impairment: None Skin:  All exposed intact Edema: none noted Range Of Motion: 
AROM: Within functional limits PROM: Within functional limits Strength:   
Strength: Within functional limits Tone & Sensation:  
Tone: Normal 
  
  
  
  
Sensation: Intact Coordination: 
Coordination: Within functional limits Vision:  
Corrective Lenses: Reading glasses Functional Mobility: 
Bed Mobility: Supine to Sit: Independent Scooting: Independent Transfers: 
Sit to Stand: Independent Stand to Sit: Independent Bed to Chair: Independent Balance:  
Sitting: Intact Standing: Intact Ambulation/Gait Training: 
Distance (ft): 350 Feet (ft) Assistive Device: Gait belt Ambulation - Level of Assistance: Independent Gait Description (WDL): Exceptions to [facility] Stairs: 
Number of Stairs Trained: 4 Stairs - Level of Assistance: Modified independent Rail Use: Left Therapeutic Exercises:  
 
 
Functional Measure: 
Barthel Index: 
 
Bathin Bladder: 10 Bowels: 10 
Groomin Dressing: 10 Feeding: 10 Mobility: 15 
Stairs: 0 Toilet Use: 10 Transfer (Bed to Chair and Back): 15 Total: 90/100 Percentage of impairment  
0% 1-19% 20-39% 40-59% 60-79% 80-99% 100% Barthel Score 0-100 100 99-80 79-60 59-40 20-39 1-19 
 0 The Barthel ADL Index: Guidelines 1. The index should be used as a record of what a patient does, not as a record of what a patient could do. 2. The main aim is to establish degree of independence from any help, physical or verbal, however minor and for whatever reason. 3. The need for supervision renders the patient not independent. 4. A patient's performance should be established using the best available evidence. Asking the patient, friends/relatives and nurses are the usual sources, but direct observation and common sense are also important. However direct testing is not needed. 5. Usually the patient's performance over the preceding 24-48 hours is important, but occasionally longer periods will be relevant. 6. Middle categories imply that the patient supplies over 50 per cent of the effort. 7. Use of aids to be independent is allowed. Keith Lee., Barthel, D.W. (8180). Functional evaluation: the Barthel Index. [address] [facility] (14)2. BARBARA Dodge, Rober Riley, Cinda [facility], [address] (1999). Measuring the change indisability after inpatient rehabilitation; comparison of the responsiveness of the Barthel Index and Functional [place] Measure. Journal of Neurology, Neurosurgery, and Psychiatry, 66(4), 383-700. JUAN C Garcia, JOSHUA Ames, & Alyssa Quinonez M.A. (2004.) Assessment of post-stroke quality of life in cost-effectiveness studies: The usefulness of the Barthel Index and the EuroQoL-5D. [facility], 13, 546-11 Physical Therapy Evaluation Charge Determination History Examination Presentation Decision-Making HIGH Complexity :3+ comorbidities / personal factors will impact the outcome/ POC  LOW Complexity : 1-2 Standardized tests and measures addressing body structure, function, activity limitation and / or participation in recreation  MEDIUM Complexity : Evolving with changing characteristics  Other outcome measures Barthel Index  LOW Based on the above components, the patient evaluation is determined to be of the following complexity level: LOW Pain: 
Pain Scale 1: Numeric (0 - 10) Pain Intensity 1: 0 Activity Tolerance: No complications with upright activity Please refer to the flowsheet for vital signs taken during this treatment. After treatment:  
[x]   Patient left in no apparent distress sitting up in chair 
[]   Patient left in no apparent distress in bed 
[x]   Call bell left within reach [x]   Nursing notified 
[]   Caregiver present 
[]   Bed alarm activated COMMUNICATION/EDUCATION:  
Communication/Collaboration: 
[x]   Fall prevention education was provided and the patient/caregiver indicated understanding. [x]   Patient/family have participated as able and agree with findings and recommendations. []   Patient is unable to participate in plan of care at this time. Findings and recommendations were discussed with: Registered Nurse Thank you for this referral. 
Kevan Jaramillo, PT, DPT Time Calculation: 11 mins

## 2019-04-19 NOTE — PROGRESS NOTES
Klever Awan [facility] [address] 
[address] [facility], [place], [id number] [place] [address] 
[phone] Medical Progress Note NAME:         Trent Donovan :        1940 MRM:        [id number] Date:          2019 Subjective: Patient has been seen and examined as a follow up for multiple medical issues. Chart, labs, diagnostics reviewed. HR better. She has a hx of asthma and now has some wheezing. No fever or chills. Mild cough Objective: 
 
Vital Signs: 
 
Visit Vitals /70 (BP 1 Location: Left arm, BP Patient Position: At rest) Pulse 64 Temp 97.3 °F (36.3 °C) Resp 16 Ht 5' 3\" (1.6 m) Wt 54 kg (119 lb) SpO2 95% Breastfeeding? No  
BMI 21.08 kg/m² Intake/Output Summary (Last 24 hours) at 2019 1004 Last data filed at 2019 2942 Gross per 24 hour Intake 3439 ml Output 200 ml Net 3239 ml Physical Examination: 
 
General:   Well looking and nourished patient in no acute distress Eyes:   pink conjunctivae, PERRLA with no discharge. ENT:   no ottorrhea or rhinorrhea with moist mucous membranes Neck: no masses, thyroid non-tender and trachea central. 
Pulm:  generally decreased breath sounds without crackles. + wheezes Card:  no JVD or murmurs, has regular and normal S1, S2 without thrills, bruits or peripheral edema Abd:  Soft, non-tender, non-distended, normoactive bowel sounds with no palpable organomegaly Musc:  No cyanosis, clubbing, atrophy or deformities. Skin:  No rashes, bruising or ulcers. Neuro: Awake and alert. Generally a non focal exam. Follows commands appropriately Psych:  Has a good insight and is oriented x 3 Current Facility-Administered Medications Medication Dose Route Frequency  amLODIPine (NORVASC) tablet 5 mg  5 mg Oral DAILY  budesonide (PULMICORT) 500 mcg/2 ml nebulizer suspension  500 mcg Nebulization BID RT  
  arformoterol (BROVANA) neb solution 15 mcg  15 mcg Nebulization BID RT  
 sodium chloride (NS) flush 5-40 mL  5-40 mL IntraVENous Q8H  
 sodium chloride (NS) flush 5-40 mL  5-40 mL IntraVENous PRN  
 acetaminophen (TYLENOL) tablet 650 mg  650 mg Oral Q4H PRN  
 naloxone (NARCAN) injection 0.4 mg  0.4 mg IntraVENous PRN  
 ondansetron (ZOFRAN) injection 4 mg  4 mg IntraVENous Q4H PRN  
 enoxaparin (LOVENOX) injection 40 mg  40 mg SubCUTAneous Q24H  
 HYDROcodone-acetaminophen (NORCO) 5-325 mg per tablet 0.5 Tab  0.5 Tab Oral Q8H PRN  pantoprazole (PROTONIX) tablet 40 mg  40 mg Oral ACB  buPROPion XL (WELLBUTRIN XL) tablet 150 mg  150 mg Oral QHS  cetirizine (ZYRTEC) tablet 10 mg  10 mg Oral QHS  diclofenac EC (VOLTAREN) tablet 50 mg  50 mg Oral DAILY  estradiol (ESTRACE) tablet 1 mg  1 mg Oral QHS  fluticasone propionate (FLONASE) 50 mcg/actuation nasal spray 2 Spray  2 Spray Both Nostrils QHS  predniSONE (DELTASONE) tablet 7.5 mg  7.5 mg Oral DAILY  potassium chloride SR (KLOR-CON 10) tablet 20 mEq  20 mEq Oral DAILY  levothyroxine (SYNTHROID) tablet 112 mcg  112 mcg Oral ACB  guaiFENesin (ROBITUSSIN) 100 mg/5 mL oral liquid 100 mg  100 mg Oral Q4H PRN  
 albuterol 5mg / ipratropium 0.5mg neb solution  1 Dose Nebulization Q6H PRN Laboratory data and review: 
 
Recent Labs 04/19/19 
0335 04/18/19 
1119 WBC 7.3 10.2 HGB 9.4* 10.1* HCT 29.1* 31.1*  
 215 Recent Labs 04/19/19 
0335 04/18/19 
1119  139  
K 3.9 3.2*  
* 109* CO2 21 24 GLU 80 113* BUN 16 26* CREA 0.91 1.24* CA 7.9* 8.0*  
MG  --  1.7 ALB 2.9* 3.2* SGOT 16 15 ALT 20 20 No components found for: [place] Other Diagnostics: 
 
Telemetry reviewed by me:   normal sinus rhythm Assessment and Plan: 
 
Syncope and collapse (4/18/2019) /  Bradycardia (4/18/2019): likely due to multiple and likely interacting medications - on Coreg, Clonidine, high dose Diltiazem. Now feels much better. Echocardiogram showed a normal LV function. Not orthostatic. These medications have been held. Appreciate cardiology consult. Ambulate as tolerated.  
  
Essential hypertension (5/20/2017): BP now creeping up. Started on Amlodipine. Monitor for now. Will need further medication adjustment Acquired hypothyroidism (5/20/2017): TSH suppressed but normal free T4. Continue levothyroxine 
  
GERD (gastroesophageal reflux disease) (5/20/2017): she needs a PPi. On Prednisone, potassium and NSAIDs 
  
Asthma, mild intermittent (4/18/2019): now wheezing. Give Brovana, Pulmicort and a dose of solumedrol. Start claritin. Follow pro BNP. Consult pulm if no improvement 
  
Hypokalemia (4/18/2019): due to diuretic use. Repleted.   
  
Lumbar stenosis with neurogenic claudication (5/15/2017): severe. Resume Norco, prednisone and monitor Total time spent for the patient's care: 35 Minutes Care Plan discussed with: Patient, Care Manager, Nursing Staff and Consultant/Specialist 
 
Discussed:  Care Plan and D/C Planning Prophylaxis:  Lovenox Anticipated Disposition:  Home w/Family 
        
___________________________________________________ Attending Physician:   Terry Don MD

## 2019-04-19 NOTE — PROGRESS NOTES
Progress Note Jamar Escobar MD, [facility] [address], [place], [address] [facility] Phone [phone]; Fax [phone] 2019 8:23 AM  
 
Admit Date:           2019 Admit Diagnosis:  Syncope and collapse [R55] :          1940 MRN:          [id number] ASSESSMENT/RECOMMENDATION:  
1)Syncope and hypotension 
-HR better. Could go home but significant wheezing. 
-ask pulmonary to see 
-will check stat proBNP and if elevated 1 mg Bumex   
2) Essential hypertension 
-BP is trending up.will add Norvasc to regimen. May consider going back on clonidine patch but no Cardizem or coreg. 
  
3) Hypokalemia 
-Normokalemic 
  
4) · Echo today ; Aortic valve sclerosis with no significant stenosis. · Moderate mitral valve regurgitation. · Mild tricuspid valve regurgitation is present. Mild to moderate  
pulmonary hypertension is present. · Mild pulmonic valve regurgitation is present. · Estimated left ventricular ejection fraction is 56 - 60%. No intake/output data recorded. Last 3 Recorded Weights in this Encounter 19 1059 19 1509 Weight: 119 lb (54 kg) 119 lb (54 kg)  1901 -  0700 In: 2959 [P.O.:800; I.V.:2159] Out: 200 [Urine:200] SUBJECTIVE Antonette Bolden is a 66 y.o. female I am seeing for dizziness nd syncope. Her BP this am was SBP of 98. She went to the dentist and they felt her HR was in the 40's. Then passed out twice. She has had long hx of hypertension and her HR had been low for some time in the 50's. NO sx's of presyncope until placed on clonidine . she states clonidine as helped with her BP.-today,DM,FMHx,+postmenopausal, HTN Denies chest pain or SOB. she has had a upper respiratoy infection.  Has been on prednisone and has significant wheezing today. Garret Olmedo reports difficultly sleeping because of wheezing. \" [place] never had wheezing\". Laci Pineda Current Facility-Administered Medications Medication Dose Route Frequency  sodium chloride (NS) flush 5-40 mL  5-40 mL IntraVENous Q8H  
 sodium chloride (NS) flush 5-40 mL  5-40 mL IntraVENous PRN  
 acetaminophen (TYLENOL) tablet 650 mg  650 mg Oral Q4H PRN  
 naloxone (NARCAN) injection 0.4 mg  0.4 mg IntraVENous PRN  
 ondansetron (ZOFRAN) injection 4 mg  4 mg IntraVENous Q4H PRN  
 enoxaparin (LOVENOX) injection 40 mg  40 mg SubCUTAneous Q24H  
 HYDROcodone-acetaminophen (NORCO) 5-325 mg per tablet 0.5 Tab  0.5 Tab Oral Q8H PRN  
 0.9% sodium chloride infusion  75 mL/hr IntraVENous CONTINUOUS  
 pantoprazole (PROTONIX) tablet 40 mg  40 mg Oral ACB  buPROPion XL (WELLBUTRIN XL) tablet 150 mg  150 mg Oral QHS  cetirizine (ZYRTEC) tablet 10 mg  10 mg Oral QHS  diclofenac EC (VOLTAREN) tablet 50 mg  50 mg Oral DAILY  estradiol (ESTRACE) tablet 1 mg  1 mg Oral QHS  fluticasone propionate (FLONASE) 50 mcg/actuation nasal spray 2 Spray  2 Spray Both Nostrils QHS  predniSONE (DELTASONE) tablet 7.5 mg  7.5 mg Oral DAILY  potassium chloride SR (KLOR-CON 10) tablet 20 mEq  20 mEq Oral DAILY  levothyroxine (SYNTHROID) tablet 112 mcg  112 mcg Oral ACB  guaiFENesin (ROBITUSSIN) 100 mg/5 mL oral liquid 100 mg  100 mg Oral Q4H PRN  
 albuterol 5mg / ipratropium 0.5mg neb solution  1 Dose Nebulization Q6H PRN OBJECTIVE Intake/Output Summary (Last 24 hours) at 4/19/2019 3501 Last data filed at 4/19/2019 4788 Gross per 24 hour Intake 2959 ml Output 200 ml Net 2759 ml Review of Systems - History obtained from the patient AS PER  HPI PHYSICAL EXAM  
  
 
Visit Vitals /70 (BP 1 Location: Left arm, BP Patient Position: At rest) Pulse 64 Temp 97.3 °F (36.3 °C) Resp 16  
 Ht 5' 3\" (1.6 m) Wt 119 lb (54 kg) SpO2 95% Breastfeeding? No  
BMI 21.08 kg/m² Gen: Well-developed, well-nourished, in no acute distress  alert and oriented x 3 HEENT:  Pink conjunctivae, Hearing grossly normal.No scleral icterus or conjunctival, moist mucous membranes Neck: Supple,No JVD, No Carotid Bruit, Thyroid- non tender No cervical lymphadenopathy Resp: N+wheezing Card: Regular Rate,Rythm MSK: No cyanosis or clubbing, good capillary refill Skin: No rashes or ulcers, no bruising Neuro:  Cranial nerves are grossly intact, moving all four extremities, no focal deficit, follows commands appropriately Psych:  Good insight, oriented to person, place and time, alert, Nml Affect LE: No edema DATA REVIEW Laboratory and Imaging have been reviewed by me and are notable for Recent Labs 04/18/19 
1119 TROIQ <0.05 Recent Labs 04/19/19 
0335 04/18/19 
1119  139  
K 3.9 3.2*  
CO2 21 24 BUN 16 26* CREA 0.91 1.24* GLU 80 113* MG  --  1.7 WBC 7.3 10.2 HGB 9.4* 10.1* HCT 29.1* 31.1*  
 215 Guanakito Sofia MD

## 2019-04-19 NOTE — PROGRESS NOTES
SHIFT CHANGE: 
7:53 AM Report received from Zenon Cavanaugh, RN & Cristy Dominguez RN. SBAR, Kardex, Procedure Summary, Intake/Output, MAR, Recent Results, Med Rec Status and Cardiac Rhythm NSR were discussed. SHIFT SUMMARY: 
9:23 AM  Incentive spirometer given to patient and teaching performed. Patient verbalized understanding. 10:34 AM  Dr. Taylor Villareal notified of BNP results. Orders received for 1 mg IV Bumex once. 2:53 PM  Patient complaining of a cough. PRN Robitussin given. END OF SHIFT REPORT: 
7:51 PM  Bedside and Verbal shift change report given to Stefani Ramirez RN (oncoming nurse) by Macie Samuels RN (offgoing nurse). Report included the following information SBAR, Kardex, Procedure Summary, Intake/Output, MAR, Recent Results, Med Rec Status and Cardiac Rhythm NSR.

## 2019-04-19 NOTE — PROGRESS NOTES
Case Management follow-up note: 
 
Per cardiologist,pt was wheezing when examined. Pt is  . Son is Shaquille Roberts. His number is [phone]. Plan for today: 1. Pulmonology consult due to wheezing. 2.ProBNP is being checked. If elevated,pt will receive bumex. 3.Norvasc has been added by cardiology due to increased blood pressure. 4.Case Management will continue to follow pt for discharge needs. 5. Currently,there are no home health or hospital to home needs identified but will follow for these needs. 6.For discharge planning needs today,please contact me @ [phone].  
 
Dominga Parra

## 2019-04-20 VITALS
DIASTOLIC BLOOD PRESSURE: 69 MMHG | HEIGHT: 63 IN | OXYGEN SATURATION: 95 % | BODY MASS INDEX: 21.09 KG/M2 | WEIGHT: 119 LBS | RESPIRATION RATE: 19 BRPM | SYSTOLIC BLOOD PRESSURE: 174 MMHG | HEART RATE: 60 BPM | TEMPERATURE: 98 F

## 2019-04-20 PROCEDURE — 74011250637 HC RX REV CODE- 250/637: Performed by: INTERNAL MEDICINE

## 2019-04-20 PROCEDURE — 74011636637 HC RX REV CODE- 636/637: Performed by: INTERNAL MEDICINE

## 2019-04-20 PROCEDURE — 94640 AIRWAY INHALATION TREATMENT: CPT

## 2019-04-20 PROCEDURE — 74011000250 HC RX REV CODE- 250: Performed by: INTERNAL MEDICINE

## 2019-04-20 PROCEDURE — 74011250637 HC RX REV CODE- 250/637: Performed by: NURSE PRACTITIONER

## 2019-04-20 RX ORDER — AMLODIPINE BESYLATE 10 MG/1
10 TABLET ORAL DAILY
Qty: 30 TAB | Refills: 0 | Status: SHIPPED | OUTPATIENT
Start: 2019-04-21 | End: 2019-05-09

## 2019-04-20 RX ORDER — GUAIFENESIN 600 MG/1
600 TABLET, EXTENDED RELEASE ORAL 2 TIMES DAILY
Qty: 8 TAB | Refills: 0 | Status: SHIPPED | OUTPATIENT
Start: 2019-04-20 | End: 2019-05-09

## 2019-04-20 RX ORDER — CHLORTHALIDONE 25 MG/1
25 TABLET ORAL DAILY
Qty: 30 TAB | Refills: 0 | Status: SHIPPED | OUTPATIENT
Start: 2019-04-21 | End: 2019-05-17 | Stop reason: SDUPTHER

## 2019-04-20 RX ADMIN — POTASSIUM CHLORIDE 20 MEQ: 750 TABLET, EXTENDED RELEASE ORAL at 09:03

## 2019-04-20 RX ADMIN — BUDESONIDE 500 MCG: 0.5 INHALANT RESPIRATORY (INHALATION) at 09:08

## 2019-04-20 RX ADMIN — ARFORMOTEROL TARTRATE 15 MCG: 15 SOLUTION RESPIRATORY (INHALATION) at 09:08

## 2019-04-20 RX ADMIN — AMLODIPINE BESYLATE 10 MG: 5 TABLET ORAL at 09:03

## 2019-04-20 RX ADMIN — GUAIFENESIN 100 MG: 200 SOLUTION ORAL at 09:03

## 2019-04-20 RX ADMIN — ACETAMINOPHEN 650 MG: 325 TABLET ORAL at 09:09

## 2019-04-20 RX ADMIN — CLONIDINE HYDROCHLORIDE 0.1 MG: 0.1 TABLET ORAL at 07:51

## 2019-04-20 RX ADMIN — CLONIDINE HYDROCHLORIDE 0.1 MG: 0.1 TABLET ORAL at 00:28

## 2019-04-20 RX ADMIN — PANTOPRAZOLE SODIUM 40 MG: 40 TABLET, DELAYED RELEASE ORAL at 07:51

## 2019-04-20 RX ADMIN — CHLORTHALIDONE 25 MG: 25 TABLET ORAL at 09:03

## 2019-04-20 RX ADMIN — LEVOTHYROXINE SODIUM 112 MCG: 112 TABLET ORAL at 07:51

## 2019-04-20 RX ADMIN — PREDNISONE 7.5 MG: 5 TABLET ORAL at 09:04

## 2019-04-20 RX ADMIN — GUAIFENESIN 100 MG: 200 SOLUTION ORAL at 04:58

## 2019-04-20 RX ADMIN — DICLOFENAC SODIUM 50 MG: 25 TABLET, DELAYED RELEASE ORAL at 09:03

## 2019-04-20 NOTE — PROGRESS NOTES
1930: Bedside and Verbal shift change report given to Ramesh Harmon (oncoming nurse) by Pranay Thornton (offgoing nurse). Report included the following information SBAR, Kardex, ED Summary, Procedure Summary, Intake/Output, MAR, Accordion, Recent Results, Med Rec Status and Cardiac Rhythm NSR. 
 
 2200: Bedside and Verbal shift change report given to Nazanin (oncoming nurse) by Ramesh Harmon (offgoing nurse). Report included the following information SBAR, Kardex, ED Summary, Procedure Summary, Intake/Output, MAR, Accordion, Recent Results, Med Rec Status and Cardiac Rhythm NSR w/ 1st degree block.

## 2019-04-20 NOTE — ROUTINE PROCESS
Bedside shift change report given to Anastasia Steen (oncoming nurse) by Fiona Tapia (offgoing nurse). Report included the following information SBAR, Kardex, Intake/Output, MAR, Accordion and Recent Results.

## 2019-04-20 NOTE — DISCHARGE SUMMARY
Physician Discharge Summary     Patient ID:  Trent Donovan  [id number]  9 y.o.  1940    Admit date: 4/18/2019    Discharge date and time: 4/20/2019    Admission Diagnoses: Syncope and collapse [R55]    Discharge Diagnoses:    Principal Problem:    Syncope and collapse (4/18/2019)    Active Problems:    Lumbar stenosis with neurogenic claudication (5/15/2017)      Essential hypertension (5/20/2017)      Acquired hypothyroidism (5/20/2017)      GERD (gastroesophageal reflux disease) (5/20/2017)      Bradycardia (4/18/2019)      Asthma, mild intermittent (4/18/2019)      Hypokalemia (4/18/2019)           Hospital Course:   Syncope and collapse (4/18/2019) /  Bradycardia (4/18/2019): likely due to multiple and likely interacting medications - on Coreg, Clonidine, high dose Diltiazem. Echocardiogram showed a normal LV function. Not orthostatic. Bradycardia has resolved with medication adjustments. Tolerated PT prior to discharge with no syncope/dizziness.      Essential hypertension (5/20/2017): BP increased. Started norvasc, clonidine and chlorthalidone with some improvement. Pt to follow up with cardiology for further adjustments. Acquired hypothyroidism (5/20/2017): TSH suppressed but normal free T4. Continue levothyroxine     GERD (gastroesophageal reflux disease) (5/20/2017): she needs a PPi. On Prednisone, potassium and NSAIDs     Asthma, mild intermittent (4/18/2019): now wheezing. Give Brovana, Pulmicort and a dose of solumedrol. Start claritin. Follow pro BNP. Consult pulm if no improvement     Hypokalemia (4/18/2019): due to diuretic use. Repleted.       Lumbar stenosis with neurogenic claudication (5/15/2017): severe. Resume Norco, prednisone and monitor    PCP: Alicia Don MD     Consults: Cardiology    Condition of patient at discharge: improved    Discharge Exam:  Please refer to progress note from today.     Disposition: home    Patient Instructions:   Current Discharge Medication List      START taking these medications    Details   amLODIPine (NORVASC) 10 mg tablet Take 1 Tab by mouth daily. Qty: 30 Tab, Refills: 0      chlorthalidone (HYGROTEN) 25 mg tablet Take 1 Tab by mouth daily. Qty: 30 Tab, Refills: 0      guaiFENesin ER (MUCINEX) 600 mg ER tablet Take 1 Tab by mouth two (2) times a day. Qty: 8 Tab, Refills: 0         CONTINUE these medications which have NOT CHANGED    Details   buPROPion XL (WELLBUTRIN XL) 150 mg tablet Take 150 mg by mouth nightly. cloNIDine (CATAPRES) 0.1 mg/24 hr ptwk 1 Patch by TransDERmal route Every Friday. diclofenac EC (VOLTAREN) 50 mg EC tablet Take 50 mg by mouth daily. Indications: spinal stenosis      estradiol (ESTRACE) 1 mg tablet Take 1 mg by mouth nightly. HYDROcodone-acetaminophen (NORCO) 5-325 mg per tablet Take 0.5 Tabs by mouth every eight (8) hours as needed for Pain.      potassium chloride SR (KLOR-CON 10) 10 mEq tablet Take 10 mEq by mouth daily. Takes with triamterene/hydrochlorothiazide      predniSONE (DELTASONE) 5 mg tablet Take 7.5 mg by mouth daily. Indications: pain from spinal stenosis      famotidine (PEPCID) 20 mg tablet Take 20 mg by mouth daily. Takes with diclofenac      levothyroxine (SYNTHROID) 112 mcg tablet Take 112 mcg by mouth Daily (before breakfast). fluticasone-salmeterol (ADVAIR DISKUS) 100-50 mcg/dose diskus inhaler Take 1 Puff by inhalation two (2) times a day. cyanocobalamin (VITAMIN B-12) 1,000 mcg/mL injection 1,000 mcg by IntraMUSCular route every thirty (30) days. Cetirizine (ZYRTEC) 10 mg cap Take 1 Cap by mouth nightly. fluticasone (FLONASE) 50 mcg/actuation nasal spray 2 Sprays by Both Nostrils route nightly.          STOP taking these medications       azithromycin (ZITHROMAX) 250 mg tablet Comments:   Reason for Stopping:         carvedilol (COREG) 12.5 mg tablet Comments:   Reason for Stopping:         dilTIAZem ER (CARDIZEM LA) 240 mg Tb24 tablet Comments:   Reason for Stopping:         triamterene-hydroCHLOROthiazide (MAXZIDE) 37.5-25 mg per tablet Comments:   Reason for Stopping:             Activity: Activity as tolerated  Diet: Resume previous diet  Wound Care: None needed    Follow-up with Kirill Haddad MD in 1 week.   Follow-up tests/labs none    Approximate time spent in patient care on day of discharge: 33 minutes    Signed:  Venkat Morocho MD  4/20/2019  9:51 AM

## 2019-04-20 NOTE — PROGRESS NOTES
Klever Awan [address] [place] [address] 
[address] [facility], [place], [id number] [place] [address] 
[phone] Medical Progress Note NAME:         Kristina Dolan :        1940 MRM:        [id number] Date:          2019 Subjective: f/u syncope No acute events. Worked with PT yesterday with no further episodes of syncope. Denies chest pain, sob, dizziness, vision changes. Feels ready for discharge today Objective: 
 
Vital Signs: 
 
Visit Vitals /71 (BP 1 Location: Left arm, BP Patient Position: At rest) Pulse 82 Temp 97.9 °F (36.6 °C) Resp 20 Ht 5' 3\" (1.6 m) Wt 54 kg (119 lb) SpO2 97% Breastfeeding? No  
BMI 21.08 kg/m² Intake/Output Summary (Last 24 hours) at 2019 5373 Last data filed at 2019 2240 Gross per 24 hour Intake 720 ml Output 3450 ml Net -2730 ml Physical Examination: 
 
General:   Well looking and nourished patient in no acute distress Eyes:   pink conjunctivae, PERRLA with no discharge. ENT:   no ottorrhea or rhinorrhea with moist mucous membranes Neck: no masses, thyroid non-tender and trachea central. 
Pulm:  generally decreased breath sounds without crackles. + wheezes Card:  no JVD or murmurs, has regular and normal S1, S2 without thrills, bruits or peripheral edema Abd:  Soft, non-tender, non-distended, normoactive bowel sounds with no palpable organomegaly Skin:  No rashes, bruising or ulcers. Neuro: Awake and alert. Generally a non focal exam. Follows commands appropriately Psych:  Has a good insight and is oriented x 3 Current Facility-Administered Medications Medication Dose Route Frequency  budesonide (PULMICORT) 500 mcg/2 ml nebulizer suspension  500 mcg Nebulization BID RT  
 arformoterol (BROVANA) neb solution 15 mcg  15 mcg Nebulization BID RT  
 amLODIPine (NORVASC) tablet 10 mg  10 mg Oral DAILY  chlorthalidone (HYGROTEN) tablet 25 mg  25 mg Oral DAILY  hydrALAZINE (APRESOLINE) 20 mg/mL injection 10 mg  10 mg IntraVENous Q6H PRN  
 cloNIDine HCl (CATAPRES) tablet 0.1 mg  0.1 mg Oral Q8H  
 sodium chloride (NS) flush 5-40 mL  5-40 mL IntraVENous Q8H  
 sodium chloride (NS) flush 5-40 mL  5-40 mL IntraVENous PRN  
 acetaminophen (TYLENOL) tablet 650 mg  650 mg Oral Q4H PRN  
 naloxone (NARCAN) injection 0.4 mg  0.4 mg IntraVENous PRN  
 ondansetron (ZOFRAN) injection 4 mg  4 mg IntraVENous Q4H PRN  
 enoxaparin (LOVENOX) injection 40 mg  40 mg SubCUTAneous Q24H  
 HYDROcodone-acetaminophen (NORCO) 5-325 mg per tablet 0.5 Tab  0.5 Tab Oral Q8H PRN  pantoprazole (PROTONIX) tablet 40 mg  40 mg Oral ACB  buPROPion XL (WELLBUTRIN XL) tablet 150 mg  150 mg Oral QHS  cetirizine (ZYRTEC) tablet 10 mg  10 mg Oral QHS  diclofenac EC (VOLTAREN) tablet 50 mg  50 mg Oral DAILY  estradiol (ESTRACE) tablet 1 mg  1 mg Oral QHS  fluticasone propionate (FLONASE) 50 mcg/actuation nasal spray 2 Spray  2 Spray Both Nostrils QHS  predniSONE (DELTASONE) tablet 7.5 mg  7.5 mg Oral DAILY  potassium chloride SR (KLOR-CON 10) tablet 20 mEq  20 mEq Oral DAILY  levothyroxine (SYNTHROID) tablet 112 mcg  112 mcg Oral ACB  guaiFENesin (ROBITUSSIN) 100 mg/5 mL oral liquid 100 mg  100 mg Oral Q4H PRN  
 albuterol 5mg / ipratropium 0.5mg neb solution  1 Dose Nebulization Q6H PRN Laboratory data and review: 
 
Recent Labs 04/19/19 
0335 04/18/19 
1119 WBC 7.3 10.2 HGB 9.4* 10.1* HCT 29.1* 31.1*  
 215 Recent Labs 04/19/19 
0335 04/18/19 
1119  139  
K 3.9 3.2*  
* 109* CO2 21 24 GLU 80 113* BUN 16 26* CREA 0.91 1.24* CA 7.9* 8.0*  
MG  --  1.7 ALB 2.9* 3.2* SGOT 16 15 ALT 20 20 No components found for: [place] Other Diagnostics: 
 
Telemetry reviewed by me:   normal sinus rhythm Assessment and Plan: Syncope and collapse (4/18/2019) /  Bradycardia (4/18/2019): likely due to multiple and likely interacting medications - on Coreg, Clonidine, high dose Diltiazem. Echocardiogram showed a normal LV function. Not orthostatic. Bradycardia has resolved with medication adjustments. Tolerated PT prior to discharge with no syncope/dizziness.  
  
Essential hypertension (5/20/2017): BP increased. Started norvasc, clonidine and chlorthalidone with some improvement. Pt to follow up with cardiology for further adjustments. Acquired hypothyroidism (5/20/2017): TSH suppressed but normal free T4. Continue levothyroxine 
  
GERD (gastroesophageal reflux disease) (5/20/2017): she needs a PPi. On Prednisone, potassium and NSAIDs 
  
Asthma, mild intermittent (4/18/2019): now wheezing. Give Brovana, Pulmicort and a dose of solumedrol. Start claritin. Follow pro BNP. Consult pulm if no improvement 
  
Hypokalemia (4/18/2019): due to diuretic use. Repleted.   
  
Lumbar stenosis with neurogenic claudication (5/15/2017): severe. Resume Norco, prednisone and monitor Total time spent for the patient's care: 35 Minutes Care Plan discussed with: Patient, Care Manager, Nursing Staff and Consultant/Specialist 
 
Discussed:  Care Plan and D/C Planning Prophylaxis:  Lovenox Anticipated Disposition:  Home w/Family 
        
___________________________________________________ Attending Physician:   Lexi Jean-Baptiste MD

## 2019-04-20 NOTE — DISCHARGE INSTRUCTIONS
HOSPITALIST DISCHARGE INSTRUCTIONS  NAME: Grace Avery   :  1940   MRN:  [id number]     Date/Time:  2019 9:49 AM    ADMIT DATE: 2019     DISCHARGE DATE: 2019     ADMITTING DIAGNOSIS:  Hypertension    DISCHARGE DIAGNOSIS:  As above    MEDICATIONS:    · It is important that you take the medication exactly as they are prescribed. · Keep your medication in the bottles provided by the pharmacist and keep a list of the medication names, dosages, and times to be taken in your wallet. · Do not take other medications without consulting your doctor. Pain Management: per above medications         Recommended diet:  Resume previous diet    Recommended activity: Activity as tolerated    If you experience any of the following symptoms then please call your primary care physician or return to the emergency room if you cannot get hold of your doctor:  Fever, chills, nausea, vomiting, diarrhea, change in mentation, falling, bleeding, shortness of breath    Follow Up:  Dr. Ida Jacobo MD  you are to call and set up an appointment to see them in 1 week. Information obtained by :  I understand that if any problems occur once I am at home I am to contact my physician. I understand and acknowledge receipt of the instructions indicated above.                                                                                                                                            Physician's or R.N.'s Signature                                                                  Date/Time                                                                                                                                              Patient or Representative Signature                                                          Date/Time

## 2019-04-20 NOTE — PROGRESS NOTES
Isra Aguilar MD. [facility] Patient: Jackson Ta : 1940 Today's Date: 2019 CARDIOLOGY PROGRESS NOTE 
S: BP was high overnight; SBP > 200 - clonidine added this morning. She feels well overall and feels ready to go home. No dizziness or CP. O:  
Physical Exam: 
Visit Vitals /69 (BP 1 Location: Right arm, BP Patient Position: At rest) Pulse 60 Temp 98 °F (36.7 °C) Resp 19 Ht 5' 3\" (1.6 m) Wt 119 lb (54 kg) SpO2 95% Breastfeeding? No  
BMI 21.08 kg/m² Patient appears generally well, mood and affect are appropriate and pleasant. HEENT:  Hearing intact, non-icteric, normocephalic, atraumatic. Neck Exam: Supple Lung Exam: Clear to auscultation, even breath sounds. Cardiac Exam: Regular rate and rhythm with no murmur Abdomen: Soft, non-tender Extremities: MAW, No lower extremity edema. Psych: Appropriate affect Neuro - Grossly intact Review of Symptoms: 
Constitutional: Negative for fever HEENT: Negative for vision changes. Respiratory: Negative for productive cough Cardiovascular: Negative for syncope Gastrointestinal: Negative for abdominal pain, melena Genitourinary: Negative for dysuria Skin: Negative for rash Heme: No problems bleeding. Neuro - no speech changes or focal weaknesses LABS / OTHER STUDIES:  
 
No results found for this or any previous visit (from the past 24 hour(s)). CARDIAC DIAGNOSTICS:  
 
Cardiac Evaluation Includes: 
 
Echo 19 - LVEF 55-60%, Mod MR, RVSP 41 ASSESSMENT AND PLAN:  
 
Assessment and Plan: 
67 yo came if for syncope and HR was 40's. Was on diltiazem, coreg and clonidine PTA. 1) Syncope and hypotension 
- HR stable off of AV Shakira agents 2) Wheezing 19 
- CXR clear - ProBNP 1003--> Bumex given and she feels well now - Also treated for asthma  
  
3) Essential hypertension - Now on Norvasc, chlorthalidone; Had to resume clonidine as BP remained very high - Avoid AV tamara agents (at least in combination at high doses) 4) Mod MR - follow as outpatient 5) OK to DC home and my office will arrange FU. Ginette Nichole MD, [facility] 9 [facility] [address] [place] [address], [address]  [place] [address] [address] Ph: [phone]   Ph [phone]

## 2019-04-20 NOTE — PROGRESS NOTES
5670- Patient AxOx4 In bed resting- requesting PRN robitussin for cough.  /93 HR 85- notified Dr. Manny Downs, orders to be placed by MD

## 2019-04-29 ENCOUNTER — TELEPHONE (OUTPATIENT)
Dept: CARDIOLOGY CLINIC | Age: 79
End: 2019-04-29

## 2019-04-29 NOTE — TELEPHONE ENCOUNTER
Patient states she saw Dr. Paulette Garcia in the hospital. She was put on Norvasc, but she states it makes her really sick to her stomach and makes her sleepy. She is calling to ask if there any alternatives for her to take or what Dr. Paulette Garcia would like her to do.      She can be reached at [phone]

## 2019-04-29 NOTE — TELEPHONE ENCOUNTER
Hospital discharge pt to be seen 5/9/19 in office. She takes Amodipine 10 mg . What would you like to change to ? Have NP see her?

## 2019-04-30 NOTE — TELEPHONE ENCOUNTER
Pt called again f/u on if she can stop taking the Norvast medication. She stated she stopped already but would like to double check.   Phone #[phone]  Thanks

## 2019-05-09 ENCOUNTER — OFFICE VISIT (OUTPATIENT)
Dept: CARDIOLOGY CLINIC | Age: 79
End: 2019-05-09

## 2019-05-09 VITALS
HEART RATE: 80 BPM | WEIGHT: 121 LBS | HEIGHT: 63 IN | BODY MASS INDEX: 21.44 KG/M2 | DIASTOLIC BLOOD PRESSURE: 82 MMHG | SYSTOLIC BLOOD PRESSURE: 180 MMHG

## 2019-05-09 DIAGNOSIS — T50.905A DRUG-INDUCED BRADYCARDIA: ICD-10-CM

## 2019-05-09 DIAGNOSIS — R55 SYNCOPE, UNSPECIFIED SYNCOPE TYPE: ICD-10-CM

## 2019-05-09 DIAGNOSIS — R00.1 DRUG-INDUCED BRADYCARDIA: ICD-10-CM

## 2019-05-09 DIAGNOSIS — I10 ESSENTIAL HYPERTENSION: Primary | ICD-10-CM

## 2019-05-09 RX ORDER — MONTELUKAST SODIUM 10 MG/1
10 TABLET ORAL DAILY
COMMUNITY

## 2019-05-09 RX ORDER — POTASSIUM CHLORIDE 20 MEQ/1
TABLET, EXTENDED RELEASE ORAL DAILY
COMMUNITY
End: 2021-06-29

## 2019-05-09 RX ORDER — NEBIVOLOL 2.5 MG/1
2.5 TABLET ORAL DAILY
Qty: 30 TAB | Refills: 5 | Status: SHIPPED | OUTPATIENT
Start: 2019-05-09 | End: 2019-11-03 | Stop reason: SDUPTHER

## 2019-05-09 NOTE — PROGRESS NOTES
Visit Vitals  /82   Pulse 80   Ht 5' 3\" (1.6 m)   Wt 121 lb (54.9 kg)   BMI 21.43 kg/m²     Medication changes for this OV VO Dr Sandria Moritz

## 2019-05-09 NOTE — PROGRESS NOTES
LAST OFFICE VISIT : Visit date not found      No diagnosis found. Kristina Dolan is a 66 y.o. female referred for hospital follow up; discharged on 4/20/2019      I have personally obtained the history from the patient. She was seen in the hospital for {dizziness nd syncope. Her BP this am was SBP of 98. She went to the dentist and they felt her HR was in the 40's. Then passed out twice. She has had long hx of hypertension and her HR had been low for some time in the 50's. NO sx's of presyncope until placed on clonidine . she states clonidine as helped with her BP.-today,DM,FMHx,+postmenopausal, HTN   Denies chest pain or SOB. she has had a upper respiratoy infection. Has been on prednisone and has significant wheezing today.       HISTORY OF PRESENTING ILLNESS     She reports she is feeling well; with no interval complaints. They recently returned from a trip to [place]. The patient denies chest pain/ shortness of breath, orthopnea, PND, LE edema, palpitations, syncope, presyncope or fatigue.          ACTIVE PROBLEM LIST     Patient Active Problem List    Diagnosis Date Noted    Syncope and collapse 04/18/2019    Bradycardia 04/18/2019    Asthma, mild intermittent 04/18/2019    Hypokalemia 04/18/2019    Essential hypertension 05/20/2017    Acquired hypothyroidism 05/20/2017    Asthma 05/20/2017    GERD (gastroesophageal reflux disease) 05/20/2017    Osteoporosis 05/20/2017    Acute pulmonary embolism ([address] 05/19/2017    Lumbar stenosis with neurogenic claudication 05/15/2017           PAST MEDICAL HISTORY     Past Medical History:   Diagnosis Date    Anemia     Arthritis     osteoporosis    Asthma     Broken ribs 2006    fell    Cancer ([address]     melanoma     GERD (gastroesophageal reflux disease)     Hypertension     Migraines     Osteoporosis     Rheumatic fever     x2 without residual    Thyroid disease            PAST SURGICAL HISTORY     Past Surgical History:   Procedure Laterality Date    HX ABOVE KNEE AMPUTATION Left 05/2015    remove Tillman's neuroma    HX BREAST BIOPSY  1972    HX CATARACT REMOVAL Bilateral 2009, 2011    HX GYN  1969    Hysterectomy    HX HEENT  1996    sinus surgery    HX HEENT      deviated septum    HX ORTHOPAEDIC Left 2006    Tarsal Tunnel Release     HX ORTHOPAEDIC Right 2010,2016    basal joint surgery    HX ORTHOPAEDIC Left 2012    basal joint surgery     HX ORTHOPAEDIC      back surgery     HX OTHER SURGICAL      TVT Sling    HX OTHER SURGICAL      Tillman's Neuroma removal     HX SEPTOPLASTY      HX TONSIL AND ADENOIDECTOMY      HX TONSILLECTOMY  1946    HX UROLOGICAL  2010    TVT sling          ALLERGIES     Allergies   Allergen Reactions    Ace Inhibitors Angioedema    Arb-Angiotensin Receptor Antagonist Other (comments)     Told not to take by provider due to severe reaction to ace inhibitors          FAMILY HISTORY     Family History   Problem Relation Age of Onset    Hypertension Mother     Stroke Mother         TIA    Heart Disease Mother     Alzheimer Mother     Hypertension Father     Asthma Father     Colon Cancer Father     COPD Father     Asthma Brother     negative for cardiac disease       SOCIAL HISTORY     Social History     Socioeconomic History    Marital status:      Spouse name: Not on file    Number of children: Not on file    Years of education: Not on file    Highest education level: Not on file   Tobacco Use    Smoking status: Never Smoker    Smokeless tobacco: Never Used   Substance and Sexual Activity    Alcohol use: No    Drug use: No         MEDICATIONS     Current Outpatient Medications   Medication Sig    potassium chloride (K-DUR, KLOR-CON) 20 mEq tablet Take  by mouth daily.  montelukast (SINGULAIR) 10 mg tablet Take 10 mg by mouth daily.  chlorthalidone (HYGROTEN) 25 mg tablet Take 1 Tab by mouth daily.     buPROPion XL (WELLBUTRIN XL) 150 mg tablet Take 150 mg by mouth nightly.  cloNIDine (CATAPRES) 0.1 mg/24 hr ptwk 1 Patch by TransDERmal route Every Friday.  diclofenac EC (VOLTAREN) 50 mg EC tablet Take 50 mg by mouth daily. Indications: spinal stenosis    estradiol (ESTRACE) 1 mg tablet Take 1 mg by mouth nightly.  HYDROcodone-acetaminophen (NORCO) 5-325 mg per tablet Take 0.5 Tabs by mouth every eight (8) hours as needed for Pain.  predniSONE (DELTASONE) 5 mg tablet Take 7.5 mg by mouth daily. Indications: pain from spinal stenosis    famotidine (PEPCID) 20 mg tablet Take 20 mg by mouth daily. Takes with diclofenac    levothyroxine (SYNTHROID) 112 mcg tablet Take 112 mcg by mouth Daily (before breakfast).  fluticasone-salmeterol (ADVAIR DISKUS) 100-50 mcg/dose diskus inhaler Take 1 Puff by inhalation two (2) times a day.  cyanocobalamin (VITAMIN B-12) 1,000 mcg/mL injection 1,000 mcg by IntraMUSCular route every thirty (30) days.  Cetirizine (ZYRTEC) 10 mg cap Take 1 Cap by mouth nightly.  fluticasone (FLONASE) 50 mcg/actuation nasal spray 2 Sprays by Both Nostrils route nightly. No current facility-administered medications for this visit. I have reviewed the nurses notes, vitals, problem list, allergy list, medical history, family, social history and medications. REVIEW OF SYMPTOMS      General: Pt denies excessive weight gain or loss. Pt is able to conduct ADL's  HEENT: Denies blurred vision, headaches, hearing loss, epistaxis and difficulty swallowing. Respiratory: Denies cough, congestion, shortness of breath, HOWE, wheezing or stridor.   Cardiovascular: Denies precordial pain, palpitations, edema or PND  Gastrointestinal: Denies poor appetite, indigestion, abdominal pain or blood in stool  Genitourinary: Denies hematuria, dysuria, increased urinary frequency  Musculoskeletal: Denies joint pain or swelling from muscles or joints  Neurologic: Denies tremor, paresthesias, headache, or sensory motor disturbance  Psychiatric: Denies confusion, insomnia, depression  Integumentray: Denies rash, itching or ulcers. Hematologic: Denies easy bruising, bleeding     PHYSICAL EXAMINATION      Vitals:    05/09/19 1046   BP: 180/82   Pulse: 80   Weight: 121 lb (54.9 kg)   Height: 5' 3\" (1.6 m)   BP recheck: 192/82    General: Well developed, in no acute distress. HEENT: No jaundice, oral mucosa moist, no oral ulcers  Neck: Supple, no stiffness, no lymphadenopathy, supple  Heart:  Normal S1/S2 negative S3 or S4. Regular, no murmur, gallop or rub, no jugular venous distention  Respiratory: Clear bilaterally x 4, no wheezing or rales  Abdomen:   Soft, non-tender, bowel sounds are active.   Extremities:  No edema, normal cap refill, no cyanosis. Musculoskeletal: No clubbing, no deformities  Neuro: A&Ox3, speech clear, gait stable, cooperative, no focal neurologic deficits  Skin: Skin color is normal. No rashes or lesions. Non diaphoretic, moist.  Vascular: 2+ pulses symmetric in all extremities        EKG:      DIAGNOSTIC DATA     1. Echo  4/18/19- EF 56-60%, AV sclerosis with no significant stenosis, MR mod, TR mild, mild/mod Pulm HTN, mild PI    2. CTA chest  5/28/17- Persistent small left lower lobe pulmonary arterial branch embolus. No new emboli or other acute findings identified    3.  Lipids  3/9/16- , HDL 94, LDL 77, LDL-P 945          LABORATORY DATA            Lab Results   Component Value Date/Time    WBC 7.3 04/19/2019 03:35 AM    HGB 9.4 (L) 04/19/2019 03:35 AM    HCT 29.1 (L) 04/19/2019 03:35 AM    PLATELET 656 90/38/9156 03:35 AM    MCV 91.8 04/19/2019 03:35 AM      Lab Results   Component Value Date/Time    Sodium 141 04/19/2019 03:35 AM    Potassium 3.9 04/19/2019 03:35 AM    Chloride 113 (H) 04/19/2019 03:35 AM    CO2 21 04/19/2019 03:35 AM    Anion gap 7 04/19/2019 03:35 AM    Glucose 80 04/19/2019 03:35 AM    BUN 16 04/19/2019 03:35 AM    Creatinine 0.91 04/19/2019 03:35 AM    BUN/Creatinine ratio 18 04/19/2019 03:35 AM GFR est AA >60 04/19/2019 03:35 AM    GFR est non-AA 60 (L) 04/19/2019 03:35 AM    Calcium 7.9 (L) 04/19/2019 03:35 AM    Bilirubin, total 0.4 04/19/2019 03:35 AM    AST (SGOT) 16 04/19/2019 03:35 AM    Alk. phosphatase 62 04/19/2019 03:35 AM    Protein, total 6.5 04/19/2019 03:35 AM    Albumin 2.9 (L) 04/19/2019 03:35 AM    Globulin 3.6 04/19/2019 03:35 AM    A-G Ratio 0.8 (L) 04/19/2019 03:35 AM    ALT (SGPT) 20 04/19/2019 03:35 AM           ASSESSMENT/RECOMMENDATIONS:.      ASSESSMENT/RECOMMENDATIONS:   1)Syncope and hypotension  - she had been on large dose of Cardizem, after eliminating this her HR came up appropriately. BP came up as well        2) Essential Hypertension:   - BP on recheck was significantly elevated in office today; Will start on Bystolic on 2.5 mg a day if she as any wheezing will stop the medication d/t history of asthma.    - Return in 10- 14  days for BP check ; Will bring in her cuff to correlate manual and electronic readings. Goal is <140  - she is on prednisone 7.5 mg;  may be contributing to her BP issues      3) · Echo ; Aortic valve sclerosis with no significant stenosis. Plan to re echo in the future but nothing is needed at this time     4) dyslipidemia:   -profile is good No action needed. 4. Follow up with me in 6month           Orders Placed This Encounter    potassium chloride (K-DUR, KLOR-CON) 20 mEq tablet     Sig: Take  by mouth daily.  montelukast (SINGULAIR) 10 mg tablet     Sig: Take 10 mg by mouth daily. I have discussed the diagnosis with  Kayla Campa and the intended plan as seen in the above orders. Questions were answered concerning future plans. I have discussed medication side effects and warnings with the patient as well. Thank you,  Jorge Lan MD for involving me in the care of  Kayla Campa. Please do not hesitate to contact me for further questions/concerns.         Written by Fred Lombardo, as dictated by Cooper Kowalski MD.       Og Castillo. Marian Merritt MD, [facility]    Patient Care Team:  Johanny Alicia MD as PCP - General (Internal Medicine)    [address], [address] [place] [address]     [address]      [phone] / [phone] Fax

## 2019-05-17 RX ORDER — CHLORTHALIDONE 25 MG/1
25 TABLET ORAL DAILY
Qty: 30 TAB | Refills: 6 | Status: SHIPPED | OUTPATIENT
Start: 2019-05-17 | End: 2020-02-20

## 2019-05-22 ENCOUNTER — CLINICAL SUPPORT (OUTPATIENT)
Dept: CARDIOLOGY CLINIC | Age: 79
End: 2019-05-22

## 2019-05-22 VITALS — HEART RATE: 64 BPM | DIASTOLIC BLOOD PRESSURE: 75 MMHG | SYSTOLIC BLOOD PRESSURE: 145 MMHG

## 2019-05-22 DIAGNOSIS — I10 ESSENTIAL HYPERTENSION: Primary | ICD-10-CM

## 2019-11-08 RX ORDER — NEBIVOLOL HYDROCHLORIDE 2.5 MG/1
TABLET ORAL
Qty: 30 TAB | Refills: 2 | Status: SHIPPED | OUTPATIENT
Start: 2019-11-08 | End: 2020-01-31

## 2019-12-03 ENCOUNTER — OFFICE VISIT (OUTPATIENT)
Dept: CARDIOLOGY CLINIC | Age: 79
End: 2019-12-03

## 2019-12-03 VITALS
HEIGHT: 63 IN | HEART RATE: 62 BPM | SYSTOLIC BLOOD PRESSURE: 144 MMHG | BODY MASS INDEX: 21.97 KG/M2 | DIASTOLIC BLOOD PRESSURE: 68 MMHG | OXYGEN SATURATION: 98 % | WEIGHT: 124 LBS | RESPIRATION RATE: 16 BRPM

## 2019-12-03 DIAGNOSIS — I10 ESSENTIAL HYPERTENSION: Primary | ICD-10-CM

## 2019-12-03 NOTE — LETTER
12/3/19 Patient: Indigo Starr YOB: 1940 Date of Visit: 12/3/2019 Whitney Coreas MD 
[address] [facility] [address] [address] VIA Facsimile: [phone] Dear Whitney Coreas MD, Thank you for referring Ms. Chanell Perez to [facility] for evaluation. My notes for this consultation are attached. If you have questions, please do not hesitate to call me. I look forward to following your patient along with you.  
 
 
Sincerely, 
 
Ruben Sullivan MD

## 2019-12-03 NOTE — PROGRESS NOTES
Chief Complaint   Patient presents with    Follow-up    Hypertension    Valvular Heart Disease     MR     Visit Vitals  /68 (BP 1 Location: Left arm, BP Patient Position: Sitting)   Pulse 62   Resp 16   Ht 5' 3\" (1.6 m)   Wt 124 lb (56.2 kg)   SpO2 98%   BMI 21.97 kg/m²     Pt presents in office w/o cardiac complaint. No recent hospital visits. No refills needed at this time.

## 2019-12-03 NOTE — PROGRESS NOTES
LAST OFFICE VISIT : Visit date not found        ICD-10-CM ICD-9-CM   1. Essential hypertension I10 401.9            Kojo Blount is a 78 y.o. female with HTN and dyslipidemia referred for follow up. Cardiac risk factors: dyslipidemia, hypertension, post-menopausal  I have personally obtained the history from the patient. HISTORY OF PRESENTING ILLNESS     Overall the pt states she is doing well. Pt states that her clonidine was recently increased to 0.2 mg. She notes that her home BP's have been in the 318'P systolic. Pt notes that she has been feeling some fatigue but attributed it to lower back pain. Pt states her PCP has been trying to get her off of prednisone. The patient denies chest pain/ shortness of breath, orthopnea, PND, LE edema, palpitations, syncope, presyncope or fatigue.          ACTIVE PROBLEM LIST     Patient Active Problem List    Diagnosis Date Noted    Syncope and collapse 04/18/2019    Bradycardia 04/18/2019    Asthma, mild intermittent 04/18/2019    Hypokalemia 04/18/2019    Essential hypertension 05/20/2017    Acquired hypothyroidism 05/20/2017    Asthma 05/20/2017    GERD (gastroesophageal reflux disease) 05/20/2017    Osteoporosis 05/20/2017    Acute pulmonary embolism ([facility] [address] 05/19/2017    Lumbar stenosis with neurogenic claudication 05/15/2017           PAST MEDICAL HISTORY     Past Medical History:   Diagnosis Date    Anemia     Arthritis     osteoporosis    Asthma     Broken ribs 2006    fell    Cancer ([facility] [address]     melanoma     GERD (gastroesophageal reflux disease)     Hypertension     Migraines     Osteoporosis     Rheumatic fever     x2 without residual    Thyroid disease            PAST SURGICAL HISTORY     Past Surgical History:   Procedure Laterality Date    HX ABOVE KNEE AMPUTATION Left 05/2015    remove Tillman's neuroma    HX BREAST BIOPSY  1972    HX CATARACT REMOVAL Bilateral 2009, 2011    HX GYN  1969    Hysterectomy    HX HEENT  1996 sinus surgery    HX HEENT      deviated septum    HX ORTHOPAEDIC Left 2006    Tarsal Tunnel Release     HX ORTHOPAEDIC Right 2010,2016    basal joint surgery    HX ORTHOPAEDIC Left 2012    basal joint surgery     HX ORTHOPAEDIC      back surgery     HX OTHER SURGICAL      TVT Sling    HX OTHER SURGICAL      Tillman's Neuroma removal     HX SEPTOPLASTY      HX TONSIL AND ADENOIDECTOMY      HX TONSILLECTOMY  1946    HX UROLOGICAL  2010    TVT sling          ALLERGIES     Allergies   Allergen Reactions    Ace Inhibitors Angioedema    Arb-Angiotensin Receptor Antagonist Other (comments)     Told not to take by provider due to severe reaction to ace inhibitors          FAMILY HISTORY     Family History   Problem Relation Age of Onset    Hypertension Mother     Stroke Mother         TIA    Heart Disease Mother     Alzheimer Mother     Hypertension Father     Asthma Father     Colon Cancer Father     COPD Father     Asthma Brother     negative for cardiac disease       SOCIAL HISTORY     Social History     Socioeconomic History    Marital status:      Spouse name: Not on file    Number of children: Not on file    Years of education: Not on file    Highest education level: Not on file   Tobacco Use    Smoking status: Never Smoker    Smokeless tobacco: Never Used   Substance and Sexual Activity    Alcohol use: No    Drug use: No         MEDICATIONS     Current Outpatient Medications   Medication Sig    BYSTOLIC 2.5 mg tablet TAKE 1 TABLET BY MOUTH ONCE DAILY    chlorthalidone (HYGROTEN) 25 mg tablet Take 1 Tab by mouth daily.  potassium chloride (K-DUR, KLOR-CON) 20 mEq tablet Take  by mouth daily.  montelukast (SINGULAIR) 10 mg tablet Take 10 mg by mouth daily.  buPROPion XL (WELLBUTRIN XL) 150 mg tablet Take 150 mg by mouth nightly.  cloNIDine (CATAPRES) 0.2 mg/24 hr ptwk 1 Patch by TransDERmal route Every Friday.     diclofenac EC (VOLTAREN) 50 mg EC tablet Take 50 mg by mouth two (2) times a day. Indications: spinal stenosis    estradiol (ESTRACE) 1 mg tablet Take 1 mg by mouth nightly.  HYDROcodone-acetaminophen (NORCO) 5-325 mg per tablet Take 0.5 Tabs by mouth every eight (8) hours as needed for Pain.  predniSONE (DELTASONE) 5 mg tablet Take 7.5 mg by mouth daily. Indications: pain from spinal stenosis    famotidine (PEPCID) 20 mg tablet Take 20 mg by mouth daily. Takes with diclofenac    levothyroxine (SYNTHROID) 88 mcg tablet Take 88 mcg by mouth Daily (before breakfast).  fluticasone-salmeterol (ADVAIR DISKUS) 100-50 mcg/dose diskus inhaler Take 1 Puff by inhalation two (2) times a day.  cyanocobalamin (VITAMIN B-12) 1,000 mcg/mL injection 1,000 mcg by IntraMUSCular route every thirty (30) days.  Cetirizine (ZYRTEC) 10 mg cap Take 1 Cap by mouth nightly.  fluticasone (FLONASE) 50 mcg/actuation nasal spray 2 Sprays by Both Nostrils route nightly. No current facility-administered medications for this visit. I have reviewed the nurses notes, vitals, problem list, allergy list, medical history, family, social history and medications. REVIEW OF SYMPTOMS      General: Pt denies excessive weight gain or loss. Pt is able to conduct ADL's  HEENT: Denies blurred vision, headaches, hearing loss, epistaxis and difficulty swallowing. Respiratory: Denies cough, congestion, shortness of breath, HOWE, wheezing or stridor. Cardiovascular: Denies precordial pain, palpitations, edema or PND  Gastrointestinal: Denies poor appetite, indigestion, abdominal pain or blood in stool  Genitourinary: Denies hematuria, dysuria, increased urinary frequency  Musculoskeletal: Denies joint pain or swelling from muscles or joints  Neurologic: Denies tremor, paresthesias, headache, or sensory motor disturbance  Psychiatric: Denies confusion, insomnia, depression  Integumentray: Denies rash, itching or ulcers.   Hematologic: Denies easy bruising, bleeding     PHYSICAL EXAMINATION      Vitals:    12/03/19 1029   BP: 144/68   Pulse: 62   Resp: 16   SpO2: 98%   Weight: 124 lb (56.2 kg)   Height: 5' 3\" (1.6 m)     General: Well developed, in no acute distress. HEENT: No jaundice, oral mucosa moist, no oral ulcers  Neck: Supple, no stiffness, no lymphadenopathy, supple  Heart:  Normal S1/S2 negative S3 or S4. Regular, no murmur, gallop or rub, no jugular venous distention  Respiratory: Clear bilaterally x 4, no wheezing or rales  Abdomen:   Soft, non-tender, bowel sounds are active. Extremities:  No edema, normal cap refill, no cyanosis. Musculoskeletal: No clubbing, no deformities  Neuro: A&Ox3, speech clear, gait stable, cooperative, no focal neurologic deficits  Skin: Skin color is normal. No rashes or lesions. Non diaphoretic, moist.  Vascular: 2+ pulses symmetric in all extremities       DIAGNOSTIC DATA     1. Echo  4/18/19- EF 56-60%, AV sclerosis with no significant stenosis, MR mod, TR mild, mild/mod Pulm HTN, mild PI    2. CTA chest  5/28/17- Persistent small left lower lobe pulmonary arterial branch embolus. No new emboli or other acute findings identified    3.  Lipids  3/9/16- , HDL 94, LDL 77, LDL-P 945          LABORATORY DATA            Lab Results   Component Value Date/Time    WBC 7.3 04/19/2019 03:35 AM    HGB 9.4 (L) 04/19/2019 03:35 AM    HCT 29.1 (L) 04/19/2019 03:35 AM    PLATELET 115 02/28/3386 03:35 AM    MCV 91.8 04/19/2019 03:35 AM      Lab Results   Component Value Date/Time    Sodium 141 04/19/2019 03:35 AM    Potassium 3.9 04/19/2019 03:35 AM    Chloride 113 (H) 04/19/2019 03:35 AM    CO2 21 04/19/2019 03:35 AM    Anion gap 7 04/19/2019 03:35 AM    Glucose 80 04/19/2019 03:35 AM    BUN 16 04/19/2019 03:35 AM    Creatinine 0.91 04/19/2019 03:35 AM    BUN/Creatinine ratio 18 04/19/2019 03:35 AM    GFR est AA >60 04/19/2019 03:35 AM    GFR est non-AA 60 (L) 04/19/2019 03:35 AM    Calcium 7.9 (L) 04/19/2019 03:35 AM    Bilirubin, total 0.4 04/19/2019 03:35 AM    AST (SGOT) 16 04/19/2019 03:35 AM    Alk. phosphatase 62 04/19/2019 03:35 AM    Protein, total 6.5 04/19/2019 03:35 AM    Albumin 2.9 (L) 04/19/2019 03:35 AM    Globulin 3.6 04/19/2019 03:35 AM    A-G Ratio 0.8 (L) 04/19/2019 03:35 AM    ALT (SGPT) 20 04/19/2019 03:35 AM           ASSESSMENT/RECOMMENDATIONS:.      1. HTN  - Blood pressure is elevated. Blood pressure recheck: 160/72. She has recently had her clonidine patch dose increased to 0.2 mg. She is having some back pain that may be precipitated elevated BP as well as the Prednisone. Will have her return in about 1 month; thus far all of her readings at home have been within the 130 range. 3. Aortic valve sclerosis with no significant stenosis  - There is no plans for an echo at this time. Will continue to monitor in the future, but it was not significant enough to warrant frequent echos. 4. Cholesterol profile  - Last lipids in 2016 were at goal.   5. Return in 6 months or PRN. No orders of the defined types were placed in this encounter. Follow-up and Dispositions  ·   Return in about 6 months (around 6/3/2020). I have discussed the diagnosis with  Kan Robert and the intended plan as seen in the above orders. Questions were answered concerning future plans. I have discussed medication side effects and warnings with the patient as well. Thank you,  Haile Akhtar MD for involving me in the care of  Kan Robert. Please do not hesitate to contact me for further questions/concerns. Written by Teetee Suarez, as dictated by Dayanna Childers MD.     Comfort Case MD, [facility] [place]    Patient Care Team:  Haile Ahktar MD as PCP - General (Internal Medicine)    [address], [address] [place] [address] Drive      [phone] / [phone] Fax

## 2020-01-07 ENCOUNTER — OFFICE VISIT (OUTPATIENT)
Dept: CARDIOLOGY CLINIC | Age: 80
End: 2020-01-07

## 2020-01-07 VITALS
DIASTOLIC BLOOD PRESSURE: 68 MMHG | SYSTOLIC BLOOD PRESSURE: 130 MMHG | WEIGHT: 122 LBS | OXYGEN SATURATION: 97 % | HEIGHT: 63 IN | HEART RATE: 66 BPM | BODY MASS INDEX: 21.62 KG/M2

## 2020-01-07 DIAGNOSIS — I10 ESSENTIAL HYPERTENSION: Primary | ICD-10-CM

## 2020-01-07 NOTE — PROGRESS NOTES
Dylan Marshall is a 78 y.o. female    Chief Complaint   Patient presents with    Hypertension       Chest pain no  SOB no  Dizziness no  Swelling no  Recent hospital visit no  Refills no  Visit Vitals  /68 (BP 1 Location: Left arm, BP Patient Position: Sitting)   Pulse 66   Ht 5' 3\" (1.6 m)   Wt 122 lb (55.3 kg)   SpO2 97%   BMI 21.61 kg/m²

## 2020-01-07 NOTE — PROGRESS NOTES
LAST OFFICE VISIT : Visit date not found      No diagnosis found. Margarita Morataya is a 78 y.o. female with HTN and dyslipidemia referred for follow up. Cardiac risk factors: dyslipidemia, hypertension, post-menopausal  I have personally obtained the history from the patient. HISTORY OF PRESENTING ILLNESS     Pt returns for a BP check today. She had been on prednisone and that might have precipitated the elevation of her BP. Her systolic readings have been between 120 and 140. The patient denies chest pain/ shortness of breath, orthopnea, PND, LE edema, palpitations, syncope, presyncope or fatigue.          ACTIVE PROBLEM LIST     Patient Active Problem List    Diagnosis Date Noted    Syncope and collapse 04/18/2019    Bradycardia 04/18/2019    Asthma, mild intermittent 04/18/2019    Hypokalemia 04/18/2019    Essential hypertension 05/20/2017    Acquired hypothyroidism 05/20/2017    Asthma 05/20/2017    GERD (gastroesophageal reflux disease) 05/20/2017    Osteoporosis 05/20/2017    Acute pulmonary embolism ([address] 05/19/2017    Lumbar stenosis with neurogenic claudication 05/15/2017           PAST MEDICAL HISTORY     Past Medical History:   Diagnosis Date    Anemia     Arthritis     osteoporosis    Asthma     Broken ribs 2006    fell    Cancer ([address]     melanoma     GERD (gastroesophageal reflux disease)     Hypertension     Migraines     Osteoporosis     Rheumatic fever     x2 without residual    Thyroid disease            PAST SURGICAL HISTORY     Past Surgical History:   Procedure Laterality Date    HX ABOVE KNEE AMPUTATION Left 05/2015    remove Tillman's neuroma    HX BREAST BIOPSY  1972    HX CATARACT REMOVAL Bilateral 2009, 2011    HX GYN  1969    Hysterectomy    HX HEENT  1996    sinus surgery    HX HEENT      deviated septum    HX ORTHOPAEDIC Left 2006    Tarsal Tunnel Release     HX ORTHOPAEDIC Right 2010,2016    basal joint surgery    HX ORTHOPAEDIC Left 2012    basal joint surgery     HX ORTHOPAEDIC      back surgery     HX OTHER SURGICAL      TVT Sling    HX OTHER SURGICAL      Tillman's Neuroma removal     HX SEPTOPLASTY      HX TONSIL AND ADENOIDECTOMY      HX TONSILLECTOMY  1946    HX UROLOGICAL  2010    TVT sling          ALLERGIES     Allergies   Allergen Reactions    Ace Inhibitors Angioedema    Arb-Angiotensin Receptor Antagonist Other (comments)     Told not to take by provider due to severe reaction to ace inhibitors          FAMILY HISTORY     Family History   Problem Relation Age of Onset    Hypertension Mother     Stroke Mother         TIA    Heart Disease Mother     Alzheimer Mother     Hypertension Father     Asthma Father     Colon Cancer Father     COPD Father     Asthma Brother     negative for cardiac disease       SOCIAL HISTORY     Social History     Socioeconomic History    Marital status:      Spouse name: Not on file    Number of children: Not on file    Years of education: Not on file    Highest education level: Not on file   Tobacco Use    Smoking status: Never Smoker    Smokeless tobacco: Never Used   Substance and Sexual Activity    Alcohol use: No    Drug use: No         MEDICATIONS     Current Outpatient Medications   Medication Sig    BYSTOLIC 2.5 mg tablet TAKE 1 TABLET BY MOUTH ONCE DAILY    chlorthalidone (HYGROTEN) 25 mg tablet Take 1 Tab by mouth daily.  potassium chloride (K-DUR, KLOR-CON) 20 mEq tablet Take  by mouth daily.  montelukast (SINGULAIR) 10 mg tablet Take 10 mg by mouth daily.  buPROPion XL (WELLBUTRIN XL) 150 mg tablet Take 150 mg by mouth nightly.  cloNIDine (CATAPRES) 0.2 mg/24 hr ptwk 1 Patch by TransDERmal route Every Friday.  diclofenac EC (VOLTAREN) 50 mg EC tablet Take 50 mg by mouth two (2) times a day. Indications: spinal stenosis    estradiol (ESTRACE) 1 mg tablet Take 1 mg by mouth nightly.     HYDROcodone-acetaminophen (NORCO) 5-325 mg per tablet Take 0.5 Tabs by mouth every eight (8) hours as needed for Pain.  predniSONE (DELTASONE) 5 mg tablet Take 7.5 mg by mouth daily. Indications: pain from spinal stenosis    famotidine (PEPCID) 20 mg tablet Take 20 mg by mouth daily. Takes with diclofenac    levothyroxine (SYNTHROID) 88 mcg tablet Take 88 mcg by mouth Daily (before breakfast).  fluticasone-salmeterol (ADVAIR DISKUS) 100-50 mcg/dose diskus inhaler Take 1 Puff by inhalation two (2) times a day.  cyanocobalamin (VITAMIN B-12) 1,000 mcg/mL injection 1,000 mcg by IntraMUSCular route every thirty (30) days.  Cetirizine (ZYRTEC) 10 mg cap Take 1 Cap by mouth nightly.  fluticasone (FLONASE) 50 mcg/actuation nasal spray 2 Sprays by Both Nostrils route nightly.  PNV No.40-Iron Fum-FA Cmb No.1 27-1 mg tab Take  by mouth. No current facility-administered medications for this visit. I have reviewed the nurses notes, vitals, problem list, allergy list, medical history, family, social history and medications. REVIEW OF SYMPTOMS      General: Pt denies excessive weight gain or loss. Pt is able to conduct ADL's  HEENT: Denies blurred vision, headaches, hearing loss, epistaxis and difficulty swallowing. Respiratory: Denies cough, congestion, shortness of breath, HOWE, wheezing or stridor. Cardiovascular: Denies precordial pain, palpitations, edema or PND  Gastrointestinal: Denies poor appetite, indigestion, abdominal pain or blood in stool  Genitourinary: Denies hematuria, dysuria, increased urinary frequency  Musculoskeletal: Denies joint pain or swelling from muscles or joints  Neurologic: Denies tremor, paresthesias, headache, or sensory motor disturbance  Psychiatric: Denies confusion, insomnia, depression  Integumentray: Denies rash, itching or ulcers.   Hematologic: Denies easy bruising, bleeding     PHYSICAL EXAMINATION      Vitals:    01/07/20 1128   BP: 130/68   Pulse: 66   SpO2: 97%   Weight: 122 lb (55.3 kg)   Height: 5' 3\" (1.6 m) General: Well developed, in no acute distress. HEENT: No jaundice, oral mucosa moist, no oral ulcers  Neck: Supple, no stiffness, no lymphadenopathy, supple  Heart:  Normal S1/S2 negative S3 or S4. Regular, no murmur, gallop or rub, no jugular venous distention  Respiratory: Clear bilaterally x 4, no wheezing or rales  Abdomen:   Soft, non-tender, bowel sounds are active. Extremities:  No edema, normal cap refill, no cyanosis. Musculoskeletal: No clubbing, no deformities  Neuro: A&Ox3, speech clear, gait stable, cooperative, no focal neurologic deficits  Skin: Skin color is normal. No rashes or lesions. Non diaphoretic, moist.  Vascular: 2+ pulses symmetric in all extremities       DIAGNOSTIC DATA     1. Echo  4/18/19- EF 56-60%, AV sclerosis with no significant stenosis, MR mod, TR mild, mild/mod Pulm HTN, mild PI    2. CTA chest  5/28/17- Persistent small left lower lobe pulmonary arterial branch embolus. No new emboli or other acute findings identified    3. Lipids  3/9/16- , HDL 94, LDL 77, LDL-P 945          LABORATORY DATA            Lab Results   Component Value Date/Time    WBC 7.3 04/19/2019 03:35 AM    HGB 9.4 (L) 04/19/2019 03:35 AM    HCT 29.1 (L) 04/19/2019 03:35 AM    PLATELET 458 84/21/2934 03:35 AM    MCV 91.8 04/19/2019 03:35 AM      Lab Results   Component Value Date/Time    Sodium 141 04/19/2019 03:35 AM    Potassium 3.9 04/19/2019 03:35 AM    Chloride 113 (H) 04/19/2019 03:35 AM    CO2 21 04/19/2019 03:35 AM    Anion gap 7 04/19/2019 03:35 AM    Glucose 80 04/19/2019 03:35 AM    BUN 16 04/19/2019 03:35 AM    Creatinine 0.91 04/19/2019 03:35 AM    BUN/Creatinine ratio 18 04/19/2019 03:35 AM    GFR est AA >60 04/19/2019 03:35 AM    GFR est non-AA 60 (L) 04/19/2019 03:35 AM    Calcium 7.9 (L) 04/19/2019 03:35 AM    Bilirubin, total 0.4 04/19/2019 03:35 AM    AST (SGOT) 16 04/19/2019 03:35 AM    Alk.  phosphatase 62 04/19/2019 03:35 AM    Protein, total 6.5 04/19/2019 03:35 AM Albumin 2.9 (L) 04/19/2019 03:35 AM    Globulin 3.6 04/19/2019 03:35 AM    A-G Ratio 0.8 (L) 04/19/2019 03:35 AM    ALT (SGPT) 20 04/19/2019 03:35 AM           ASSESSMENT/RECOMMENDATIONS:.      1. HTN  - Blood pressure is under good control. BP recheck: 148/70. I don't think we need to increase her medicines at this point. She will follow them at home. Continue low sodium diet. 2. Aortic valve sclerosis with no significant stenosis  - There is no plans for an echo at this time. Will continue to monitor in the future, but it was not significant enough to warrant frequent echos. 3. Cholesterol profile  - Last lipids in 2016 were at goal.  4. Return on previously scheduled appointment      Orders Placed This Encounter    PNV No.40-Iron Fum-FA Cmb No.1 27-1 mg tab     Sig: Take  by mouth. I have discussed the diagnosis with  Jessica Nova and the intended plan as seen in the above orders. Questions were answered concerning future plans. I have discussed medication side effects and warnings with the patient as well. Thank you,  Vaughn Cullen MD for involving me in the care of  Jessica Nova. Please do not hesitate to contact me for further questions/concerns. Written by Nydia Moncada, as dictated by Nely Solares MD.     Anamaria  Naz Pelletier MD, [facility] [place]    Patient Care Team:  Vaughn Cullen MD as PCP - General (Internal Medicine)    [address], [address] [place] [address]     [address]      [phone] / [phone] Fax

## 2020-01-07 NOTE — LETTER
1/7/20 Patient: Elsy Stout YOB: 1940 Date of Visit: 1/7/2020 Wu Adkins MD 
[address] [facility] [address] [id number] VIA Facsimile: [phone] Dear Wu Adkins MD, Thank you for referring Ms. Dafne Alejo to [facility] for evaluation. My notes for this consultation are attached. If you have questions, please do not hesitate to call me. I look forward to following your patient along with you.  
 
 
Sincerely, 
 
Titi Ward MD

## 2020-01-31 RX ORDER — NEBIVOLOL HYDROCHLORIDE 2.5 MG/1
TABLET ORAL
Qty: 30 TAB | Refills: 5 | Status: SHIPPED | OUTPATIENT
Start: 2020-01-31 | End: 2021-06-29

## 2020-02-20 RX ORDER — CHLORTHALIDONE 25 MG/1
TABLET ORAL
Qty: 30 TAB | Refills: 5 | Status: SHIPPED | OUTPATIENT
Start: 2020-02-20 | End: 2021-06-29

## 2020-06-15 NOTE — PROGRESS NOTES
LAST OFFICE VISIT : Visit date not found        ICD-10-CM ICD-9-CM   1. Essential hypertension I10 401.9   2. Syncope, unspecified syncope type R55 780.2   3. Drug-induced bradycardia R00.1 427.89    T50.905A E947.9            Eric Nascimento is a 78 y.o. female with HTN and dyslipidemia referred for follow up. Cardiac risk factors: dyslipidemia, hypertension, post-menopausal  I have personally obtained the history from the patient. HISTORY OF PRESENTING ILLNESS     Pt returns for a BP check today. She had been on prednisone and that might have precipitated the elevation of her BP. Her systolic readings have been between 120 and 140. She is scheduled to have back surgery on Monday. Her blood pressure readings at home have been up to 190 but I think it is related to pain and that is how she feels. Has not noticed any irregularity in her heart rhythm         ATTENTION:   This medical record was transcribed using an electronic medical records/speech recognition system. Although proofread, it may and can contain electronic, spelling and other errors. Corrections may be executed at a later time. Please feel free to contact us for any clarifications as needed.          ACTIVE PROBLEM LIST     Patient Active Problem List    Diagnosis Date Noted    Syncope and collapse 04/18/2019    Bradycardia 04/18/2019    Asthma, mild intermittent 04/18/2019    Hypokalemia 04/18/2019    Essential hypertension 05/20/2017    Acquired hypothyroidism 05/20/2017    Asthma 05/20/2017    GERD (gastroesophageal reflux disease) 05/20/2017    Osteoporosis 05/20/2017    Acute pulmonary embolism ([facility] [address] 05/19/2017    Lumbar stenosis with neurogenic claudication 05/15/2017           PAST MEDICAL HISTORY     Past Medical History:   Diagnosis Date    Anemia     Arthritis     osteoporosis    Asthma     Broken ribs 2006    fell    Cancer ([facility] [address]     melanoma     GERD (gastroesophageal reflux disease)     Hypertension  Migraines     Osteoporosis     Rheumatic fever     x2 without residual    Thyroid disease            PAST SURGICAL HISTORY     Past Surgical History:   Procedure Laterality Date    HX ABOVE KNEE AMPUTATION Left 05/2015    remove Tillman's neuroma    HX BREAST BIOPSY  1972    HX CATARACT REMOVAL Bilateral 2009, 2011    HX GYN  1969    Hysterectomy    HX HEENT  1996    sinus surgery    HX HEENT      deviated septum    HX ORTHOPAEDIC Left 2006    Tarsal Tunnel Release     HX ORTHOPAEDIC Right 2010,2016    basal joint surgery    HX ORTHOPAEDIC Left 2012    basal joint surgery     HX ORTHOPAEDIC      back surgery     HX OTHER SURGICAL      TVT Sling    HX OTHER SURGICAL      Tillman's Neuroma removal     HX SEPTOPLASTY      HX TONSIL AND ADENOIDECTOMY      HX TONSILLECTOMY  1946    HX UROLOGICAL  2010    TVT sling          ALLERGIES     Allergies   Allergen Reactions    Ace Inhibitors Angioedema    Arb-Angiotensin Receptor Antagonist Other (comments)     Told not to take by provider due to severe reaction to ace inhibitors          FAMILY HISTORY     Family History   Problem Relation Age of Onset    Hypertension Mother     Stroke Mother         TIA    Heart Disease Mother     Alzheimer Mother     Hypertension Father     Asthma Father     Colon Cancer Father     COPD Father     Asthma Brother     negative for cardiac disease       SOCIAL HISTORY     Social History     Socioeconomic History    Marital status:      Spouse name: Not on file    Number of children: Not on file    Years of education: Not on file    Highest education level: Not on file   Tobacco Use    Smoking status: Never Smoker    Smokeless tobacco: Never Used   Substance and Sexual Activity    Alcohol use: No    Drug use: No         MEDICATIONS     Current Outpatient Medications   Medication Sig    chlorthalidone (HYGROTEN) 25 mg tablet TAKE ONE TABLET BY MOUTH ONE TIME DAILY     BYSTOLIC 2.5 mg tablet TAKE 1 TABLET BY MOUTH ONCE DAILY    PNV No.40-Iron Fum-FA Cmb No.1 27-1 mg tab Take  by mouth.  potassium chloride (K-DUR, KLOR-CON) 20 mEq tablet Take  by mouth daily.  montelukast (SINGULAIR) 10 mg tablet Take 10 mg by mouth daily.  buPROPion XL (WELLBUTRIN XL) 150 mg tablet Take 150 mg by mouth nightly.  cloNIDine (CATAPRES) 0.2 mg/24 hr ptwk 1 Patch by TransDERmal route Every Friday.  diclofenac EC (VOLTAREN) 50 mg EC tablet Take 50 mg by mouth two (2) times a day. Indications: spinal stenosis    estradiol (ESTRACE) 1 mg tablet Take 1 mg by mouth nightly.  HYDROcodone-acetaminophen (NORCO) 5-325 mg per tablet Take 0.5 Tabs by mouth every eight (8) hours as needed for Pain.  predniSONE (DELTASONE) 5 mg tablet Take 7.5 mg by mouth daily. Indications: pain from spinal stenosis    famotidine (PEPCID) 20 mg tablet Take 20 mg by mouth daily. Takes with diclofenac    levothyroxine (SYNTHROID) 88 mcg tablet Take 88 mcg by mouth Daily (before breakfast).  fluticasone-salmeterol (ADVAIR DISKUS) 100-50 mcg/dose diskus inhaler Take 1 Puff by inhalation two (2) times a day.  cyanocobalamin (VITAMIN B-12) 1,000 mcg/mL injection 1,000 mcg by IntraMUSCular route every thirty (30) days.  Cetirizine (ZYRTEC) 10 mg cap Take 1 Cap by mouth nightly.  fluticasone (FLONASE) 50 mcg/actuation nasal spray 2 Sprays by Both Nostrils route nightly. No current facility-administered medications for this visit. I have reviewed the nurses notes, vitals, problem list, allergy list, medical history, family, social history and medications. REVIEW OF SYMPTOMS      General: Pt denies excessive weight gain or loss. Pt is able to conduct ADL's  HEENT: Denies blurred vision, headaches, hearing loss, epistaxis and difficulty swallowing. Respiratory: Denies cough, congestion, shortness of breath, HOWE, wheezing or stridor.   Cardiovascular: Denies precordial pain, palpitations, edema or PND  Gastrointestinal: Denies poor appetite, indigestion, abdominal pain or blood in stool  Genitourinary: Denies hematuria, dysuria, increased urinary frequency  Musculoskeletal: Denies joint pain or swelling from muscles or joints  Neurologic: Denies tremor, paresthesias, headache, or sensory motor disturbance  Psychiatric: Denies confusion, insomnia, depression  Integumentray: Denies rash, itching or ulcers. Hematologic: Denies easy bruising, bleeding     PHYSICAL EXAMINATION      Vitals:    06/16/20 1048   Weight: 121 lb (54.9 kg)   Height: 5' 3\" (1.6 m)     General: Well developed, in no acute distress. HEENT: No jaundice, oral mucosa moist, no oral ulcers  Neck: Supple, no stiffness, no lymphadenopathy, supple  Heart:  Normal S1/S2 negative S3 or S4. Regular, no murmur, gallop or rub, no jugular venous distention  Respiratory: Clear bilaterally x 4, no wheezing or rales  Abdomen:   Soft, non-tender, bowel sounds are active. Extremities:  No edema, normal cap refill, no cyanosis. Musculoskeletal: No clubbing, no deformities  Neuro: A&Ox3, speech clear, gait stable, cooperative, no focal neurologic deficits  Skin: Skin color is normal. No rashes or lesions. Non diaphoretic, moist.  Vascular: 2+ pulses symmetric in all extremities       DIAGNOSTIC DATA     1. Echo  4/18/19- EF 56-60%, AV sclerosis with no significant stenosis, MR mod, TR mild, mild/mod Pulm HTN, mild PI    2. CTA chest  5/28/17- Persistent small left lower lobe pulmonary arterial branch embolus. No new emboli or other acute findings identified    3.  Lipids  3/9/16- , HDL 94, LDL 77, LDL-P 945        ECG: at [facility] recently     LABORATORY DATA          Lab Results   Component Value Date/Time    WBC 7.3 04/19/2019 03:35 AM    HGB 9.4 (L) 04/19/2019 03:35 AM    HCT 29.1 (L) 04/19/2019 03:35 AM    PLATELET 479 55/45/5410 03:35 AM    MCV 91.8 04/19/2019 03:35 AM      Lab Results   Component Value Date/Time    Sodium 141 04/19/2019 03:35 AM    Potassium 3.9 04/19/2019 03:35 AM    Chloride 113 (H) 04/19/2019 03:35 AM    CO2 21 04/19/2019 03:35 AM    Anion gap 7 04/19/2019 03:35 AM    Glucose 80 04/19/2019 03:35 AM    BUN 16 04/19/2019 03:35 AM    Creatinine 0.91 04/19/2019 03:35 AM    BUN/Creatinine ratio 18 04/19/2019 03:35 AM    GFR est AA >60 04/19/2019 03:35 AM    GFR est non-AA 60 (L) 04/19/2019 03:35 AM    Calcium 7.9 (L) 04/19/2019 03:35 AM    Bilirubin, total 0.4 04/19/2019 03:35 AM    Alk. phosphatase 62 04/19/2019 03:35 AM    Protein, total 6.5 04/19/2019 03:35 AM    Albumin 2.9 (L) 04/19/2019 03:35 AM    Globulin 3.6 04/19/2019 03:35 AM    A-G Ratio 0.8 (L) 04/19/2019 03:35 AM    ALT (SGPT) 20 04/19/2019 03:35 AM           ASSESSMENT/RECOMMENDATIONS:.      1. HTN  - Blood pressure is elevated and will increase norvasc to 10 mg. Will call me Friday to let me know her BP readings before Monday surgery  2. Aortic valve sclerosis with no significant stenosis  - There is no plans for an echo at this time. Will continue to monitor in the future, but it was not significant enough to warrant frequent echos. 3. Cholesterol profile  - Last lipids in 2016 were at goal.  4. Cardiac preoperative risk stratification  -low risk based on subjective criteria. -no further cardiac testing needed. 4. Return on previously scheduled appointment      Orders Placed This Encounter    AMB POC EKG ROUTINE W/ 12 LEADS, INTER & REP     Order Specific Question:   Reason for Exam:     Answer:   htn          Follow-up and Dispositions  ·   Return in about 6 months (around 12/16/2020). I have discussed the diagnosis with  Delia Bean and the intended plan as seen in the above orders. Questions were answered concerning future plans. I have discussed medication side effects and warnings with the patient as well. Thank you,  Андрей Mcneil MD for involving me in the care of  Delia Bean.  Please do not hesitate to contact me for further questions/concerns. Written by El Phoenix, as dictated by Iker Ledesma MD.     Tracy Morales MD, [facility]    Patient Care Team:  Manjeet Hi MD as PCP - General (Internal Medicine)    [address], [address] [place], [place] [address]      [phone] / [phone] Fax

## 2020-06-16 ENCOUNTER — OFFICE VISIT (OUTPATIENT)
Dept: CARDIOLOGY CLINIC | Age: 80
End: 2020-06-16

## 2020-06-16 VITALS
HEART RATE: 58 BPM | BODY MASS INDEX: 21.44 KG/M2 | HEIGHT: 63 IN | SYSTOLIC BLOOD PRESSURE: 154 MMHG | WEIGHT: 121 LBS | OXYGEN SATURATION: 96 % | DIASTOLIC BLOOD PRESSURE: 76 MMHG

## 2020-06-16 DIAGNOSIS — R00.1 DRUG-INDUCED BRADYCARDIA: ICD-10-CM

## 2020-06-16 DIAGNOSIS — T50.905A DRUG-INDUCED BRADYCARDIA: ICD-10-CM

## 2020-06-16 DIAGNOSIS — R55 SYNCOPE, UNSPECIFIED SYNCOPE TYPE: ICD-10-CM

## 2020-06-16 DIAGNOSIS — I10 ESSENTIAL HYPERTENSION: Primary | ICD-10-CM

## 2020-06-16 RX ORDER — NEBIVOLOL 10 MG/1
TABLET ORAL DAILY
COMMUNITY

## 2020-06-16 RX ORDER — OXYCODONE HYDROCHLORIDE 10 MG/1
TABLET ORAL
COMMUNITY
End: 2021-06-29

## 2020-06-16 RX ORDER — SPIRONOLACTONE 50 MG/1
TABLET, FILM COATED ORAL DAILY
COMMUNITY
End: 2021-06-29

## 2020-06-16 RX ORDER — CELECOXIB 100 MG/1
CAPSULE ORAL 2 TIMES DAILY
COMMUNITY
End: 2021-06-29

## 2020-06-16 RX ORDER — AMLODIPINE BESYLATE 5 MG/1
5 TABLET ORAL DAILY
COMMUNITY
End: 2020-06-16

## 2020-06-16 RX ORDER — AMLODIPINE BESYLATE 10 MG/1
10 TABLET ORAL DAILY
Qty: 30 TAB | Refills: 4 | Status: SHIPPED | OUTPATIENT
Start: 2020-06-16

## 2020-06-16 NOTE — PROGRESS NOTES
Roxi Garcia is a 78 y.o. female    Visit Vitals  /76 (BP 1 Location: Left arm, BP Patient Position: Sitting)   Pulse (!) 58   Ht 5' 3\" (1.6 m)   Wt 121 lb (54.9 kg)   SpO2 96%   BMI 21.43 kg/m²       Chief Complaint   Patient presents with    Hypertension       Chest pain no  SOB no  Dizziness no  Swelling no  Recent hospital visit no  Refills no      Pt declined ekg.

## 2020-06-19 ENCOUNTER — TELEPHONE (OUTPATIENT)
Dept: CARDIOLOGY CLINIC | Age: 80
End: 2020-06-19

## 2020-06-19 NOTE — TELEPHONE ENCOUNTER
Patient calling to give her BP ranges per Dr. Anitra Zuleta.      June 18th - 156/67  Pulse-62  June 19th - 140/59  Pulse- 53      Phone:[phone]  Leave message if no answer

## 2020-06-26 NOTE — TELEPHONE ENCOUNTER
I would stop her Norvasc and begin her on Procardia 30 mg XL a day recheck blood pressure in 2 weeks

## 2020-10-26 ENCOUNTER — HOSPITAL ENCOUNTER (OUTPATIENT)
Dept: MAMMOGRAPHY | Age: 80
Discharge: HOME | End: 2020-10-26
Attending: INTERNAL MEDICINE
Payer: MEDICARE

## 2020-10-26 DIAGNOSIS — Z12.31 ENCOUNTER FOR SCREENING MAMMOGRAM FOR MALIGNANT NEOPLASM OF BREAST: ICD-10-CM

## 2020-10-26 DIAGNOSIS — Z78.0 ASYMPTOMATIC MENOPAUSAL STATE: ICD-10-CM

## 2020-10-26 PROCEDURE — 77080 DXA BONE DENSITY AXIAL: CPT

## 2020-10-26 PROCEDURE — 77067 SCR MAMMO BI INCL CAD: CPT

## 2020-12-22 ENCOUNTER — OFFICE VISIT (OUTPATIENT)
Dept: CARDIOLOGY CLINIC | Age: 80
End: 2020-12-22
Payer: MEDICARE

## 2020-12-22 VITALS
BODY MASS INDEX: 22.68 KG/M2 | SYSTOLIC BLOOD PRESSURE: 130 MMHG | WEIGHT: 128 LBS | HEART RATE: 62 BPM | OXYGEN SATURATION: 98 % | DIASTOLIC BLOOD PRESSURE: 60 MMHG | HEIGHT: 63 IN

## 2020-12-22 DIAGNOSIS — R55 SYNCOPE, UNSPECIFIED SYNCOPE TYPE: ICD-10-CM

## 2020-12-22 DIAGNOSIS — I10 ESSENTIAL HYPERTENSION: Primary | ICD-10-CM

## 2020-12-22 PROCEDURE — G8752 SYS BP LESS 140: HCPCS | Performed by: SPECIALIST

## 2020-12-22 PROCEDURE — 1090F PRES/ABSN URINE INCON ASSESS: CPT | Performed by: SPECIALIST

## 2020-12-22 PROCEDURE — G8754 DIAS BP LESS 90: HCPCS | Performed by: SPECIALIST

## 2020-12-22 PROCEDURE — G0463 HOSPITAL OUTPT CLINIC VISIT: HCPCS | Performed by: SPECIALIST

## 2020-12-22 PROCEDURE — 1101F PT FALLS ASSESS-DOCD LE1/YR: CPT | Performed by: SPECIALIST

## 2020-12-22 PROCEDURE — G8420 CALC BMI NORM PARAMETERS: HCPCS | Performed by: SPECIALIST

## 2020-12-22 PROCEDURE — 99214 OFFICE O/P EST MOD 30 MIN: CPT | Performed by: SPECIALIST

## 2020-12-22 PROCEDURE — 93005 ELECTROCARDIOGRAM TRACING: CPT | Performed by: SPECIALIST

## 2020-12-22 PROCEDURE — G8536 NO DOC ELDER MAL SCRN: HCPCS | Performed by: SPECIALIST

## 2020-12-22 PROCEDURE — 93010 ELECTROCARDIOGRAM REPORT: CPT | Performed by: SPECIALIST

## 2020-12-22 PROCEDURE — G8427 DOCREV CUR MEDS BY ELIG CLIN: HCPCS | Performed by: SPECIALIST

## 2020-12-22 PROCEDURE — G8432 DEP SCR NOT DOC, RNG: HCPCS | Performed by: SPECIALIST

## 2020-12-22 RX ORDER — HYDROXYCHLOROQUINE SULFATE 200 MG/1
200 TABLET, FILM COATED ORAL DAILY
COMMUNITY
End: 2022-02-16

## 2020-12-22 NOTE — PROGRESS NOTES
ATTENTION:   This medical record was transcribed using an electronic medical records/speech recognition system. Although proofread, it may and can contain electronic, spelling and other errors. Corrections may be executed at a later time. Please feel free to contact us for any clarifications as needed. ICD-10-CM ICD-9-CM   1. Essential hypertension  I10 401.9   2. Syncope, unspecified syncope type  R55 780.2            Radha Diana is a [de-identified] y.o. female with HTN and dyslipidemia referred for follow up. Cardiac risk factors: dyslipidemia, hypertension, post-menopausal  I have personally obtained the history from the patient. HISTORY OF PRESENTING ILLNESS     He is doing well with no cardiac issues her blood pressure is good today no chest pain or shortness of breath. ATTENTION:   This medical record was transcribed using an electronic medical records/speech recognition system. Although proofread, it may and can contain electronic, spelling and other errors. Corrections may be executed at a later time. Please feel free to contact us for any clarifications as needed.          ACTIVE PROBLEM LIST     Patient Active Problem List    Diagnosis Date Noted    Syncope and collapse 04/18/2019    Bradycardia 04/18/2019    Asthma, mild intermittent 04/18/2019    Hypokalemia 04/18/2019    Essential hypertension 05/20/2017    Acquired hypothyroidism 05/20/2017    Asthma 05/20/2017    GERD (gastroesophageal reflux disease) 05/20/2017    Osteoporosis 05/20/2017    Acute pulmonary embolism ([facility] [address] 05/19/2017    Lumbar stenosis with neurogenic claudication 05/15/2017           PAST MEDICAL HISTORY     Past Medical History:   Diagnosis Date    Anemia     Arthritis     osteoporosis    Asthma     Broken ribs 2006    fell    Cancer ([address]     melanoma     GERD (gastroesophageal reflux disease)     Hypertension     Migraines     Osteoporosis     Rheumatic fever     x2 without residual  Thyroid disease            PAST SURGICAL HISTORY     Past Surgical History:   Procedure Laterality Date    HX ABOVE KNEE AMPUTATION Left 05/2015    remove Tillman's neuroma    HX BREAST BIOPSY Left 1972    benign    HX CATARACT REMOVAL Bilateral 2009, 2011    HX CYST INCISION AND DRAINAGE      multiple bilateral cyst aspiration. [facility]    Hysterectomy    HX HEENT  1996    sinus surgery    HX HEENT      deviated septum    HX ORTHOPAEDIC Left 2006    Tarsal Tunnel Release     HX ORTHOPAEDIC Right 2010,2016    basal joint surgery    HX ORTHOPAEDIC Left 2012    basal joint surgery     HX ORTHOPAEDIC      back surgery     HX OTHER SURGICAL      TVT Sling    HX OTHER SURGICAL      Tillman's Neuroma removal     HX SEPTOPLASTY      HX TONSIL AND ADENOIDECTOMY      HX TONSILLECTOMY  1946    HX UROLOGICAL  2010    TVT sling          ALLERGIES     Allergies   Allergen Reactions    Ace Inhibitors Angioedema    Arb-Angiotensin Receptor Antagonist Other (comments)     Told not to take by provider due to severe reaction to ace inhibitors          FAMILY HISTORY     Family History   Problem Relation Age of Onset    Hypertension Mother     Stroke Mother         TIA    Heart Disease Mother     Alzheimer Mother     Hypertension Father     Asthma Father     Colon Cancer Father     COPD Father     Asthma Brother     negative for cardiac disease       SOCIAL HISTORY     Social History     Socioeconomic History    Marital status:      Spouse name: Not on file    Number of children: Not on file    Years of education: Not on file    Highest education level: Not on file   Tobacco Use    Smoking status: Never Smoker    Smokeless tobacco: Never Used   Substance and Sexual Activity    Alcohol use: No    Drug use: No         MEDICATIONS     Current Outpatient Medications   Medication Sig    hydrOXYchloroQUINE (PLAQUENIL) 200 mg tablet Take 200 mg by mouth daily.     celecoxib (CeleBREX) 100 mg capsule Take  by mouth two (2) times a day.  spironolactone (ALDACTONE) 50 mg tablet Take  by mouth daily.  oxyCODONE IR (ROXICODONE) 10 mg tab immediate release tablet Take  by mouth.  amLODIPine (NORVASC) 10 mg tablet Take 1 Tab by mouth daily.  BYSTOLIC 2.5 mg tablet TAKE 1 TABLET BY MOUTH ONCE DAILY    buPROPion XL (WELLBUTRIN XL) 150 mg tablet Take 150 mg by mouth nightly.  cloNIDine (CATAPRES) 0.2 mg/24 hr ptwk 1 Patch by TransDERmal route Every Friday.  HYDROcodone-acetaminophen (NORCO) 5-325 mg per tablet Take 0.5 Tabs by mouth every eight (8) hours as needed for Pain.  predniSONE (DELTASONE) 5 mg tablet Take 7.5 mg by mouth daily. Indications: pain from spinal stenosis    famotidine (PEPCID) 20 mg tablet Take 20 mg by mouth daily. Takes with diclofenac    levothyroxine (SYNTHROID) 88 mcg tablet Take 88 mcg by mouth Daily (before breakfast).  fluticasone-salmeterol (ADVAIR DISKUS) 100-50 mcg/dose diskus inhaler Take 1 Puff by inhalation two (2) times a day.  Cetirizine (ZYRTEC) 10 mg cap Take 1 Cap by mouth nightly.  fluticasone (FLONASE) 50 mcg/actuation nasal spray 2 Sprays by Both Nostrils route nightly.  nebivoloL (Bystolic) 10 mg tablet Take  by mouth daily.  chlorthalidone (HYGROTEN) 25 mg tablet TAKE ONE TABLET BY MOUTH ONE TIME DAILY     PNV No.40-Iron Fum-FA Cmb No.1 27-1 mg tab Take  by mouth.  potassium chloride (K-DUR, KLOR-CON) 20 mEq tablet Take  by mouth daily.  montelukast (SINGULAIR) 10 mg tablet Take 10 mg by mouth daily.  diclofenac EC (VOLTAREN) 50 mg EC tablet Take 50 mg by mouth two (2) times a day. Indications: spinal stenosis    estradiol (ESTRACE) 1 mg tablet Take 1 mg by mouth nightly.  cyanocobalamin (VITAMIN B-12) 1,000 mcg/mL injection 1,000 mcg by IntraMUSCular route every thirty (30) days. No current facility-administered medications for this visit.         I have reviewed the nurses notes, vitals, problem list, allergy list, medical history, family, social history and medications. REVIEW OF SYMPTOMS      General: Pt denies excessive weight gain or loss. Pt is able to conduct ADL's  HEENT: Denies blurred vision, headaches, hearing loss, epistaxis and difficulty swallowing. Respiratory: Denies cough, congestion, shortness of breath, HOWE, wheezing or stridor. Cardiovascular: Denies precordial pain, palpitations, edema or PND  Gastrointestinal: Denies poor appetite, indigestion, abdominal pain or blood in stool  Genitourinary: Denies hematuria, dysuria, increased urinary frequency  Musculoskeletal: Denies joint pain or swelling from muscles or joints  Neurologic: Denies tremor, paresthesias, headache, or sensory motor disturbance  Psychiatric: Denies confusion, insomnia, depression  Integumentray: Denies rash, itching or ulcers. Hematologic: Denies easy bruising, bleeding     PHYSICAL EXAMINATION      Vitals:    12/22/20 1255   BP: 130/60   Pulse: 62   SpO2: 98%   Weight: 128 lb (58.1 kg)   Height: 5' 3\" (1.6 m)     General: Well developed, in no acute distress. HEENT: No jaundice, oral mucosa moist, no oral ulcers  Neck: Supple, no stiffness, no lymphadenopathy, supple  Heart:  Normal S1/S2 negative S3 or S4. Regular, no murmur, gallop or rub, no jugular venous distention  Respiratory: Clear bilaterally x 4, no wheezing or rales  Abdomen:   Soft, non-tender, bowel sounds are active. Extremities:  No edema, normal cap refill, no cyanosis. Musculoskeletal: No clubbing, no deformities  Neuro: A&Ox3, speech clear, gait stable, cooperative, no focal neurologic deficits  Skin: Skin color is normal. No rashes or lesions. Non diaphoretic, moist.  Vascular: 2+ pulses symmetric in all extremities       DIAGNOSTIC DATA     1. Echo   4/18/19- EF 56-60%, AV sclerosis with no significant stenosis, MR mod, TR mild, mild/mod Pulm HTN, mild PI     2. CTA chest   5/28/17- Persistent small left lower lobe pulmonary arterial branch embolus. No new emboli or other acute findings identified     3. Lipids   3/9/16- , HDL 94, LDL 77, LDL-P 945   9/30/20- , HDL 87, , TG 95       ECG: Normal sinus rhythm today     LABORATORY DATA          Lab Results   Component Value Date/Time    WBC 7.3 04/19/2019 03:35 AM    HGB 9.4 (L) 04/19/2019 03:35 AM    HCT 29.1 (L) 04/19/2019 03:35 AM    PLATELET 600 86/71/3611 03:35 AM    MCV 91.8 04/19/2019 03:35 AM      Lab Results   Component Value Date/Time    Sodium 141 04/19/2019 03:35 AM    Potassium 3.9 04/19/2019 03:35 AM    Chloride 113 (H) 04/19/2019 03:35 AM    CO2 21 04/19/2019 03:35 AM    Anion gap 7 04/19/2019 03:35 AM    Glucose 80 04/19/2019 03:35 AM    BUN 16 04/19/2019 03:35 AM    Creatinine 0.91 04/19/2019 03:35 AM    BUN/Creatinine ratio 18 04/19/2019 03:35 AM    GFR est AA >60 04/19/2019 03:35 AM    GFR est non-AA 60 (L) 04/19/2019 03:35 AM    Calcium 7.9 (L) 04/19/2019 03:35 AM    Bilirubin, total 0.4 04/19/2019 03:35 AM    Alk. phosphatase 62 04/19/2019 03:35 AM    Protein, total 6.5 04/19/2019 03:35 AM    Albumin 2.9 (L) 04/19/2019 03:35 AM    Globulin 3.6 04/19/2019 03:35 AM    A-G Ratio 0.8 (L) 04/19/2019 03:35 AM    ALT (SGPT) 20 04/19/2019 03:35 AM           ASSESSMENT/RECOMMENDATIONS:.      1. HTN  -Blood pressure is at goal  -Continue low-sodium diet  2. Aortic valve sclerosis with no significant stenosis  -No need for echo at this point  3. Cholesterol profile  -LDL has gone up to 110 so I just encourage her to reduce red meat intake currently being followed by her primary care  4. Return on previously scheduled appointment      Orders Placed This Encounter    AMB POC EKG ROUTINE W/ 12 LEADS, INTER & REP     Order Specific Question:   Reason for Exam:     Answer:   HTN    hydrOXYchloroQUINE (PLAQUENIL) 200 mg tablet     Sig: Take 200 mg by mouth daily. Follow-up and Dispositions  ·   Return in about 6 months (around 6/22/2021).            I have discussed the diagnosis with  Shannan Blades and the intended plan as seen in the above orders. Questions were answered concerning future plans. I have discussed medication side effects and warnings with the patient as well. Thank you,  Allan Garcia MD for involving me in the care of  Shannan Blades. Please do not hesitate to contact me for further questions/concerns. Jamar Cope MD, [facility] [place]    Patient Care Team:  Allan Garcia MD as PCP - General (Internal Medicine)  Shar Correa MD (Neurosurgery)    [address] [place] [address], [address] [place], [place] [address]      [phone] / [phone] Fax

## 2020-12-22 NOTE — PROGRESS NOTES
Shabnam Brown is a [de-identified] y.o. female    Visit Vitals  /60 (BP 1 Location: Left arm, BP Patient Position: Sitting)   Pulse 62   Ht 5' 3\" (1.6 m)   Wt 128 lb (58.1 kg)   SpO2 98%   BMI 22.67 kg/m²       Chief Complaint   Patient presents with    Cholesterol Problem    Hypertension       Chest pain NO  SOB NO  Dizziness NO    Swelling LEFT ANKLE    Recent hospital visit NO  Refills NO

## 2021-06-29 ENCOUNTER — OFFICE VISIT (OUTPATIENT)
Dept: CARDIOLOGY CLINIC | Age: 81
End: 2021-06-29
Payer: MEDICARE

## 2021-06-29 VITALS
SYSTOLIC BLOOD PRESSURE: 138 MMHG | WEIGHT: 115 LBS | HEART RATE: 55 BPM | HEIGHT: 63 IN | BODY MASS INDEX: 20.38 KG/M2 | OXYGEN SATURATION: 99 % | DIASTOLIC BLOOD PRESSURE: 60 MMHG

## 2021-06-29 DIAGNOSIS — R55 SYNCOPE, UNSPECIFIED SYNCOPE TYPE: ICD-10-CM

## 2021-06-29 DIAGNOSIS — T50.905A DRUG-INDUCED BRADYCARDIA: ICD-10-CM

## 2021-06-29 DIAGNOSIS — I10 ESSENTIAL HYPERTENSION: Primary | ICD-10-CM

## 2021-06-29 DIAGNOSIS — R00.1 DRUG-INDUCED BRADYCARDIA: ICD-10-CM

## 2021-06-29 PROCEDURE — G8432 DEP SCR NOT DOC, RNG: HCPCS | Performed by: SPECIALIST

## 2021-06-29 PROCEDURE — 99214 OFFICE O/P EST MOD 30 MIN: CPT | Performed by: SPECIALIST

## 2021-06-29 PROCEDURE — 1090F PRES/ABSN URINE INCON ASSESS: CPT | Performed by: SPECIALIST

## 2021-06-29 PROCEDURE — G8752 SYS BP LESS 140: HCPCS | Performed by: SPECIALIST

## 2021-06-29 PROCEDURE — 1101F PT FALLS ASSESS-DOCD LE1/YR: CPT | Performed by: SPECIALIST

## 2021-06-29 PROCEDURE — G8420 CALC BMI NORM PARAMETERS: HCPCS | Performed by: SPECIALIST

## 2021-06-29 PROCEDURE — G8754 DIAS BP LESS 90: HCPCS | Performed by: SPECIALIST

## 2021-06-29 PROCEDURE — G0463 HOSPITAL OUTPT CLINIC VISIT: HCPCS | Performed by: SPECIALIST

## 2021-06-29 PROCEDURE — G8427 DOCREV CUR MEDS BY ELIG CLIN: HCPCS | Performed by: SPECIALIST

## 2021-06-29 PROCEDURE — G8536 NO DOC ELDER MAL SCRN: HCPCS | Performed by: SPECIALIST

## 2021-06-29 NOTE — PROGRESS NOTES
ATTENTION:   This medical record was transcribed using an electronic medical records/speech recognition system. Although proofread, it may and can contain electronic, spelling and other errors. Corrections may be executed at a later time. Please feel free to contact us for any clarifications as needed. ICD-10-CM ICD-9-CM   1. Essential hypertension  I10 401.9   2. Syncope, unspecified syncope type  R55 780.2   3. Drug-induced bradycardia  R00.1 427.89    T50.905A E947.9            Zondra Pallas is a [de-identified] y.o. female with HTN and dyslipidemia referred for follow up. Cardiac risk factors: dyslipidemia, hypertension, post-menopausal  I have personally obtained the history from the patient. HISTORY OF PRESENTING ILLNESS     She is doing well with no interval cardiac complaints no chest pain or shortness of breath. She just got back from [place]. Her brother-in-law just passed away.          ACTIVE PROBLEM LIST     Patient Active Problem List    Diagnosis Date Noted    Syncope and collapse 04/18/2019    Bradycardia 04/18/2019    Asthma, mild intermittent 04/18/2019    Hypokalemia 04/18/2019    Essential hypertension 05/20/2017    Acquired hypothyroidism 05/20/2017    Asthma 05/20/2017    GERD (gastroesophageal reflux disease) 05/20/2017    Osteoporosis 05/20/2017    Acute pulmonary embolism ([address] 05/19/2017    Lumbar stenosis with neurogenic claudication 05/15/2017           PAST MEDICAL HISTORY     Past Medical History:   Diagnosis Date    Anemia     Arthritis     osteoporosis    Asthma     Broken ribs 2006    fell    Cancer ([address]     melanoma     GERD (gastroesophageal reflux disease)     Hypertension     Migraines     Osteoporosis     Rheumatic fever     x2 without residual    Thyroid disease            PAST SURGICAL HISTORY     Past Surgical History:   Procedure Laterality Date    HX ABOVE KNEE AMPUTATION Left 05/2015    remove Tillman's neuroma    HX BREAST BIOPSY Left 1972    benign    HX CATARACT REMOVAL Bilateral 2009, 2011    HX CYST INCISION AND DRAINAGE      multiple bilateral cyst aspiration. [facility]    Hysterectomy    HX HEENT  1996    sinus surgery    HX HEENT      deviated septum    HX ORTHOPAEDIC Left 2006    Tarsal Tunnel Release     HX ORTHOPAEDIC Right 2010,2016    basal joint surgery    HX ORTHOPAEDIC Left 2012    basal joint surgery     HX ORTHOPAEDIC      back surgery     HX OTHER SURGICAL      TVT Sling    HX OTHER SURGICAL      Tillman's Neuroma removal     HX SEPTOPLASTY      HX TONSIL AND ADENOIDECTOMY      HX TONSILLECTOMY  1946    HX UROLOGICAL  2010    TVT sling          ALLERGIES     Allergies   Allergen Reactions    Ace Inhibitors Angioedema    Arb-Angiotensin Receptor Antagonist Other (comments)     Told not to take by provider due to severe reaction to ace inhibitors          FAMILY HISTORY     Family History   Problem Relation Age of Onset    Hypertension Mother     Stroke Mother         TIA    Heart Disease Mother     Alzheimer Mother     Hypertension Father     Asthma Father     Colon Cancer Father     COPD Father     Asthma Brother     negative for cardiac disease       SOCIAL HISTORY     Social History     Socioeconomic History    Marital status:      Spouse name: Not on file    Number of children: Not on file    Years of education: Not on file    Highest education level: Not on file   Tobacco Use    Smoking status: Never Smoker    Smokeless tobacco: Never Used   Substance and Sexual Activity    Alcohol use: No    Drug use: No     Social Determinants of Health     Financial Resource Strain:     Difficulty of Paying Living Expenses:    Food Insecurity:     Worried About Running Out of Food in the Last Year:     [address] in the Last Year:    Transportation Needs:     Lack of Transportation (Medical):      Lack of Transportation (Non-Medical):    Physical Activity:     Days of Exercise per Week:     Minutes of Exercise per Session:    Stress:     Feeling of Stress :    Social Connections:     Frequency of Communication with Friends and Family:     Frequency of Social Gatherings with Friends and Family:     Attends [employer] Services:     Active Member of Clubs or Organizations:     Attends Club or Organization Meetings:     Marital Status:          MEDICATIONS     Current Outpatient Medications   Medication Sig    lisdexamfetamine (Vyvanse) 30 mg capsule Take 30 mg by mouth every morning.  hydrOXYchloroQUINE (PLAQUENIL) 200 mg tablet Take 200 mg by mouth daily.  nebivoloL (Bystolic) 10 mg tablet Take  by mouth daily.  amLODIPine (NORVASC) 10 mg tablet Take 1 Tab by mouth daily.  montelukast (SINGULAIR) 10 mg tablet Take 10 mg by mouth daily.  cloNIDine (CATAPRES) 0.2 mg/24 hr ptwk 1 Patch by TransDERmal route Every Friday.  estradiol (ESTRACE) 1 mg tablet Take 1 mg by mouth nightly.  HYDROcodone-acetaminophen (NORCO) 5-325 mg per tablet Take 0.5 Tabs by mouth every eight (8) hours as needed for Pain.  levothyroxine (SYNTHROID) 88 mcg tablet Take 88 mcg by mouth Daily (before breakfast).  fluticasone-salmeterol (ADVAIR DISKUS) 100-50 mcg/dose diskus inhaler Take 1 Puff by inhalation two (2) times a day.  Cetirizine (ZYRTEC) 10 mg cap Take 1 Cap by mouth nightly.  fluticasone (FLONASE) 50 mcg/actuation nasal spray 2 Sprays by Both Nostrils route nightly.  celecoxib (CeleBREX) 100 mg capsule Take  by mouth two (2) times a day. (Patient not taking: Reported on 6/29/2021)    spironolactone (ALDACTONE) 50 mg tablet Take  by mouth daily. (Patient not taking: Reported on 6/29/2021)    oxyCODONE IR (ROXICODONE) 10 mg tab immediate release tablet Take  by mouth.  (Patient not taking: Reported on 6/29/2021)    chlorthalidone (HYGROTEN) 25 mg tablet TAKE ONE TABLET BY MOUTH ONE TIME DAILY  (Patient not taking: Reported on 3/13/6167)    BYSTOLIC 2.5 mg tablet TAKE 1 TABLET BY MOUTH ONCE DAILY (Patient not taking: Reported on 6/29/2021)    PNV No.40-Iron Fum-FA Cmb No.1 27-1 mg tab Take  by mouth.  potassium chloride (K-DUR, KLOR-CON) 20 mEq tablet Take  by mouth daily. (Patient not taking: Reported on 6/29/2021)    buPROPion XL (WELLBUTRIN XL) 150 mg tablet Take 150 mg by mouth nightly. (Patient not taking: Reported on 6/29/2021)    diclofenac EC (VOLTAREN) 50 mg EC tablet Take 50 mg by mouth two (2) times a day. Indications: spinal stenosis (Patient not taking: Reported on 6/29/2021)    predniSONE (DELTASONE) 5 mg tablet Take 7.5 mg by mouth daily. Indications: pain from spinal stenosis    famotidine (PEPCID) 20 mg tablet Take 20 mg by mouth daily. Takes with diclofenac (Patient not taking: Reported on 6/29/2021)    cyanocobalamin (VITAMIN B-12) 1,000 mcg/mL injection 1,000 mcg by IntraMUSCular route every thirty (30) days. (Patient not taking: Reported on 6/29/2021)     No current facility-administered medications for this visit. I have reviewed the nurses notes, vitals, problem list, allergy list, medical history, family, social history and medications. REVIEW OF SYMPTOMS    as per HPI  General: Pt denies excessive weight gain or loss. Pt is able to conduct ADL's  HEENT: Denies blurred vision, headaches, hearing loss, epistaxis and difficulty swallowing. Respiratory: Denies cough, congestion, shortness of breath, HOWE, wheezing or stridor. Cardiovascular: Denies precordial pain, palpitations, edema or PND  Gastrointestinal: Denies poor appetite, indigestion, abdominal pain or blood in stool  Genitourinary: Denies hematuria, dysuria, increased urinary frequency  Musculoskeletal: Denies joint pain or swelling from muscles or joints  Neurologic: Denies tremor, paresthesias, headache, or sensory motor disturbance  Psychiatric: Denies confusion, insomnia, depression  Integumentray: Denies rash, itching or ulcers.   Hematologic: Denies easy bruising, bleeding     PHYSICAL EXAMINATION      Vitals:    06/29/21 1312   BP: 138/60   Pulse: (!) 55   SpO2: 99%   Weight: 115 lb (52.2 kg)   Height: 5' 3\" (1.6 m)     General: Well developed, in no acute distress. HEENT: No jaundice, oral mucosa moist, no oral ulcers  Neck: Supple, no stiffness, no lymphadenopathy, supple  Heart:  Normal S1/S2 negative S3 or S4. Regular, no murmur, gallop or rub, no jugular venous distention  Respiratory: Clear bilaterally x 4, no wheezing or rales  Extremities:  No edema, normal cap refill, no cyanosis. Musculoskeletal: No clubbing, no deformities  Neuro: A&Ox3, speech clear, gait stable, cooperative, no focal neurologic deficits  Skin: Skin color is normal. No rashes or lesions. Non diaphoretic, moist.         DIAGNOSTIC DATA     1. Echo   4/18/19- EF 56-60%, AV sclerosis with no significant stenosis, MR mod, TR mild, mild/mod Pulm HTN, mild PI     2. CTA chest   5/28/17- Persistent small left lower lobe pulmonary arterial branch embolus. No new emboli or other acute findings identified     3.  Lipids   3/9/16- , HDL 94, LDL 77, LDL-P 945   9/30/20- , HDL 87, , TG 95       ECG: Normal sinus rhythm today     LABORATORY DATA          Lab Results   Component Value Date/Time    WBC 7.3 04/19/2019 03:35 AM    HGB 9.4 (L) 04/19/2019 03:35 AM    HCT 29.1 (L) 04/19/2019 03:35 AM    PLATELET 903 00/86/4962 03:35 AM    MCV 91.8 04/19/2019 03:35 AM      Lab Results   Component Value Date/Time    Sodium 141 04/19/2019 03:35 AM    Potassium 3.9 04/19/2019 03:35 AM    Chloride 113 (H) 04/19/2019 03:35 AM    CO2 21 04/19/2019 03:35 AM    Anion gap 7 04/19/2019 03:35 AM    Glucose 80 04/19/2019 03:35 AM    BUN 16 04/19/2019 03:35 AM    Creatinine 0.91 04/19/2019 03:35 AM    BUN/Creatinine ratio 18 04/19/2019 03:35 AM    GFR est AA >60 04/19/2019 03:35 AM    GFR est non-AA 60 (L) 04/19/2019 03:35 AM    Calcium 7.9 (L) 04/19/2019 03:35 AM    Bilirubin, total 0.4 04/19/2019 03:35 AM    Alk. phosphatase 62 04/19/2019 03:35 AM    Protein, total 6.5 04/19/2019 03:35 AM    Albumin 2.9 (L) 04/19/2019 03:35 AM    Globulin 3.6 04/19/2019 03:35 AM    A-G Ratio 0.8 (L) 04/19/2019 03:35 AM    ALT (SGPT) 20 04/19/2019 03:35 AM           ASSESSMENT/RECOMMENDATIONS:.      1. HTN  -Blood pressure is good on current regimen  2. Aortic valve sclerosis with no significant stenosis  -No need for echo at this point  3. Cholesterol profile  -LDL is elevated and goal should be under 100  -will give on calcium scoring  4. Moderate MR  -will recheck echo in 2 mo  4. Return in 6mo. Or prn      Orders Placed This Encounter    lisdexamfetamine (Vyvanse) 30 mg capsule     Sig: Take 30 mg by mouth every morning. I have discussed the diagnosis with  Nabeel Jaeger and the intended plan as seen in the above orders. Questions were answered concerning future plans. I have discussed medication side effects and warnings with the patient as well. Thank you,  Jef Hoffmann MD for involving me in the care of  Nabeel Jaeger. Please do not hesitate to contact me for further questions/concerns. Mark A. Caron Cockayne, MD, [facility] [place]    Patient Care Team:  Jef Hoffmann MD as PCP - General (Internal Medicine)  Johny Villafuerte MD (Neurosurgery)    [address], [address] [place] [address], [facility] [address]      [phone] / [phone] Fax

## 2021-06-29 NOTE — PROGRESS NOTES
Tammysindhu [place] is a [de-identified] y.o. female    Visit Vitals  Ht 5' 3\" (1.6 m)   Wt 115 lb (52.2 kg)   BMI 20.37 kg/m²       Chief Complaint   Patient presents with    Hypertension    Other     AVS       Chest pain NO  SOB NO  Dizziness NO  Swelling LEFT LEG  Recent hospital visit NO  Refills NO

## 2021-11-18 ENCOUNTER — HOSPITAL ENCOUNTER (OUTPATIENT)
Dept: GENERAL RADIOLOGY | Age: 81
Discharge: HOME | End: 2021-11-18
Payer: MEDICARE

## 2021-11-18 ENCOUNTER — TRANSCRIBE ORDER (OUTPATIENT)
Dept: GENERAL RADIOLOGY | Age: 81
End: 2021-11-18

## 2021-11-18 DIAGNOSIS — R05.9 COUGH: Primary | ICD-10-CM

## 2021-11-18 DIAGNOSIS — R05.9 COUGH: ICD-10-CM

## 2021-11-18 PROCEDURE — 71046 X-RAY EXAM CHEST 2 VIEWS: CPT

## 2021-11-23 ENCOUNTER — APPOINTMENT (OUTPATIENT)
Dept: GENERAL RADIOLOGY | Age: 81
End: 2021-11-23
Attending: EMERGENCY MEDICINE
Payer: MEDICARE

## 2021-11-23 ENCOUNTER — APPOINTMENT (OUTPATIENT)
Dept: CT IMAGING | Age: 81
End: 2021-11-23
Attending: EMERGENCY MEDICINE
Payer: MEDICARE

## 2021-11-23 ENCOUNTER — HOSPITAL ENCOUNTER (EMERGENCY)
Age: 81
Discharge: HOME | End: 2021-11-23
Attending: EMERGENCY MEDICINE
Payer: MEDICARE

## 2021-11-23 ENCOUNTER — APPOINTMENT (OUTPATIENT)
Dept: GENERAL RADIOLOGY | Age: 81
End: 2021-11-23
Attending: PHYSICIAN ASSISTANT
Payer: MEDICARE

## 2021-11-23 VITALS
WEIGHT: 102 LBS | HEART RATE: 66 BPM | RESPIRATION RATE: 16 BRPM | BODY MASS INDEX: 18.07 KG/M2 | TEMPERATURE: 98.9 F | DIASTOLIC BLOOD PRESSURE: 60 MMHG | OXYGEN SATURATION: 97 % | SYSTOLIC BLOOD PRESSURE: 124 MMHG | HEIGHT: 63 IN

## 2021-11-23 DIAGNOSIS — J18.9 PARAPNEUMONIC EFFUSION: Primary | ICD-10-CM

## 2021-11-23 DIAGNOSIS — J91.8 PARAPNEUMONIC EFFUSION: Primary | ICD-10-CM

## 2021-11-23 DIAGNOSIS — J18.9 COMMUNITY ACQUIRED PNEUMONIA OF RIGHT LUNG, UNSPECIFIED PART OF LUNG: ICD-10-CM

## 2021-11-23 LAB
ALBUMIN SERPL-MCNC: 2.8 G/DL (ref 3.5–5)
ALBUMIN/GLOB SERPL: 0.6 {RATIO} (ref 1.1–2.2)
ALP SERPL-CCNC: 98 U/L (ref 45–117)
ALT SERPL-CCNC: 20 U/L (ref 12–78)
ANION GAP SERPL CALC-SCNC: 9 MMOL/L (ref 5–15)
APPEARANCE FLD: ABNORMAL
APPEARANCE UR: CLEAR
AST SERPL-CCNC: 24 U/L (ref 15–37)
BACTERIA URNS QL MICRO: NEGATIVE /HPF
BASOPHILS # BLD: 0.1 K/UL (ref 0–0.1)
BASOPHILS NFR BLD: 1 % (ref 0–1)
BILIRUB SERPL-MCNC: 0.3 MG/DL (ref 0.2–1)
BILIRUB UR QL: NEGATIVE
BNP SERPL-MCNC: 516 PG/ML
BUN SERPL-MCNC: 10 MG/DL (ref 6–20)
BUN/CREAT SERPL: 12 (ref 12–20)
CALCIUM SERPL-MCNC: 8.8 MG/DL (ref 8.5–10.1)
CHLORIDE SERPL-SCNC: 98 MMOL/L (ref 97–108)
CO2 SERPL-SCNC: 25 MMOL/L (ref 21–32)
COLOR FLD: ABNORMAL
COLOR UR: ABNORMAL
CREAT SERPL-MCNC: 0.84 MG/DL (ref 0.55–1.02)
DIFFERENTIAL METHOD BLD: ABNORMAL
EOSINOPHIL # BLD: 0.1 K/UL (ref 0–0.4)
EOSINOPHIL NFR BLD: 1 % (ref 0–7)
EPITH CASTS URNS QL MICRO: ABNORMAL /LPF
ERYTHROCYTE [DISTWIDTH] IN BLOOD BY AUTOMATED COUNT: 13.2 % (ref 11.5–14.5)
GLOBULIN SER CALC-MCNC: 4.8 G/DL (ref 2–4)
GLUCOSE SERPL-MCNC: 120 MG/DL (ref 65–100)
GLUCOSE UR STRIP.AUTO-MCNC: NEGATIVE MG/DL
HCT VFR BLD AUTO: 31.9 % (ref 35–47)
HGB BLD-MCNC: 10.5 G/DL (ref 11.5–16)
HGB UR QL STRIP: NEGATIVE
HYALINE CASTS URNS QL MICRO: ABNORMAL /LPF (ref 0–5)
IMM GRANULOCYTES # BLD AUTO: 0 K/UL (ref 0–0.04)
IMM GRANULOCYTES NFR BLD AUTO: 1 % (ref 0–0.5)
KETONES UR QL STRIP.AUTO: ABNORMAL MG/DL
LACTATE SERPL-SCNC: 1 MMOL/L (ref 0.4–2)
LDH FLD L TO P-CCNC: 531 U/L
LDH SERPL L TO P-CCNC: 247 U/L (ref 81–246)
LEUKOCYTE ESTERASE UR QL STRIP.AUTO: NEGATIVE
LYMPHOCYTES # BLD: 0.8 K/UL (ref 0.8–3.5)
LYMPHOCYTES NFR BLD: 12 % (ref 12–49)
LYMPHOCYTES NFR FLD: 17 %
MCH RBC QN AUTO: 29.3 PG (ref 26–34)
MCHC RBC AUTO-ENTMCNC: 32.9 G/DL (ref 30–36.5)
MCV RBC AUTO: 89.1 FL (ref 80–99)
MESOTHL CELL NFR FLD: 3 %
MONOCYTES # BLD: 0.7 K/UL (ref 0–1)
MONOCYTES NFR BLD: 11 % (ref 5–13)
MONOS+MACROS NFR FLD: 6 %
NEUTROPHILS NFR FLD: 74 %
NEUTS SEG # BLD: 4.9 K/UL (ref 1.8–8)
NEUTS SEG NFR BLD: 74 % (ref 32–75)
NITRITE UR QL STRIP.AUTO: NEGATIVE
NRBC # BLD: 0 K/UL (ref 0–0.01)
NRBC BLD-RTO: 0 PER 100 WBC
NUC CELL # FLD: ABNORMAL /CU MM
PH UR STRIP: 5.5 [PH] (ref 5–8)
PLATELET # BLD AUTO: 354 K/UL (ref 150–400)
PMV BLD AUTO: 9.2 FL (ref 8.9–12.9)
POTASSIUM SERPL-SCNC: 3.2 MMOL/L (ref 3.5–5.1)
PROT FLD-MCNC: 5.2 G/DL
PROT SERPL-MCNC: 7.6 G/DL (ref 6.4–8.2)
PROT UR STRIP-MCNC: NEGATIVE MG/DL
RBC # BLD AUTO: 3.58 M/UL (ref 3.8–5.2)
RBC # FLD: >100 /CU MM
RBC #/AREA URNS HPF: ABNORMAL /HPF (ref 0–5)
SARS-COV-2, COV2: NORMAL
SODIUM SERPL-SCNC: 132 MMOL/L (ref 136–145)
SP GR UR REFRACTOMETRY: 1.02 (ref 1–1.03)
SPECIMEN SOURCE FLD: ABNORMAL
SPECIMEN SOURCE FLD: NORMAL
TRIGL FLD-MCNC: 52 MG/DL
TROPONIN-HIGH SENSITIVITY: 9 NG/L (ref 0–51)
UR CULT HOLD, URHOLD: NORMAL
UROBILINOGEN UR QL STRIP.AUTO: 0.2 EU/DL (ref 0.2–1)
WBC # BLD AUTO: 6.6 K/UL (ref 3.6–11)
WBC URNS QL MICRO: ABNORMAL /HPF (ref 0–4)

## 2021-11-23 PROCEDURE — 71045 X-RAY EXAM CHEST 1 VIEW: CPT

## 2021-11-23 PROCEDURE — 89050 BODY FLUID CELL COUNT: CPT

## 2021-11-23 PROCEDURE — 87070 CULTURE OTHR SPECIMN AEROBIC: CPT

## 2021-11-23 PROCEDURE — 74011250636 HC RX REV CODE- 250/636: Performed by: EMERGENCY MEDICINE

## 2021-11-23 PROCEDURE — 36415 COLL VENOUS BLD VENIPUNCTURE: CPT

## 2021-11-23 PROCEDURE — 83615 LACTATE (LD) (LDH) ENZYME: CPT

## 2021-11-23 PROCEDURE — 84478 ASSAY OF TRIGLYCERIDES: CPT

## 2021-11-23 PROCEDURE — 84484 ASSAY OF TROPONIN QUANT: CPT

## 2021-11-23 PROCEDURE — 99282 EMERGENCY DEPT VISIT SF MDM: CPT

## 2021-11-23 PROCEDURE — 83605 ASSAY OF LACTIC ACID: CPT

## 2021-11-23 PROCEDURE — 74011000250 HC RX REV CODE- 250: Performed by: EMERGENCY MEDICINE

## 2021-11-23 PROCEDURE — 87040 BLOOD CULTURE FOR BACTERIA: CPT

## 2021-11-23 PROCEDURE — 74011000258 HC RX REV CODE- 258: Performed by: EMERGENCY MEDICINE

## 2021-11-23 PROCEDURE — 80053 COMPREHEN METABOLIC PANEL: CPT

## 2021-11-23 PROCEDURE — 83880 ASSAY OF NATRIURETIC PEPTIDE: CPT

## 2021-11-23 PROCEDURE — 81001 URINALYSIS AUTO W/SCOPE: CPT

## 2021-11-23 PROCEDURE — 84157 ASSAY OF PROTEIN OTHER: CPT

## 2021-11-23 PROCEDURE — 71250 CT THORAX DX C-: CPT

## 2021-11-23 PROCEDURE — 96365 THER/PROPH/DIAG IV INF INIT: CPT

## 2021-11-23 PROCEDURE — U0005 INFEC AGEN DETEC AMPLI PROBE: HCPCS

## 2021-11-23 PROCEDURE — 85025 COMPLETE CBC W/AUTO DIFF WBC: CPT

## 2021-11-23 PROCEDURE — 75810000165 HC THORACENTESIS

## 2021-11-23 RX ORDER — AMOXICILLIN AND CLAVULANATE POTASSIUM 875; 125 MG/1; MG/1
1 TABLET, FILM COATED ORAL 2 TIMES DAILY
Qty: 20 TABLET | Refills: 0 | Status: SHIPPED | OUTPATIENT
Start: 2021-11-23 | End: 2022-02-16

## 2021-11-23 RX ORDER — LIDOCAINE HYDROCHLORIDE AND EPINEPHRINE 10; 10 MG/ML; UG/ML
1.5 INJECTION, SOLUTION INFILTRATION; PERINEURAL ONCE
Status: COMPLETED | OUTPATIENT
Start: 2021-11-23 | End: 2021-11-23

## 2021-11-23 RX ORDER — DOXYCYCLINE HYCLATE 100 MG
100 TABLET ORAL
Status: DISCONTINUED | OUTPATIENT
Start: 2021-11-23 | End: 2021-11-23

## 2021-11-23 RX ADMIN — SODIUM CHLORIDE 3 G: 900 INJECTION INTRAVENOUS at 17:50

## 2021-11-23 RX ADMIN — LIDOCAINE HYDROCHLORIDE AND EPINEPHRINE 15 MG: 10; 10 INJECTION, SOLUTION INFILTRATION; PERINEURAL at 17:49

## 2021-11-23 NOTE — DISCHARGE INSTRUCTIONS
Thank you for allowing us to provide you with medical care today. We realize that you have many choices for your emergency care needs. We thank you for choosing [facility]. Please choose us in the future for any continued health care needs. We hope we addressed all of your medical concerns. We strive to provide excellent quality care in the Emergency Department. Anything less than excellent does not meet our expectations. The exam and treatment you received in the Emergency Department were for an emergent problem and are not intended as complete care. It is important that you follow up with a doctor, nurse practitioner, or physician's assistant for ongoing care. If your symptoms worsen or you do not improve as expected and you are unable to reach your usual health care provider, you should return to the Emergency Department. We are available 24 hours a day. Take this sheet with you when you go to your follow-up visit. If you have any problem arranging the follow-up visit, contact the Emergency Department immediately. Make an appointment your family doctor for follow up of this visit. Return to the ER if you are unable to be seen in a timely manner.

## 2021-11-23 NOTE — ED NOTES
2:45 PM  82yo hx of asthma, SOB, cough, fever x 11 days. Had CXR 5 days ago showing \"small right pleural effusion with underlying atelectasis. \" States she's had a fever every day for 11 days. PCP sent her in for worsening lung exam despite Bumex and worsening SOB, cough. Ambulated in triage O2 sat 93-94%. I have evaluated the patient as the Provider in Triage. I have reviewed Her vital signs and the triage nurse assessment. I have talked with the patient and any available family and advised that I am the provider in triage and have ordered the appropriate study to initiate their work up based on the clinical presentation during my assessment. I have advised that the patient will be accommodated in the Main ED as soon as possible. I have also requested to contact the triage nurse or myself immediately if the patient experiences any changes in their condition during this brief waiting period.   Yesi Martinez PA-C

## 2021-11-23 NOTE — ED PROVIDER NOTES
This is a very pleasant 80-year-old female with history of anemia, arthritis, broken ribs, melanoma, hypertension presents to the emergency department with chief complaint of ongoing fever and cough which is worsening over the past 11 days. She has had 2 - rapid Covid test as an outpatient. Her primary care doctor placed her on a cough syrup as well as Omnicef but despite this she has worsening symptoms. She had a chest x-ray which showed a small right-sided pleural effusion which has worsened now. She presents to the emergency department for further management. The history is provided by the patient and medical records. Cough  This is a new problem. The current episode started more than 1 week ago. The problem occurs constantly. The problem has been gradually worsening. The cough is productive of sputum. Patient reports a subjective fever - was not measured. Associated symptoms include shortness of breath. Pertinent negatives include no chest pain, no chills, no headaches, no sore throat, no myalgias, no nausea and no vomiting. Her past medical history is significant for pneumonia. Fever   Associated symptoms include cough and shortness of breath. Pertinent negatives include no chest pain, no diarrhea, no vomiting, no headaches, no sore throat and no rash. Past Medical History:   Diagnosis Date    Anemia     Arthritis     osteoporosis    Asthma     Broken ribs 2006    fell    Cancer ([facility]     melanoma     GERD (gastroesophageal reflux disease)     Hypertension     Migraines     Osteoporosis     Rheumatic fever     x2 without residual    Thyroid disease        Past Surgical History:   Procedure Laterality Date    HX ABOVE KNEE AMPUTATION Left 05/2015    remove Tillman's neuroma    HX BREAST BIOPSY Left 1972    benign    HX CATARACT REMOVAL Bilateral 2009, 2011    HX CYST INCISION AND DRAINAGE      multiple bilateral cyst aspiration.     [facility]    Hysterectomy   [address] [facility]. sinus surgery    HX HEENT      deviated septum    HX ORTHOPAEDIC Left 2006    Tarsal Tunnel Release     HX ORTHOPAEDIC Right 2010,2016    basal joint surgery    HX ORTHOPAEDIC Left 2012    basal joint surgery     HX ORTHOPAEDIC      back surgery     HX OTHER SURGICAL      TVT Sling    HX OTHER SURGICAL      Tillman's Neuroma removal     HX SEPTOPLASTY      HX TONSIL AND ADENOIDECTOMY      HX TONSILLECTOMY  1946    HX UROLOGICAL  2010    TVT sling         Family History:   Problem Relation Age of Onset    Hypertension Mother     Stroke Mother         TIA    Heart Disease Mother     Alzheimer's Disease Mother     Hypertension Father     Asthma Father     Colon Cancer Father     COPD Father     Asthma Brother        Social History     Socioeconomic History    Marital status:      Spouse name: Not on file    Number of children: Not on file    Years of education: Not on file    Highest education level: Not on file   Occupational History    Not on file   Tobacco Use    Smoking status: Never Smoker    Smokeless tobacco: Never Used   Substance and Sexual Activity    Alcohol use: No    Drug use: No    Sexual activity: Not on file   Other Topics Concern    Not on file   Social History Narrative    Not on file     Social Determinants of Health     Financial Resource Strain:     Difficulty of Paying Living Expenses: Not on file   Food Insecurity:     Worried About Running Out of Food in the Last Year: Not on file    Andrew of Food in the Last Year: Not on file   Transportation Needs:     Lack of Transportation (Medical): Not on file    Lack of Transportation (Non-Medical):  Not on file   Physical Activity:     Days of Exercise per Week: Not on file    Minutes of Exercise per Session: Not on file   Stress:     Feeling of Stress : Not on file   Social Connections:     Frequency of Communication with Friends and Family: Not on file    Frequency of Social Gatherings with Friends and Family: Not on file    Attends Worship Services: Not on file    Active Member of Clubs or Organizations: Not on file    Attends Club or Organization Meetings: Not on file    Marital Status: Not on file   Intimate Partner Violence:     Fear of Current or Ex-Partner: Not on file    Emotionally Abused: Not on file    Physically Abused: Not on file    Sexually Abused: Not on file   Housing Stability:     Unable to Pay for Housing in the Last Year: Not on file    Number of [place] in the Last Year: Not on file    Unstable Housing in the Last Year: Not on file         ALLERGIES: Ace inhibitors and Arb-angiotensin receptor antagonist    Review of Systems   Constitutional: Positive for fever. Negative for chills and fatigue. HENT: Negative for sneezing and sore throat. Respiratory: Positive for cough and shortness of breath. Cardiovascular: Negative for chest pain and leg swelling. Gastrointestinal: Negative for abdominal pain, diarrhea, nausea and vomiting. Genitourinary: Negative for difficulty urinating and dysuria. Musculoskeletal: Negative for arthralgias and myalgias. Skin: Negative for color change and rash. Neurological: Negative for weakness and headaches. Psychiatric/Behavioral: Negative for agitation and behavioral problems. Vitals:    11/23/21 1435 11/23/21 1444 11/23/21 1545   BP: (!) 140/73  124/60   Pulse: 83  66   Resp: 16  16   Temp: 99.2 °F (37.3 °C)  98.9 °F (37.2 °C)   SpO2: 96% 93% 97%   Weight: 46.3 kg (102 lb)     Height: 5' 3\" (1.6 m)              Physical Exam  Vitals and nursing note reviewed. Constitutional:       General: She is not in acute distress. Appearance: Normal appearance. She is well-developed. She is not ill-appearing, toxic-appearing or diaphoretic. HENT:      Head: Normocephalic and atraumatic. Nose: Nose normal.      Mouth/Throat:      Mouth: Mucous membranes are moist.      Pharynx: Oropharynx is clear.    Eyes: Extraocular Movements: Extraocular movements intact. Conjunctiva/sclera: Conjunctivae normal.      Pupils: Pupils are equal, round, and reactive to light. Cardiovascular:      Rate and Rhythm: Normal rate and regular rhythm. Pulses: Normal pulses. Heart sounds: Normal heart sounds. Pulmonary:      Effort: Pulmonary effort is normal. No respiratory distress. Breath sounds: Examination of the right-middle field reveals decreased breath sounds. Examination of the right-lower field reveals decreased breath sounds. Decreased breath sounds present. No wheezing. Chest:      Chest wall: No tenderness. Abdominal:      General: Abdomen is flat. There is no distension. Palpations: Abdomen is soft. Tenderness: There is no abdominal tenderness. There is no guarding or rebound. Musculoskeletal:         General: No swelling, tenderness, deformity or signs of injury. Normal range of motion. Cervical back: Normal range of motion and neck supple. No rigidity. No muscular tenderness. Right lower leg: No edema. Left lower leg: No edema. Skin:     General: Skin is warm and dry. Capillary Refill: Capillary refill takes less than 2 seconds. Neurological:      General: No focal deficit present. Mental Status: She is alert and oriented to person, place, and time. Psychiatric:         Mood and Affect: Mood normal.         Behavior: Behavior normal.          MDM  Number of Diagnoses or Management Options  Diagnosis management comments: 51-year-old female presents as above with worsening pleural effusion in the setting of cough and fever. I suspect that she has continued pneumonia with parapneumonic effusion. After discussion with the patient shared decision making we elected to do a diagnostic thoracentesis. As the fluid was not purulent we will discharge with change in antibiotics, follow-up with primary care, return if needed.        Amount and/or Complexity of Data Reviewed  Clinical lab tests: reviewed  Tests in the radiology section of CPT®: reviewed  Decide to obtain previous medical records or to obtain history from someone other than the patient: yes           Thoracentesis    Date/Time: 11/23/2021 6:19 PM  Performed by: Iliana Hein MD  Authorized by: Iliana Hein MD     Consent:     Consent obtained:  Verbal    Consent given by:  Patient    Risks discussed:  Incomplete drainage, pain, pneumothorax, nerve damage, infection and bleeding    Alternatives discussed:  No treatment  Anesthesia (see MAR for exact dosages): Anesthesia method:  Local infiltration    Local anesthetic:  Lidocaine 1% WITH epi  Procedure details:     Patient position:  Sitting    Location:  R posterior    Intercostal space:  5th    Puncture method:  Needle only    Ultrasound guidance: yes      Indwelling catheter placed: no      Needle gauge:  18    Number of attempts:  1    Drainage characteristics:  Serosanguinous  Post-procedure details:     Chest x-ray performed: yes      Chest x-ray findings:  Pleural effusion unchanged    Patient tolerance of procedure:   Tolerated well, no immediate complications

## 2021-11-23 NOTE — ED TRIAGE NOTES
Pt present with a fever, cough, and body aches for 11 days. Pt has two rapid covid tests that were negative. Denies NVD.

## 2021-11-24 ENCOUNTER — PATIENT OUTREACH (OUTPATIENT)
Dept: CASE MANAGEMENT | Age: 81
End: 2021-11-24

## 2021-11-24 LAB
SARS-COV-2, XPLCVT: NOT DETECTED
SOURCE, COVRS: NORMAL

## 2021-11-24 NOTE — ED NOTES
Patient seen by provider prior to discharge and no further questions. Discharge papers given to patient by RN. Ambulatory to home and no apparent distress.

## 2021-11-24 NOTE — PROGRESS NOTES
Date/Time:  2021 11:39 AM     Patient contacted regarding COVID-19 risk. Discussed COVID-19 related testing which was available at this time. Test results were negative. Patient informed of results, if available? yes. Ambulatory Care Manager contacted the patient by telephone to perform post discharge assessment. Call within 2 business days of discharge: Yes Verified name and  with patient as identifiers. Provided introduction to self, and explanation of the CTN/ACM role, and reason for call due to risk factors for infection and/or exposure to COVID-19. Symptoms reviewed with patient who verbalized the following symptoms: no new symptoms and no worsening symptoms,  Temp today is 98.8po and POx reading 94% on RA      Due to no new or worsening symptoms encounter was not routed to provider for escalation. Discussed follow-up appointments. If no appointment was previously scheduled, appointment scheduling offered:  Pt reports that she called her PCP. She had an appt already scheduled before this ED visit for 21 to have her Medicare Yearly Exam.  PCP told her that date would be fine for her ED f/u appt. [facility] follow up appointment(s):   Future Appointments   Date Time Provider Norma Castillo   2022  1:00 PM KHADRA ROSADO BS AMB   2022  2:00 PM Max Escobar MD CAV BS AMB     Non-[facility] follow up appointment(s): 21 ED f/u appt w/PCP    Interventions to address risk factors: Pt sts she has her Augmentin Rx, but they are very large tablets and she has difficulty swallowing pills. Pt was instructed to call her pharmacist to inquire about crushing this med and mixing it w/applesauce for easier ingestion. Pt reports her POx is running 94% on RA. ACM instructed her to go back to the ED if this value goes below 90%. Pt verbalized understanding. Advance Care Planning:   Does patient have an Advance Directive: yes, reviewed and needs to be updated.      Educated patient about risk for severe COVID-19 due to risk factors according to CDC guidelines. ACM reviewed discharge instructions, medical action plan and red flag symptoms with the patient who verbalized understanding. Discussed COVID vaccination status: yes  (Pt reports she rec'd her COVID booster vac on 11/10/21) Education provided on COVID-19 vaccination as appropriate. Discussed exposure protocols and quarantine with CDC Guidelines. Patient was given an opportunity to verbalize any questions and concerns and agrees to contact ACM or health care provider for questions related to their healthcare. Reviewed and educated patient on any new and changed medications related to discharge diagnosis     Was patient discharged with a pulse oximeter? No (she has one at home) Discussed and confirmed pulse oximeter discharge instructions and when to notify provider or seek emergency care. ACM provided contact information. Plan for follow-up call in 3-5 days based on severity of symptoms and risk factors.  /dla      Date/Time:  11/24/2021 9:29 AM   Call within 2 business days of discharge: Yes   Attempted to reach patient by telephone. Left HIPPA compliant messages requesting a return call. COVID result still in process at this time.   Will attempt to reach patient again.  /dla

## 2021-11-27 LAB
BACTERIA SPEC CULT: NORMAL
GRAM STN SPEC: NORMAL
GRAM STN SPEC: NORMAL
SERVICE CMNT-IMP: NORMAL

## 2021-11-28 LAB
BACTERIA SPEC CULT: NORMAL
SERVICE CMNT-IMP: NORMAL

## 2021-11-29 ENCOUNTER — PATIENT OUTREACH (OUTPATIENT)
Dept: CASE MANAGEMENT | Age: 81
End: 2021-11-29

## 2021-11-29 NOTE — PROGRESS NOTES
Date/Time:  11/29/2021 2:00 PM     Patient resolved from [address] episode on 11/29/21. Discussed COVID-19 related testing which was available at this time. Test results were negative. Patient informed of results, if available? No, pt is already aware     Patient/family has been provided the following resources and education related to COVID-19:                         Signs, symptoms and red flags related to COVID-19            CDC exposure and quarantine guidelines            [employer] exposure contact - [phone]            Contact for their local Department of Health                 Patient currently reports that the following symptoms have improved:  fever and cough. She still occasionally has fevers (last Friday and yesterday morning), but they have not reached 100. POx is reading around 92-93%. ACM strongly encouraged pt to f/u with a pulmonologist.    No further outreach scheduled with this CTN/ACM/LPN/HC/ MA. Episode of Care resolved.   Patient has this CTN/ACM/LPN/HC/MA contact information if future needs arise.  Lencho Carlton

## 2022-01-12 ENCOUNTER — TRANSCRIBE ORDER (OUTPATIENT)
Dept: SCHEDULING | Age: 82
End: 2022-01-12

## 2022-01-12 DIAGNOSIS — M81.0 AGE-RELATED OSTEOPOROSIS WITHOUT CURRENT PATHOLOGICAL FRACTURE: Primary | ICD-10-CM

## 2022-01-24 ENCOUNTER — TRANSCRIBE ORDER (OUTPATIENT)
Dept: SCHEDULING | Age: 82
End: 2022-01-24

## 2022-01-24 DIAGNOSIS — Z12.31 VISIT FOR SCREENING MAMMOGRAM: Primary | ICD-10-CM

## 2022-02-01 ENCOUNTER — HOSPITAL ENCOUNTER (OUTPATIENT)
Dept: GENERAL RADIOLOGY | Age: 82
Discharge: HOME | End: 2022-02-01
Attending: INTERNAL MEDICINE
Payer: MEDICARE

## 2022-02-01 ENCOUNTER — HOSPITAL ENCOUNTER (OUTPATIENT)
Dept: MAMMOGRAPHY | Age: 82
Discharge: HOME | End: 2022-02-01
Attending: INTERNAL MEDICINE
Payer: MEDICARE

## 2022-02-01 ENCOUNTER — TRANSCRIBE ORDER (OUTPATIENT)
Dept: GENERAL RADIOLOGY | Age: 82
End: 2022-02-01

## 2022-02-01 DIAGNOSIS — J90 PLEURAL EFFUSION: Primary | ICD-10-CM

## 2022-02-01 DIAGNOSIS — Z12.31 VISIT FOR SCREENING MAMMOGRAM: ICD-10-CM

## 2022-02-01 DIAGNOSIS — J90 PLEURAL EFFUSION: ICD-10-CM

## 2022-02-01 DIAGNOSIS — M81.0 AGE-RELATED OSTEOPOROSIS WITHOUT CURRENT PATHOLOGICAL FRACTURE: ICD-10-CM

## 2022-02-01 PROCEDURE — 77080 DXA BONE DENSITY AXIAL: CPT

## 2022-02-01 PROCEDURE — 77067 SCR MAMMO BI INCL CAD: CPT

## 2022-02-01 PROCEDURE — 71046 X-RAY EXAM CHEST 2 VIEWS: CPT

## 2022-02-14 ENCOUNTER — TRANSCRIBE ORDER (OUTPATIENT)
Dept: SCHEDULING | Age: 82
End: 2022-02-14

## 2022-02-14 DIAGNOSIS — J90 PLEURAL EFFUSION, NOT ELSEWHERE CLASSIFIED: Primary | ICD-10-CM

## 2022-02-16 ENCOUNTER — OFFICE VISIT (OUTPATIENT)
Dept: CARDIOLOGY CLINIC | Age: 82
End: 2022-02-16
Payer: MEDICARE

## 2022-02-16 ENCOUNTER — ANCILLARY PROCEDURE (OUTPATIENT)
Dept: CARDIOLOGY CLINIC | Age: 82
End: 2022-02-16
Payer: MEDICARE

## 2022-02-16 VITALS
SYSTOLIC BLOOD PRESSURE: 122 MMHG | DIASTOLIC BLOOD PRESSURE: 60 MMHG | HEIGHT: 63 IN | BODY MASS INDEX: 18.25 KG/M2 | WEIGHT: 103 LBS

## 2022-02-16 VITALS
HEIGHT: 63 IN | HEART RATE: 52 BPM | BODY MASS INDEX: 18.25 KG/M2 | DIASTOLIC BLOOD PRESSURE: 60 MMHG | WEIGHT: 103 LBS | SYSTOLIC BLOOD PRESSURE: 122 MMHG | OXYGEN SATURATION: 98 %

## 2022-02-16 DIAGNOSIS — T50.905A DRUG-INDUCED BRADYCARDIA: ICD-10-CM

## 2022-02-16 DIAGNOSIS — R00.1 DRUG-INDUCED BRADYCARDIA: ICD-10-CM

## 2022-02-16 DIAGNOSIS — R55 SYNCOPE, UNSPECIFIED SYNCOPE TYPE: ICD-10-CM

## 2022-02-16 DIAGNOSIS — I10 ESSENTIAL HYPERTENSION: Primary | ICD-10-CM

## 2022-02-16 DIAGNOSIS — I10 ESSENTIAL HYPERTENSION: ICD-10-CM

## 2022-02-16 PROCEDURE — 1090F PRES/ABSN URINE INCON ASSESS: CPT | Performed by: SPECIALIST

## 2022-02-16 PROCEDURE — G8754 DIAS BP LESS 90: HCPCS | Performed by: SPECIALIST

## 2022-02-16 PROCEDURE — G0463 HOSPITAL OUTPT CLINIC VISIT: HCPCS | Performed by: SPECIALIST

## 2022-02-16 PROCEDURE — G8536 NO DOC ELDER MAL SCRN: HCPCS | Performed by: SPECIALIST

## 2022-02-16 PROCEDURE — G8752 SYS BP LESS 140: HCPCS | Performed by: SPECIALIST

## 2022-02-16 PROCEDURE — G8419 CALC BMI OUT NRM PARAM NOF/U: HCPCS | Performed by: SPECIALIST

## 2022-02-16 PROCEDURE — 99214 OFFICE O/P EST MOD 30 MIN: CPT | Performed by: SPECIALIST

## 2022-02-16 PROCEDURE — G8510 SCR DEP NEG, NO PLAN REQD: HCPCS | Performed by: SPECIALIST

## 2022-02-16 PROCEDURE — 93306 TTE W/DOPPLER COMPLETE: CPT | Performed by: SPECIALIST

## 2022-02-16 PROCEDURE — G8427 DOCREV CUR MEDS BY ELIG CLIN: HCPCS | Performed by: SPECIALIST

## 2022-02-16 PROCEDURE — 1101F PT FALLS ASSESS-DOCD LE1/YR: CPT | Performed by: SPECIALIST

## 2022-02-16 RX ORDER — HYDRALAZINE HYDROCHLORIDE 25 MG/1
25 TABLET, FILM COATED ORAL AS NEEDED
COMMUNITY
Start: 2022-02-10

## 2022-02-16 RX ORDER — METHYLPHENIDATE HYDROCHLORIDE 27 MG/1
27 TABLET ORAL DAILY
COMMUNITY
Start: 2022-02-09 | End: 2022-09-20

## 2022-02-16 RX ORDER — ESCITALOPRAM OXALATE 10 MG/1
TABLET ORAL
COMMUNITY
End: 2022-09-20 | Stop reason: DRUGHIGH

## 2022-02-16 RX ORDER — GABAPENTIN 100 MG/1
100 CAPSULE ORAL DAILY
COMMUNITY
Start: 2021-12-27

## 2022-02-16 NOTE — PROGRESS NOTES
CARDIOLOGY OFFICE NOTE    Jamar Weinberg MD, [address], [place], [address] [facility]  Phone [phone]; Fax [phone]  Mobile [phone]   Voice Mail [phone]         ATTENTION:   This medical record was transcribed using an electronic medical records/speech recognition system. Although proofread, it may and can contain electronic, spelling and other errors. Corrections may be executed at a later time. Please feel free to contact us for any clarifications as needed. ICD-10-CM ICD-9-CM   1. Essential hypertension  I10 401.9   2. Syncope, unspecified syncope type  R55 780.2   3. Drug-induced bradycardia  R00.1 427.89    T50.905A E947.9            Jose Garner is a 80 y.o. female with HTN and dyslipidemia referred for follow up. Cardiac risk factors: dyslipidemia, hypertension, post-menopausal  I have personally obtained the history from the patient. HISTORY OF PRESENTING ILLNESS     Overall she is doing well with no interval cardiac issues. She was recently hospitalized in November with a pneumonia. She did have a thoracentesis at that time. She lost a lot of weight she says because she was not eating for 2 weeks and was having fevers. On a cardiac standpoint there are no issues today.        ACTIVE PROBLEM LIST     Patient Active Problem List    Diagnosis Date Noted    Syncope and collapse 04/18/2019    Bradycardia 04/18/2019    Asthma, mild intermittent 04/18/2019    Hypokalemia 04/18/2019    Essential hypertension 05/20/2017    Acquired hypothyroidism 05/20/2017    Asthma 05/20/2017    GERD (gastroesophageal reflux disease) 05/20/2017    Osteoporosis 05/20/2017    Acute pulmonary embolism ([facility] [address] 05/19/2017    Lumbar stenosis with neurogenic claudication 05/15/2017           PAST MEDICAL HISTORY     Past Medical History:   Diagnosis Date    Anemia     Arthritis     osteoporosis    Asthma     Broken ribs 2006    fell    Cancer ([facility] [address]     melanoma     GERD (gastroesophageal reflux disease)     Hypertension     Migraines     Osteoporosis     Rheumatic fever     x2 without residual    Thyroid disease            PAST SURGICAL HISTORY     Past Surgical History:   Procedure Laterality Date    HX ABOVE KNEE AMPUTATION Left 05/2015    remove Tillman's neuroma    HX BREAST BIOPSY Left 1972    benign    HX CATARACT REMOVAL Bilateral 2009, 2011    HX CYST INCISION AND DRAINAGE      multiple bilateral cyst aspiration.     [facility]    Hysterectomy    HX HEENT  1996    sinus surgery    HX HEENT      deviated septum    HX ORTHOPAEDIC Left 2006    Tarsal Tunnel Release     HX ORTHOPAEDIC Right 2010,2016    basal joint surgery    HX ORTHOPAEDIC Left 2012    basal joint surgery     HX ORTHOPAEDIC      back surgery     HX OTHER SURGICAL      TVT Sling    HX OTHER SURGICAL      Tillman's Neuroma removal     HX SEPTOPLASTY      HX TONSIL AND ADENOIDECTOMY      HX TONSILLECTOMY  1946    HX UROLOGICAL  2010    TVT sling          ALLERGIES     Allergies   Allergen Reactions    Ace Inhibitors Angioedema    Arb-Angiotensin Receptor Antagonist Other (comments)     Told not to take by provider due to severe reaction to ace inhibitors          FAMILY HISTORY     Family History   Problem Relation Age of Onset    Hypertension Mother     Stroke Mother         TIA    Heart Disease Mother     Alzheimer's Disease Mother     Hypertension Father     Asthma Father     Colon Cancer Father     COPD Father     Asthma Brother     negative for cardiac disease       SOCIAL HISTORY     Social History     Socioeconomic History    Marital status:    Tobacco Use    Smoking status: Never Smoker    Smokeless tobacco: Never Used   Substance and Sexual Activity    Alcohol use: Yes     Comment: rarely    Drug use: Never         MEDICATIONS     Current Outpatient Medications   Medication Sig    escitalopram oxalate (LEXAPRO) 10 mg tablet escitalopram 10 mg tablet   Take 1 tablet every day by oral route.  gabapentin (NEURONTIN) 100 mg capsule Take 100 mg by mouth daily.  hydrALAZINE (APRESOLINE) 25 mg tablet Take 25 mg by mouth as needed. Systolic is 248    methylphenidate ER 27 mg 24 hr tab Take 27 mg by mouth daily.  nebivoloL (Bystolic) 10 mg tablet Take  by mouth daily.  amLODIPine (NORVASC) 10 mg tablet Take 1 Tab by mouth daily.  montelukast (SINGULAIR) 10 mg tablet Take 10 mg by mouth daily.  estradiol (ESTRACE) 1 mg tablet Take 1 mg by mouth nightly.  HYDROcodone-acetaminophen (NORCO) 5-325 mg per tablet Take 0.5 Tabs by mouth every eight (8) hours as needed for Pain.  levothyroxine (SYNTHROID) 88 mcg tablet Take 88 mcg by mouth Daily (before breakfast).  Cetirizine (ZYRTEC) 10 mg cap Take 1 Cap by mouth nightly.  fluticasone (FLONASE) 50 mcg/actuation nasal spray 2 Sprays by Both Nostrils route nightly.  amoxicillin-clavulanate (Augmentin) 875-125 mg per tablet Take 1 Tablet by mouth two (2) times a day.  PNV No.40-Iron Fum-FA Cmb No.1 27-1 mg tab Take  by mouth.  cloNIDine (CATAPRES) 0.2 mg/24 hr ptwk 1 Patch by TransDERmal route Every Friday.  predniSONE (DELTASONE) 5 mg tablet Take 7.5 mg by mouth daily. Indications: pain from spinal stenosis    fluticasone-salmeterol (ADVAIR DISKUS) 100-50 mcg/dose diskus inhaler Take 1 Puff by inhalation two (2) times a day. No current facility-administered medications for this visit. I have reviewed the nurses notes, vitals, problem list, allergy list, medical history, family, social history and medications. REVIEW OF SYMPTOMS    as per HPI  General: Pt denies excessive weight gain or loss. Pt is able to conduct ADL's  HEENT: Denies blurred vision, headaches, hearing loss, epistaxis and difficulty swallowing. Respiratory: Denies cough, congestion, shortness of breath, HOWE, wheezing or stridor.   Cardiovascular: Denies precordial pain, palpitations, edema or PND  Gastrointestinal: Denies poor appetite, indigestion, abdominal pain or blood in stool  Genitourinary: Denies hematuria, dysuria, increased urinary frequency  Musculoskeletal: Denies joint pain or swelling from muscles or joints  Neurologic: Denies tremor, paresthesias, headache, or sensory motor disturbance  Psychiatric: Denies confusion, insomnia, depression  Integumentray: Denies rash, itching or ulcers. Hematologic: Denies easy bruising, bleeding     PHYSICAL EXAMINATION      Vitals:    02/16/22 1342   BP: 122/60   Pulse: (!) 52   SpO2: 98%   Weight: 103 lb (46.7 kg)   Height: 5' 3\" (1.6 m)     General: Well developed, in no acute distress. HEENT: No jaundice, oral mucosa moist, no oral ulcers  Neck: Supple, no stiffness, no lymphadenopathy, supple  Heart:   Slow rate regular rhythm  Respiratory: Clear bilaterally x 4, no wheezing or rales  Extremities:  No edema, normal cap refill, no cyanosis. Musculoskeletal: No clubbing, no deformities  Neuro: A&Ox3, speech clear, gait stable, cooperative, no focal neurologic deficits  Skin: Skin color is normal. No rashes or lesions. Non diaphoretic, moist.         DIAGNOSTIC DATA     1. Echo   4/18/19- EF 56-60%, AV sclerosis with no significant stenosis, MR mod, TR mild, mild/mod Pulm HTN, mild PI     2. CTA chest   5/28/17- Persistent small left lower lobe pulmonary arterial branch embolus. No new emboli or other acute findings identified     3.  Lipids   3/9/16- , HDL 94, LDL 77, LDL-P 945   9/30/20- , HDL 87, , TG 95       ECG: Normal sinus rhythm today     LABORATORY DATA          Lab Results   Component Value Date/Time    WBC 6.6 11/23/2021 02:56 PM    HGB 10.5 (L) 11/23/2021 02:56 PM    HCT 31.9 (L) 11/23/2021 02:56 PM    PLATELET 529 11/81/1649 02:56 PM    MCV 89.1 11/23/2021 02:56 PM      Lab Results   Component Value Date/Time    Sodium 132 (L) 11/23/2021 02:56 PM    Potassium 3.2 (L) 11/23/2021 02:56 PM Chloride 98 11/23/2021 02:56 PM    CO2 25 11/23/2021 02:56 PM    Anion gap 9 11/23/2021 02:56 PM    Glucose 120 (H) 11/23/2021 02:56 PM    BUN 10 11/23/2021 02:56 PM    Creatinine 0.84 11/23/2021 02:56 PM    BUN/Creatinine ratio 12 11/23/2021 02:56 PM    GFR est AA >60 11/23/2021 02:56 PM    GFR est non-AA >60 11/23/2021 02:56 PM    Calcium 8.8 11/23/2021 02:56 PM    Bilirubin, total 0.3 11/23/2021 02:56 PM    Alk. phosphatase 98 11/23/2021 02:56 PM    Protein, total 7.6 11/23/2021 02:56 PM    Albumin 2.8 (L) 11/23/2021 02:56 PM    Globulin 4.8 (H) 11/23/2021 02:56 PM    A-G Ratio 0.6 (L) 11/23/2021 02:56 PM    ALT (SGPT) 20 11/23/2021 02:56 PM           ASSESSMENT/RECOMMENDATIONS:.      1. HTN  -Blood pressure is good today no adjustments in meds  2. Aortic valve sclerosis with no significant stenosis  -No need for echo at this point  3. Cholesterol profile  -LDL was 110. Her goal is under 100.  4. Moderate MR  -consider echo in 6 mo  4. Return in 6mo. Or prn      Orders Placed This Encounter    escitalopram oxalate (LEXAPRO) 10 mg tablet     Sig: escitalopram 10 mg tablet   Take 1 tablet every day by oral route.  gabapentin (NEURONTIN) 100 mg capsule     Sig: Take 100 mg by mouth daily.  hydrALAZINE (APRESOLINE) 25 mg tablet     Sig: Take 25 mg by mouth as needed. Systolic is 703    methylphenidate ER 27 mg 24 hr tab     Sig: Take 27 mg by mouth daily. Follow-up and Dispositions  ·   Return in about 6 months (around 8/16/2022). I have discussed the diagnosis with  Tono Downy and the intended plan as seen in the above orders. Questions were answered concerning future plans. I have discussed medication side effects and warnings with the patient as well. Thank you,  Spencer Patel MD for involving me in the care of  Major Downy. Please do not hesitate to contact me for further questions/concerns. Jamar Anthony MD, [facility]    Patient Care Team:  Mikayla Bhatt, Rahel Marie MD as PCP - General (Internal Medicine)  Michele Conroy MD (Neurosurgery)    [address], [address] [place] [address]     [address]      [phone] / [phone] Fax

## 2022-02-16 NOTE — LETTER
2/16/2022    Patient: Major Downy   YOB: 1940   Date of Visit: 2/16/2022     Kari Tony, [facility] [id number]  Via Fax: [phone]    Dear Kari Tony MD,      Thank you for referring Ms. Umm Zurita to [facility] for evaluation. My notes for this consultation are attached. If you have questions, please do not hesitate to call me. I look forward to following your patient along with you.       Sincerely,    Ladonna Villegas MD

## 2022-02-16 NOTE — PROGRESS NOTES
Room 4    Visit Vitals  /60   Pulse (!) 52   Ht 5' 3\" (1.6 m)   Wt 103 lb (46.7 kg)   SpO2 98%   BMI 18.25 kg/m²       Chest pain: no  Shortness of breath: no  Edema: no  Palpitations: no  Dizziness: no    New diagnosis/Surgeries: no    ER/Hospitalizations: ED 11-23-21, pneum    Refills: no

## 2022-02-21 ENCOUNTER — HOSPITAL ENCOUNTER (OUTPATIENT)
Dept: CT IMAGING | Age: 82
Discharge: HOME | End: 2022-02-21
Attending: INTERNAL MEDICINE
Payer: MEDICARE

## 2022-02-21 DIAGNOSIS — J90 PLEURAL EFFUSION, NOT ELSEWHERE CLASSIFIED: ICD-10-CM

## 2022-02-21 LAB
ECHO AO ASC DIAM: 3.3 CM
ECHO AO ASCENDING AORTA INDEX: 2.26 CM/M2
ECHO AO ROOT DIAM: 2.8 CM
ECHO AO ROOT INDEX: 1.92 CM/M2
ECHO AV AREA PEAK VELOCITY: 2.1 CM2
ECHO AV AREA PEAK VELOCITY: 2.1 CM2
ECHO AV AREA VTI: 2.2 CM2
ECHO AV AREA VTI: 2.2 CM2
ECHO AV MEAN GRADIENT: 6 MMHG
ECHO AV MEAN VELOCITY: 1.1 M/S
ECHO AV PEAK GRADIENT: 11 MMHG
ECHO AV PEAK VELOCITY: 1.7 M/S
ECHO AV VELOCITY RATIO: 0.71
ECHO AV VTI: 40.2 CM
ECHO EST RA PRESSURE: 3 MMHG
ECHO LA DIAMETER INDEX: 2.6 CM/M2
ECHO LA DIAMETER: 3.8 CM
ECHO LA TO AORTIC ROOT RATIO: 1.36
ECHO LA VOL 2C: 47 ML (ref 22–52)
ECHO LA VOL 4C: 39 ML (ref 22–52)
ECHO LA VOL BP: 45 ML (ref 22–52)
ECHO LA VOL BP: 45 ML (ref 22–52)
ECHO LA VOLUME AREA LENGTH: 47 ML
ECHO LA VOLUME INDEX A2C: 32 ML/M2 (ref 16–34)
ECHO LA VOLUME INDEX A4C: 27 ML/M2 (ref 16–34)
ECHO LA VOLUME INDEX AREA LENGTH: 32 ML/M2 (ref 16–34)
ECHO LV E' LATERAL VELOCITY: 10 CM/S
ECHO LV E' SEPTAL VELOCITY: 7 CM/S
ECHO LV EDV A2C: 79 ML
ECHO LV EDV A4C: 88 ML
ECHO LV EDV BP: 84 ML (ref 56–104)
ECHO LV EDV INDEX A4C: 60 ML/M2
ECHO LV EDV INDEX BP: 58 ML/M2
ECHO LV EDV NDEX A2C: 54 ML/M2
ECHO LV EJECTION FRACTION A2C: 63 %
ECHO LV EJECTION FRACTION A4C: 64 %
ECHO LV EJECTION FRACTION BIPLANE: 64 % (ref 55–100)
ECHO LV ESV A2C: 29 ML
ECHO LV ESV A4C: 32 ML
ECHO LV ESV BP: 30 ML (ref 19–49)
ECHO LV ESV INDEX A2C: 20 ML/M2
ECHO LV ESV INDEX A4C: 22 ML/M2
ECHO LV ESV INDEX BP: 21 ML/M2
ECHO LV FRACTIONAL SHORTENING: 30 % (ref 28–44)
ECHO LV INTERNAL DIMENSION DIASTOLE INDEX: 2.74 CM/M2
ECHO LV INTERNAL DIMENSION DIASTOLIC: 4 CM (ref 3.9–5.3)
ECHO LV INTERNAL DIMENSION SYSTOLIC INDEX: 1.92 CM/M2
ECHO LV INTERNAL DIMENSION SYSTOLIC: 2.8 CM
ECHO LV IVSD: 0.9 CM (ref 0.6–0.9)
ECHO LV MASS 2D: 109.7 G (ref 67–162)
ECHO LV MASS INDEX 2D: 75.1 G/M2 (ref 43–95)
ECHO LV POSTERIOR WALL DIASTOLIC: 0.9 CM (ref 0.6–0.9)
ECHO LV RELATIVE WALL THICKNESS RATIO: 0.45
ECHO LVOT AREA: 2.8 CM2
ECHO LVOT AV VTI INDEX: 0.8
ECHO LVOT DIAM: 1.9 CM
ECHO LVOT MEAN GRADIENT: 3 MMHG
ECHO LVOT PEAK GRADIENT: 6 MMHG
ECHO LVOT PEAK VELOCITY: 1.2 M/S
ECHO LVOT STROKE VOLUME INDEX: 62.3 ML/M2
ECHO LVOT SV: 91 ML
ECHO LVOT VTI: 32.1 CM
ECHO MV A VELOCITY: 1.07 M/S
ECHO MV AREA PHT: 3.8 CM2
ECHO MV E DECELERATION TIME (DT): 200.9 MS
ECHO MV E VELOCITY: 1.28 M/S
ECHO MV E/A RATIO: 1.2
ECHO MV E/E' LATERAL: 12.8
ECHO MV E/E' RATIO (AVERAGED): 15.54
ECHO MV E/E' SEPTAL: 18.29
ECHO MV PRESSURE HALF TIME (PHT): 58.3 MS
ECHO RA MINOR AXIS INDEX: 2.6 CM/M2
ECHO RA MINOR AXIS: 3.8 CM
ECHO RIGHT VENTRICULAR SYSTOLIC PRESSURE (RVSP): 40 MMHG
ECHO RV FREE WALL PEAK S': 12 CM/S
ECHO RV INTERNAL DIMENSION: 3.2 CM
ECHO RV TAPSE: 2.3 CM (ref 1.5–2)
ECHO TV REGURGITANT MAX VELOCITY: 3.06 M/S
ECHO TV REGURGITANT PEAK GRADIENT: 38 MMHG

## 2022-02-21 PROCEDURE — 93306 TTE W/DOPPLER COMPLETE: CPT | Performed by: SPECIALIST

## 2022-02-21 PROCEDURE — 74011000636 HC RX REV CODE- 636

## 2022-02-21 PROCEDURE — 71260 CT THORAX DX C+: CPT

## 2022-02-21 RX ADMIN — IOPAMIDOL 100 ML: 612 INJECTION, SOLUTION INTRAVENOUS at 17:03

## 2022-03-06 ENCOUNTER — APPOINTMENT (OUTPATIENT)
Dept: GENERAL RADIOLOGY | Age: 82
End: 2022-03-06
Attending: EMERGENCY MEDICINE
Payer: MEDICARE

## 2022-03-06 ENCOUNTER — HOSPITAL ENCOUNTER (EMERGENCY)
Age: 82
Discharge: HOME | End: 2022-03-06
Attending: EMERGENCY MEDICINE
Payer: MEDICARE

## 2022-03-06 VITALS
DIASTOLIC BLOOD PRESSURE: 65 MMHG | HEART RATE: 53 BPM | TEMPERATURE: 97.9 F | WEIGHT: 100 LBS | HEIGHT: 63 IN | SYSTOLIC BLOOD PRESSURE: 174 MMHG | BODY MASS INDEX: 17.72 KG/M2 | RESPIRATION RATE: 16 BRPM | OXYGEN SATURATION: 97 %

## 2022-03-06 DIAGNOSIS — S61.211A LACERATION OF LEFT INDEX FINGER WITHOUT FOREIGN BODY WITHOUT DAMAGE TO NAIL, INITIAL ENCOUNTER: Primary | ICD-10-CM

## 2022-03-06 PROCEDURE — 74011000272 HC RX REV CODE- 272: Performed by: EMERGENCY MEDICINE

## 2022-03-06 PROCEDURE — 75810000283 HC INJECTION NERVE BLOCK

## 2022-03-06 PROCEDURE — 73130 X-RAY EXAM OF HAND: CPT

## 2022-03-06 PROCEDURE — 74011000250 HC RX REV CODE- 250: Performed by: EMERGENCY MEDICINE

## 2022-03-06 PROCEDURE — 77030002916 HC SUT ETHLN J&J -A

## 2022-03-06 PROCEDURE — 99283 EMERGENCY DEPT VISIT LOW MDM: CPT

## 2022-03-06 RX ORDER — SILVER NITRATE 38.21; 12.74 MG/1; MG/1
1 STICK TOPICAL
Status: COMPLETED | OUTPATIENT
Start: 2022-03-06 | End: 2022-03-06

## 2022-03-06 RX ORDER — BUPIVACAINE HYDROCHLORIDE 5 MG/ML
5 INJECTION, SOLUTION EPIDURAL; INTRACAUDAL ONCE
Status: COMPLETED | OUTPATIENT
Start: 2022-03-06 | End: 2022-03-06

## 2022-03-06 RX ORDER — SILVER NITRATE 38.21; 12.74 MG/1; MG/1
1 STICK TOPICAL
Status: DISCONTINUED | OUTPATIENT
Start: 2022-03-06 | End: 2022-03-06

## 2022-03-06 RX ORDER — LIDOCAINE HYDROCHLORIDE 10 MG/ML
8 INJECTION INFILTRATION; PERINEURAL ONCE
Status: COMPLETED | OUTPATIENT
Start: 2022-03-06 | End: 2022-03-06

## 2022-03-06 RX ADMIN — SILVER NITRATE APPLICATORS 1 APPLICATOR: 25; 75 STICK TOPICAL at 17:16

## 2022-03-06 RX ADMIN — LIDOCAINE HYDROCHLORIDE 8 ML: 10 INJECTION, SOLUTION INFILTRATION; PERINEURAL at 15:14

## 2022-03-06 RX ADMIN — Medication 1 EACH: at 17:16

## 2022-03-06 RX ADMIN — BUPIVACAINE HYDROCHLORIDE 25 MG: 5 INJECTION, SOLUTION EPIDURAL; INTRACAUDAL; PERINEURAL at 17:16

## 2022-03-06 NOTE — ED PROVIDER NOTES
31-year-old female presents with a laceration to left index finger. She was cutting vegetation in her yard this morning when she accidentally cut the tip of her left index finger. She went to an urgent care and was told that they could not manage this so she was referred to the ED. She denies any other symptoms           Past Medical History:   Diagnosis Date    Anemia     Arthritis     osteoporosis    Asthma     Broken ribs 2006    fell    Cancer ([address]     melanoma     GERD (gastroesophageal reflux disease)     Hypertension     Migraines     Osteoporosis     Rheumatic fever     x2 without residual    Thyroid disease        Past Surgical History:   Procedure Laterality Date    HX ABOVE KNEE AMPUTATION Left 05/2015    remove Tillman's neuroma    HX BREAST BIOPSY Left 1972    benign    HX CATARACT REMOVAL Bilateral 2009, 2011    HX CYST INCISION AND DRAINAGE      multiple bilateral cyst aspiration.     [facility]    Hysterectomy    HX HEENT  1996    sinus surgery    HX HEENT      deviated septum    HX ORTHOPAEDIC Left 2006    Tarsal Tunnel Release     HX ORTHOPAEDIC Right 2010,2016    basal joint surgery    HX ORTHOPAEDIC Left 2012    basal joint surgery     HX ORTHOPAEDIC      back surgery     HX OTHER SURGICAL      TVT Sling    HX OTHER SURGICAL      Tillman's Neuroma removal     HX SEPTOPLASTY      HX TONSIL AND ADENOIDECTOMY      HX TONSILLECTOMY  1946    HX UROLOGICAL  2010    TVT sling         Family History:   Problem Relation Age of Onset    Hypertension Mother     Stroke Mother         TIA    Heart Disease Mother     Alzheimer's Disease Mother     Hypertension Father     Asthma Father     Colon Cancer Father     COPD Father     Asthma Brother        Social History     Socioeconomic History    Marital status:      Spouse name: Not on file    Number of children: Not on file    Years of education: Not on file    Highest education level: Not on file Occupational History    Not on file   Tobacco Use    Smoking status: Never Smoker    Smokeless tobacco: Never Used   Substance and Sexual Activity    Alcohol use: Yes     Comment: rarely    Drug use: Never    Sexual activity: Not on file   Other Topics Concern    Not on file   Social History Narrative    Not on file     Social Determinants of Health     Financial Resource Strain:     Difficulty of Paying Living Expenses: Not on file   Food Insecurity:     Worried About Running Out of Food in the Last Year: Not on file    Andrew of Food in the Last Year: Not on file   Transportation Needs:     Lack of Transportation (Medical): Not on file    Lack of Transportation (Non-Medical): Not on file   Physical Activity:     Days of Exercise per Week: Not on file    Minutes of Exercise per Session: Not on file   Stress:     Feeling of Stress : Not on file   Social Connections:     Frequency of Communication with Friends and Family: Not on file    Frequency of Social Gatherings with Friends and Family: Not on file    Attends [employer] Services: Not on file    Active Member of [address] [place] [address] or Organizations: Not on file    Attends Club or Organization Meetings: Not on file    Marital Status: Not on file   Intimate Partner Violence:     Fear of Current or Ex-Partner: Not on file    Emotionally Abused: Not on file    Physically Abused: Not on file    Sexually Abused: Not on file   Housing Stability:     Unable to Pay for Housing in the Last Year: Not on file    Number of [place] in the Last Year: Not on file    Unstable Housing in the Last Year: Not on file         ALLERGIES: Ace inhibitors and Arb-angiotensin receptor antagonist    Review of Systems   Constitutional: Negative for fever. HENT: Negative for rhinorrhea. Respiratory: Negative for shortness of breath. Cardiovascular: Negative for chest pain. Gastrointestinal: Negative for abdominal pain. Genitourinary: Negative for dysuria. Musculoskeletal: Negative for back pain. Skin: Positive for wound. Neurological: Negative for headaches. Psychiatric/Behavioral: Negative for confusion. Vitals:    03/06/22 1402   BP: (!) 174/65   Pulse: (!) 53   Resp: 16   Temp: 97.9 °F (36.6 °C)   SpO2: 97%   Weight: 45.4 kg (100 lb)   Height: 5' 3\" (1.6 m)            Physical Exam  Vitals and nursing note reviewed. Constitutional:       General: She is not in acute distress. Appearance: Normal appearance. She is not ill-appearing, toxic-appearing or diaphoretic. HENT:      Head: Normocephalic and atraumatic. Eyes:      Extraocular Movements: Extraocular movements intact. Cardiovascular:      Rate and Rhythm: Normal rate. Pulses: Normal pulses. Pulmonary:      Effort: Pulmonary effort is normal. No respiratory distress. Abdominal:      General: There is no distension. Musculoskeletal:         General: Normal range of motion. Cervical back: Normal range of motion. Skin:     General: Skin is dry. Comments: 1.5 cm laceration to the medial aspect of the left index finger. Neurological:      Mental Status: She is alert and oriented to person, place, and time. Psychiatric:         Mood and Affect: Mood normal.          MDM  Number of Diagnoses or Management Options  Laceration of left index finger without foreign body without damage to nail, initial encounter  Diagnosis management comments:       Patient presents with a laceration left index finger. Digital block performed. Wound explored. It appears to be an avulsion with only a small portion of skin still remaining intact. The skin was removed and wound was dressed by nursing staff. Plain film shows no bony abnormality. At this point there is no indication for suturing. Discussed my clinical impression(s), any labs and/or radiology results with the patient. I answered any questions and addressed any concerns.  Discussed the importance of following up with their primary care physician and/or specialist(s). Discussed signs or symptoms that would warrant return back to the ER for further evaluation. The patient is agreeable with discharge.          Procedures

## 2022-03-06 NOTE — DISCHARGE INSTRUCTIONS
Thank you for allowing us to provide you with medical care today. We realize that you have many choices for your emergency care needs. We thank you for choosing [facility]. Please choose us in the future for any continued health care needs. The exam and treatment you received in the Emergency Department were for an emergent problem and are not intended as complete care. It is important that you follow up with a doctor, nurse practitioner, or physician's assistant for ongoing care. If your symptoms worsen or you do not improve as expected and you are unable to reach your usual health care provider, you should return to the Emergency Department. We are available 24 hours a day. Please make an appointment with your health care provider(s) for follow up of your Emergency Department visit. Take this sheet with you when you go to your follow-up visit.

## 2022-03-06 NOTE — ED TRIAGE NOTES
Pt ambulates to treatment area she states that at 1150 today she was pruning the shrub and cut her left index finger.   She went to any urgent care [facility] and they sent her here for sutures

## 2022-03-06 NOTE — ED NOTES
Patient  discharged with instructions per Dr Reji Godinez. Instructions reviewed with pt. Lance Stafford

## 2022-03-06 NOTE — ED NOTES
Dr Shaunna Hernandez cauterized finger with silver nitrate. The bleeding was stopped. Xeroform and a tube gauze dressing was applied  To the left 2nd finger.

## 2022-03-19 PROBLEM — I26.99 ACUTE PULMONARY EMBOLISM (HCC): Status: ACTIVE | Noted: 2017-05-19

## 2022-03-19 PROBLEM — K21.9 GERD (GASTROESOPHAGEAL REFLUX DISEASE): Status: ACTIVE | Noted: 2017-05-20

## 2022-03-19 PROBLEM — M81.0 OSTEOPOROSIS: Status: ACTIVE | Noted: 2017-05-20

## 2022-03-19 PROBLEM — M48.062 LUMBAR STENOSIS WITH NEUROGENIC CLAUDICATION: Status: ACTIVE | Noted: 2017-05-15

## 2022-03-19 PROBLEM — R00.1 BRADYCARDIA: Status: ACTIVE | Noted: 2019-04-18

## 2022-03-19 PROBLEM — J45.909 ASTHMA: Status: ACTIVE | Noted: 2017-05-20

## 2022-03-19 PROBLEM — R55 SYNCOPE AND COLLAPSE: Status: ACTIVE | Noted: 2019-04-18

## 2022-03-20 PROBLEM — E03.9 ACQUIRED HYPOTHYROIDISM: Status: ACTIVE | Noted: 2017-05-20

## 2022-03-20 PROBLEM — I10 ESSENTIAL HYPERTENSION: Status: ACTIVE | Noted: 2017-05-20

## 2022-03-20 PROBLEM — J45.20 ASTHMA, MILD INTERMITTENT: Status: ACTIVE | Noted: 2019-04-18

## 2022-03-20 PROBLEM — E87.6 HYPOKALEMIA: Status: ACTIVE | Noted: 2019-04-18

## 2022-04-29 NOTE — PROGRESS NOTES
0700- Bedside and Verbal shift change report given to A Autumn Valadez (oncoming nurse) by Suad Law (offgoing nurse). Report included the following information SBAR, Kardex, ED Summary, Intake/Output, MAR, Recent Results, Med Rec Status and Cardiac Rhythm NSR 1st degree block. Will assess. 6392- Per Dr Juana Kirkland DC patient after monitoring BP about 2 hours; BP to remain under 898 systolic.  
 
3307- Esign not working so patient signed copy of medications/discharge instructions. Put in patient chart. Refer to PM&R for further evaluation of peripheral neuropathy.  Advised patient to bring EMG study report to her first appointment.

## 2022-08-02 ENCOUNTER — TELEPHONE (OUTPATIENT)
Dept: CARDIOLOGY CLINIC | Age: 82
End: 2022-08-02

## 2022-09-19 NOTE — PROGRESS NOTES
CARDIOLOGY OFFICE NOTE    Jamar Vela MD, [address], [place], [address] [facility]  Phone [phone]; Fax [phone]  Mobile [phone]   Voice Mail [phone]         ATTENTION:   This medical record was transcribed using an electronic medical records/speech recognition system. Although proofread, it may and can contain electronic, spelling and other errors. Corrections may be executed at a later time. Please feel free to contact us for any clarifications as needed. ICD-10-CM ICD-9-CM   1. Essential hypertension  I10 401.9   2. Drug-induced bradycardia  R00.1 427.89    T50.905A E947.9            Zulma Moreno is a 80 y.o. female with HTN and dyslipidemia referred for follow up. Cardiac risk factors: dyslipidemia, hypertension, post-menopausal  I have personally obtained the history from the patient. HISTORY OF PRESENTING ILLNESS     States she is doing well with no interval cardiac issues. She did have COVID on returning from her daughter's graduation from [employer]. She did well after that. She had an echo today we reviewed her aortic insufficiency appears to be gone and see any evidence on today's echo and her mitral regurgitation went from moderate to mild. We reviewed her cholesterol and the last one I have is from 2020 but she feels that she had one earlier this year and will obtain it from her primary care.      ACTIVE PROBLEM LIST     Patient Active Problem List    Diagnosis Date Noted    Syncope and collapse 04/18/2019    Bradycardia 04/18/2019    Asthma, mild intermittent 04/18/2019    Hypokalemia 04/18/2019    Essential hypertension 05/20/2017    Acquired hypothyroidism 05/20/2017    Asthma 05/20/2017    GERD (gastroesophageal reflux disease) 05/20/2017    Osteoporosis 05/20/2017    Acute pulmonary embolism ([facility] [address] 05/19/2017    Lumbar stenosis with neurogenic claudication 05/15/2017           PAST MEDICAL HISTORY     Past Medical History:   Diagnosis Date    Anemia     Arthritis     osteoporosis    Asthma     Broken ribs 2006    fell    Cancer ([address]     melanoma     GERD (gastroesophageal reflux disease)     Hypertension     Migraines     Osteoporosis     Rheumatic fever     x2 without residual    Thyroid disease            PAST SURGICAL HISTORY     Past Surgical History:   Procedure Laterality Date    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      HX GYN  1969    Hysterectomy    HX HEENT  1996    sinus surgery    HX HEENT      deviated septum    HX ORTHOPAEDIC Left 2006    Tarsal Tunnel Release     HX ORTHOPAEDIC Right 2010,2016    basal joint surgery    HX ORTHOPAEDIC Left 2012    basal joint surgery     HX ORTHOPAEDIC      back surgery     HX OTHER SURGICAL      TVT Sling    HX OTHER SURGICAL      Tillman's Neuroma removal     HX SEPTOPLASTY      HX TONSIL AND ADENOIDECTOMY      HX TONSILLECTOMY  1946    HX UROLOGICAL  2010    TVT sling          ALLERGIES     Allergies   Allergen Reactions    Ace Inhibitors Angioedema    Arb-Angiotensin Receptor Antagonist Other (comments)     Told not to take by provider due to severe reaction to ace inhibitors          FAMILY HISTORY     Family History   Problem Relation Age of Onset    Hypertension Mother     Stroke Mother         TIA    Heart Disease Mother     Alzheimer's Disease Mother     Hypertension Father     Asthma Father     Colon Cancer Father     COPD Father     Asthma Brother     negative for cardiac disease       SOCIAL HISTORY     Social History     Socioeconomic History    Marital status:    Tobacco Use    Smoking status: Never    Smokeless tobacco: Never   Substance and Sexual Activity    Alcohol use: Yes     Comment: rarely    Drug use: Never         MEDICATIONS     Current Outpatient Medications Medication Sig    cloNIDine (CATAPRES) 0.3 mg/24 hr 1 Patch by TransDERmal route every seven (7) days. escitalopram oxalate (LEXAPRO) 20 mg tablet Take 20 mg by mouth daily. dexmethylphenidate 10 mg tab Take 10 mg by mouth daily. predniSONE (DELTASONE) 5 mg tablet Take 5 mg by mouth daily. albuterol (PROVENTIL HFA, VENTOLIN HFA, PROAIR HFA) 90 mcg/actuation inhaler Take  by inhalation every six (6) hours as needed for Wheezing. gabapentin (NEURONTIN) 100 mg capsule Take 100 mg by mouth daily. hydrALAZINE (APRESOLINE) 25 mg tablet Take 25 mg by mouth as needed. Systolic is 472    nebivoloL (BYSTOLIC) 10 mg tablet Take  by mouth daily. amLODIPine (NORVASC) 10 mg tablet Take 1 Tab by mouth daily. montelukast (SINGULAIR) 10 mg tablet Take 10 mg by mouth daily. estradioL (ESTRACE) 0.5 mg tablet Take 0.5 mg by mouth nightly. HYDROcodone-acetaminophen (NORCO) 5-325 mg per tablet Take 0.5 Tabs by mouth every eight (8) hours as needed for Pain.    levothyroxine (SYNTHROID) 88 mcg tablet Take 88 mcg by mouth Daily (before breakfast). Cetirizine 10 mg cap Take 1 Cap by mouth nightly. fluticasone (FLONASE) 50 mcg/actuation nasal spray 2 Sprays by Both Nostrils route nightly. No current facility-administered medications for this visit. I have reviewed the nurses notes, vitals, problem list, allergy list, medical history, family, social history and medications. REVIEW OF SYMPTOMS    as per HPI  General: Pt denies excessive weight gain or loss. Pt is able to conduct ADL's  HEENT: Denies blurred vision, headaches, hearing loss, epistaxis and difficulty swallowing. Respiratory: Denies cough, congestion, shortness of breath, HOWE, wheezing or stridor.   Cardiovascular: Denies precordial pain, palpitations, edema or PND  Gastrointestinal: Denies poor appetite, indigestion, abdominal pain or blood in stool  Genitourinary: Denies hematuria, dysuria, increased urinary frequency  Musculoskeletal: Denies joint pain or swelling from muscles or joints  Neurologic: Denies tremor, paresthesias, headache, or sensory motor disturbance  Psychiatric: Denies confusion, insomnia, depression  Integumentray: Denies rash, itching or ulcers. Hematologic: Denies easy bruising, bleeding     PHYSICAL EXAMINATION      Vitals:    09/20/22 0947 09/20/22 1000   BP: (!) 165/60 (!) 155/60   Pulse: (!) 56    Weight: 118 lb (53.5 kg)    Height: 5' 3\" (1.6 m)        General: Well developed, in no acute distress. HEENT: No jaundice, oral mucosa moist, no oral ulcers  Neck: Supple, no stiffness, no lymphadenopathy, supple  Heart:   Slow rate regular rhythm  Respiratory: Clear bilaterally x 4, no wheezing or rales  Extremities:  No edema, normal cap refill, no cyanosis. Musculoskeletal: No clubbing, no deformities  Neuro: A&Ox3, speech clear, gait stable, cooperative, no focal neurologic deficits  Skin: Skin color is normal. No rashes or lesions. Non diaphoretic, moist.         DIAGNOSTIC DATA     1. Echo   4/18/19- EF 56-60%, AV sclerosis with no significant stenosis, MR mod, TR mild, mild/mod Pulm HTN, mild PI   2/16/22-EF 64%, Mild sclerosis of AV cusp, mild AI, RVSP 40 mmHg    2. CTA chest   5/28/17- Persistent small left lower lobe pulmonary arterial branch embolus. No new emboli or other acute findings identified     3.  Lipids   3/9/16- , HDL 94, LDL 77, LDL-P 945   9/30/20- , HDL 87, , TG 95       ECG: Normal sinus rhythm today     LABORATORY DATA          Lab Results   Component Value Date/Time    WBC 6.6 11/23/2021 02:56 PM    HGB 10.5 (L) 11/23/2021 02:56 PM    HCT 31.9 (L) 11/23/2021 02:56 PM    PLATELET 448 54/41/8794 02:56 PM    MCV 89.1 11/23/2021 02:56 PM      Lab Results   Component Value Date/Time    Sodium 132 (L) 11/23/2021 02:56 PM    Potassium 3.2 (L) 11/23/2021 02:56 PM    Chloride 98 11/23/2021 02:56 PM    CO2 25 11/23/2021 02:56 PM    Anion gap 9 11/23/2021 02:56 PM    Glucose 120 (H) 2021 02:56 PM    BUN 10 2021 02:56 PM    Creatinine 0.84 2021 02:56 PM    BUN/Creatinine ratio 12 2021 02:56 PM    GFR est AA >60 2021 02:56 PM    GFR est non-AA >60 2021 02:56 PM    Calcium 8.8 2021 02:56 PM    Bilirubin, total 0.3 2021 02:56 PM    Alk. phosphatase 98 2021 02:56 PM    Protein, total 7.6 2021 02:56 PM    Albumin 2.8 (L) 2021 02:56 PM    Globulin 4.8 (H) 2021 02:56 PM    A-G Ratio 0.6 (L) 2021 02:56 PM    ALT (SGPT) 20 2021 02:56 PM           ASSESSMENT/RECOMMENDATIONS:.      1. HTN  -Blood pressure is elevated but is related to white coat syndrome and her BP cuff at home correspond our numbers  -reduce sodium intake  2. Aortic valve sclerosis with no significant stenosis  -No evidence of AI today  3. Cholesterol profile  -LDL has not been checked since   -will obtain records from primary care  4. Moderate MR  -MR looks better and is now mild  4. Return in 6mo. Or prn      Orders Placed This Encounter    cloNIDine (CATAPRES) 0.3 mg/24 hr     Si Patch by TransDERmal route every seven (7) days. escitalopram oxalate (LEXAPRO) 20 mg tablet     Sig: Take 20 mg by mouth daily. dexmethylphenidate 10 mg tab     Sig: Take 10 mg by mouth daily. predniSONE (DELTASONE) 5 mg tablet     Sig: Take 5 mg by mouth daily. albuterol (PROVENTIL HFA, VENTOLIN HFA, PROAIR HFA) 90 mcg/actuation inhaler     Sig: Take  by inhalation every six (6) hours as needed for Wheezing. Follow-up and Dispositions    Return in about 6 months (around 3/20/2023). I have discussed the diagnosis with  Little Ends and the intended plan as seen in the above orders. Questions were answered concerning future plans. I have discussed medication side effects and warnings with the patient as well. Thank you,  Garland Salgado MD for involving me in the care of  Little Ends. Please do not hesitate to contact me for further questions/concerns. Jamar Garland MD, [facility]    Patient Care Team:  Live Beltrán MD as PCP - General (Internal Medicine Physician)  Janeth Capellan MD (Neurosurgery)    [address], [address] [place] [address] Drive      [phone] / [phone] Fax

## 2022-09-19 NOTE — PATIENT INSTRUCTIONS

## 2022-09-20 ENCOUNTER — OFFICE VISIT (OUTPATIENT)
Dept: CARDIOLOGY CLINIC | Age: 82
End: 2022-09-20
Payer: MEDICARE

## 2022-09-20 ENCOUNTER — ANCILLARY PROCEDURE (OUTPATIENT)
Dept: CARDIOLOGY CLINIC | Age: 82
End: 2022-09-20
Payer: MEDICARE

## 2022-09-20 VITALS
WEIGHT: 118 LBS | BODY MASS INDEX: 20.91 KG/M2 | DIASTOLIC BLOOD PRESSURE: 60 MMHG | HEART RATE: 56 BPM | HEIGHT: 63 IN | SYSTOLIC BLOOD PRESSURE: 155 MMHG

## 2022-09-20 VITALS
HEIGHT: 63 IN | WEIGHT: 118 LBS | BODY MASS INDEX: 20.91 KG/M2 | DIASTOLIC BLOOD PRESSURE: 78 MMHG | SYSTOLIC BLOOD PRESSURE: 160 MMHG

## 2022-09-20 DIAGNOSIS — I10 HYPERTENSION, UNSPECIFIED TYPE: ICD-10-CM

## 2022-09-20 DIAGNOSIS — R00.1 DRUG-INDUCED BRADYCARDIA: ICD-10-CM

## 2022-09-20 DIAGNOSIS — T50.905A DRUG-INDUCED BRADYCARDIA: ICD-10-CM

## 2022-09-20 DIAGNOSIS — I10 ESSENTIAL HYPERTENSION: Primary | ICD-10-CM

## 2022-09-20 DIAGNOSIS — R55 SYNCOPE, UNSPECIFIED SYNCOPE TYPE: ICD-10-CM

## 2022-09-20 LAB
ECHO AO ASC DIAM: 3.3 CM
ECHO AO ASCENDING AORTA INDEX: 2.13 CM/M2
ECHO AO ROOT DIAM: 2.8 CM
ECHO AO ROOT INDEX: 1.81 CM/M2
ECHO AV AREA PEAK VELOCITY: 1.8 CM2
ECHO AV AREA VTI: 2.1 CM2
ECHO AV AREA/BSA PEAK VELOCITY: 1.2 CM2/M2
ECHO AV AREA/BSA VTI: 1.4 CM2/M2
ECHO AV MEAN GRADIENT: 5 MMHG
ECHO AV MEAN VELOCITY: 1.1 M/S
ECHO AV PEAK GRADIENT: 12 MMHG
ECHO AV PEAK VELOCITY: 1.7 M/S
ECHO AV VELOCITY RATIO: 0.71
ECHO AV VTI: 38.7 CM
ECHO LA DIAMETER INDEX: 2 CM/M2
ECHO LA DIAMETER: 3.1 CM
ECHO LA TO AORTIC ROOT RATIO: 1.11
ECHO LA VOL 2C: 37 ML (ref 22–52)
ECHO LA VOL 4C: 41 ML (ref 22–52)
ECHO LA VOL BP: 40 ML (ref 22–52)
ECHO LA VOL/BSA BIPLANE: 26 ML/M2 (ref 16–34)
ECHO LA VOLUME AREA LENGTH: 42 ML
ECHO LA VOLUME INDEX A2C: 24 ML/M2 (ref 16–34)
ECHO LA VOLUME INDEX A4C: 26 ML/M2 (ref 16–34)
ECHO LA VOLUME INDEX AREA LENGTH: 27 ML/M2 (ref 16–34)
ECHO LV E' LATERAL VELOCITY: 9 CM/S
ECHO LV E' SEPTAL VELOCITY: 7 CM/S
ECHO LV EDV A4C: 88 ML
ECHO LV EDV INDEX A4C: 57 ML/M2
ECHO LV EJECTION FRACTION A4C: 61 %
ECHO LV ESV A4C: 34 ML
ECHO LV ESV INDEX A4C: 22 ML/M2
ECHO LV FRACTIONAL SHORTENING: 34 % (ref 28–44)
ECHO LV INTERNAL DIMENSION DIASTOLE INDEX: 2.65 CM/M2
ECHO LV INTERNAL DIMENSION DIASTOLIC: 4.1 CM (ref 3.9–5.3)
ECHO LV INTERNAL DIMENSION SYSTOLIC INDEX: 1.74 CM/M2
ECHO LV INTERNAL DIMENSION SYSTOLIC: 2.7 CM
ECHO LV IVSD: 1 CM (ref 0.6–0.9)
ECHO LV MASS 2D: 123 G (ref 67–162)
ECHO LV MASS INDEX 2D: 79.3 G/M2 (ref 43–95)
ECHO LV POSTERIOR WALL DIASTOLIC: 0.9 CM (ref 0.6–0.9)
ECHO LV RELATIVE WALL THICKNESS RATIO: 0.44
ECHO LVOT AREA: 2.5 CM2
ECHO LVOT AV VTI INDEX: 0.82
ECHO LVOT DIAM: 1.8 CM
ECHO LVOT MEAN GRADIENT: 3 MMHG
ECHO LVOT PEAK GRADIENT: 6 MMHG
ECHO LVOT PEAK VELOCITY: 1.2 M/S
ECHO LVOT STROKE VOLUME INDEX: 52.3 ML/M2
ECHO LVOT SV: 81.1 ML
ECHO LVOT VTI: 31.9 CM
ECHO MV A VELOCITY: 1.09 M/S
ECHO MV AREA PHT: 3.8 CM2
ECHO MV E DECELERATION TIME (DT): 199.3 MS
ECHO MV E VELOCITY: 1.23 M/S
ECHO MV E/A RATIO: 1.13
ECHO MV E/E' LATERAL: 13.67
ECHO MV E/E' RATIO (AVERAGED): 15.62
ECHO MV E/E' SEPTAL: 17.57
ECHO MV PRESSURE HALF TIME (PHT): 57.8 MS
ECHO RV FREE WALL PEAK S': 11 CM/S
ECHO RV INTERNAL DIMENSION: 3.4 CM
ECHO RV TAPSE: 2.2 CM (ref 1.7–?)
ECHO TV REGURGITANT MAX VELOCITY: 3.02 M/S
ECHO TV REGURGITANT PEAK GRADIENT: 36 MMHG

## 2022-09-20 PROCEDURE — G8753 SYS BP > OR = 140: HCPCS | Performed by: SPECIALIST

## 2022-09-20 PROCEDURE — 99214 OFFICE O/P EST MOD 30 MIN: CPT | Performed by: SPECIALIST

## 2022-09-20 PROCEDURE — 93306 TTE W/DOPPLER COMPLETE: CPT | Performed by: SPECIALIST

## 2022-09-20 PROCEDURE — G8420 CALC BMI NORM PARAMETERS: HCPCS | Performed by: SPECIALIST

## 2022-09-20 PROCEDURE — 1123F ACP DISCUSS/DSCN MKR DOCD: CPT | Performed by: SPECIALIST

## 2022-09-20 PROCEDURE — 1101F PT FALLS ASSESS-DOCD LE1/YR: CPT | Performed by: SPECIALIST

## 2022-09-20 PROCEDURE — G8754 DIAS BP LESS 90: HCPCS | Performed by: SPECIALIST

## 2022-09-20 PROCEDURE — 1090F PRES/ABSN URINE INCON ASSESS: CPT | Performed by: SPECIALIST

## 2022-09-20 PROCEDURE — G8427 DOCREV CUR MEDS BY ELIG CLIN: HCPCS | Performed by: SPECIALIST

## 2022-09-20 PROCEDURE — G8432 DEP SCR NOT DOC, RNG: HCPCS | Performed by: SPECIALIST

## 2022-09-20 PROCEDURE — G8536 NO DOC ELDER MAL SCRN: HCPCS | Performed by: SPECIALIST

## 2022-09-20 PROCEDURE — G0463 HOSPITAL OUTPT CLINIC VISIT: HCPCS | Performed by: SPECIALIST

## 2022-09-20 RX ORDER — DEXMETHYLPHENIDATE HYDROCHLORIDE 10 MG/1
10 TABLET ORAL DAILY
COMMUNITY

## 2022-09-20 RX ORDER — ALBUTEROL SULFATE 90 UG/1
AEROSOL, METERED RESPIRATORY (INHALATION)
COMMUNITY

## 2022-09-20 RX ORDER — ESCITALOPRAM OXALATE 20 MG/1
20 TABLET ORAL DAILY
COMMUNITY

## 2022-09-20 RX ORDER — CLONIDINE 0.3 MG/24H
1 PATCH, EXTENDED RELEASE TRANSDERMAL
COMMUNITY

## 2022-09-20 RX ORDER — PREDNISONE 5 MG/1
5 TABLET ORAL DAILY
COMMUNITY

## 2022-09-20 NOTE — PROGRESS NOTES
Visit Vitals  BP (!) 155/60   Pulse (!) 56   Ht 5' 3\" (1.6 m)   Wt 118 lb (53.5 kg)   BMI 20.90 kg/m²

## 2022-10-20 ENCOUNTER — TRANSCRIBE ORDER (OUTPATIENT)
Dept: GENERAL RADIOLOGY | Age: 82
End: 2022-10-20

## 2022-10-20 ENCOUNTER — HOSPITAL ENCOUNTER (OUTPATIENT)
Dept: GENERAL RADIOLOGY | Age: 82
Discharge: HOME | End: 2022-10-20
Payer: MEDICARE

## 2022-10-20 DIAGNOSIS — J06.9 ACUTE UPPER RESPIRATORY INFECTION, UNSPECIFIED: Primary | ICD-10-CM

## 2022-10-20 DIAGNOSIS — R06.00 DYSPNEA, UNSPECIFIED: ICD-10-CM

## 2022-10-20 DIAGNOSIS — J06.9 ACUTE UPPER RESPIRATORY INFECTION, UNSPECIFIED: ICD-10-CM

## 2022-10-20 PROCEDURE — 71046 X-RAY EXAM CHEST 2 VIEWS: CPT

## 2022-12-09 DIAGNOSIS — R55 SYNCOPE, UNSPECIFIED SYNCOPE TYPE: ICD-10-CM

## 2022-12-09 DIAGNOSIS — Z13.220 SCREENING CHOLESTEROL LEVEL: Primary | ICD-10-CM

## 2022-12-09 DIAGNOSIS — I10 ESSENTIAL HYPERTENSION: ICD-10-CM

## 2023-01-24 ENCOUNTER — TRANSCRIBE ORDER (OUTPATIENT)
Dept: SCHEDULING | Age: 83
End: 2023-01-24

## 2023-01-24 DIAGNOSIS — Z12.31 ENCOUNTER FOR SCREENING MAMMOGRAM FOR MALIGNANT NEOPLASM OF BREAST: Primary | ICD-10-CM

## 2023-02-01 ENCOUNTER — HOSPITAL ENCOUNTER (EMERGENCY)
Age: 83
Discharge: HOME | End: 2023-02-02
Attending: EMERGENCY MEDICINE
Payer: MEDICARE

## 2023-02-01 ENCOUNTER — APPOINTMENT (OUTPATIENT)
Dept: CT IMAGING | Age: 83
End: 2023-02-01
Attending: EMERGENCY MEDICINE
Payer: MEDICARE

## 2023-02-01 DIAGNOSIS — R55 NEAR SYNCOPE: Primary | ICD-10-CM

## 2023-02-01 DIAGNOSIS — E86.0 DEHYDRATION: ICD-10-CM

## 2023-02-01 DIAGNOSIS — N28.9 RENAL INSUFFICIENCY: ICD-10-CM

## 2023-02-01 LAB
ALBUMIN SERPL-MCNC: 4 G/DL (ref 3.5–5)
ALBUMIN/GLOB SERPL: 1 (ref 1.1–2.2)
ALP SERPL-CCNC: 57 U/L (ref 45–117)
ALT SERPL-CCNC: 22 U/L (ref 12–78)
ANION GAP SERPL CALC-SCNC: 5 MMOL/L (ref 5–15)
AST SERPL-CCNC: 22 U/L (ref 15–37)
BASOPHILS # BLD: 0 K/UL (ref 0–0.1)
BASOPHILS NFR BLD: 1 % (ref 0–1)
BILIRUB SERPL-MCNC: 0.3 MG/DL (ref 0.2–1)
BUN SERPL-MCNC: 35 MG/DL (ref 6–20)
BUN/CREAT SERPL: 24 (ref 12–20)
CALCIUM SERPL-MCNC: 9.2 MG/DL (ref 8.5–10.1)
CHLORIDE SERPL-SCNC: 107 MMOL/L (ref 97–108)
CO2 SERPL-SCNC: 28 MMOL/L (ref 21–32)
CREAT SERPL-MCNC: 1.48 MG/DL (ref 0.55–1.02)
DIFFERENTIAL METHOD BLD: NORMAL
EOSINOPHIL # BLD: 0.1 K/UL (ref 0–0.4)
EOSINOPHIL NFR BLD: 1 % (ref 0–7)
ERYTHROCYTE [DISTWIDTH] IN BLOOD BY AUTOMATED COUNT: 14.4 % (ref 11.5–14.5)
GLOBULIN SER CALC-MCNC: 4 G/DL (ref 2–4)
GLUCOSE SERPL-MCNC: 127 MG/DL (ref 65–100)
HCT VFR BLD AUTO: 36.6 % (ref 35–47)
HGB BLD-MCNC: 11.8 G/DL (ref 11.5–16)
IMM GRANULOCYTES # BLD AUTO: 0 K/UL (ref 0–0.04)
IMM GRANULOCYTES NFR BLD AUTO: 0 % (ref 0–0.5)
LYMPHOCYTES # BLD: 1.3 K/UL (ref 0.8–3.5)
LYMPHOCYTES NFR BLD: 18 % (ref 12–49)
MAGNESIUM SERPL-MCNC: 2.2 MG/DL (ref 1.6–2.4)
MCH RBC QN AUTO: 29.9 PG (ref 26–34)
MCHC RBC AUTO-ENTMCNC: 32.2 G/DL (ref 30–36.5)
MCV RBC AUTO: 92.9 FL (ref 80–99)
MONOCYTES # BLD: 0.5 K/UL (ref 0–1)
MONOCYTES NFR BLD: 7 % (ref 5–13)
NEUTS SEG # BLD: 5.4 K/UL (ref 1.8–8)
NEUTS SEG NFR BLD: 73 % (ref 32–75)
NRBC # BLD: 0 K/UL (ref 0–0.01)
NRBC BLD-RTO: 0 PER 100 WBC
PLATELET # BLD AUTO: 247 K/UL (ref 150–400)
PMV BLD AUTO: 9.2 FL (ref 8.9–12.9)
POTASSIUM SERPL-SCNC: 4.4 MMOL/L (ref 3.5–5.1)
PROT SERPL-MCNC: 8 G/DL (ref 6.4–8.2)
RBC # BLD AUTO: 3.94 M/UL (ref 3.8–5.2)
SODIUM SERPL-SCNC: 140 MMOL/L (ref 136–145)
TROPONIN I SERPL HS-MCNC: 12 NG/L (ref 0–51)
TSH SERPL DL<=0.05 MIU/L-ACNC: 0.09 UIU/ML (ref 0.36–3.74)
WBC # BLD AUTO: 7.3 K/UL (ref 3.6–11)

## 2023-02-01 PROCEDURE — 71275 CT ANGIOGRAPHY CHEST: CPT

## 2023-02-01 PROCEDURE — 83735 ASSAY OF MAGNESIUM: CPT

## 2023-02-01 PROCEDURE — 36415 COLL VENOUS BLD VENIPUNCTURE: CPT

## 2023-02-01 PROCEDURE — 74011000636 HC RX REV CODE- 636: Performed by: RADIOLOGY

## 2023-02-01 PROCEDURE — 99285 EMERGENCY DEPT VISIT HI MDM: CPT

## 2023-02-01 PROCEDURE — 80053 COMPREHEN METABOLIC PANEL: CPT

## 2023-02-01 PROCEDURE — 85025 COMPLETE CBC W/AUTO DIFF WBC: CPT

## 2023-02-01 PROCEDURE — 93005 ELECTROCARDIOGRAM TRACING: CPT

## 2023-02-01 PROCEDURE — 84443 ASSAY THYROID STIM HORMONE: CPT

## 2023-02-01 PROCEDURE — 84484 ASSAY OF TROPONIN QUANT: CPT

## 2023-02-01 RX ADMIN — IOPAMIDOL 65 ML: 755 INJECTION, SOLUTION INTRAVENOUS at 23:46

## 2023-02-02 VITALS
HEART RATE: 57 BPM | OXYGEN SATURATION: 97 % | DIASTOLIC BLOOD PRESSURE: 66 MMHG | TEMPERATURE: 98 F | BODY MASS INDEX: 21.26 KG/M2 | WEIGHT: 120 LBS | RESPIRATION RATE: 12 BRPM | SYSTOLIC BLOOD PRESSURE: 169 MMHG | HEIGHT: 63 IN

## 2023-02-02 PROCEDURE — 74011250636 HC RX REV CODE- 250/636: Performed by: EMERGENCY MEDICINE

## 2023-02-02 PROCEDURE — 96360 HYDRATION IV INFUSION INIT: CPT

## 2023-02-02 RX ADMIN — SODIUM CHLORIDE 1000 ML: 9 INJECTION, SOLUTION INTRAVENOUS at 01:26

## 2023-02-02 NOTE — ED NOTES
Signout performed and instructions reviewed with Dr. Piedad Sharp who wanted CT chest result performed and if negative can discharge home with outpatient referral.    Progress Note:   Pt has been reexamined by Romy Ware MD. Pt is feeling much better. Symptoms have improved. All available results have been reviewed with pt and any available family. Pt understands sx, dx, and tx in ED. Care plan has been outlined and questions have been answered. Pt is ready to go home. Will send home on near syncope, dehydration and renal insufficiency instruction. Oral rehydration instruction. . Outpatient referral with PCP as needed. Written by Romy Ware MD,12:57 AM    .   .

## 2023-02-02 NOTE — ED TRIAGE NOTES
Pt ambulatory to triage w/ c/o dizziness around 1915 and reports feeling like she was going to pass out. Pt reports a similar event happening and states that it was due to her HR being low.

## 2023-02-02 NOTE — ED NOTES
Pt discharged to home in nad, states understanding of dc instructions and followup, pt ambulatory with visitor, declined wheelchair offer.

## 2023-02-02 NOTE — ED PROVIDER NOTES
The history is provided by the patient. Dizziness  This is a new problem. The current episode started 1 to 2 hours ago. The problem has been resolved. There was no focality noted. Pertinent negatives include no focal weakness, no loss of sensation, no loss of balance, no slurred speech, no speech difficulty, no memory loss, no movement disorder, no agitation, no visual change, no auditory change, no mental status change, no unresponsiveness and no disorientation. There has been no fever. Associated symptoms include shortness of breath and nausea. Pertinent negatives include no chest pain, no vomiting, no altered mental status, no confusion, no headaches, no choking, no bowel incontinence and no bladder incontinence. Associated medical issues do not include trauma, seizures or CVA. 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      HX GYN  [id number]    Hysterectomy    HX HEENT  1996    sinus surgery    HX HEENT      deviated septum    HX ORTHOPAEDIC Left 2006    Tarsal Tunnel Release     HX ORTHOPAEDIC Right 2010,2016    basal joint surgery    HX ORTHOPAEDIC Left 2012    basal joint surgery     HX ORTHOPAEDIC      back surgery     HX OTHER SURGICAL      TVT Sling    HX OTHER SURGICAL      Tillman's Neuroma removal     HX SEPTOPLASTY      HX TONSIL AND ADENOIDECTOMY      HX TONSILLECTOMY  1946    HX UROLOGICAL  2010    TVT sling         Family History:   Problem Relation Age of Onset Hypertension Mother     Stroke Mother         TIA    Heart Disease Mother     Alzheimer's Disease Mother     Hypertension Father     Asthma Father     Colon Cancer Father     COPD Father     Asthma Brother        Social History     Socioeconomic History    Marital status:      Spouse name: Not on file    Number of children: Not on file    Years of education: Not on file    Highest education level: Not on file   Occupational History    Not on file   Tobacco Use    Smoking status: Never    Smokeless tobacco: Never   Substance and Sexual Activity    Alcohol use: Yes     Comment: rarely    Drug use: Never    Sexual activity: Not on file   Other Topics Concern    Not on file   Social History Narrative    Not on file     Social Determinants of Health     Financial Resource Strain: Not on file   Food Insecurity: Not on file   Transportation Needs: Not on file   Physical Activity: Not on file   Stress: Not on file   Social Connections: Not on file   Intimate Partner Violence: Not on file   Housing Stability: Not on file         ALLERGIES: Ace inhibitors and Arb-angiotensin receptor antagonist    Review of Systems   Constitutional:  Negative for activity change, chills and fever. HENT:  Negative for nosebleeds, sore throat, trouble swallowing and voice change. Eyes:  Negative for visual disturbance. Respiratory:  Positive for shortness of breath. Negative for choking. Cardiovascular:  Negative for chest pain and palpitations. Gastrointestinal:  Positive for nausea. Negative for abdominal pain, bowel incontinence, constipation, diarrhea and vomiting. Genitourinary:  Negative for bladder incontinence, difficulty urinating, dysuria, hematuria and urgency. Musculoskeletal:  Negative for back pain, neck pain and neck stiffness. Skin:  Negative for color change. Allergic/Immunologic: Negative for immunocompromised state. Neurological:  Positive for dizziness.  Negative for focal weakness, seizures, syncope, speech difficulty, weakness, light-headedness, numbness, headaches and loss of balance. Psychiatric/Behavioral:  Negative for agitation, behavioral problems, confusion, hallucinations, memory loss, self-injury and suicidal ideas. There were no vitals filed for this visit. Physical Exam  Vitals and nursing note reviewed. Constitutional:       General: She is not in acute distress. Appearance: She is well-developed. She is not diaphoretic. HENT:      Head: Normocephalic and atraumatic. Eyes:      Pupils: Pupils are equal, round, and reactive to light. Cardiovascular:      Rate and Rhythm: Regular rhythm. Bradycardia present. Heart sounds: Normal heart sounds. No murmur heard. No friction rub. No gallop. Pulmonary:      Effort: Pulmonary effort is normal. No respiratory distress. Breath sounds: Normal breath sounds. No wheezing. Abdominal:      General: Bowel sounds are normal. There is no distension. Palpations: Abdomen is soft. Tenderness: There is no abdominal tenderness. There is no guarding or rebound. Musculoskeletal:         General: Normal range of motion. Cervical back: Normal range of motion and neck supple. Skin:     General: Skin is warm. Findings: No rash. Neurological:      Mental Status: She is alert and oriented to person, place, and time. Psychiatric:         Behavior: Behavior normal.         Thought Content: Thought content normal.         Judgment: Judgment normal.        Medical Decision Making  Amount and/or Complexity of Data Reviewed  Labs: ordered. Radiology: ordered. This is an 80-year-old female with past medical history, review of systems, physical exam as above, presenting with complaints of an episode of near syncope. Patient states she was at a social gathering this evening when she began to feel lightheaded, and nearly fell down.   Patient states she was lowered to the ground, was told her heart rate was low, estimated to be in the 40s by medical personnel. She endorses a history of syncope with bradycardia, denies a history of coronary disease, endorses previous rheumatic fever without valvular disease. She endorses recent shortness of breath, without cough, fever. Physical exam is remarkable for well-appearing elderly female, in no acute distress, vital signs notable for mild  hypertension, afebrile, bradycardia. She has clear breath sounds to auscultation, noted to be mildly bradycardic, soft abdomen, no murmurs gallops or rubs. Discussed with patient differential including recurrent bradycardia, electrolyte abnormalities, ACS. Plan to obtain CMP, CBC, EKG, chest x-ray, cardiac enzymes, UA. Will obtain orthostatic vital signs, reassess, make a disposition. Procedures    SIGN OUT:  11:06 PM  Discussed pt's hx, disposition, and available diagnostic and imaging results with Dr. Carmen Bryant. Reviewed care plans. Both providers and patient are in agreement with care plan. Dr. Gutierrez Potts is transferring care of the pt to Dr. Carmen Bryant at this time.

## 2023-02-03 LAB
ATRIAL RATE: 55 BPM
CALCULATED P AXIS, ECG09: 25 DEGREES
CALCULATED R AXIS, ECG10: -45 DEGREES
CALCULATED T AXIS, ECG11: 20 DEGREES
DIAGNOSIS, 93000: NORMAL
P-R INTERVAL, ECG05: 178 MS
Q-T INTERVAL, ECG07: 442 MS
QRS DURATION, ECG06: 92 MS
QTC CALCULATION (BEZET), ECG08: 422 MS
VENTRICULAR RATE, ECG03: 55 BPM

## 2023-02-06 ENCOUNTER — HOSPITAL ENCOUNTER (OUTPATIENT)
Dept: MAMMOGRAPHY | Age: 83
Discharge: HOME | End: 2023-02-06
Attending: INTERNAL MEDICINE
Payer: MEDICARE

## 2023-02-06 DIAGNOSIS — Z12.31 ENCOUNTER FOR SCREENING MAMMOGRAM FOR MALIGNANT NEOPLASM OF BREAST: ICD-10-CM

## 2023-02-06 PROCEDURE — 77067 SCR MAMMO BI INCL CAD: CPT

## 2023-03-20 NOTE — PROGRESS NOTES
CARDIOLOGY OFFICE NOTE    Jamar Lovelace MD, [address], [place], [id number] [place] [address] [place]  Phone [phone]; Fax [phone]  Mobile [phone]   Voice Mail [phone]         ATTENTION:   This medical record was transcribed using an electronic medical records/speech recognition system. Although proofread, it may and can contain electronic, spelling and other errors. Corrections may be executed at a later time. Please feel free to contact us for any clarifications as needed. ICD-10-CM ICD-9-CM   1. Hypertension, unspecified type  I10 401.9   2. Syncope, unspecified syncope type  R55 780.2   3. Drug-induced bradycardia  R00.1 427.89    T50.905A E947.9            Rosendo Jasso is a 80 y.o. female with HTN and dyslipidemia referred for follow up. Cardiac risk factors: dyslipidemia, hypertension, post-menopausal  I have personally obtained the history from the patient. HISTORY OF PRESENTING ILLNESS         She has been seen by a physician in [place] called [address]. She was seen in the emergency room with a syncopal episode. She has some dyspnea on exertion in the past had a normal echo. She that was noted on 12 2 sinus bradycardia 49 beats a minute her cholesterol profile from primary care and her LDL particle number was 1128 and her LDL size was normal.  Total cholesterol was 205 HDL was 90 LDL was 98. She states she was at a meeting and was standing and feeling quizzy and stomach aching. She never passed out.   She was saying that she has some shortness of breath we will proceed with exercise Cardiolite     ACTIVE PROBLEM LIST     Patient Active Problem List    Diagnosis Date Noted    Syncope and collapse 04/18/2019    Bradycardia 04/18/2019    Asthma, mild intermittent 04/18/2019    Hypokalemia 04/18/2019    Essential hypertension 05/20/2017    Acquired hypothyroidism 05/20/2017    Asthma 05/20/2017    GERD (gastroesophageal reflux disease) 05/20/2017    Osteoporosis 05/20/2017    Acute pulmonary embolism ([facility] 05/19/2017    Lumbar stenosis with neurogenic claudication 05/15/2017           PAST MEDICAL HISTORY     Past Medical History:   Diagnosis Date    Anemia     Arthritis     osteoporosis    Asthma     Broken ribs 2006    fell    Cancer ([facility] [address]     melanoma     GERD (gastroesophageal reflux disease)     Hypertension     Migraines     Osteoporosis     Rheumatic fever     x2 without residual    Thyroid disease            PAST SURGICAL HISTORY     Past Surgical History:   Procedure Laterality Date    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      HX GYN  1969    Hysterectomy    HX HEENT  1996    sinus surgery    HX HEENT      deviated septum    HX ORTHOPAEDIC Left 2006    Tarsal Tunnel Release     HX ORTHOPAEDIC Right 2010,2016    basal joint surgery    HX ORTHOPAEDIC Left 2012    basal joint surgery     HX ORTHOPAEDIC      back surgery     HX OTHER SURGICAL      TVT Sling    HX OTHER SURGICAL      Tillman's Neuroma removal     HX SEPTOPLASTY      HX TONSIL AND ADENOIDECTOMY      HX TONSILLECTOMY  1946    HX UROLOGICAL  2010    TVT sling          ALLERGIES     Allergies   Allergen Reactions    Ace Inhibitors Angioedema    Arb-Angiotensin Receptor Antagonist Other (comments)     Told not to take by provider due to severe reaction to ace inhibitors          FAMILY HISTORY     Family History   Problem Relation Age of Onset    Hypertension Mother     Stroke Mother         TIA    Heart Disease Mother     Alzheimer's Disease Mother     Hypertension Father     Asthma Father     Colon Cancer Father     COPD Father     Asthma Brother     negative for cardiac disease       SOCIAL HISTORY     Social History     Socioeconomic History    Marital status:    Tobacco Use    Smoking status: Never    Smokeless tobacco: Never   Substance and Sexual Activity    Alcohol use: Yes     Comment: rarely    Drug use: Never         MEDICATIONS     Current Outpatient Medications   Medication Sig    hydroCHLOROthiazide (MICROZIDE) 12.5 mg capsule Take 12.5 mg by mouth daily. buPROPion XL (WELLBUTRIN XL) 300 mg XL tablet Take 300 mg by mouth daily. minoxidiL (LONITEN) 10 mg tablet Take  by mouth daily. ergocalciferol (Vitamin D2) 1,250 mcg (50,000 unit) capsule Take 50,000 Units by mouth.    cloNIDine (CATAPRES) 0.1 mg/24 hr ptwk 1 Patch by TransDERmal route every seven (7) days. hydrALAZINE (APRESOLINE) 50 mg tablet Take 1 Tablet by mouth three (3) times daily. Systolic is 409    escitalopram oxalate (LEXAPRO) 20 mg tablet Take 20 mg by mouth daily. dexmethylphenidate 10 mg tab Take 10 mg by mouth daily. predniSONE (DELTASONE) 5 mg tablet Take 5 mg by mouth daily. albuterol (PROVENTIL HFA, VENTOLIN HFA, PROAIR HFA) 90 mcg/actuation inhaler Take  by inhalation every six (6) hours as needed for Wheezing. gabapentin (NEURONTIN) 100 mg capsule Take 100 mg by mouth daily. nebivoloL (BYSTOLIC) 10 mg tablet Take  by mouth daily. amLODIPine (NORVASC) 10 mg tablet Take 1 Tab by mouth daily. montelukast (SINGULAIR) 10 mg tablet Take 10 mg by mouth daily. estradioL (ESTRACE) 0.5 mg tablet Take 0.5 mg by mouth nightly. HYDROcodone-acetaminophen (NORCO) 5-325 mg per tablet Take 0.5 Tabs by mouth every eight (8) hours as needed for Pain.    levothyroxine (SYNTHROID) 88 mcg tablet Take 88 mcg by mouth Daily (before breakfast). Cetirizine 10 mg cap Take 1 Cap by mouth nightly. fluticasone (FLONASE) 50 mcg/actuation nasal spray 2 Sprays by Both Nostrils route nightly. No current facility-administered medications for this visit.        I have reviewed the nurses notes, vitals, problem list, allergy list, medical history, family, social history and medications. REVIEW OF SYMPTOMS    as per HPI  General: Pt denies excessive weight gain or loss. Pt is able to conduct ADL's  HEENT: Denies blurred vision, headaches, hearing loss, epistaxis and difficulty swallowing. Respiratory: Denies cough, congestion, shortness of breath, HOWE, wheezing or stridor. Cardiovascular: Denies precordial pain, palpitations, edema or PND  Gastrointestinal: Denies poor appetite, indigestion, abdominal pain or blood in stool  Genitourinary: Denies hematuria, dysuria, increased urinary frequency  Musculoskeletal: Denies joint pain or swelling from muscles or joints  Neurologic: Denies tremor, paresthesias, headache, or sensory motor disturbance  Psychiatric: Denies confusion, insomnia, depression  Integumentray: Denies rash, itching or ulcers. Hematologic: Denies easy bruising, bleeding     PHYSICAL EXAMINATION      Vitals:    03/21/23 1019   BP: 136/60   Pulse: (!) 57   SpO2: 97%   Weight: 124 lb (56.2 kg)   Height: 5' 3\" (1.6 m)         General: Well developed, in no acute distress. HEENT: No jaundice  Neck: Supple, no stiffness,  Heart:   Slow rate regular rhythm  Respiratory: Clear bilaterally x 4, no wheezing or rales  Extremities:  No edema, normal cap refill, no cyanosis. Musculoskeletal: No clubbing, no deformities  Neuro: A&Ox3, speech clear, gait stable, cooperative, no focal neurologic deficits  Skin: Skin color is normal. No rashes or lesions. Non diaphoretic, moist.         DIAGNOSTIC DATA     1. Echo   4/18/19- EF 56-60%, AV sclerosis with no significant stenosis, MR mod, TR mild, mild/mod Pulm HTN, mild PI   2/16/22-EF 64%, Mild sclerosis of AV cusp, mild AI, RVSP 40 mmHg  9/20/22-EF 60 - 65%,Tricuspid AV - Mild sclerosis of AV cusp, mild MR    2. CTA chest   5/28/17- Persistent small left lower lobe pulmonary arterial branch embolus. No new emboli or other acute findings identified     3.  Lipids   3/9/16- , HDL 94, LDL 77, LDL-P 945   9/30/20- , HDL 87, , TG 95  1/09/23- , HDL 90, LDL 98, TG 79       ECG: Normal sinus rhythm today     LABORATORY DATA          Lab Results   Component Value Date/Time    WBC 7.3 02/01/2023 09:19 PM    HGB 11.8 02/01/2023 09:19 PM    HCT 36.6 02/01/2023 09:19 PM    PLATELET 524 54/21/8525 09:19 PM    MCV 92.9 02/01/2023 09:19 PM      Lab Results   Component Value Date/Time    Sodium 140 02/01/2023 09:19 PM    Potassium 4.4 02/01/2023 09:19 PM    Chloride 107 02/01/2023 09:19 PM    CO2 28 02/01/2023 09:19 PM    Anion gap 5 02/01/2023 09:19 PM    Glucose 127 (H) 02/01/2023 09:19 PM    BUN 35 (H) 02/01/2023 09:19 PM    Creatinine 1.48 (H) 02/01/2023 09:19 PM    BUN/Creatinine ratio 24 (H) 02/01/2023 09:19 PM    GFR est AA >60 11/23/2021 02:56 PM    GFR est non-AA >60 11/23/2021 02:56 PM    Calcium 9.2 02/01/2023 09:19 PM    Bilirubin, total 0.3 02/01/2023 09:19 PM    Alk. phosphatase 57 02/01/2023 09:19 PM    Protein, total 8.0 02/01/2023 09:19 PM    Albumin 4.0 02/01/2023 09:19 PM    Globulin 4.0 02/01/2023 09:19 PM    A-G Ratio 1.0 (L) 02/01/2023 09:19 PM    ALT (SGPT) 22 02/01/2023 09:19 PM           ASSESSMENT/RECOMMENDATIONS:.      1. HTN  -Blood pressure is still elevated 150/60  -Reduce clonidine patch from 0.3 to 0.1 mg for 7 days secondary to bradycardia  -Increase hydralazine to 50 mg every 8 hours  -She is not taking Bumex but she is on hydrochlorothiazide  -Follow blood pressures and pulse at home  -In 6 to 8 weeks for blood pressure check and pulse check  2. Aortic valve sclerosis with no significant stenosis  -Follow with serial echoes  3. Cholesterol profile  -LDL was at goal at 82 I would adjust her medicines  4. Moderate MR  -Follow-up with echo as  5.   Shortness of breath  -We will proceed with exercise Cardiolite to look for any ischemia that may be contributing to her shortness of breath    Follow-up with me in 6 to 8 weeks  Orders Placed This Encounter    hydroCHLOROthiazide (Arcadio Rosa) 12.5 mg capsule     Sig: Take 12.5 mg by mouth daily. buPROPion XL (WELLBUTRIN XL) 300 mg XL tablet     Sig: Take 300 mg by mouth daily. minoxidiL (LONITEN) 10 mg tablet     Sig: Take  by mouth daily. ergocalciferol (Vitamin D2) 1,250 mcg (50,000 unit) capsule     Sig: Take 50,000 Units by mouth. DISCONTD: bumetanide (BUMEX) 0.5 mg tablet     Sig: Take  by mouth daily. cloNIDine (CATAPRES) 0.1 mg/24 hr ptwk     Si Patch by TransDERmal route every seven (7) days. Dispense:  4 Patch     Refill:  5    hydrALAZINE (APRESOLINE) 50 mg tablet     Sig: Take 1 Tablet by mouth three (3) times daily. Systolic is 278     Dispense:  90 Tablet     Refill:  5              Follow-up and Dispositions    Return in about 6 months (around 2023). I have discussed the diagnosis with  Brittany Zaidi and the intended plan as seen in the above orders. Questions were answered concerning future plans. I have discussed medication side effects and warnings with the patient as well. Thank you,  Danica Rios MD for involving me in the care of  Brittany Zaidi. Please do not hesitate to contact me for further questions/concerns. Jamar Villa MD, [facility]    Patient Care Team:  Danica Rios MD as PCP - General (Internal Medicine Physician)  Maddie Henry MD (Neurosurgery)    [address]      [phone] / [phone] Fax

## 2023-03-20 NOTE — PATIENT INSTRUCTIONS
1) I would like for you to hold taking the Bumex which you already are not taking. 2) I would like for you to reduce the clonidine patch to 0.1 mg the prescription is at your pharmacy. 3) I will add hydralazine 50 mg every 8 hours for blood pressure    4) please follow your blood pressures at home. 5) also let me know if your heart rates come up since we have reduced the clonidine patch from 0.3 to 0.1 mg    6) need to follow-up with me in approximately 6 to 8 weeks. 7) will arrange for a [facility] cardiolite        It is  my pleasure to have the opportunity to be involved in taking care of you and to provide you the best cardiac care. At the end of every visit I  encourage you to eat healthy and clean and reduce your red meat intake as well as exercise 30 minutes 5 days a week to ensure a healthy heart. If you are a smoker , it will be essential that you stop smoking to reduce your risk of heart disease. Part of providing you the best heart care possible IS AN ACCURATE KNOWLEDGE OF YOUR MEDICINE. It  will be  essential at each office visit that you provide me with an accurate list of your medicines. When you come into the office you should have that list or another option is lining up your pill bottles  and taking a snapshot with your cell phone of all the medicines you are taking currently and show it to the nurse in the examining room. Inaccurate reporting of your medication to the nurse may lead to adverse events and medical errors. Thank you again for giving me the opportunity to be part of your heart care and it is my pleasure. All the best,    Jamar Woo MD

## 2023-03-21 ENCOUNTER — OFFICE VISIT (OUTPATIENT)
Dept: CARDIOLOGY CLINIC | Age: 83
End: 2023-03-21
Payer: MEDICARE

## 2023-03-21 VITALS
BODY MASS INDEX: 21.97 KG/M2 | SYSTOLIC BLOOD PRESSURE: 136 MMHG | DIASTOLIC BLOOD PRESSURE: 60 MMHG | WEIGHT: 124 LBS | HEART RATE: 57 BPM | HEIGHT: 63 IN | OXYGEN SATURATION: 97 %

## 2023-03-21 DIAGNOSIS — T50.905A DRUG-INDUCED BRADYCARDIA: ICD-10-CM

## 2023-03-21 DIAGNOSIS — R00.1 DRUG-INDUCED BRADYCARDIA: ICD-10-CM

## 2023-03-21 DIAGNOSIS — I10 HYPERTENSION, UNSPECIFIED TYPE: Primary | ICD-10-CM

## 2023-03-21 DIAGNOSIS — R55 SYNCOPE, UNSPECIFIED SYNCOPE TYPE: ICD-10-CM

## 2023-03-21 PROCEDURE — 3078F DIAST BP <80 MM HG: CPT | Performed by: SPECIALIST

## 2023-03-21 PROCEDURE — 3075F SYST BP GE 130 - 139MM HG: CPT | Performed by: SPECIALIST

## 2023-03-21 PROCEDURE — 1101F PT FALLS ASSESS-DOCD LE1/YR: CPT | Performed by: SPECIALIST

## 2023-03-21 PROCEDURE — G8420 CALC BMI NORM PARAMETERS: HCPCS | Performed by: SPECIALIST

## 2023-03-21 PROCEDURE — 1090F PRES/ABSN URINE INCON ASSESS: CPT | Performed by: SPECIALIST

## 2023-03-21 PROCEDURE — G8432 DEP SCR NOT DOC, RNG: HCPCS | Performed by: SPECIALIST

## 2023-03-21 PROCEDURE — 1123F ACP DISCUSS/DSCN MKR DOCD: CPT | Performed by: SPECIALIST

## 2023-03-21 PROCEDURE — G8536 NO DOC ELDER MAL SCRN: HCPCS | Performed by: SPECIALIST

## 2023-03-21 PROCEDURE — G8427 DOCREV CUR MEDS BY ELIG CLIN: HCPCS | Performed by: SPECIALIST

## 2023-03-21 PROCEDURE — G0463 HOSPITAL OUTPT CLINIC VISIT: HCPCS | Performed by: SPECIALIST

## 2023-03-21 PROCEDURE — 99214 OFFICE O/P EST MOD 30 MIN: CPT | Performed by: SPECIALIST

## 2023-03-21 RX ORDER — ERGOCALCIFEROL 1.25 MG/1
50000 CAPSULE ORAL
COMMUNITY

## 2023-03-21 RX ORDER — BUMETANIDE 0.5 MG/1
TABLET ORAL DAILY
COMMUNITY
End: 2023-03-21

## 2023-03-21 RX ORDER — CLONIDINE 0.1 MG/24H
1 PATCH, EXTENDED RELEASE TRANSDERMAL
Qty: 4 PATCH | Refills: 5 | Status: SHIPPED | OUTPATIENT
Start: 2023-03-21

## 2023-03-21 RX ORDER — HYDRALAZINE HYDROCHLORIDE 50 MG/1
50 TABLET, FILM COATED ORAL 3 TIMES DAILY
Qty: 90 TABLET | Refills: 5 | Status: SHIPPED | OUTPATIENT
Start: 2023-03-21

## 2023-03-21 RX ORDER — BUPROPION HYDROCHLORIDE 300 MG/1
300 TABLET ORAL DAILY
COMMUNITY

## 2023-03-21 RX ORDER — HYDROCHLOROTHIAZIDE 12.5 MG/1
12.5 CAPSULE ORAL DAILY
COMMUNITY

## 2023-03-21 RX ORDER — MINOXIDIL 10 MG/1
TABLET ORAL DAILY
COMMUNITY

## 2023-03-21 NOTE — LETTER
3/21/2023    Patient: Christin Juarez   YOB: 1940   Date of Visit: 3/21/2023     [phone]  Via Fax: [phone]    Dear Ayesha Maier MD,      Thank you for referring Ms. Gerald Torres to [address] [place] [address] for evaluation. My notes for this consultation are attached. If you have questions, please do not hesitate to call me. I look forward to following your patient along with you.       Sincerely,    Dannie Conner MD

## 2023-03-21 NOTE — PROGRESS NOTES
Gregorio Orozco is a 80 y.o. female    Vitals:    03/21/23 1019   BP: 136/60   BP 1 Location: Left upper arm   BP Patient Position: Sitting   BP Cuff Size: Adult   Pulse: (!) 57   Height: 5' 3\" (1.6 m)   Weight: 124 lb (56.2 kg)   SpO2: 97%       Chief Complaint   Patient presents with    Hypertension       Chest pain NO  SOB YES  Dizziness NO  Swelling NO  Recent hospital visit [address]  HAD COVID?  YES

## 2023-03-29 ENCOUNTER — HOSPITAL ENCOUNTER (OUTPATIENT)
Age: 83
Setting detail: OBSERVATION
Discharge: HOME | End: 2023-03-30
Attending: STUDENT IN AN ORGANIZED HEALTH CARE EDUCATION/TRAINING PROGRAM | Admitting: INTERNAL MEDICINE
Payer: MEDICARE

## 2023-03-29 ENCOUNTER — HOSPITAL ENCOUNTER (OUTPATIENT)
Dept: GENERAL RADIOLOGY | Age: 83
Discharge: HOME | End: 2023-03-29
Attending: INTERNAL MEDICINE
Payer: MEDICARE

## 2023-03-29 ENCOUNTER — APPOINTMENT (OUTPATIENT)
Dept: CT IMAGING | Age: 83
End: 2023-03-29
Attending: INTERNAL MEDICINE
Payer: MEDICARE

## 2023-03-29 ENCOUNTER — TRANSCRIBE ORDER (OUTPATIENT)
Dept: GENERAL RADIOLOGY | Age: 83
End: 2023-03-29

## 2023-03-29 DIAGNOSIS — R06.00 DYSPNEA: ICD-10-CM

## 2023-03-29 DIAGNOSIS — R06.00 DYSPNEA, UNSPECIFIED TYPE: ICD-10-CM

## 2023-03-29 DIAGNOSIS — R06.00 DYSPNEA: Primary | ICD-10-CM

## 2023-03-29 DIAGNOSIS — I10 ESSENTIAL HYPERTENSION: Chronic | ICD-10-CM

## 2023-03-29 DIAGNOSIS — R07.9 CHEST PAIN, UNSPECIFIED TYPE: Primary | ICD-10-CM

## 2023-03-29 PROBLEM — I26.99 ACUTE PULMONARY EMBOLISM (HCC): Status: RESOLVED | Noted: 2017-05-19 | Resolved: 2023-03-29

## 2023-03-29 PROBLEM — J45.20 ASTHMA, MILD INTERMITTENT: Status: RESOLVED | Noted: 2019-04-18 | Resolved: 2023-03-29

## 2023-03-29 PROBLEM — R55 SYNCOPE AND COLLAPSE: Status: RESOLVED | Noted: 2019-04-18 | Resolved: 2023-03-29

## 2023-03-29 LAB
ALBUMIN SERPL-MCNC: 3.8 G/DL (ref 3.5–5)
ALBUMIN/GLOB SERPL: 0.9 (ref 1.1–2.2)
ALP SERPL-CCNC: 58 U/L (ref 45–117)
ALT SERPL-CCNC: 30 U/L (ref 12–78)
ANION GAP SERPL CALC-SCNC: 2 MMOL/L (ref 5–15)
AST SERPL-CCNC: 29 U/L (ref 15–37)
BASOPHILS # BLD: 0.1 K/UL (ref 0–0.1)
BASOPHILS NFR BLD: 1 % (ref 0–1)
BILIRUB SERPL-MCNC: 0.5 MG/DL (ref 0.2–1)
BNP SERPL-MCNC: 1184 PG/ML
BUN SERPL-MCNC: 13 MG/DL (ref 6–20)
BUN/CREAT SERPL: 13 (ref 12–20)
CALCIUM SERPL-MCNC: 9.4 MG/DL (ref 8.5–10.1)
CHLORIDE SERPL-SCNC: 106 MMOL/L (ref 97–108)
CO2 SERPL-SCNC: 29 MMOL/L (ref 21–32)
COMMENT, HOLDF: NORMAL
CREAT SERPL-MCNC: 0.97 MG/DL (ref 0.55–1.02)
D DIMER PPP FEU-MCNC: 0.64 MG/L FEU (ref 0–0.65)
DIFFERENTIAL METHOD BLD: NORMAL
EOSINOPHIL # BLD: 0.1 K/UL (ref 0–0.4)
EOSINOPHIL NFR BLD: 2 % (ref 0–7)
ERYTHROCYTE [DISTWIDTH] IN BLOOD BY AUTOMATED COUNT: 13.8 % (ref 11.5–14.5)
GLOBULIN SER CALC-MCNC: 4.2 G/DL (ref 2–4)
GLUCOSE SERPL-MCNC: 98 MG/DL (ref 65–100)
HCT VFR BLD AUTO: 36.1 % (ref 35–47)
HGB BLD-MCNC: 11.7 G/DL (ref 11.5–16)
IMM GRANULOCYTES # BLD AUTO: 0 K/UL (ref 0–0.04)
IMM GRANULOCYTES NFR BLD AUTO: 0 % (ref 0–0.5)
LYMPHOCYTES # BLD: 1.8 K/UL (ref 0.8–3.5)
LYMPHOCYTES NFR BLD: 22 % (ref 12–49)
MCH RBC QN AUTO: 29.3 PG (ref 26–34)
MCHC RBC AUTO-ENTMCNC: 32.4 G/DL (ref 30–36.5)
MCV RBC AUTO: 90.5 FL (ref 80–99)
MONOCYTES # BLD: 0.8 K/UL (ref 0–1)
MONOCYTES NFR BLD: 10 % (ref 5–13)
NEUTS SEG # BLD: 5.1 K/UL (ref 1.8–8)
NEUTS SEG NFR BLD: 65 % (ref 32–75)
NRBC # BLD: 0 K/UL (ref 0–0.01)
NRBC BLD-RTO: 0 PER 100 WBC
PLATELET # BLD AUTO: 249 K/UL (ref 150–400)
PMV BLD AUTO: 9 FL (ref 8.9–12.9)
POTASSIUM SERPL-SCNC: 3.4 MMOL/L (ref 3.5–5.1)
PROT SERPL-MCNC: 8 G/DL (ref 6.4–8.2)
RBC # BLD AUTO: 3.99 M/UL (ref 3.8–5.2)
SAMPLES BEING HELD,HOLD: NORMAL
SODIUM SERPL-SCNC: 137 MMOL/L (ref 136–145)
TROPONIN I SERPL HS-MCNC: 15 NG/L (ref 0–51)
TROPONIN I SERPL HS-MCNC: 17 NG/L (ref 0–51)
TROPONIN I SERPL HS-MCNC: 21 NG/L (ref 0–51)
WBC # BLD AUTO: 7.9 K/UL (ref 3.6–11)

## 2023-03-29 PROCEDURE — 96372 THER/PROPH/DIAG INJ SC/IM: CPT

## 2023-03-29 PROCEDURE — 80053 COMPREHEN METABOLIC PANEL: CPT

## 2023-03-29 PROCEDURE — 93005 ELECTROCARDIOGRAM TRACING: CPT

## 2023-03-29 PROCEDURE — 84484 ASSAY OF TROPONIN QUANT: CPT

## 2023-03-29 PROCEDURE — 70450 CT HEAD/BRAIN W/O DYE: CPT

## 2023-03-29 PROCEDURE — 74011000250 HC RX REV CODE- 250: Performed by: INTERNAL MEDICINE

## 2023-03-29 PROCEDURE — G0378 HOSPITAL OBSERVATION PER HR: HCPCS

## 2023-03-29 PROCEDURE — 74011250636 HC RX REV CODE- 250/636: Performed by: INTERNAL MEDICINE

## 2023-03-29 PROCEDURE — 85025 COMPLETE CBC W/AUTO DIFF WBC: CPT

## 2023-03-29 PROCEDURE — 74011250637 HC RX REV CODE- 250/637: Performed by: INTERNAL MEDICINE

## 2023-03-29 PROCEDURE — 85379 FIBRIN DEGRADATION QUANT: CPT

## 2023-03-29 PROCEDURE — 99285 EMERGENCY DEPT VISIT HI MDM: CPT

## 2023-03-29 PROCEDURE — 36415 COLL VENOUS BLD VENIPUNCTURE: CPT

## 2023-03-29 PROCEDURE — 74011250637 HC RX REV CODE- 250/637: Performed by: STUDENT IN AN ORGANIZED HEALTH CARE EDUCATION/TRAINING PROGRAM

## 2023-03-29 PROCEDURE — 71046 X-RAY EXAM CHEST 2 VIEWS: CPT

## 2023-03-29 PROCEDURE — 83880 ASSAY OF NATRIURETIC PEPTIDE: CPT

## 2023-03-29 RX ORDER — ACETAMINOPHEN 325 MG/1
650 TABLET ORAL
Status: DISCONTINUED | OUTPATIENT
Start: 2023-03-29 | End: 2023-03-30 | Stop reason: HOSPADM

## 2023-03-29 RX ORDER — ONDANSETRON 2 MG/ML
4 INJECTION INTRAMUSCULAR; INTRAVENOUS
Status: DISCONTINUED | OUTPATIENT
Start: 2023-03-29 | End: 2023-03-30 | Stop reason: HOSPADM

## 2023-03-29 RX ORDER — ENOXAPARIN SODIUM 100 MG/ML
40 INJECTION SUBCUTANEOUS EVERY 24 HOURS
Status: DISCONTINUED | OUTPATIENT
Start: 2023-03-29 | End: 2023-03-30 | Stop reason: HOSPADM

## 2023-03-29 RX ORDER — SODIUM CHLORIDE 0.9 % (FLUSH) 0.9 %
5-40 SYRINGE (ML) INJECTION AS NEEDED
Status: DISCONTINUED | OUTPATIENT
Start: 2023-03-29 | End: 2023-03-30 | Stop reason: HOSPADM

## 2023-03-29 RX ORDER — FLUTICASONE PROPIONATE 50 MCG
2 SPRAY, SUSPENSION (ML) NASAL
Status: DISCONTINUED | OUTPATIENT
Start: 2023-03-29 | End: 2023-03-30 | Stop reason: HOSPADM

## 2023-03-29 RX ORDER — MINOXIDIL 2.5 MG/1
2.5 TABLET ORAL DAILY
Status: DISCONTINUED | OUTPATIENT
Start: 2023-03-30 | End: 2023-03-30

## 2023-03-29 RX ORDER — AMLODIPINE BESYLATE 5 MG/1
10 TABLET ORAL DAILY
Status: DISCONTINUED | OUTPATIENT
Start: 2023-03-30 | End: 2023-03-30

## 2023-03-29 RX ORDER — CETIRIZINE HYDROCHLORIDE 10 MG/1
5 TABLET ORAL
Status: DISCONTINUED | OUTPATIENT
Start: 2023-03-29 | End: 2023-03-30 | Stop reason: HOSPADM

## 2023-03-29 RX ORDER — NALOXONE HYDROCHLORIDE 0.4 MG/ML
0.4 INJECTION, SOLUTION INTRAMUSCULAR; INTRAVENOUS; SUBCUTANEOUS AS NEEDED
Status: DISCONTINUED | OUTPATIENT
Start: 2023-03-29 | End: 2023-03-30 | Stop reason: HOSPADM

## 2023-03-29 RX ORDER — LEVOTHYROXINE SODIUM 88 UG/1
88 TABLET ORAL
Status: DISCONTINUED | OUTPATIENT
Start: 2023-03-30 | End: 2023-03-30 | Stop reason: HOSPADM

## 2023-03-29 RX ORDER — ESCITALOPRAM OXALATE 10 MG/1
5 TABLET ORAL DAILY
Status: DISCONTINUED | OUTPATIENT
Start: 2023-03-30 | End: 2023-03-30 | Stop reason: HOSPADM

## 2023-03-29 RX ORDER — SODIUM CHLORIDE 0.9 % (FLUSH) 0.9 %
5-40 SYRINGE (ML) INJECTION EVERY 8 HOURS
Status: DISCONTINUED | OUTPATIENT
Start: 2023-03-29 | End: 2023-03-30 | Stop reason: HOSPADM

## 2023-03-29 RX ORDER — ACETAMINOPHEN 325 MG/1
650 TABLET ORAL ONCE
Status: COMPLETED | OUTPATIENT
Start: 2023-03-29 | End: 2023-03-29

## 2023-03-29 RX ORDER — ESTRADIOL 0.5 MG/1
0.5 TABLET ORAL
Status: DISCONTINUED | OUTPATIENT
Start: 2023-03-29 | End: 2023-03-30 | Stop reason: HOSPADM

## 2023-03-29 RX ORDER — ALBUTEROL SULFATE 0.83 MG/ML
1.25 SOLUTION RESPIRATORY (INHALATION)
Status: DISCONTINUED | OUTPATIENT
Start: 2023-03-29 | End: 2023-03-30 | Stop reason: HOSPADM

## 2023-03-29 RX ORDER — PREDNISONE 5 MG/1
5 TABLET ORAL DAILY
Status: DISCONTINUED | OUTPATIENT
Start: 2023-03-30 | End: 2023-03-30 | Stop reason: HOSPADM

## 2023-03-29 RX ORDER — GUAIFENESIN 100 MG/5ML
81 LIQUID (ML) ORAL DAILY
Status: DISCONTINUED | OUTPATIENT
Start: 2023-03-29 | End: 2023-03-30 | Stop reason: HOSPADM

## 2023-03-29 RX ORDER — MORPHINE SULFATE 2 MG/ML
2 INJECTION, SOLUTION INTRAMUSCULAR; INTRAVENOUS
Status: DISCONTINUED | OUTPATIENT
Start: 2023-03-29 | End: 2023-03-30 | Stop reason: HOSPADM

## 2023-03-29 RX ORDER — DEXMETHYLPHENIDATE HYDROCHLORIDE 10 MG/1
10 TABLET ORAL DAILY
Status: DISCONTINUED | OUTPATIENT
Start: 2023-03-30 | End: 2023-03-30

## 2023-03-29 RX ORDER — HYDRALAZINE HYDROCHLORIDE 25 MG/1
50 TABLET, FILM COATED ORAL
Status: COMPLETED | OUTPATIENT
Start: 2023-03-29 | End: 2023-03-29

## 2023-03-29 RX ORDER — HYDROCODONE BITARTRATE AND ACETAMINOPHEN 5; 325 MG/1; MG/1
0.5 TABLET ORAL
Status: DISCONTINUED | OUTPATIENT
Start: 2023-03-29 | End: 2023-03-30

## 2023-03-29 RX ORDER — HYDRALAZINE HYDROCHLORIDE 25 MG/1
50 TABLET, FILM COATED ORAL 3 TIMES DAILY
Status: DISCONTINUED | OUTPATIENT
Start: 2023-03-30 | End: 2023-03-30 | Stop reason: HOSPADM

## 2023-03-29 RX ORDER — BUPROPION HYDROCHLORIDE 150 MG/1
300 TABLET ORAL DAILY
Status: DISCONTINUED | OUTPATIENT
Start: 2023-03-30 | End: 2023-03-30 | Stop reason: HOSPADM

## 2023-03-29 RX ORDER — GABAPENTIN 100 MG/1
100 CAPSULE ORAL
Status: DISCONTINUED | OUTPATIENT
Start: 2023-03-29 | End: 2023-03-30 | Stop reason: HOSPADM

## 2023-03-29 RX ORDER — MONTELUKAST SODIUM 10 MG/1
10 TABLET ORAL
Status: DISCONTINUED | OUTPATIENT
Start: 2023-03-29 | End: 2023-03-30 | Stop reason: HOSPADM

## 2023-03-29 RX ADMIN — ESTRADIOL 0.5 MG: 0.5 TABLET ORAL at 21:41

## 2023-03-29 RX ADMIN — ENOXAPARIN SODIUM 40 MG: 100 INJECTION SUBCUTANEOUS at 21:20

## 2023-03-29 RX ADMIN — SODIUM CHLORIDE, PRESERVATIVE FREE 10 ML: 5 INJECTION INTRAVENOUS at 21:47

## 2023-03-29 RX ADMIN — ACETAMINOPHEN 650 MG: 325 TABLET ORAL at 18:33

## 2023-03-29 RX ADMIN — GABAPENTIN 100 MG: 100 CAPSULE ORAL at 21:20

## 2023-03-29 RX ADMIN — ASPIRIN 81 MG: 81 TABLET, CHEWABLE ORAL at 21:20

## 2023-03-29 RX ADMIN — CETIRIZINE HYDROCHLORIDE 5 MG: 10 TABLET, FILM COATED ORAL at 21:42

## 2023-03-29 RX ADMIN — HYDROCODONE BITARTRATE AND ACETAMINOPHEN 0.5 TABLET: 5; 325 TABLET ORAL at 23:27

## 2023-03-29 RX ADMIN — MONTELUKAST 10 MG: 10 TABLET, FILM COATED ORAL at 21:41

## 2023-03-29 RX ADMIN — HYDRALAZINE HYDROCHLORIDE 50 MG: 25 TABLET, FILM COATED ORAL at 18:37

## 2023-03-29 RX ADMIN — FLUTICASONE PROPIONATE 2 SPRAY: 50 SPRAY, METERED NASAL at 21:45

## 2023-03-29 NOTE — ED PROVIDER NOTES
HPI     Date of Service:  3/29/2023    Patient:  Samantha Gutierrez    Chief Complaint:  Abnormal EKG       HPI:  Samantha Gutierrez is a 80 y.o.  female with a past medical history of HTN, asthma who presents for evaluation of shortness of breath and abnormal ECG. Patient reports this afternoon she had sudden onset shortness of breath, reports episode started while she was resting and notes limited ambulation secondary to the shortness of breath. There is no associated chest pain, diaphoresis, nausea or vomiting. Does endorse she felt some lightheadedness but no syncope. Notes over the last 1 to 2 months she has been having intermittent shortness of breath, has been seen in the emergency department as well as by her cardiologist. She subsequently called her PCP office, had outpatient chest x-ray performed. At PCP office ECG was performed and they were concerned for ECG changes therefore she was referred to the emergency department. Notes at the primary care office she received a breathing treatment, nitro and aspirin. Unclear what order these medications were given but she does endorse after breathing treatment she felt improved. Denies any recent illness including fevers, chills, cough or congestion. No vomiting or diarrhea. No leg swelling or calf pain. 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      HX GYN  [id number]    Hysterectomy    HX HEENT  1996    sinus surgery    HX HEENT      deviated septum    HX ORTHOPAEDIC Left 2006    Tarsal Tunnel Release     HX ORTHOPAEDIC Right 2010,2016    basal joint surgery    HX ORTHOPAEDIC Left 2012    basal joint surgery     HX ORTHOPAEDIC      back surgery     HX OTHER SURGICAL      TVT Sling    HX OTHER SURGICAL      Tillman's Neuroma removal     HX SEPTOPLASTY      HX TONSIL AND ADENOIDECTOMY      HX TONSILLECTOMY  1946    HX UROLOGICAL  2010    TVT sling         Family History:   Problem Relation Age of Onset    Hypertension Mother     Stroke Mother         TIA    Heart Disease Mother     Alzheimer's Disease Mother     Hypertension Father     Asthma Father     Colon Cancer Father     COPD Father     Asthma Brother        Social History     Socioeconomic History    Marital status:      Spouse name: Not on file    Number of children: Not on file    Years of education: Not on file    Highest education level: Not on file   Occupational History    Not on file   Tobacco Use    Smoking status: Never    Smokeless tobacco: Never   Substance and Sexual Activity    Alcohol use: Yes     Comment: rarely    Drug use: Never    Sexual activity: Not on file   Other Topics Concern    Not on file   Social History Narrative    Not on file     Social Determinants of Health     Financial Resource Strain: Not on file   Food Insecurity: Not on file   Transportation Needs: Not on file   Physical Activity: Not on file   Stress: Not on file   Social Connections: Not on file   Intimate Partner Violence: Not on file   Housing Stability: Not on file         ALLERGIES: Ace inhibitors and Arb-angiotensin receptor antagonist    Review of Systems   Constitutional:  Negative for chills and fever. HENT:  Negative for congestion and rhinorrhea. Eyes:  Negative for discharge and redness. Respiratory:  Positive for shortness of breath. Negative for cough. Cardiovascular:  Negative for chest pain and leg swelling.    Gastrointestinal:  Negative for abdominal pain, diarrhea, nausea and vomiting. Neurological:  Positive for light-headedness. Negative for speech difficulty. Psychiatric/Behavioral:  Negative for agitation and confusion. Vitals:    03/29/23 1725   BP: (!) 189/73   Pulse: (!) 58   Resp: 16   Temp: 97.7 °F (36.5 °C)   SpO2: 97%   Weight: 55.3 kg (122 lb)   Height: 5' 3\" (1.6 m)            Physical Exam  Vitals and nursing note reviewed. Constitutional:       General: She is not in acute distress. Appearance: She is not ill-appearing or toxic-appearing. HENT:      Head: Normocephalic and atraumatic. Eyes:      General: No scleral icterus. Right eye: No discharge. Left eye: No discharge. Conjunctiva/sclera: Conjunctivae normal.   Cardiovascular:      Rate and Rhythm: Regular rhythm. Bradycardia present. Pulses: Normal pulses. Pulmonary:      Effort: Pulmonary effort is normal. No respiratory distress. Breath sounds: Normal breath sounds. Abdominal:      General: Abdomen is flat. Palpations: Abdomen is soft. Tenderness: There is no abdominal tenderness. Musculoskeletal:         General: Normal range of motion. Right lower leg: No edema. Left lower leg: No edema. Skin:     General: Skin is warm and dry. Capillary Refill: Capillary refill takes less than 2 seconds. Neurological:      General: No focal deficit present. Mental Status: She is alert and oriented to person, place, and time. Psychiatric:         Mood and Affect: Mood normal.         Behavior: Behavior normal.        Medical Decision Making      DECISION MAKING:  Amy Coughlin is a 80 y.o. female who comes in as above. On arrival patient is afebrile. Vital signs are notable for bradycardia at 58 and blood pressure is elevated at 189/73. On my examination she is asymptomatic. Exam unremarkable. Of note, patient takes hydralazine 3 times a day, she has not had her afternoon dose.   ECG on arrival did show T wave inversion in V3 and flattening in V4, otherwise no significant changes when compared to prior from February. Differential diagnosis includes, but not limited to CHF, asthma exacerbation, pneumonia, ACS. Low suspicion for PE as her shortness of breath has been ongoing for the last several months, no risk factors and had CTA performed in early February that was negative for PE.    CBC is without leukocytosis or anemia. Electrolytes notable for mild hypokalemia at 3.4, oral potassium replacement ordered. Electrolytes otherwise stable. Kidney function and LFTs within normal limits. BNP is elevated at 1,184, on review of patient's chest x-ray from earlier today as an outpatient there is no appreciated pulmonary edema or other acute cardiopulmonary process. High-sensitivity troponin within normal limits at 15 and on repeat testing is 17. On reevaluation after a dose of hydralazine, her blood pressure has improved to 158/56. I discussed results with patient. Patient now reporting having chest pressure. Will repeat ECG. Given she is now having chest discomfort discussed admission versus outpatient cardiac as she does have a stress test scheduled for next week. Patient prefers admission for cardiac rule out. Patient's case was discussed with the hospitalist, Dr. Dustin Collins, for admission. Repeat ECG was unchanged from EGD obtained earlier today.     Perfect Serve Consult for Admission  8:34 PM    ED Room Number: SU89/90  Patient Name and age:  Rhett Joe 80 y.o.  female  Working Diagnosis: Chest pain, unspecified type  (primary encounter diagnosis)  Dyspnea, unspecified type    COVID-19 Suspicion:  no  Sepsis present:  no  Reassessment needed: no  Code Status:  Full Code  Readmission: no  Isolation Requirements:  no  Recommended Level of Care:  telemetry  Department: [facility] ED - [phone]  Admitting Provider: Dustin Collins    Other:  80 y.o.  female with a past medical history of HTN, asthma presented for episode of dyspnea at home. No associated CP, n/v, sweating. Has had ongoing dyspnea w/ exertion x several months, follows with [facility]. Saw PCP for the SOB, had ECG performed in office and referred to ED for ECG changes compared to prior. Of note also given aspirin, nitro and breathing treatment PTA which reportedly helped symptoms. On arrival symptom free, ECG  showed T wave inversion in V3 and flattening in V4 but otherwise unchanged from prior. Trop x 2 15 -> 17. BNP  1,184 but CXR performed prior to arrival w/o pulmonary edema and unremarkable. On re-eval noting return of SOB and now with chest pressure, repeating ECG. Admitting for chest pain rule out. Amount and/or Complexity of Data Reviewed  External Data Reviewed: radiology. Details: Outpatient chest x-ray from today reviewed. Labs: ordered. ECG/medicine tests: ordered. Decision-making details documented in ED Course. Risk  OTC drugs. Prescription drug management. Decision regarding hospitalization. ED Course as of 03/29/23 2202   Wed Mar 29, 2023   1740 EKG, 12 LEAD, INITIAL  ECG at 1736, interpreted by me: Sinus bradycardia, rate 54 bpm.  Left axis deviation. Normal intervals. LVH. No ST elevations. T wave inversion in V3 and flattening in V4 otherwise no changes when compared to 2/1/23. [SH]   2046 EKG, 12 LEAD, REPEAT  ECG at 2035, interpreted by me: Sinus bradycardia, rate 58 bpm.  Left axis deviation. Normal intervals. No ST elevations.   No significant change when compared to ECG from earlier today. [SH]      ED Course User Index  [SH] Nora Cotto DO       Procedures        LABS:  Recent Results (from the past 6 hour(s))   CBC WITH AUTOMATED DIFF    Collection Time: 03/29/23  5:29 PM   Result Value Ref Range    WBC 7.9 3.6 - 11.0 K/uL    RBC 3.99 3.80 - 5.20 M/uL    HGB 11.7 11.5 - 16.0 g/dL    HCT 36.1 35.0 - 47.0 %    MCV 90.5 80.0 - 99.0 FL    MCH 29.3 26.0 - 34.0 PG    MCHC 32.4 30.0 - 36.5 g/dL RDW 13.8 11.5 - 14.5 %    PLATELET 523 101 - 828 K/uL    MPV 9.0 8.9 - 12.9 FL    NRBC 0.0 0  WBC    ABSOLUTE NRBC 0.00 0.00 - 0.01 K/uL    NEUTROPHILS 65 32 - 75 %    LYMPHOCYTES 22 12 - 49 %    MONOCYTES 10 5 - 13 %    EOSINOPHILS 2 0 - 7 %    BASOPHILS 1 0 - 1 %    IMMATURE GRANULOCYTES 0 0.0 - 0.5 %    ABS. NEUTROPHILS 5.1 1.8 - 8.0 K/UL    ABS. LYMPHOCYTES 1.8 0.8 - 3.5 K/UL    ABS. MONOCYTES 0.8 0.0 - 1.0 K/UL    ABS. EOSINOPHILS 0.1 0.0 - 0.4 K/UL    ABS. BASOPHILS 0.1 0.0 - 0.1 K/UL    ABS. IMM. GRANS. 0.0 0.00 - 0.04 K/UL    DF AUTOMATED     METABOLIC PANEL, COMPREHENSIVE    Collection Time: 03/29/23  5:29 PM   Result Value Ref Range    Sodium 137 136 - 145 mmol/L    Potassium 3.4 (L) 3.5 - 5.1 mmol/L    Chloride 106 97 - 108 mmol/L    CO2 29 21 - 32 mmol/L    Anion gap 2 (L) 5 - 15 mmol/L    Glucose 98 65 - 100 mg/dL    BUN 13 6 - 20 MG/DL    Creatinine 0.97 0.55 - 1.02 MG/DL    BUN/Creatinine ratio 13 12 - 20      eGFR 58 (L) >60 ml/min/1.73m2    Calcium 9.4 8.5 - 10.1 MG/DL    Bilirubin, total 0.5 0.2 - 1.0 MG/DL    ALT (SGPT) 30 12 - 78 U/L    AST (SGOT) 29 15 - 37 U/L    Alk. phosphatase 58 45 - 117 U/L    Protein, total 8.0 6.4 - 8.2 g/dL    Albumin 3.8 3.5 - 5.0 g/dL    Globulin 4.2 (H) 2.0 - 4.0 g/dL    A-G Ratio 0.9 (L) 1.1 - 2.2     NT-PRO BNP    Collection Time: 03/29/23  5:29 PM   Result Value Ref Range    NT pro-BNP 1,184 (H) <450 PG/ML   TROPONIN-HIGH SENSITIVITY    Collection Time: 03/29/23  5:29 PM   Result Value Ref Range    Troponin-High Sensitivity 15 0 - 51 ng/L   SAMPLES BEING HELD    Collection Time: 03/29/23  5:29 PM   Result Value Ref Range    SAMPLES BEING HELD NO HOLDS     COMMENT        Add-on orders for these samples will be processed based on acceptable specimen integrity and analyte stability, which may vary by analyte.    EKG, 12 LEAD, INITIAL    Collection Time: 03/29/23  5:36 PM   Result Value Ref Range    Ventricular Rate 54 BPM    Atrial Rate 54 BPM    P-R Interval 162 ms    QRS Duration 92 ms    Q-T Interval 454 ms    QTC Calculation (Bezet) 430 ms    Calculated P Axis 13 degrees    Calculated R Axis -39 degrees    Calculated T Axis 25 degrees    Diagnosis       Sinus bradycardia  Left axis deviation  Voltage criteria for left ventricular hypertrophy  Abnormal ECG  When compared with ECG of 01-FEB-2023 20:58,  Nonspecific T wave abnormality now evident in Anterior leads     TROPONIN-HIGH SENSITIVITY    Collection Time: 03/29/23  7:29 PM   Result Value Ref Range    Troponin-High Sensitivity 17 0 - 51 ng/L        IMAGING:  No orders to display         Medications During Visit:  Medications   acetaminophen (TYLENOL) tablet 650 mg (650 mg Oral Given 3/29/23 1833)   hydrALAZINE (APRESOLINE) tablet 50 mg (50 mg Oral Given 3/29/23 1837)         IMPRESSION:  1. Chest pain, unspecified type    2.  Dyspnea, unspecified type        DISPOSITION:  Admitted             Tesfaye Chu DO

## 2023-03-29 NOTE — ED TRIAGE NOTES
Patient reports she was at her PCP today to be evaluated for shortness of breath for the last 1-2 months which got worse today    Patient reports her PCP did an EKG, gave her 2 nitro and ASA and sent her to the ED for evaluation. Patient denies any chest pain- reports she saw cardiology last week for a routine visit.

## 2023-03-30 ENCOUNTER — HOSPITAL ENCOUNTER (OUTPATIENT)
Dept: NON INVASIVE DIAGNOSTICS | Age: 83
Setting detail: OBSERVATION
End: 2023-03-30
Attending: SPECIALIST
Payer: MEDICARE

## 2023-03-30 ENCOUNTER — APPOINTMENT (OUTPATIENT)
Dept: NUCLEAR MEDICINE | Age: 83
End: 2023-03-30
Attending: SPECIALIST
Payer: MEDICARE

## 2023-03-30 ENCOUNTER — APPOINTMENT (OUTPATIENT)
Dept: NON INVASIVE DIAGNOSTICS | Age: 83
End: 2023-03-30
Attending: INTERNAL MEDICINE
Payer: MEDICARE

## 2023-03-30 VITALS
OXYGEN SATURATION: 97 % | HEART RATE: 67 BPM | WEIGHT: 122 LBS | HEIGHT: 63 IN | TEMPERATURE: 97.6 F | SYSTOLIC BLOOD PRESSURE: 146 MMHG | DIASTOLIC BLOOD PRESSURE: 71 MMHG | RESPIRATION RATE: 15 BRPM | BODY MASS INDEX: 21.62 KG/M2

## 2023-03-30 VITALS
SYSTOLIC BLOOD PRESSURE: 159 MMHG | DIASTOLIC BLOOD PRESSURE: 68 MMHG | WEIGHT: 122 LBS | BODY MASS INDEX: 21.62 KG/M2 | HEIGHT: 63 IN

## 2023-03-30 LAB
ANION GAP SERPL CALC-SCNC: 7 MMOL/L (ref 5–15)
ATRIAL RATE: 54 BPM
ATRIAL RATE: 58 BPM
BASOPHILS # BLD: 0.1 K/UL (ref 0–0.1)
BASOPHILS NFR BLD: 1 % (ref 0–1)
BUN SERPL-MCNC: 11 MG/DL (ref 6–20)
BUN/CREAT SERPL: 13 (ref 12–20)
CALCIUM SERPL-MCNC: 9.3 MG/DL (ref 8.5–10.1)
CALCULATED P AXIS, ECG09: 13 DEGREES
CALCULATED P AXIS, ECG09: 43 DEGREES
CALCULATED R AXIS, ECG10: -39 DEGREES
CALCULATED R AXIS, ECG10: -48 DEGREES
CALCULATED T AXIS, ECG11: 25 DEGREES
CALCULATED T AXIS, ECG11: 7 DEGREES
CHLORIDE SERPL-SCNC: 106 MMOL/L (ref 97–108)
CO2 SERPL-SCNC: 25 MMOL/L (ref 21–32)
CREAT SERPL-MCNC: 0.87 MG/DL (ref 0.55–1.02)
DIAGNOSIS, 93000: NORMAL
DIAGNOSIS, 93000: NORMAL
DIFFERENTIAL METHOD BLD: ABNORMAL
ECHO AO ASC DIAM: 3.4 CM
ECHO AO ASCENDING AORTA INDEX: 2.17 CM/M2
ECHO AV AREA PEAK VELOCITY: 2 CM2
ECHO AV AREA VTI: 2.3 CM2
ECHO AV AREA/BSA PEAK VELOCITY: 1.3 CM2/M2
ECHO AV AREA/BSA VTI: 1.5 CM2/M2
ECHO AV MEAN GRADIENT: 5 MMHG
ECHO AV MEAN VELOCITY: 1 M/S
ECHO AV PEAK GRADIENT: 12 MMHG
ECHO AV PEAK VELOCITY: 1.7 M/S
ECHO AV VELOCITY RATIO: 0.65
ECHO AV VTI: 34.9 CM
ECHO LA DIAMETER INDEX: 1.97 CM/M2
ECHO LA DIAMETER: 3.1 CM
ECHO LA VOL 2C: 34 ML (ref 22–52)
ECHO LA VOL 4C: 42 ML (ref 22–52)
ECHO LA VOL BP: 42 ML (ref 22–52)
ECHO LA VOL/BSA BIPLANE: 27 ML/M2 (ref 16–34)
ECHO LA VOLUME AREA LENGTH: 46 ML
ECHO LA VOLUME INDEX A2C: 22 ML/M2 (ref 16–34)
ECHO LA VOLUME INDEX A4C: 27 ML/M2 (ref 16–34)
ECHO LA VOLUME INDEX AREA LENGTH: 29 ML/M2 (ref 16–34)
ECHO LV E' LATERAL VELOCITY: 9 CM/S
ECHO LV E' SEPTAL VELOCITY: 7 CM/S
ECHO LV EDV A2C: 48 ML
ECHO LV EDV A4C: 65 ML
ECHO LV EDV BP: 56 ML (ref 56–104)
ECHO LV EDV INDEX A4C: 41 ML/M2
ECHO LV EDV INDEX BP: 36 ML/M2
ECHO LV EDV NDEX A2C: 31 ML/M2
ECHO LV EJECTION FRACTION A2C: 72 %
ECHO LV EJECTION FRACTION A4C: 62 %
ECHO LV EJECTION FRACTION BIPLANE: 66 % (ref 55–100)
ECHO LV ESV A2C: 13 ML
ECHO LV ESV A4C: 25 ML
ECHO LV ESV BP: 19 ML (ref 19–49)
ECHO LV ESV INDEX A2C: 8 ML/M2
ECHO LV ESV INDEX A4C: 16 ML/M2
ECHO LV ESV INDEX BP: 12 ML/M2
ECHO LV FRACTIONAL SHORTENING: 31 % (ref 28–44)
ECHO LV INTERNAL DIMENSION DIASTOLE INDEX: 2.48 CM/M2
ECHO LV INTERNAL DIMENSION DIASTOLIC: 3.9 CM (ref 3.9–5.3)
ECHO LV INTERNAL DIMENSION SYSTOLIC INDEX: 1.72 CM/M2
ECHO LV INTERNAL DIMENSION SYSTOLIC: 2.7 CM
ECHO LV IVSD: 1.1 CM (ref 0.6–0.9)
ECHO LV MASS 2D: 140.1 G (ref 67–162)
ECHO LV MASS INDEX 2D: 89.2 G/M2 (ref 43–95)
ECHO LV POSTERIOR WALL DIASTOLIC: 1.1 CM (ref 0.6–0.9)
ECHO LV RELATIVE WALL THICKNESS RATIO: 0.56
ECHO LVOT AREA: 3.1 CM2
ECHO LVOT AV VTI INDEX: 0.77
ECHO LVOT DIAM: 2 CM
ECHO LVOT MEAN GRADIENT: 2 MMHG
ECHO LVOT PEAK GRADIENT: 5 MMHG
ECHO LVOT PEAK VELOCITY: 1.1 M/S
ECHO LVOT STROKE VOLUME INDEX: 53.4 ML/M2
ECHO LVOT SV: 83.8 ML
ECHO LVOT VTI: 26.7 CM
ECHO MV A VELOCITY: 1.08 M/S
ECHO MV E DECELERATION TIME (DT): 293.1 MS
ECHO MV E VELOCITY: 0.67 M/S
ECHO MV E/A RATIO: 0.62
ECHO MV E/E' LATERAL: 7.44
ECHO MV E/E' RATIO (AVERAGED): 8.51
ECHO MV E/E' SEPTAL: 9.57
ECHO MV REGURGITANT PEAK GRADIENT: 61 MMHG
ECHO MV REGURGITANT PEAK VELOCITY: 3.9 M/S
ECHO PV MAX VELOCITY: 1.2 M/S
ECHO PV PEAK GRADIENT: 6 MMHG
ECHO RV INTERNAL DIMENSION: 3.8 CM
ECHO RV TAPSE: 2 CM (ref 1.7–?)
ECHO RVOT PEAK GRADIENT: 3 MMHG
ECHO RVOT PEAK VELOCITY: 0.9 M/S
EOSINOPHIL # BLD: 0.1 K/UL (ref 0–0.4)
EOSINOPHIL NFR BLD: 2 % (ref 0–7)
ERYTHROCYTE [DISTWIDTH] IN BLOOD BY AUTOMATED COUNT: 13.9 % (ref 11.5–14.5)
GLUCOSE SERPL-MCNC: 95 MG/DL (ref 65–100)
HCT VFR BLD AUTO: 35.5 % (ref 35–47)
HGB BLD-MCNC: 11.7 G/DL (ref 11.5–16)
IMM GRANULOCYTES # BLD AUTO: 0 K/UL (ref 0–0.04)
IMM GRANULOCYTES NFR BLD AUTO: 1 % (ref 0–0.5)
LYMPHOCYTES # BLD: 1.5 K/UL (ref 0.8–3.5)
LYMPHOCYTES NFR BLD: 27 % (ref 12–49)
MAGNESIUM SERPL-MCNC: 1.9 MG/DL (ref 1.6–2.4)
MCH RBC QN AUTO: 29.8 PG (ref 26–34)
MCHC RBC AUTO-ENTMCNC: 33 G/DL (ref 30–36.5)
MCV RBC AUTO: 90.3 FL (ref 80–99)
MONOCYTES # BLD: 0.6 K/UL (ref 0–1)
MONOCYTES NFR BLD: 12 % (ref 5–13)
NEUTS SEG # BLD: 3.2 K/UL (ref 1.8–8)
NEUTS SEG NFR BLD: 57 % (ref 32–75)
NRBC # BLD: 0 K/UL (ref 0–0.01)
NRBC BLD-RTO: 0 PER 100 WBC
P-R INTERVAL, ECG05: 162 MS
P-R INTERVAL, ECG05: 196 MS
PLATELET # BLD AUTO: 254 K/UL (ref 150–400)
PMV BLD AUTO: 8.8 FL (ref 8.9–12.9)
POTASSIUM SERPL-SCNC: 3.5 MMOL/L (ref 3.5–5.1)
Q-T INTERVAL, ECG07: 446 MS
Q-T INTERVAL, ECG07: 454 MS
QRS DURATION, ECG06: 92 MS
QRS DURATION, ECG06: 92 MS
QTC CALCULATION (BEZET), ECG08: 430 MS
QTC CALCULATION (BEZET), ECG08: 437 MS
RBC # BLD AUTO: 3.93 M/UL (ref 3.8–5.2)
SODIUM SERPL-SCNC: 138 MMOL/L (ref 136–145)
STRESS BASELINE DIAS BP: 69 MMHG
STRESS BASELINE HR: 71 BPM
STRESS BASELINE SYS BP: 160 MMHG
STRESS ESTIMATED WORKLOAD: 1 METS
STRESS EXERCISE DUR MIN: 3 MIN
STRESS EXERCISE DUR SEC: 0 SEC
STRESS PEAK DIAS BP: 69 MMHG
STRESS PEAK SYS BP: 160 MMHG
STRESS PERCENT HR ACHIEVED: 70 %
STRESS POST PEAK HR: 96 BPM
STRESS RATE PRESSURE PRODUCT: NORMAL BPM*MMHG
STRESS TARGET HR: 138 BPM
TROPONIN I SERPL HS-MCNC: 35 NG/L (ref 0–51)
VENTRICULAR RATE, ECG03: 54 BPM
VENTRICULAR RATE, ECG03: 58 BPM
WBC # BLD AUTO: 5.6 K/UL (ref 3.6–11)

## 2023-03-30 PROCEDURE — 97165 OT EVAL LOW COMPLEX 30 MIN: CPT

## 2023-03-30 PROCEDURE — 93017 CV STRESS TEST TRACING ONLY: CPT

## 2023-03-30 PROCEDURE — 85025 COMPLETE CBC W/AUTO DIFF WBC: CPT

## 2023-03-30 PROCEDURE — A9270 NON-COVERED ITEM OR SERVICE: HCPCS | Performed by: INTERNAL MEDICINE

## 2023-03-30 PROCEDURE — 93306 TTE W/DOPPLER COMPLETE: CPT

## 2023-03-30 PROCEDURE — 84484 ASSAY OF TROPONIN QUANT: CPT

## 2023-03-30 PROCEDURE — 74011250637 HC RX REV CODE- 250/637: Performed by: INTERNAL MEDICINE

## 2023-03-30 PROCEDURE — G0378 HOSPITAL OBSERVATION PER HR: HCPCS

## 2023-03-30 PROCEDURE — 97535 SELF CARE MNGMENT TRAINING: CPT

## 2023-03-30 PROCEDURE — 74011636637 HC RX REV CODE- 636/637: Performed by: INTERNAL MEDICINE

## 2023-03-30 PROCEDURE — 97161 PT EVAL LOW COMPLEX 20 MIN: CPT

## 2023-03-30 PROCEDURE — 83735 ASSAY OF MAGNESIUM: CPT

## 2023-03-30 PROCEDURE — A9500 TC99M SESTAMIBI: HCPCS

## 2023-03-30 PROCEDURE — 36415 COLL VENOUS BLD VENIPUNCTURE: CPT

## 2023-03-30 PROCEDURE — 97116 GAIT TRAINING THERAPY: CPT

## 2023-03-30 PROCEDURE — 80048 BASIC METABOLIC PNL TOTAL CA: CPT

## 2023-03-30 PROCEDURE — 74011250636 HC RX REV CODE- 250/636: Performed by: SPECIALIST

## 2023-03-30 RX ORDER — TETRAKIS(2-METHOXYISOBUTYLISOCYANIDE)COPPER(I) TETRAFLUOROBORATE 1 MG/ML
11 INJECTION, POWDER, LYOPHILIZED, FOR SOLUTION INTRAVENOUS
Status: COMPLETED | OUTPATIENT
Start: 2023-03-30 | End: 2023-03-30

## 2023-03-30 RX ORDER — HYDROCODONE BITARTRATE AND ACETAMINOPHEN 5; 325 MG/1; MG/1
1 TABLET ORAL
Status: COMPLETED | OUTPATIENT
Start: 2023-03-30 | End: 2023-03-30

## 2023-03-30 RX ORDER — HYDROCODONE BITARTRATE AND ACETAMINOPHEN 5; 325 MG/1; MG/1
1 TABLET ORAL
COMMUNITY

## 2023-03-30 RX ORDER — GABAPENTIN 100 MG/1
100 CAPSULE ORAL
COMMUNITY

## 2023-03-30 RX ORDER — HYDROCODONE BITARTRATE AND ACETAMINOPHEN 5; 325 MG/1; MG/1
0.5 TABLET ORAL
COMMUNITY

## 2023-03-30 RX ORDER — NIFEDIPINE 30 MG/1
30 TABLET, EXTENDED RELEASE ORAL DAILY
Qty: 30 TABLET | Refills: 0 | Status: SHIPPED | OUTPATIENT
Start: 2023-03-31 | End: 2023-04-30

## 2023-03-30 RX ORDER — HYDRALAZINE HYDROCHLORIDE 20 MG/ML
20 INJECTION INTRAMUSCULAR; INTRAVENOUS
Status: DISCONTINUED | OUTPATIENT
Start: 2023-03-30 | End: 2023-03-30 | Stop reason: HOSPADM

## 2023-03-30 RX ORDER — TETRAKIS(2-METHOXYISOBUTYLISOCYANIDE)COPPER(I) TETRAFLUOROBORATE 1 MG/ML
30 INJECTION, POWDER, LYOPHILIZED, FOR SOLUTION INTRAVENOUS
Status: COMPLETED | OUTPATIENT
Start: 2023-03-30 | End: 2023-03-30

## 2023-03-30 RX ORDER — MINOXIDIL 2.5 MG/1
2.5 TABLET ORAL DAILY
Status: DISCONTINUED | OUTPATIENT
Start: 2023-03-30 | End: 2023-03-30 | Stop reason: HOSPADM

## 2023-03-30 RX ORDER — NIFEDIPINE 30 MG/1
30 TABLET, EXTENDED RELEASE ORAL DAILY
Status: DISCONTINUED | OUTPATIENT
Start: 2023-03-31 | End: 2023-03-30 | Stop reason: HOSPADM

## 2023-03-30 RX ADMIN — REGADENOSON 0.4 MG: 0.08 INJECTION, SOLUTION INTRAVENOUS at 13:26

## 2023-03-30 RX ADMIN — AMLODIPINE BESYLATE 10 MG: 5 TABLET ORAL at 09:29

## 2023-03-30 RX ADMIN — HYDROCODONE BITARTRATE AND ACETAMINOPHEN 1 TABLET: 5; 325 TABLET ORAL at 16:32

## 2023-03-30 RX ADMIN — HYDRALAZINE HYDROCHLORIDE 50 MG: 25 TABLET, FILM COATED ORAL at 09:30

## 2023-03-30 RX ADMIN — HYDROCODONE BITARTRATE AND ACETAMINOPHEN 0.5 TABLET: 5; 325 TABLET ORAL at 05:48

## 2023-03-30 RX ADMIN — ESCITALOPRAM OXALATE 5 MG: 10 TABLET ORAL at 09:36

## 2023-03-30 RX ADMIN — TETRAKIS(2-METHOXYISOBUTYLISOCYANIDE)COPPER(I) TETRAFLUOROBORATE 30 MILLICURIE: 1 INJECTION, POWDER, LYOPHILIZED, FOR SOLUTION INTRAVENOUS at 13:30

## 2023-03-30 RX ADMIN — BUPROPION HYDROCHLORIDE 300 MG: 150 TABLET, EXTENDED RELEASE ORAL at 09:30

## 2023-03-30 RX ADMIN — LEVOTHYROXINE SODIUM 88 MCG: 0.09 TABLET ORAL at 09:27

## 2023-03-30 RX ADMIN — TETRAKIS(2-METHOXYISOBUTYLISOCYANIDE)COPPER(I) TETRAFLUOROBORATE 11 MILLICURIE: 1 INJECTION, POWDER, LYOPHILIZED, FOR SOLUTION INTRAVENOUS at 11:55

## 2023-03-30 RX ADMIN — PREDNISONE 5 MG: 5 TABLET ORAL at 09:28

## 2023-03-30 RX ADMIN — HYDROCODONE BITARTRATE AND ACETAMINOPHEN 0.5 TABLET: 5; 325 TABLET ORAL at 13:41

## 2023-03-30 NOTE — ED NOTES
Bedside and Verbal shift change report given to TOMASZ Reed (oncoming nurse) by Ashlyn Page (offgoing nurse). Report included the following information SBAR, ED Summary, Procedure Summary and MAR.

## 2023-03-30 NOTE — DISCHARGE INSTRUCTIONS
HOSPITALIST DISCHARGE INSTRUCTIONS  NAME: Krzysztof Allen   :  1940   MRN:  [id number]     Date/Time:  3/30/2023 4:29 PM    ADMIT DATE: 3/29/2023     DISCHARGE DATE: 3/30/2023     ADMITTING DIAGNOSIS:  Shortness of breath    DISCHARGE DIAGNOSIS:  You were admitted for evaluation and treatment of the above. You underwent stress testing which was reassuring. You were seen by your Cardiologist and we made some small medication changes which may help with your symptoms. MEDICATIONS:    It is important that you take the medication exactly as they are prescribed. Keep your medication in the bottles provided by the pharmacist and keep a list of the medication names, dosages, and times to be taken in your wallet. Do not take other medications without consulting your doctor. If you experience any of the following symptoms then please call your primary care physician or return to the emergency room if you cannot get hold of your doctor:  Fever, chills, nausea, vomiting, diarrhea, change in mentation, falling, bleeding, shortness of breath    Follow Up:  Please call and set up an appointment to see them in 1 week. Alverto Mckee MD  [address] [place] [address]  [phone]    Follow up on 2023  3:20 pm    Mandy Garcia, [address] [place] [address]  [phone]              Information obtained by :  I understand that if any problems occur once I am at home I am to contact my physician. I understand and acknowledge receipt of the instructions indicated above.                                                                                                                                            Physician's or R.N.'s Signature                                                                  Date/Time Patient or Representative Signature                                                          Date/Time

## 2023-03-30 NOTE — PROGRESS NOTES
Admission Medication Reconciliation:     Information obtained from:    Patient via interview in ER 13  RxQuery data available¹:  YES    Comments/Recommendations:   Patient able to confirm name, , allergies, and preferred pharmacy  Updated PTA medication list       ¹RxQuery pharmacy benefit data reflects medications filled and processed through the patient's insurance, however   this data does NOT capture whether the medication was picked up or is currently being taken by the patient. Prior to Admission Medications   Prescriptions Last Dose Informant Taking? Cetirizine 10 mg cap 3/29/2023 Self Yes   Sig: Take 1 Cap by mouth nightly. HYDROcodone-acetaminophen (NORCO) 5-325 mg per tablet 3/29/2023 Self Yes   Sig: Take 1 Tablet by mouth three (3) times daily. Indications: spinal stenosis   HYDROcodone-acetaminophen (NORCO) 5-325 mg per tablet 3/29/2023 Self Yes   Sig: Take 0.5 Tablets by mouth nightly. HYDROcodone-acetaminophen (NORCO) 5-325 mg per tablet  Self Yes   Sig: Take 1 Tablet by mouth daily as needed for Pain (in addition to scheduled doses if needed). albuterol (PROVENTIL HFA, VENTOLIN HFA, PROAIR HFA) 90 mcg/actuation inhaler  Self Yes   Sig: Take 2 Puffs by inhalation every six (6) hours as needed for Wheezing. amLODIPine (NORVASC) 10 mg tablet 3/29/2023 Self Yes   Sig: Take 1 Tab by mouth daily. buPROPion XL (WELLBUTRIN XL) 300 mg XL tablet 3/29/2023 Self Yes   Sig: Take 300 mg by mouth daily. cloNIDine (CATAPRES) 0.1 mg/24 hr ptwk 3/23/2023 Self Yes   Si Patch by TransDERmal route every seven (7) days. dexmethylphenidate 15 mg BP50 3/29/2023 Self Yes   Sig: Take 15 mg by mouth daily as needed (focus). ergocalciferol (ERGOCALCIFEROL) 1,250 mcg (50,000 unit) capsule  Self Yes   Sig: Take 50,000 Units by mouth every Monday. escitalopram oxalate (LEXAPRO) 5 mg tablet 3/29/2023 Self Yes   Sig: Take 5 mg by mouth daily.    estradioL (ESTRACE) 0.5 mg tablet 3/29/2023 Self Yes   Sig: Take 0.5 mg by mouth nightly. fluticasone (FLONASE) 50 mcg/actuation nasal spray 3/29/2023 Self Yes   Si Sprays by Both Nostrils route nightly.   gabapentin (NEURONTIN) 100 mg capsule 3/29/2023 Self Yes   Sig: Take 100 mg by mouth nightly.   gabapentin (NEURONTIN) 100 mg capsule  Self Yes   Sig: Take 100 mg by mouth daily as needed for Pain (in additon to scheduled dose if needed). hydrALAZINE (APRESOLINE) 50 mg tablet 3/29/2023 Self Yes   Sig: Take 1 Tablet by mouth three (3) times daily. Systolic is 742   levothyroxine (SYNTHROID) 88 mcg tablet 3/29/2023 Self Yes   Sig: Take 88 mcg by mouth Daily (before breakfast). minoxidiL (LONITEN) 2.5 mg tablet 3/29/2023 Self Yes   Sig: Take 2.5 mg by mouth daily. montelukast (SINGULAIR) 10 mg tablet 3/29/2023 Self Yes   Sig: Take 10 mg by mouth nightly. nebivoloL (BYSTOLIC) 10 mg tablet  Self Yes   Sig: Take 10 mg by mouth nightly. predniSONE (DELTASONE) 5 mg tablet 3/29/2023 Self Yes   Sig: Take 5 mg by mouth daily. Indications: pain      Facility-Administered Medications: None            Please contact the main inpatient pharmacy with any questions or concerns at [phone] and we will direct you to the clinical pharmacist covering this patient's care while in-house.    Nicolle Esparza, PharmD, BCPS

## 2023-03-30 NOTE — CONSULTS
[facility]  Cardiology Care Note                  [x]Initial visit     []Established visit     Jamar Gallegos MD, [address], [place], [address] [facility]  Phone [phone]; Fax [phone]  Mobile [phone]   Voice Mail [phone]      Patient Name: Sherry Michael - XCU:[id number]  Primary Cardiologist: Ranjeet Love MD  Consulting Cardiologist: Enriqueta Taylor. Darline Gallegos MD   3/29/2023  5:31 PM    Reason for initial visit: Shortness of breath        SUBJECTIVE:      She  describes a 2-week history of shortness of breath. Her son is in the room with her and he states also she is little bit more fatigued and not doing the activities she used to do. I have seen her in the office recently and had planned on a stress test.   she has a history of hypertension, shortness of breath as well as a history of asthma and history of a pulmonary embolus. .  She went to her primary care's office for shortness of breath and received a breathing treatment and as well as some nitroglycerin. Shortness of breath might of improved slightly. She denies any PND or orthopnea. Cardiac risk factors: dyslipidemia, hypertension, post-menopausal  I have personally obtained the history from the patient. Assessment and Plan     1)Exertional dyspnea  -Echo in the past has been normal  -We will proceed with Lexiscan Cardiolite  2)HTN  -Blood pressure has been elevated  3) moderate MR  -On today's echo the MR appears to be mild and EF is normal    ECHO ADULT COMPLETE 03/30/2023    Interpretation Summary    Left Ventricle: Normal left ventricular systolic function with a visually estimated EF of 60 - 65%. Left ventricle size is normal. LVIDd is 3.9 cm. Increased wall thickness. IVSd is 1.1 cm. LVPWd is 1.1 cm. Normal wall motion. Abnormal diastolic function. Aortic Valve: Tricuspid valve.         4) dyslipidemia  5) hypertension  -When I saw her in the office last I had reduced her clonidine patch from 0.3 to 0.1 mg to see if it would improve her bradycardia. I increased her hydralazine to 50 mg every 8 hours but appears that her blood pressure went up with systolic of 470 which may be contributing to her symptoms of shortness of breath.  -I switched her amlodipine to nifedipine 30 mg to see if we get a better blood pressure effect. Additionally I would have thought that reducing the clonidine patch to 0.1 mg would have improved some of her fatigue         pBNP 1,184 abd trop negative   ____________________________________________________________    Cardiac work up to date:  1. Echo   4/18/19- EF 56-60%, AV sclerosis with no significant stenosis, MR mod, TR mild, mild/mod Pulm HTN, mild PI   2/16/22-EF 64%, Mild sclerosis of AV cusp, mild AI, RVSP 40 mmHg  9/20/22-EF 60 - 65%,Tricuspid AV - Mild sclerosis of AV cusp, mild MR    2. CTA chest   5/28/17- Persistent small left lower lobe pulmonary arterial branch embolus. No new emboli or other acute findings identified     3.  Lipids   3/9/16- , HDL 94, LDL 77, LDL-P 945   9/30/20- , HDL 87, , TG 95  1/09/23- , HDL 90, LDL 98, TG 79    Most recent HS troponins:  Recent Labs     03/30/23  0318 03/29/23  2147 03/29/23  1929   TROPHS 35 21 17     ECG: nonspecific ST and T waves changes, sinus bradycardia, left axis deviation, ICRBBB unchanged from ECG in 2017, LVH    Review of Systems    [x]All other systems reviewed and all negative except as written in HPI    [] Patient unable to provide secondary to condition         Past Medical History:   Diagnosis Date    Anemia     Arthritis     osteoporosis    Asthma     Broken ribs 2006    fell    Cancer ([facility] [address]     melanoma     GERD (gastroesophageal reflux disease)     Hypertension     Migraines     Osteoporosis     Rheumatic fever     x2 without residual    Thyroid disease      Past Surgical History:   Procedure Laterality Date    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 HX GYN  [id number]    Hysterectomy    HX HEENT  1996    sinus surgery    HX HEENT      deviated septum    HX ORTHOPAEDIC Left 2006    Tarsal Tunnel Release     HX ORTHOPAEDIC Right 2010,2016    basal joint surgery    HX ORTHOPAEDIC Left 2012    basal joint surgery     HX ORTHOPAEDIC      back surgery     HX OTHER SURGICAL      TVT Sling    HX OTHER SURGICAL      Tillman's Neuroma removal     HX SEPTOPLASTY      HX TONSIL AND ADENOIDECTOMY      HX TONSILLECTOMY  1946    HX UROLOGICAL  2010    TVT sling     Social Hx:  reports that she has never smoked. She has never used smokeless tobacco. She reports current alcohol use. She reports that she does not use drugs. Family Hx: family history includes Alzheimer's Disease in her mother; Asthma in her brother and father; COPD in her father; Colon Cancer in her father; Heart Disease in her mother; Hypertension in her father and mother; Stroke in her mother. Allergies   Allergen Reactions    Ace Inhibitors Angioedema    Arb-Angiotensin Receptor Antagonist Other (comments)     Told not to take by provider due to severe reaction to ace inhibitors            Prior to Admission Medications   Prescriptions Last Dose Informant Patient Reported? Taking? Cetirizine 10 mg cap 3/29/2023 Self Yes Yes   Sig: Take 1 Cap by mouth nightly. HYDROcodone-acetaminophen (NORCO) 5-325 mg per tablet 3/29/2023 Self Yes Yes   Sig: Take 1 Tablet by mouth three (3) times daily. Indications: spinal stenosis   HYDROcodone-acetaminophen (NORCO) 5-325 mg per tablet 3/29/2023 Self Yes Yes   Sig: Take 0.5 Tablets by mouth nightly.    HYDROcodone-acetaminophen (NORCO) 5-325 mg per tablet  Self Yes Yes   Sig: Take 1 Tablet by mouth daily as needed for Pain (in addition to scheduled doses if needed). albuterol (PROVENTIL HFA, VENTOLIN HFA, PROAIR HFA) 90 mcg/actuation inhaler  Self Yes Yes   Sig: Take 2 Puffs by inhalation every six (6) hours as needed for Wheezing. amLODIPine (NORVASC) 10 mg tablet 3/29/2023 Self No Yes   Sig: Take 1 Tab by mouth daily. buPROPion XL (WELLBUTRIN XL) 300 mg XL tablet 3/29/2023 Self Yes Yes   Sig: Take 300 mg by mouth daily. cloNIDine (CATAPRES) 0.1 mg/24 hr ptwk 3/23/2023 Self No Yes   Si Patch by TransDERmal route every seven (7) days. dexmethylphenidate 10 mg tab  Self Yes Yes   Sig: Take 10 mg by mouth daily as needed (focus). ergocalciferol (ERGOCALCIFEROL) 1,250 mcg (50,000 unit) capsule  Self Yes Yes   Sig: Take 50,000 Units by mouth every Monday. escitalopram oxalate (LEXAPRO) 5 mg tablet 3/29/2023 Self Yes Yes   Sig: Take 5 mg by mouth daily. estradioL (ESTRACE) 0.5 mg tablet 3/29/2023 Self Yes Yes   Sig: Take 0.5 mg by mouth nightly. fluticasone (FLONASE) 50 mcg/actuation nasal spray 3/29/2023 Self Yes Yes   Si Sprays by Both Nostrils route nightly.   gabapentin (NEURONTIN) 100 mg capsule 3/29/2023 Self Yes Yes   Sig: Take 100 mg by mouth nightly.   gabapentin (NEURONTIN) 100 mg capsule  Self Yes Yes   Sig: Take 100 mg by mouth daily as needed for Pain (in additon to scheduled dose if needed). hydrALAZINE (APRESOLINE) 50 mg tablet 3/29/2023 Self No Yes   Sig: Take 1 Tablet by mouth three (3) times daily. Systolic is 858   levothyroxine (SYNTHROID) 88 mcg tablet 3/29/2023 Self Yes Yes   Sig: Take 88 mcg by mouth Daily (before breakfast). minoxidiL (LONITEN) 2.5 mg tablet 3/29/2023 Self Yes Yes   Sig: Take 2.5 mg by mouth daily. montelukast (SINGULAIR) 10 mg tablet 3/29/2023 Self Yes Yes   Sig: Take 10 mg by mouth nightly. nebivoloL (BYSTOLIC) 10 mg tablet  Self Yes Yes   Sig: Take 10 mg by mouth nightly.    predniSONE (DELTASONE) 5 mg tablet 3/29/2023 Self Yes Yes   Sig: Take 5 mg by mouth daily. Indications: pain      Facility-Administered Medications: None         OBJECTIVE:  Wt Readings from Last 3 Encounters:   03/29/23 122 lb (55.3 kg)   03/21/23 124 lb (56.2 kg)   02/01/23 120 lb (54.4 kg)     No intake or output data in the 24 hours ending 03/30/23 0924        Physical Exam      Vitals:   Vitals:    03/30/23 0059 03/30/23 0256 03/30/23 0400 03/30/23 0735   BP: (!) 160/62 (!) 126/58 (!) 159/54 (!) 187/71   Pulse: (!) 58 (!) 59 60 62   Resp: 14 12 17 15   Temp:    98.1 °F (36.7 °C)   SpO2: 94% 95% 94% 97%   Weight:       Height:             General:    Alert, cooperative, no distress, appears stated age. Neck:   Supple, no carotid bruit and no JVD. Back:   Clear to auscultation   Heart[de-identified]  Slow rate regular rhythm   Abdomen:     Soft, non-tender. Bowel sounds normal.    Extremities:   Extremities normal, atraumatic, no cyanosis or edema. Skin:   Skin color normal. No rashes or lesions on visible areas   Neurologic:   Alert, Moves all extremities. Data Review:     Radiology:   XR Results (most recent):  Results from Hospital Encounter encounter on 03/29/23    XR CHEST PA LAT    Narrative  EXAM: XR CHEST PA LAT    INDICATION: Dyspnea    COMPARISON: 10/20/2022    TECHNIQUE: PA and lateral chest views    FINDINGS: The cardiac size is within normal limits. The pulmonary vasculature is  within normal limits. The lungs and pleural spaces are clear. Postoperative changes are seen in the  visualized lumbar spine. Impression  No acute process or change compared to the prior exam.    CT Results (most recent):  Results from Hospital Encounter encounter on 03/29/23    CT HEAD WO CONT    Narrative  INDICATION: stroke like symptoms last week    EXAM:  HEAD CT WITHOUT CONTRAST    COMPARISON: April 18, 2019    TECHNIQUE:  Routine noncontrast axial head CT was performed. Sagittal and  coronal reconstructions were generated.     CT dose reduction was achieved through use of a standardized protocol tailored  for this examination and automatic exposure control for dose modulation. FINDINGS:    Ventricles: Midline, no hydrocephalus. Intracranial Hemorrhage: None. Brain Parenchyma/Brainstem: Normal for age. Basal Cisterns: Normal.  Paranasal Sinuses: Left maxillary sinus opacification partly visualized. Additional Comments: N/A. Impression  No acute process. MRI Results (most recent):  Results from [facility] encounter on 04/10/18    MRI LUMB SPINE W WO CONT    Narrative  EXAM:  MRI LUMB SPINE W WO CONT  Clinical history: Lumbar radiculopathy  INDICATION:  Low back pain. Low back pain    COMPARISON: None    TECHNIQUE: MR imaging of the lumbar spine was performed using the following  sequences: sagittal T1, T2, STIR;  axial T1, T2 prior to and following contrast  administration. CONTRAST: 10 mL of ProHance. FINDINGS:    There is minimal anterolisthesis of L3 on L4 this measures 2 mm. Postprocedural  changes at L4-L5 with transpedicular screws in appropriate position. Vertebral  body heights are maintained. There is a left renal cyst. The abdominal aorta is  normal in caliber. The proximal common iliac vessels are normal in caliber as  well. Cholelithiasis is incidentally noted. The conus medullaris terminates at L1. Signal and caliber of the distal spinal  cord are within normal limits. There is no pathologic intrathecal enhancement. The paraspinal soft tissues are within normal limits. Lower thoracic spine: No herniation or stenosis. L1-L2:  No herniation or stenosis. L2-L3:  No herniation or stenosis. L3-L4:  Normal anterolisthesis. Facet arthropathy and hypertrophy. Ligamentum  flavum hypertrophy. Mild broad-based disc protrusion with tiny superimposed  extrusion in the left paracentral region. The extrusion is cranially oriented. There is severe canal stenosis. Mild left foraminal stenosis. L4-L5:  Spinal canal is decompressed.  No significant canal or foraminal  compromise. Intervertebral disc spacer. L5-S1:  Canal is patent. Posterior decompression. Disc bulge. Canal and foramina  are patent. Impression  IMPRESSION:  Disc degenerative change and facet degenerative change is most pronounced at  L3-L4. There is severe canal stenosis and mild left foraminal stenosis at L3-L4. Postprocedural changes at L4-L5 and L5-S1. No results for input(s): CPK, TROIQ in the last 72 hours. No lab exists for component: CKQMB, CPKMB, BMPP  Recent Labs     03/30/23  0318 03/29/23  1729    137   K 3.5 3.4*    106   CO2 25 29   BUN 11 13   CREA 0.87 0.97   GLU 95 98   CA 9.3 9.4     Recent Labs     03/30/23 0318 03/29/23  1729   WBC 5.6 7.9   HGB 11.7 11.7   HCT 35.5 36.1    249     Recent Labs     03/29/23  1729   AP 58     No results for input(s): CHOL, LDLC in the last 72 hours. No lab exists for component: TGL, HDLC,  HBA1C  No results for input(s): CRP, TSH, TSHEXT in the last 72 hours.     No lab exists for component: ESR        Current meds:    Current Facility-Administered Medications:     aspirin chewable tablet 81 mg, 81 mg, Oral, DAILY, Eric Joyner MD, 81 mg at 03/29/23 2120    sodium chloride (NS) flush 5-40 mL, 5-40 mL, IntraVENous, Q8H, Eric Joyner MD, 10 mL at 03/29/23 2147    sodium chloride (NS) flush 5-40 mL, 5-40 mL, IntraVENous, PRN, Eric Joyner MD    acetaminophen (TYLENOL) tablet 650 mg, 650 mg, Oral, Q4H PRN, Eric Joyner MD    morphine injection 2 mg, 2 mg, IntraVENous, Q4H PRN, Eric Joyner MD    naloxone [facility]) injection 0.4 mg, 0.4 mg, IntraVENous, PRN, Eric Joyner MD    ondansetron [facility]) injection 4 mg, 4 mg, IntraVENous, Q4H PRN, Eric Joyner MD    enoxaparin (LOVENOX) injection 40 mg, 40 mg, SubCUTAneous, Q24H, Eric Joyner MD, 40 mg at 03/29/23 2120    gabapentin (NEURONTIN) capsule 100 mg, 100 mg, Oral, QHS, Bella Watts MD, 100 mg at 03/29/23 2120    montelukast (SINGULAIR) tablet 10 mg, 10 mg, Oral, QHS, Bella Watts MD, 10 mg at 03/29/23 2141    cetirizine (ZYRTEC) tablet 5 mg, 5 mg, Oral, QHS, Ana Joyner MD, 5 mg at 03/29/23 2142    fluticasone propionate (FLONASE) 50 mcg/actuation nasal spray 2 Spray, 2 Spray, Both Nostrils, QHS, Bella Watts MD, 2 Spray at 03/29/23 2145    estradioL (ESTRACE) tablet 0.5 mg, 0.5 mg, Oral, QHS, Ana Joyner MD, 0.5 mg at 03/29/23 2141    HYDROcodone-acetaminophen (NORCO) 5-325 mg per tablet 0.5 Tablet, 0.5 Tablet, Oral, Q6H PRN, Ana Rader MD, 0.5 Tablet at 03/30/23 0548    amLODIPine (NORVASC) tablet 10 mg, 10 mg, Oral, DAILY, Ana Joyner MD    albuterol (PROVENTIL VENTOLIN) nebulizer solution 1.25 mg, 1.25 mg, Nebulization, Q6H PRN, Ana Joyner MD    buPROPion XL (WELLBUTRIN XL) tablet 300 mg, 300 mg, Oral, DAILY, Ana Joyner MD    dexmethylphenidate tab 10 mg, 10 mg, Oral, DAILY, Ana Joyner MD    escitalopram oxalate (LEXAPRO) tablet 20 mg, 20 mg, Oral, DAILY, Ana Joyner MD    hydrALAZINE (APRESOLINE) tablet 50 mg, 50 mg, Oral, TID, Ana Joyner MD    levothyroxine (SYNTHROID) tablet 88 mcg, 88 mcg, Oral, ACB, Ana Joyner MD    minoxidiL (LONITEN) tablet 10 mg, 10 mg, Oral, DAILY, Ana Joyner MD    predniSONE (DELTASONE) tablet 5 mg, 5 mg, Oral, DAILY, Ana Joyner MD    Current Outpatient Medications:     gabapentin (NEURONTIN) 100 mg capsule, Take 100 mg by mouth daily as needed for Pain (in additon to scheduled dose if needed). , Disp: , Rfl:     HYDROcodone-acetaminophen (NORCO) 5-325 mg per tablet, Take 0.5 Tablets by mouth nightly., Disp: , Rfl:     HYDROcodone-acetaminophen (NORCO) 5-325 mg per tablet, Take 1 Tablet by mouth daily as needed for Pain (in addition to scheduled doses if needed). , Disp: , Rfl:     buPROPion XL (WELLBUTRIN XL) 300 mg XL tablet, Take 300 mg by mouth daily. , Disp: , Rfl:     minoxidiL (LONITEN) 2.5 mg tablet, Take 2.5 mg by mouth daily. , Disp: , Rfl:     ergocalciferol (ERGOCALCIFEROL) 1,250 mcg (50,000 unit) capsule, Take 50,000 Units by mouth every Monday., Disp: , Rfl:     cloNIDine (CATAPRES) 0.1 mg/24 hr ptwk, 1 Patch by TransDERmal route every seven (7) days. , Disp: 4 Patch, Rfl: 5    hydrALAZINE (APRESOLINE) 50 mg tablet, Take 1 Tablet by mouth three (3) times daily. Systolic is 585, Disp: 90 Tablet, Rfl: 5    escitalopram oxalate (LEXAPRO) 5 mg tablet, Take 5 mg by mouth daily. , Disp: , Rfl:     dexmethylphenidate 10 mg tab, Take 10 mg by mouth daily as needed (focus). , Disp: , Rfl:     predniSONE (DELTASONE) 5 mg tablet, Take 5 mg by mouth daily. Indications: pain, Disp: , Rfl:     albuterol (PROVENTIL HFA, VENTOLIN HFA, PROAIR HFA) 90 mcg/actuation inhaler, Take 2 Puffs by inhalation every six (6) hours as needed for Wheezing., Disp: , Rfl:     gabapentin (NEURONTIN) 100 mg capsule, Take 100 mg by mouth nightly., Disp: , Rfl:     nebivoloL (BYSTOLIC) 10 mg tablet, Take 10 mg by mouth nightly., Disp: , Rfl:     amLODIPine (NORVASC) 10 mg tablet, Take 1 Tab by mouth daily. , Disp: 30 Tab, Rfl: 4    montelukast (SINGULAIR) 10 mg tablet, Take 10 mg by mouth nightly., Disp: , Rfl:     estradioL (ESTRACE) 0.5 mg tablet, Take 0.5 mg by mouth nightly., Disp: , Rfl:     HYDROcodone-acetaminophen (NORCO) 5-325 mg per tablet, Take 1 Tablet by mouth three (3) times daily. Indications: spinal stenosis, Disp: , Rfl:     levothyroxine (SYNTHROID) 88 mcg tablet, Take 88 mcg by mouth Daily (before breakfast). , Disp: , Rfl:     Cetirizine 10 mg cap, Take 1 Cap by mouth nightly., Disp: , Rfl:     fluticasone (FLONASE) 50 mcg/actuation nasal spray, 2 Sprays by Both Nostrils route nightly., Disp: , Rfl:     Earl Crow MD  [facility] [address], [place] [address]  [phone]      I have discussed the diagnosis with the patient and the intended plan as seen in the above orders. Questions were answered concerning future plans. I have discussed medication side effects and warnings with the patient as well. Zahra Hernandez is in agreement to the plan listed above and wishes to proceed. she  was instructed not to smoke, eat heart healthy diet  and to exercise. Thank you for the consult and the opportunity to be involved in the care of Zahra Hernandez. Malia Tabares MD    ATTENTION:   This medical record was transcribed using an electronic medical records/speech recognition system. Although proofread, it may and can contain electronic, spelling and other errors. Corrections may be executed at a later time. Please feel free to contact us for any clarifications as needed.

## 2023-03-30 NOTE — PROGRESS NOTES
PHYSICAL THERAPY EVALUATION/DISCHARGE  Patient: Shahla Harvey (5 y.o. female)  Date: 3/30/2023  Primary Diagnosis: Chest pain [R07.9]       Precautions:    (Standard)      ASSESSMENT  Based on the objective data described below, the patient presents with impaired balance consistent with her functional baseline, independent gait, good activity tolerance, no reports of chest pain throughout activity and overall functional mobility that is consistent with her baseline in the setting of hospital admission for chest pain. PTA patient lives alone and is highly active, enjoying gardening and walking her dog and is going to OP PT every other week for balance training and post op spinal care. She denies fall history. Per chart and patient, one week ago patient expresses difficulty getting her words out and difficulty with activity, however today she states that this was more due to shortness of breath. CTA -, MRI pending, CT head - at time of evaluation. Today she tolerates 100ft ambulation in ED hallways with independence without significant deviation. She does have a slight balance impairment, however is already having this addressed through OP PT. Recommend resumption of this upon d/c, however no further PT needs identified. Functional Outcome Measure: The patient scored Total: 42/56 on the Tavarez Balance Assessment which is indicative of low fall risk. Other factors to consider for discharge: none additional     Further skilled acute physical therapy is not indicated at this time. PLAN :  Recommendation for discharge: (in order for the patient to meet his/her long term goals)  Outpatient physical therapy follow up recommended for balance training     This discharge recommendation:  Has not yet been discussed the attending provider and/or case management    IF patient discharges home will need the following DME: none         SUBJECTIVE:   Patient stated I have a few daffodils up.  re: discussing patient's yeni    OBJECTIVE DATA SUMMARY:   Patient received supine in bed and was agreeable to participate in PT session. Patient was cleared by nursing to participate in PT session. Patient's son present in room    Vitals:    03/30/23 0956 03/30/23 1000 03/30/23 1002 03/30/23 1015   BP: (!) 148/82 (!) 153/81 (!) 153/79 (!) 153/79   BP 1 Location: Right upper arm Right upper arm Right upper arm    BP Patient Position: Semi fowlers Sitting Standing    Pulse: 65      SpO2: 97%            HISTORY:    Past Medical History:   Diagnosis Date    Anemia     Arthritis     osteoporosis    Asthma     Broken ribs 2006    fell    Cancer ([address]     melanoma     GERD (gastroesophageal reflux disease)     Hypertension     Migraines     Osteoporosis     Rheumatic fever     x2 without residual    Thyroid disease      Past Surgical History:   Procedure Laterality Date    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      HX GYN  1969    Hysterectomy    HX HEENT  1996    sinus surgery    HX HEENT      deviated septum    HX ORTHOPAEDIC Left 2006    Tarsal Tunnel Release     HX ORTHOPAEDIC Right 2010,2016    basal joint surgery    HX ORTHOPAEDIC Left 2012    basal joint surgery     HX ORTHOPAEDIC      back surgery     HX OTHER SURGICAL      TVT Sling    HX OTHER SURGICAL      Tillman's Neuroma removal     HX SEPTOPLASTY      HX TONSIL AND ADENOIDECTOMY      HX TONSILLECTOMY  1946    HX UROLOGICAL  2010    TVT sling       Prior level of function: independent, active  Personal factors and/or comorbidities impacting plan of care: 2 spinal surgeries     Home Situation  Home Environment: Private residence  # Steps to Enter: 5  Rails to Enter: Yes  Hand Rails : Bilateral  One/Two Story Residence: Two story, live on 1st floor  Interior Rails: Both  Living Alone: Yes  Support Systems: Child(rodrigo)  Current DME Used/Available at Home: None, Shower chair  Tub or Shower Type: Shower    EXAMINATION/PRESENTATION/DECISION MAKING:   Critical Behavior:  Neurologic State: Alert  Orientation Level: Oriented X4  Cognition: Appropriate decision making, Follows commands, Appropriate safety awareness     Hearing:     Skin:  all observed intact   Edema: none apparent   Range Of Motion:  AROM: Within functional limits (neck/trunk limied by prior spine surgeries)                       Strength:    Strength: Within functional limits (LLE drop foot (mild))                    Tone & Sensation:   Tone: Normal              Sensation: Impaired (neuropathy in bilat feet)               Coordination:  Coordination: Within functional limits  Vision:      Functional Mobility:  Bed Mobility:     Supine to Sit: Independent  Sit to Supine: Independent  Scooting: Independent  Transfers:  Sit to Stand: Independent  Stand to Sit: Independent        Bed to Chair: Independent              Balance:   Sitting: Intact; Without support  Standing: Impaired; Without support  Standing - Static: Good; Unsupported  Standing - Dynamic : Occasional (high level balanc impaired)  Ambulation/Gait Training:  Distance (ft): 120 Feet (ft)  Assistive Device: Gait belt  Ambulation - Level of Assistance: Modified independent        Gait Abnormalities: Antalgic                                       Stairs:               Therapeutic Exercises:       Functional Measure  Tavarez Balance Test:    Sitting to Standin  Standing Unsupported: 4  Sitting with Back Unsupported: 4  Standing to Sittin  Transfers: 4  Standing Unsupported with Eyes Closed: 4  Standing Unsupported with Feet Together: 4  Reach Forward with Outstretched Arm: 3   Object: 3  Turn to Look Over Shoulders: 2  Turn 360 Degrees: 4  Alternate Foot on Step/Stool: 2  Standing Unsupported One Foot in Front: 0  Stand on One Le  Total: 42/56         56=Maximum possible score; 0-20=High fall risk  21-40=Moderate fall risk   41-56=Low fall risk        Physical Therapy Evaluation Charge Determination   History Examination Presentation Decision-Making   LOW Complexity : Zero comorbidities / personal factors that will impact the outcome / POC LOW Complexity : 1-2 Standardized tests and measures addressing body structure, function, activity limitation and / or participation in recreation  LOW Complexity : Stable, uncomplicated  LOW Complexity : FOTO score of       Based on the above components, the patient evaluation is determined to be of the following complexity level: LOW     Pain Rating:  Patient without reports of pain during therapy      Activity Tolerance:   Good and tolerates ADLs without rest breaks    After treatment patient left in no apparent distress:   Supine in bed, Call bell within reach, Caregiver / family present, and Side rails x 3    COMMUNICATION/EDUCATION:   The patients plan of care was discussed with: Occupational therapist and Registered nurse. Fall prevention education was provided and the patient/caregiver indicated understanding. and Patient/family have participated as able in goal setting and plan of care.     Thank you for this referral.  Martin Farley PT, DPT   Time Calculation: 17 mins

## 2023-03-30 NOTE — PROGRESS NOTES
OCCUPATIONAL THERAPY EVALUATION/DISCHARGE  Patient: Arvin Sneed (30 y.o. female)  Date: 3/30/2023  Primary Diagnosis: Chest pain [R07.9]       Precautions:    (Standard)    ASSESSMENT  Based on the objective data described below, the patient presents with baseline ADL/mobility performance: independent without assistive device. OT services not indicated. Current Level of Function (ADLs/self-care): independent    Functional Outcome Measure:   [facility] AM-PAC®      Daily Activity Inpatient Short Form (6-Clicks) Version 2  How much HELP from another person do you currently need. .. (If the patient hasn't done an activity recently, how much help from another person do you think they would need if they tried?) Total A Lot A Little None   1. Putting on and taking off regular lower body clothing? []   1 []   2 []   3 [x]   4   2. Bathing (including washing, rinsing, drying)? []   1 []   2 []   3 [x]   4   3. Toileting, which includes using toilet, bedpan, or urinal? []   1 []   2 []   3 [x]   4   4. Putting on and taking off regular upper body clothing? []   1 []   2 []   3 [x]   4   5. Taking care of personal grooming such as brushing teeth? []   1 []   2 []   3 [x]   4   6. Eating meals? []   1 []   2 []   3 [x]   4     Raw Score: 24/24                            Cutoff score ?191,2,3 had higher odds of discharging home with home health or need of SNF/IPR    1. Roxana Aguirre. Validity of the AM-PAC 6-Clicks Inpatient Daily Activity and Basic Mobility Short Forms. Physical Therapy Mar 2014, [phone]) 379-391; DOI: 10.2522/ptj.[id number]  2. Ladene Query. Association of AM-PAC \"6-Clicks\" Basic Mobility and Daily Activity Scores With Discharge Destination. Phys Ther. 2021 Apr 4;101(4):ytop576. doi: 10.1093/ptj/gekg590. PMID: 48391024.   3. Bijan BEAN, Vira SANDERS, Oniel Orourke, Milagro Talavera. Activity Measure for Post-Acute Care \"6-Clicks\" Basic Mobility Scores Predict Discharge Destination After Acute Care Hospitalization in Select Patient Groups: A Retrospective, Observational Study. Arch Rehabil Res Clin Transl. 2022 Jul 16;4(3):922220. doi: 10.1016/j.arrct. [id number]. PMID: 88316946; PMCID: [id number]. 4. Nia Hilliard Ni P. AM-PAC Short Forms Manual 4.0. Revised 2/2020. PLAN :  Recommend with staff: up ad nahomi    Recommendation for discharge: (in order for the patient to meet his/her long term goals)  No skilled occupational therapy/ follow up rehabilitation needs identified at this time. This discharge recommendation:  Has been made in collaboration with the attending provider and/or case management    IF patient discharges home will need the following DME: none       SUBJECTIVE:   Patient pleasant and cooperative    OBJECTIVE DATA SUMMARY:   HISTORY:   Past Medical History:   Diagnosis Date    Anemia     Arthritis     osteoporosis    Asthma     Broken ribs 2006    fell    Cancer ([facility] [address]     melanoma     GERD (gastroesophageal reflux disease)     Hypertension     Migraines     Osteoporosis     Rheumatic fever     x2 without residual    Thyroid disease      Past Surgical History:   Procedure Laterality Date    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      HX GYN  [id number]    Hysterectomy    HX HEENT  1996    sinus surgery    HX HEENT      deviated septum    HX ORTHOPAEDIC Left 2006    Tarsal Tunnel Release     HX ORTHOPAEDIC Right 2010,2016    basal joint surgery    HX ORTHOPAEDIC Left 2012    basal joint surgery     HX ORTHOPAEDIC      back surgery     HX OTHER SURGICAL      TVT Sling    HX OTHER SURGICAL      Tillman's Neuroma removal     HX SEPTOPLASTY      HX TONSIL AND ADENOIDECTOMY      HX TONSILLECTOMY  1946    HX UROLOGICAL  2010    TVT sling       Prior Level of Function/Environment/Context: independent with ADL/IADL  Expanded or extensive additional review of patient history:   Home Situation  Home Environment: Private residence  # Steps to Enter: 5  Rails to Enter: Yes  Hand Rails : Bilateral  One/Two Story Residence: Two story, live on 1st floor  Interior Rails: Both  Living Alone: Yes  Support Systems: Child(rodrigo)  Current DME Used/Available at Home: None, Shower chair  Tub or Shower Type: Shower      EXAMINATION OF PERFORMANCE DEFICITS:  Cognitive/Behavioral Status:  Neurologic State: Alert  Orientation Level: Oriented X4  Cognition: Appropriate decision making; Follows commands; Appropriate safety awareness                        Range of Motion:    AROM: Within functional limits (neck/trunk limied by prior spine surgeries)                         Strength:    Strength: Within functional limits (LLE drop foot (mild))                Coordination:  Coordination: Within functional limits            Tone & Sensation:    Tone: Normal  Sensation: Impaired (neuropathy in bilat feet)                      Balance:  Sitting: Intact; Without support  Standing: Impaired; Without support  Standing - Static: Good; Unsupported  Standing - Dynamic : Occasional (high level balanc impaired)    Functional Mobility and Transfers for ADLs:  Bed Mobility:  Supine to Sit: Independent  Sit to Supine: Independent  Scooting: Independent    Transfers:  Sit to Stand: Independent  Stand to Sit: Independent  Bed to Chair: Independent    ADL Assessment:  Feeding: Independent    Oral Facial Hygiene/Grooming: Independent    Bathing: Independent         Upper Body Dressing: Independent    Lower Body Dressing: Independent    Toileting: Independent                ADL Intervention and task modifications:     Patient has history of back surgeries and increased back pain after tasks requiring increased bending.  Educated patient on using adaptive tools and positioning strategies for back protection techniques durng tasks such as gardening. Patient verbalizes understanding                Occupational Therapy Evaluation Charge Determination   History Examination Decision-Making   LOW Complexity : Brief history review  LOW Complexity : 1-3 performance deficits relating to physical, cognitive , or psychosocial skils that result in activity limitations and / or participation restrictions  LOW Complexity : No comorbidities that affect functional and no verbal or physical assistance needed to complete eval tasks       Based on the above components, the patient evaluation is determined to be of the following complexity level: LOW   Pain Rating:  None     Activity Tolerance:   Good    After treatment patient left in no apparent distress:    Supine in bed, Caregiver / family present, and Side rails x - ED stretcher    COMMUNICATION/EDUCATION:   The patients plan of care was discussed with: Physical therapist and Registered nurse.      Thank you for this referral.  Scar Ortega OT  Time Calculation: 15 mins

## 2023-03-30 NOTE — PROGRESS NOTES
Medicare Outpatient Observation Notice (MOON)/ [place] Outpatient Observation Notice (Gabbi Kelly) provided to patient/representative with verbal explanation of the notice. Time allotted for questions regarding the notice. Patient /representative provided a completed copy of the MOON/VOON notice. Copy placed on bedside chart.   Wu [place] CMS

## 2023-03-30 NOTE — DISCHARGE SUMMARY
Hospitalist Discharge Summary     Patient ID:  Alan Michaels  [id number]  80 y.o.  1940    Admit date: 3/29/2023    Discharge date and time: 3/30/2023    Admission Diagnoses: Chest pain [R07.9]    Discharge Diagnoses:    Principal Problem:    Chest pain (3/29/2023)    Active Problems:    Essential hypertension (5/20/2017)      Acquired hypothyroidism (5/20/2017)      Asthma (5/20/2017)      GERD (gastroesophageal reflux disease) (5/20/2017)      Hypokalemia (4/18/2019)           Hospital Course:   Ms. Sandie Martin is a 80 y.o.  female with PMH of HTN, asthma and prior h/o PE (provoked and no longer on anticoagulation) admitted for HOWE. Per pt denies cough, fevers/chills. No wheezing. Endorses these symptoms with exertion. Was given SL nitro and albuterol at her PCP office with some improvement. She is also endorse intermittent word finding difficultly that has been present for > 1 week. She was admitted for further evaluation and treatment of the following:        #Dyspnea, chest pressure: She underwent nuclear stress test and echocardiogram, both of which were normal. She was evaluated by her Cardiologist and anti-hypertensive changes were made. Possibly an element of deconditioning could be playing role. May need to stop clonidine patch in the future.      PCP: Yuniel Briceño MD     Consults: Cardiology    Condition of patient at discharge: good and improved    Discharge Exam:    Physical Exam:    Gen: Well-developed, well-nourished, in no acute distress  HEENT:  Pink conjunctivae, PERRL, hearing intact to voice, moist mucous membranes  Neck: Supple, without masses, thyroid non-tender  Resp: No accessory muscle use, clear breath sounds without wheezes rales or rhonchi  Card: No murmurs, normal S1, S2 without thrills, bruits or peripheral edema  Abd:  Soft, non-tender, non-distended, normoactive bowel sounds are present, no palpable organomegaly and no detectable hernias  Lymph:  No cervical or inguinal adenopathy  Musc: No cyanosis or clubbing  Skin: No rashes or ulcers, skin turgor is good  Neuro:  Cranial nerves are grossly intact, no focal motor weakness, follows commands appropriately  Psych:  Good insight, oriented to person, place and time, alert          Disposition: home    Patient Instructions:   Current Discharge Medication List        START taking these medications    Details   NIFEdipine ER (PROCARDIA XL) 30 mg ER tablet Take 1 Tablet by mouth daily for 30 days. Qty: 30 Tablet, Refills: 0           CONTINUE these medications which have NOT CHANGED    Details   !! gabapentin (NEURONTIN) 100 mg capsule Take 100 mg by mouth daily as needed for Pain (in additon to scheduled dose if needed). !! HYDROcodone-acetaminophen (NORCO) 5-325 mg per tablet Take 0.5 Tablets by mouth nightly.      !! HYDROcodone-acetaminophen (NORCO) 5-325 mg per tablet Take 1 Tablet by mouth daily as needed for Pain (in addition to scheduled doses if needed). buPROPion XL (WELLBUTRIN XL) 300 mg XL tablet Take 300 mg by mouth daily. minoxidiL (LONITEN) 2.5 mg tablet Take 2.5 mg by mouth daily. ergocalciferol (ERGOCALCIFEROL) 1,250 mcg (50,000 unit) capsule Take 50,000 Units by mouth every Monday. cloNIDine (CATAPRES) 0.1 mg/24 hr ptwk 1 Patch by TransDERmal route every seven (7) days. Qty: 4 Patch, Refills: 5      hydrALAZINE (APRESOLINE) 50 mg tablet Take 1 Tablet by mouth three (3) times daily. Systolic is 508  Qty: 90 Tablet, Refills: 5      escitalopram oxalate (LEXAPRO) 5 mg tablet Take 5 mg by mouth daily. dexmethylphenidate 15 mg BP50 Take 15 mg by mouth daily as needed (focus). predniSONE (DELTASONE) 5 mg tablet Take 5 mg by mouth daily. Indications: pain      albuterol (PROVENTIL HFA, VENTOLIN HFA, PROAIR HFA) 90 mcg/actuation inhaler Take 2 Puffs by inhalation every six (6) hours as needed for Wheezing.       !! gabapentin (NEURONTIN) 100 mg capsule Take 100 mg by mouth nightly. montelukast (SINGULAIR) 10 mg tablet Take 10 mg by mouth nightly. estradioL (ESTRACE) 0.5 mg tablet Take 0.5 mg by mouth nightly.      !! HYDROcodone-acetaminophen (NORCO) 5-325 mg per tablet Take 1 Tablet by mouth three (3) times daily. Indications: spinal stenosis      levothyroxine (SYNTHROID) 88 mcg tablet Take 88 mcg by mouth Daily (before breakfast). Cetirizine 10 mg cap Take 1 Cap by mouth nightly. fluticasone (FLONASE) 50 mcg/actuation nasal spray 2 Sprays by Both Nostrils route nightly. !! - Potential duplicate medications found. Please discuss with provider. STOP taking these medications       nebivoloL (BYSTOLIC) 10 mg tablet Comments:   Reason for Stopping:         amLODIPine (NORVASC) 10 mg tablet Comments:   Reason for Stopping:             Activity: Activity as tolerated  Diet: Regular Diet  Wound Care: None needed    Follow-up with Daryn Alatorre MD in 1 week. Follow-up tests/labs none    Approximate time spent in patient care on day of discharge: 35 min    Signed:   Shellia Gitelman, MD  3/30/2023  4:30 PM

## 2023-03-30 NOTE — H&P
[facility]  [address], [place] [address]  [phone]    Admission History and Physical      NAME:  Shahla Harvey   :   89   MRN:  [id number]     PCP:  Elliot Clark MD     Date/Time:  3/29/2023         Subjective:     CHIEF COMPLAINT: \"I'm so SOB when I exert myself\"     HISTORY OF PRESENT ILLNESS:     Ms. Matthew Cao is a 80 y.o.  female with PMH of HTN, asthma and prior h/o PE (provoked and no longer on anticoagulation) admitted for HOWE. Per pt denies cough, fevers/chills. No wheezing. Endorses these symptoms with exertion. Was given SL nitro and albuterol at her PCP office with some improvement. She is also endorse intermittent word finding difficultly that has been present for > 1 week. Past Medical History:   Diagnosis Date    Anemia     Arthritis     osteoporosis    Asthma     Broken ribs     fell    Cancer ([facility] [address]     melanoma     GERD (gastroesophageal reflux disease)     Hypertension     Migraines     Osteoporosis     Rheumatic fever     x2 without residual    Thyroid disease         Past Surgical History:   Procedure Laterality Date    HX ABOVE KNEE AMPUTATION Left 2015    remove Tillman's neuroma    HX BREAST BIOPSY Left     benign    HX CATARACT REMOVAL Bilateral ,     HX CYST INCISION AND DRAINAGE      multiple bilateral cyst aspiration.      HX GYN  [id number]    Hysterectomy    HX HEENT  1996    sinus surgery    HX HEENT      deviated septum    HX ORTHOPAEDIC Left 2006    Tarsal Tunnel Release     HX ORTHOPAEDIC Right ,2016    basal joint surgery    HX ORTHOPAEDIC Left     basal joint surgery     HX ORTHOPAEDIC      back surgery     HX OTHER SURGICAL      TVT Sling    HX OTHER SURGICAL      Tillman's Neuroma removal     HX SEPTOPLASTY      HX TONSIL AND ADENOIDECTOMY      HX TONSILLECTOMY  1946    HX UROLOGICAL  2010    TVT sling       Social History     Tobacco Use    Smoking status: Never    Smokeless tobacco: Never   Substance Use Topics    Alcohol use: Yes     Comment: rarely        Family History   Problem Relation Age of Onset    Hypertension Mother     Stroke Mother         TIA    Heart Disease Mother     Alzheimer's Disease Mother     Hypertension Father     Asthma Father     Colon Cancer Father     COPD Father     Asthma Brother         Allergies   Allergen Reactions    Ace Inhibitors Angioedema    Arb-Angiotensin Receptor Antagonist Other (comments)     Told not to take by provider due to severe reaction to ace inhibitors        Prior to Admission medications    Medication Sig Start Date End Date Taking? Authorizing Provider   hydroCHLOROthiazide (MICROZIDE) 12.5 mg capsule Take 12.5 mg by mouth daily. Provider, Historical   buPROPion XL (WELLBUTRIN XL) 300 mg XL tablet Take 300 mg by mouth daily. Provider, Historical   minoxidiL (LONITEN) 10 mg tablet Take  by mouth daily. Provider, Historical   ergocalciferol (Vitamin D2) 1,250 mcg (50,000 unit) capsule Take 50,000 Units by mouth. Provider, Historical   cloNIDine (CATAPRES) 0.1 mg/24 hr ptwk 1 Patch by TransDERmal route every seven (7) days. 3/21/23   Meghan Escobar MD   hydrALAZINE (APRESOLINE) 50 mg tablet Take 1 Tablet by mouth three (3) times daily. Systolic is 034 6/32/08   Meghan Escobar MD   escitalopram oxalate (LEXAPRO) 20 mg tablet Take 20 mg by mouth daily. Provider, Historical   dexmethylphenidate 10 mg tab Take 10 mg by mouth daily. Provider, Historical   predniSONE (DELTASONE) 5 mg tablet Take 5 mg by mouth daily. Provider, Historical   albuterol (PROVENTIL HFA, VENTOLIN HFA, PROAIR HFA) 90 mcg/actuation inhaler Take  by inhalation every six (6) hours as needed for Wheezing. Provider, Historical   gabapentin (NEURONTIN) 100 mg capsule Take 100 mg by mouth daily. 12/27/21   Provider, Historical   nebivoloL (BYSTOLIC) 10 mg tablet Take  by mouth daily.     Provider, Historical   amLODIPine (NORVASC) 10 mg tablet Take 1 Tab by mouth daily. 6/16/20   Donnell Escobar MD   montelukast (SINGULAIR) 10 mg tablet Take 10 mg by mouth daily. Provider, Historical   estradioL (ESTRACE) 0.5 mg tablet Take 0.5 mg by mouth nightly. Provider, Historical   HYDROcodone-acetaminophen (NORCO) 5-325 mg per tablet Take 0.5 Tabs by mouth every eight (8) hours as needed for Pain. Provider, Historical   levothyroxine (SYNTHROID) 88 mcg tablet Take 88 mcg by mouth Daily (before breakfast). Provider, Historical   Cetirizine 10 mg cap Take 1 Cap by mouth nightly. Provider, Historical   fluticasone (FLONASE) 50 mcg/actuation nasal spray 2 Sprays by Both Nostrils route nightly.     Other, MD Aiyana         Review of Systems:  (bold if positive, if negative)    Gen:  Eyes:  ENT:  CVS:  Pulm:  GI:  :  MS:  Skin:  Psych:  Endo:  Hem:  Renal:  Neuro:     Dyspnea        Objective:      VITALS:    Vital signs reviewed; most recent are:    Visit Vitals  BP (!) 158/65   Pulse (!) 58   Temp 97.5 °F (36.4 °C)   Resp 17   Ht 5' 3\" (1.6 m)   Wt 55.3 kg (122 lb)   SpO2 96%   BMI 21.61 kg/m²     SpO2 Readings from Last 6 Encounters:   03/29/23 96%   03/21/23 97%   02/02/23 97%   03/06/22 97%   02/16/22 98%   11/23/21 97%        No intake or output data in the 24 hours ending 03/29/23 2631         Exam:     Physical Exam:    Gen:  Well-developed, well-nourished, in no acute distress  HEENT:  Pink conjunctivae, PERRL, hearing intact to voice, moist mucous membranes  Neck:  Supple, without masses, thyroid non-tender  Resp:  No accessory muscle use, clear breath sounds without wheezes rales or rhonchi  Card:  No murmurs, normal S1, S2 without thrills, bruits or peripheral edema  Abd:  Soft, non-tender, non-distended, normoactive bowel sounds are present, no palpable organomegaly  Lymph:  No cervical adenopathy  Musc:  No cyanosis or clubbing  Skin:  No rashes or ulcers, skin turgor is good  Neuro:  Cranial nerves 3-12 are grossly intact,  strength is 5/5 bilaterally, dorsi / plantarflexion strength is 5/5 bilaterally, follows commands appropriately  Psych:  Alert with good insight. Oriented to person, place, and time       Labs:    Recent Labs     03/29/23  1729   WBC 7.9   HGB 11.7   HCT 36.1        Recent Labs     03/29/23  1729      K 3.4*      CO2 29   GLU 98   BUN 13   CREA 0.97   CA 9.4   ALB 3.8   ALT 30     No components found for: GLPOC  No results for input(s): PH, PCO2, PO2, HCO3, FIO2 in the last 72 hours. No results for input(s): INR, INREXT in the last 72 hours.     CXR => no acute process  D-dimer negative        Assessment/Plan:    HOWE - in the absence of wheezing on exam, acute pathology on x-ray, negative d-dimer and recent CTA without acute process, symptoms are concerning for angina   -trend CE's   -start ASA   -check TTE   -cardiology eval; suspect will need TTE   Essential hypertension (5/20/2017)      Acquired hypothyroidism (5/20/2017)  -on levothyroxine       Asthma (5/20/2017) - NOT wheezing   -continue albuterol PRN   -continue prednisone   -continue Singulair      Hypokalemia (4/18/2019)  -replete     HTN   -continue amlodipine  -recently started on hydralazine 50mg TID   -clonidine patch recently reduced to 4.9PI   -on Bystolic; monitor HR      Gait instability/speech changes - occurring over the last 1-2 weeks   -head CT without acute process   -check MRI brain   -on ASA    Surrogate decision maker: Son, grandson     Total time spent with patient: 79 [address] [facility] discussed with: Patient and Family    Discussed:  Care Plan    Prophylaxis:  Lovenox    Probable Disposition:  Home w/Family           ___________________________________________________    Attending Physician: Ren Harvey MD

## 2023-04-18 NOTE — LETTER
9/20/2022    Patient: Roula Dunbar   YOB: 1940   Date of Visit: 9/20/2022     Patricia Alas, [address] [address]  Via Fax: [phone]    Dear Patricia Alas MD,      Thank you for referring Ms. Pradeep Trinidad to [facility] for evaluation. My notes for this consultation are attached. If you have questions, please do not hesitate to call me. I look forward to following your patient along with you.       Sincerely,    Jorge Leyva MD
(1) Other Medications/None

## 2023-04-25 ENCOUNTER — TRANSCRIBE ORDER (OUTPATIENT)
Dept: SCHEDULING | Age: 83
End: 2023-04-25

## 2023-04-25 DIAGNOSIS — Z12.31 ENCOUNTER FOR SCREENING MAMMOGRAM FOR MALIGNANT NEOPLASM OF BREAST: Primary | ICD-10-CM

## 2023-05-08 DIAGNOSIS — Z12.31 ENCOUNTER FOR SCREENING MAMMOGRAM FOR MALIGNANT NEOPLASM OF BREAST: Primary | ICD-10-CM

## 2023-05-18 ENCOUNTER — TRANSCRIBE ORDERS (OUTPATIENT)
Facility: HOSPITAL | Age: 83
End: 2023-05-18

## 2023-05-18 DIAGNOSIS — Z12.31 ENCOUNTER FOR SCREENING MAMMOGRAM FOR MALIGNANT NEOPLASM OF BREAST: Primary | ICD-10-CM

## 2023-05-23 ENCOUNTER — OFFICE VISIT (OUTPATIENT)
Age: 83
End: 2023-05-23
Payer: MEDICARE

## 2023-05-23 VITALS
WEIGHT: 117.4 LBS | DIASTOLIC BLOOD PRESSURE: 55 MMHG | SYSTOLIC BLOOD PRESSURE: 130 MMHG | HEART RATE: 64 BPM | BODY MASS INDEX: 20.8 KG/M2 | HEIGHT: 63 IN

## 2023-05-23 DIAGNOSIS — R00.1 BRADYCARDIA, UNSPECIFIED: ICD-10-CM

## 2023-05-23 DIAGNOSIS — I10 ESSENTIAL (PRIMARY) HYPERTENSION: Primary | ICD-10-CM

## 2023-05-23 DIAGNOSIS — R55 SYNCOPE AND COLLAPSE: ICD-10-CM

## 2023-05-23 PROCEDURE — 99214 OFFICE O/P EST MOD 30 MIN: CPT | Performed by: SPECIALIST

## 2023-05-23 PROCEDURE — 1090F PRES/ABSN URINE INCON ASSESS: CPT | Performed by: SPECIALIST

## 2023-05-23 PROCEDURE — G8420 CALC BMI NORM PARAMETERS: HCPCS | Performed by: SPECIALIST

## 2023-05-23 PROCEDURE — G8427 DOCREV CUR MEDS BY ELIG CLIN: HCPCS | Performed by: SPECIALIST

## 2023-05-23 PROCEDURE — G8399 PT W/DXA RESULTS DOCUMENT: HCPCS | Performed by: SPECIALIST

## 2023-05-23 PROCEDURE — 1036F TOBACCO NON-USER: CPT | Performed by: SPECIALIST

## 2023-05-23 PROCEDURE — 1123F ACP DISCUSS/DSCN MKR DOCD: CPT | Performed by: SPECIALIST

## 2023-05-23 PROCEDURE — 3075F SYST BP GE 130 - 139MM HG: CPT | Performed by: SPECIALIST

## 2023-05-23 PROCEDURE — 3078F DIAST BP <80 MM HG: CPT | Performed by: SPECIALIST

## 2023-05-23 RX ORDER — MINOXIDIL 2.5 MG/1
2.5 TABLET ORAL DAILY
COMMUNITY

## 2023-05-23 RX ORDER — GABAPENTIN 100 MG/1
200 CAPSULE ORAL DAILY
COMMUNITY

## 2023-05-23 RX ORDER — NEBIVOLOL 5 MG/1
5 TABLET ORAL DAILY
COMMUNITY

## 2023-05-23 RX ORDER — PREDNISONE 2.5 MG
2.5 TABLET ORAL DAILY
COMMUNITY

## 2023-05-23 RX ORDER — NIFEDIPINE 30 MG/1
30 TABLET, FILM COATED, EXTENDED RELEASE ORAL DAILY
COMMUNITY

## 2023-05-23 RX ORDER — ESCITALOPRAM OXALATE 5 MG/1
5 TABLET ORAL DAILY
COMMUNITY

## 2023-05-23 NOTE — PATIENT INSTRUCTIONS

## 2023-05-23 NOTE — PROGRESS NOTES
CARDIOLOGY OFFICE NOTE    Perico Ann MD, [address], [place], [address] [facility]  Phone [phone]; Fax [phone]  Mobile [phone]   Voice Mail [phone]    Primary care: Basil Brooks MD       ATTENTION:   This medical record was transcribed using an electronic medical records/speech recognition system. Although proofread, it may and can contain electronic, spelling and other errors. Corrections may be executed at a later time. Please feel free to contact us for any clarifications as needed. Jim Ford is a 80 y.o. female with  referred for HTN and dyslipidemia referred for follow up. Cardiac risk factors: dyslipidemia, hypertension, post-menopausal   I have personally obtained the history from the patient. HISTORY OF PRESENTING ILLNESS    Ms./Mr. Hakeem Hughes  80 y.o. is doing well since I last saw her. She has a history of hypertension and a pulmonary embolus in the past on 3/29/2023 to 3/30/2023 and was seen for shortness of breath. Her cardiac work-up was negative at that time. Blood pressures are improving she states.      ACTIVE PROBLEM LIST     Patient Active Problem List    Diagnosis Date Noted    Chest pain 03/29/2023    Bradycardia 04/18/2019    Hypokalemia 04/18/2019    GERD (gastroesophageal reflux disease) 05/20/2017    Asthma 05/20/2017    Osteoporosis 05/20/2017    Acquired hypothyroidism 05/20/2017    Essential hypertension 05/20/2017    Lumbar stenosis with neurogenic claudication 05/15/2017           PAST MEDICAL HISTORY     Past Medical History:   Diagnosis Date    Anemia     Arthritis     osteoporosis    Asthma     Broken ribs 2006    fell    Cancer (HCC)     melanoma     GERD (gastroesophageal reflux disease)     Hypertension     Migraines     Osteoporosis     Rheumatic fever     x2 without residual    Thyroid disease            PAST SURGICAL HISTORY     Past Surgical

## 2023-05-23 NOTE — PROGRESS NOTES
Ht 5' 3\" (1.6 m)   Wt 117 lb 6.4 oz (53.3 kg)   BMI 20.80 kg/m²   NAME Ronak Martines         1940      MRN    [id number]      LAST OFFICE APPOINTMENT: 2023     DIAGNOSIS  No diagnosis found. HOME MEDICATION  Current Outpatient Medications   Medication Sig    escitalopram (LEXAPRO) 5 MG tablet Take 1 tablet by mouth daily    gabapentin (NEURONTIN) 100 MG capsule Take 2 capsules by mouth daily. Max Daily Amount: 200 mg    minoxidil (LONITEN) 2.5 MG tablet Take 1 tablet by mouth daily    nebivolol (BYSTOLIC) 5 MG tablet Take 1 tablet by mouth daily    predniSONE (DELTASONE) 2.5 MG tablet Take 1 tablet by mouth daily    NIFEdipine (ADALAT CC) 30 MG extended release tablet Take 1 tablet by mouth daily    albuterol sulfate HFA (PROVENTIL;VENTOLIN;PROAIR) 108 (90 Base) MCG/ACT inhaler Inhale into the lungs every 6 hours as needed    buPROPion (WELLBUTRIN XL) 300 MG extended release tablet Take 1 tablet by mouth daily    Cetirizine HCl 10 MG CAPS Take 1 capsule by mouth    cloNIDine (CATAPRES) 0.1 MG/24HR PTWK Place 1 patch onto the skin every 7 days    dexmethylphenidate (FOCALIN) 10 MG tablet Take 1 tablet by mouth daily. ergocalciferol (ERGOCALCIFEROL) 1.25 MG (93265 UT) capsule Take 1 capsule by mouth    estradiol (ESTRACE) 0.5 MG tablet Take 1 tablet by mouth nightly    fluticasone (FLONASE) 50 MCG/ACT nasal spray 2 sprays by Nasal route    hydrALAZINE (APRESOLINE) 50 MG tablet Take 1 tablet by mouth 3 times daily    HYDROcodone-acetaminophen (NORCO) 5-325 MG per tablet Take 0.5 tablets by mouth every 8 hours as needed. levothyroxine (SYNTHROID) 88 MCG tablet Take 1 tablet by mouth every morning (before breakfast)     No current facility-administered medications for this visit.        VITAL SIGNS  Wt Readings from Last 3 Encounters:   23 117 lb 6.4 oz (53.3 kg)   23 122 lb (55.3 kg)   23 124 lb (56.2 kg)     BP Readings from Last 3 Encounters:   23 (!) 146/71   23

## 2023-09-01 RX ORDER — CLONIDINE 0.1 MG/24H
PATCH, EXTENDED RELEASE TRANSDERMAL
Qty: 4 PATCH | Refills: 2 | Status: SHIPPED | OUTPATIENT
Start: 2023-09-01

## 2023-09-04 NOTE — TELEPHONE ENCOUNTER
Called Doreen Pfeiffer back at the phone number provided. No one answered, so voice mail was left. Stated that as instructed in the discharge instructions, which were reviewed on check out, Ms. Cain Martin is supposed to take:  - Eliquis 10 mg (2 tablets) 2 times/ day for 7 days. This was to end 5/26. Starting 5/27, she was to transition to  - Eliquis 5 mg (1 tablet) 2 times/ day for 3-6 months.        Rakel Maurer MD  10:21 PM Statement Selected

## 2023-10-05 RX ORDER — NEBIVOLOL 5 MG/1
5 TABLET ORAL DAILY
Qty: 90 TABLET | Refills: 0 | Status: SHIPPED | OUTPATIENT
Start: 2023-10-05

## 2024-01-02 RX ORDER — NEBIVOLOL 5 MG/1
5 TABLET ORAL DAILY
Qty: 90 TABLET | Refills: 0 | Status: SHIPPED | OUTPATIENT
Start: 2024-01-02

## 2024-02-21 ENCOUNTER — APPOINTMENT (OUTPATIENT)
Facility: HOSPITAL | Age: 84
DRG: 103 | End: 2024-02-21
Payer: MEDICARE

## 2024-02-21 ENCOUNTER — HOSPITAL ENCOUNTER (EMERGENCY)
Facility: HOSPITAL | Age: 84
Discharge: HOME | DRG: 103 | End: 2024-02-21
Attending: EMERGENCY MEDICINE
Payer: MEDICARE

## 2024-02-21 VITALS
HEIGHT: 63 IN | WEIGHT: 101 LBS | TEMPERATURE: 98.7 F | SYSTOLIC BLOOD PRESSURE: 156 MMHG | OXYGEN SATURATION: 96 % | HEART RATE: 56 BPM | DIASTOLIC BLOOD PRESSURE: 87 MMHG | BODY MASS INDEX: 17.89 KG/M2 | RESPIRATION RATE: 16 BRPM

## 2024-02-21 DIAGNOSIS — S42.201A CLOSED FRACTURE OF PROXIMAL END OF RIGHT HUMERUS, UNSPECIFIED FRACTURE MORPHOLOGY, INITIAL ENCOUNTER: ICD-10-CM

## 2024-02-21 DIAGNOSIS — S01.81XA FACIAL LACERATION, INITIAL ENCOUNTER: ICD-10-CM
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
## 2024-02-21 PROCEDURE — 73030 X-RAY EXAM OF SHOULDER: CPT

## 2024-02-21 PROCEDURE — 2500000003 HC RX 250 WO HCPCS: Performed by: EMERGENCY MEDICINE

## 2024-02-21 PROCEDURE — 99284 EMERGENCY DEPT VISIT MOD MDM: CPT

## 2024-02-21 PROCEDURE — 0HQ1XZZ REPAIR FACE SKIN, EXTERNAL APPROACH: ICD-10-PCS | Performed by: EMERGENCY MEDICINE

## 2024-02-21 PROCEDURE — 6370000000 HC RX 637 (ALT 250 FOR IP): Performed by: EMERGENCY MEDICINE

## 2024-02-21 PROCEDURE — 72125 CT NECK SPINE W/O DYE: CPT

## 2024-02-21 PROCEDURE — 70450 CT HEAD/BRAIN W/O DYE: CPT

## 2024-02-21 PROCEDURE — 0CQ0XZZ REPAIR UPPER LIP, EXTERNAL APPROACH: ICD-10-PCS | Performed by: EMERGENCY MEDICINE

## 2024-02-21 RX ORDER — LIDOCAINE HYDROCHLORIDE 10 MG/ML
5 INJECTION, SOLUTION EPIDURAL; INFILTRATION; INTRACAUDAL; PERINEURAL ONCE
Status: COMPLETED | OUTPATIENT
Start: 2024-02-21 | End: 2024-02-21

## 2024-02-21 RX ORDER — HYDROCODONE BITARTRATE AND ACETAMINOPHEN 5; 325 MG/1; MG/1
0.5 TABLET ORAL
Status: COMPLETED | OUTPATIENT
Start: 2024-02-21 | End: 2024-02-21

## 2024-02-21 RX ORDER — BUPRENORPHINE 7.5 UG/H
1 PATCH TRANSDERMAL WEEKLY
COMMUNITY

## 2024-02-21 RX ADMIN — LIDOCAINE HYDROCHLORIDE 5 ML: 10 INJECTION, SOLUTION EPIDURAL; INFILTRATION; INTRACAUDAL; PERINEURAL at 14:29

## 2024-02-21 RX ADMIN — HYDROCODONE BITARTRATE AND ACETAMINOPHEN 0.5 TABLET: 5; 325 TABLET ORAL at 12:21

## 2024-02-21 ASSESSMENT — PAIN - FUNCTIONAL ASSESSMENT: PAIN_FUNCTIONAL_ASSESSMENT: 0-10

## 2024-02-21 ASSESSMENT — PAIN SCALES - GENERAL
PAINLEVEL_OUTOF10: 8
PAINLEVEL_OUTOF10: 7

## 2024-02-21 ASSESSMENT — PAIN DESCRIPTION - PAIN TYPE: TYPE: ACUTE PAIN

## 2024-02-21 NOTE — ED TRIAGE NOTES
Patient reports tripping and falling about 1 hour ago injuring her left knee, right shoulder, right chin and lip. Bleeding controller to right upper lip at this time. Patient denies LOC. Patient denies blood thinners.

## 2024-02-21 NOTE — DISCHARGE INSTRUCTIONS
You can return to the emergency department or follow-up with your PCP to have your chin sutures removed in 5-7 days.  In the meantime no weightbearing with your right arm until told otherwise by orthopedic surgery.  I provided you with the number to follow-up with them as well

## 2024-02-23 ENCOUNTER — HOSPITAL ENCOUNTER (INPATIENT)
Facility: HOSPITAL | Age: 84
LOS: 5 days | Discharge: HOME | DRG: 103 | End: 2024-02-28
Attending: STUDENT IN AN ORGANIZED HEALTH CARE EDUCATION/TRAINING PROGRAM | Admitting: INTERNAL MEDICINE
Payer: MEDICARE

## 2024-02-23 ENCOUNTER — APPOINTMENT (OUTPATIENT)
Facility: HOSPITAL | Age: 84
DRG: 103 | End: 2024-02-23
Payer: MEDICARE

## 2024-02-23 DIAGNOSIS — R47.01 EXPRESSIVE APHASIA: ICD-10-CM

## 2024-02-23 DIAGNOSIS — G93.0 ARACHNOID CYST OF POSTERIOR CRANIAL FOSSA: Primary | ICD-10-CM

## 2024-02-23 DIAGNOSIS — R47.89 EPISODES OF SPEECH ARREST: ICD-10-CM

## 2024-02-23 DIAGNOSIS — G45.9 TIA (TRANSIENT ISCHEMIC ATTACK): ICD-10-CM

## 2024-02-23 DIAGNOSIS — R41.0 EPISODE OF CONFUSION: ICD-10-CM

## 2024-02-23 DIAGNOSIS — S42.294A OTHER CLOSED NONDISPLACED FRACTURE OF PROXIMAL END OF RIGHT HUMERUS, INITIAL ENCOUNTER: ICD-10-CM

## 2024-02-23 LAB
ALBUMIN SERPL-MCNC: 3.3 G/DL (ref 3.5–5)
ALBUMIN/GLOB SERPL: 0.8 (ref 1.1–2.2)
ALP SERPL-CCNC: 80 U/L (ref 45–117)
ALT SERPL-CCNC: 28 U/L (ref 12–78)
ANION GAP SERPL CALC-SCNC: 8 MMOL/L (ref 5–15)
AST SERPL-CCNC: 30 U/L (ref 15–37)
BASOPHILS # BLD: 0 K/UL (ref 0–0.1)
BASOPHILS NFR BLD: 0 % (ref 0–1)
BILIRUB SERPL-MCNC: 0.5 MG/DL (ref 0.2–1)
BUN SERPL-MCNC: 19 MG/DL (ref 6–20)
BUN/CREAT SERPL: 23 (ref 12–20)
CALCIUM SERPL-MCNC: 8.7 MG/DL (ref 8.5–10.1)
CHLORIDE SERPL-SCNC: 98 MMOL/L (ref 97–108)
CO2 SERPL-SCNC: 27 MMOL/L (ref 21–32)
CREAT SERPL-MCNC: 0.84 MG/DL (ref 0.55–1.02)
DIFFERENTIAL METHOD BLD: ABNORMAL
EOSINOPHIL # BLD: 0 K/UL (ref 0–0.4)
EOSINOPHIL NFR BLD: 0 % (ref 0–7)
ERYTHROCYTE [DISTWIDTH] IN BLOOD BY AUTOMATED COUNT: 14.9 % (ref 11.5–14.5)
GLOBULIN SER CALC-MCNC: 4.2 G/DL (ref 2–4)
GLUCOSE BLD STRIP.AUTO-MCNC: 108 MG/DL (ref 65–117)
GLUCOSE SERPL-MCNC: 113 MG/DL (ref 65–100)
HCT VFR BLD AUTO: 31.5 % (ref 35–47)
HGB BLD-MCNC: 10.5 G/DL (ref 11.5–16)
IMM GRANULOCYTES # BLD AUTO: 0.1 K/UL (ref 0–0.04)
IMM GRANULOCYTES NFR BLD AUTO: 1 % (ref 0–0.5)
INR PPP: 1 (ref 0.9–1.1)
LYMPHOCYTES # BLD: 0.7 K/UL (ref 0.8–3.5)
LYMPHOCYTES NFR BLD: 6 % (ref 12–49)
MCH RBC QN AUTO: 30.1 PG (ref 26–34)
MCHC RBC AUTO-ENTMCNC: 33.3 G/DL (ref 30–36.5)
MCV RBC AUTO: 90.3 FL (ref 80–99)
MONOCYTES # BLD: 1.4 K/UL (ref 0–1)
MONOCYTES NFR BLD: 13 % (ref 5–13)
NEUTS SEG # BLD: 8.8 K/UL (ref 1.8–8)
NEUTS SEG NFR BLD: 80 % (ref 32–75)
NRBC # BLD: 0 K/UL (ref 0–0.01)
NRBC BLD-RTO: 0 PER 100 WBC
PLATELET # BLD AUTO: 172 K/UL (ref 150–400)
PMV BLD AUTO: 9.2 FL (ref 8.9–12.9)
POTASSIUM SERPL-SCNC: 3.9 MMOL/L (ref 3.5–5.1)
PROT SERPL-MCNC: 7.5 G/DL (ref 6.4–8.2)
PROTHROMBIN TIME: 10.1 SEC (ref 9–11.1)
RBC # BLD AUTO: 3.49 M/UL (ref 3.8–5.2)
RBC MORPH BLD: ABNORMAL
SERVICE CMNT-IMP: NORMAL
SODIUM SERPL-SCNC: 133 MMOL/L (ref 136–145)
TROPONIN I SERPL HS-MCNC: 11 NG/L (ref 0–51)
WBC # BLD AUTO: 11 K/UL (ref 3.6–11)

## 2024-02-23 PROCEDURE — 99285 EMERGENCY DEPT VISIT HI MDM: CPT

## 2024-02-23 PROCEDURE — 85025 COMPLETE CBC W/AUTO DIFF WBC: CPT

## 2024-02-23 PROCEDURE — 6370000000 HC RX 637 (ALT 250 FOR IP): Performed by: STUDENT IN AN ORGANIZED HEALTH CARE EDUCATION/TRAINING PROGRAM

## 2024-02-23 PROCEDURE — 70450 CT HEAD/BRAIN W/O DYE: CPT

## 2024-02-23 PROCEDURE — 6360000002 HC RX W HCPCS: Performed by: INTERNAL MEDICINE

## 2024-02-23 PROCEDURE — 2580000003 HC RX 258: Performed by: INTERNAL MEDICINE

## 2024-02-23 PROCEDURE — 84484 ASSAY OF TROPONIN QUANT: CPT

## 2024-02-23 PROCEDURE — 6370000000 HC RX 637 (ALT 250 FOR IP): Performed by: INTERNAL MEDICINE

## 2024-02-23 PROCEDURE — 70551 MRI BRAIN STEM W/O DYE: CPT

## 2024-02-23 PROCEDURE — 85610 PROTHROMBIN TIME: CPT

## 2024-02-23 PROCEDURE — 6360000004 HC RX CONTRAST MEDICATION: Performed by: STUDENT IN AN ORGANIZED HEALTH CARE EDUCATION/TRAINING PROGRAM

## 2024-02-23 PROCEDURE — 82962 GLUCOSE BLOOD TEST: CPT

## 2024-02-23 PROCEDURE — 2060000000 HC ICU INTERMEDIATE R&B

## 2024-02-23 PROCEDURE — 71045 X-RAY EXAM CHEST 1 VIEW: CPT

## 2024-02-23 PROCEDURE — 70498 CT ANGIOGRAPHY NECK: CPT

## 2024-02-23 PROCEDURE — 80053 COMPREHEN METABOLIC PANEL: CPT

## 2024-02-23 PROCEDURE — 36415 COLL VENOUS BLD VENIPUNCTURE: CPT

## 2024-02-23 RX ORDER — ESCITALOPRAM OXALATE 10 MG/1
5 TABLET ORAL DAILY
Status: DISCONTINUED | OUTPATIENT
Start: 2024-02-24 | End: 2024-02-28 | Stop reason: HOSPADM

## 2024-02-23 RX ORDER — CLOPIDOGREL BISULFATE 75 MG/1
75 TABLET ORAL ONCE
Status: COMPLETED | OUTPATIENT
Start: 2024-02-23 | End: 2024-02-23

## 2024-02-23 RX ORDER — SODIUM CHLORIDE 0.9 % (FLUSH) 0.9 %
5-40 SYRINGE (ML) INJECTION PRN
Status: DISCONTINUED | OUTPATIENT
Start: 2024-02-23 | End: 2024-02-28 | Stop reason: HOSPADM

## 2024-02-23 RX ORDER — ONDANSETRON 2 MG/ML
4 INJECTION INTRAMUSCULAR; INTRAVENOUS EVERY 6 HOURS PRN
Status: DISCONTINUED | OUTPATIENT
Start: 2024-02-23 | End: 2024-02-28 | Stop reason: HOSPADM

## 2024-02-23 RX ORDER — NIFEDIPINE 30 MG/1
30 TABLET, EXTENDED RELEASE ORAL DAILY
Status: DISCONTINUED | OUTPATIENT
Start: 2024-02-24 | End: 2024-02-26

## 2024-02-23 RX ORDER — PREDNISONE 5 MG/1
2.5 TABLET ORAL DAILY
Status: DISCONTINUED | OUTPATIENT
Start: 2024-02-24 | End: 2024-02-28 | Stop reason: HOSPADM

## 2024-02-23 RX ORDER — ONDANSETRON 4 MG/1
4 TABLET, ORALLY DISINTEGRATING ORAL EVERY 8 HOURS PRN
Status: DISCONTINUED | OUTPATIENT
Start: 2024-02-23 | End: 2024-02-28 | Stop reason: HOSPADM

## 2024-02-23 RX ORDER — HYDROCODONE BITARTRATE AND ACETAMINOPHEN 5; 325 MG/1; MG/1
0.5 TABLET ORAL EVERY 8 HOURS PRN
Status: DISCONTINUED | OUTPATIENT
Start: 2024-02-23 | End: 2024-02-24

## 2024-02-23 RX ORDER — CLONIDINE 0.1 MG/24H
1 PATCH, EXTENDED RELEASE TRANSDERMAL WEEKLY
Status: DISCONTINUED | OUTPATIENT
Start: 2024-02-23 | End: 2024-02-24

## 2024-02-23 RX ORDER — ENOXAPARIN SODIUM 100 MG/ML
30 INJECTION SUBCUTANEOUS EVERY 24 HOURS
Status: DISCONTINUED | OUTPATIENT
Start: 2024-02-23 | End: 2024-02-28 | Stop reason: HOSPADM

## 2024-02-23 RX ORDER — CLOPIDOGREL 300 MG/1
300 TABLET, FILM COATED ORAL ONCE
Status: DISCONTINUED | OUTPATIENT
Start: 2024-02-23 | End: 2024-02-23

## 2024-02-23 RX ORDER — LEVOTHYROXINE SODIUM 88 UG/1
88 TABLET ORAL
Status: DISCONTINUED | OUTPATIENT
Start: 2024-02-24 | End: 2024-02-28 | Stop reason: HOSPADM

## 2024-02-23 RX ORDER — HYDROCODONE BITARTRATE AND ACETAMINOPHEN 5; 325 MG/1; MG/1
1 TABLET ORAL
Status: COMPLETED | OUTPATIENT
Start: 2024-02-23 | End: 2024-02-23

## 2024-02-23 RX ORDER — ASPIRIN 300 MG/1
300 SUPPOSITORY RECTAL DAILY
Status: DISCONTINUED | OUTPATIENT
Start: 2024-02-23 | End: 2024-02-28 | Stop reason: HOSPADM

## 2024-02-23 RX ORDER — SODIUM CHLORIDE 0.9 % (FLUSH) 0.9 %
5-40 SYRINGE (ML) INJECTION EVERY 12 HOURS SCHEDULED
Status: DISCONTINUED | OUTPATIENT
Start: 2024-02-23 | End: 2024-02-28 | Stop reason: HOSPADM

## 2024-02-23 RX ORDER — ASPIRIN 81 MG/1
324 TABLET, CHEWABLE ORAL
Status: COMPLETED | OUTPATIENT
Start: 2024-02-23 | End: 2024-02-23

## 2024-02-23 RX ORDER — CLOPIDOGREL BISULFATE 75 MG/1
225 TABLET ORAL ONCE
Status: COMPLETED | OUTPATIENT
Start: 2024-02-23 | End: 2024-02-23

## 2024-02-23 RX ORDER — BUPROPION HYDROCHLORIDE 150 MG/1
300 TABLET ORAL DAILY
Status: DISCONTINUED | OUTPATIENT
Start: 2024-02-24 | End: 2024-02-23

## 2024-02-23 RX ORDER — BUPROPION HYDROCHLORIDE 150 MG/1
300 TABLET ORAL DAILY
Status: DISCONTINUED | OUTPATIENT
Start: 2024-02-24 | End: 2024-02-28 | Stop reason: HOSPADM

## 2024-02-23 RX ORDER — NEBIVOLOL 5 MG/1
5 TABLET ORAL DAILY
Status: DISCONTINUED | OUTPATIENT
Start: 2024-02-23 | End: 2024-02-23 | Stop reason: CLARIF

## 2024-02-23 RX ORDER — ALBUTEROL SULFATE 2.5 MG/3ML
2.5 SOLUTION RESPIRATORY (INHALATION) EVERY 6 HOURS PRN
Status: DISCONTINUED | OUTPATIENT
Start: 2024-02-23 | End: 2024-02-28 | Stop reason: HOSPADM

## 2024-02-23 RX ORDER — ASPIRIN 81 MG/1
81 TABLET, CHEWABLE ORAL DAILY
Status: DISCONTINUED | OUTPATIENT
Start: 2024-02-23 | End: 2024-02-28 | Stop reason: HOSPADM

## 2024-02-23 RX ORDER — METOPROLOL SUCCINATE 50 MG/1
50 TABLET, EXTENDED RELEASE ORAL DAILY
Status: DISCONTINUED | OUTPATIENT
Start: 2024-02-24 | End: 2024-02-28 | Stop reason: HOSPADM

## 2024-02-23 RX ORDER — HYDRALAZINE HYDROCHLORIDE 25 MG/1
50 TABLET, FILM COATED ORAL 3 TIMES DAILY
Status: DISCONTINUED | OUTPATIENT
Start: 2024-02-23 | End: 2024-02-24

## 2024-02-23 RX ORDER — SODIUM CHLORIDE 9 MG/ML
INJECTION, SOLUTION INTRAVENOUS PRN
Status: DISCONTINUED | OUTPATIENT
Start: 2024-02-23 | End: 2024-02-28 | Stop reason: HOSPADM

## 2024-02-23 RX ORDER — POLYETHYLENE GLYCOL 3350 17 G/17G
17 POWDER, FOR SOLUTION ORAL DAILY PRN
Status: DISCONTINUED | OUTPATIENT
Start: 2024-02-23 | End: 2024-02-28 | Stop reason: HOSPADM

## 2024-02-23 RX ADMIN — ENOXAPARIN SODIUM 30 MG: 100 INJECTION SUBCUTANEOUS at 22:00

## 2024-02-23 RX ADMIN — IOPAMIDOL 100 ML: 755 INJECTION, SOLUTION INTRAVENOUS at 13:09

## 2024-02-23 RX ADMIN — SODIUM CHLORIDE, PRESERVATIVE FREE 10 ML: 5 INJECTION INTRAVENOUS at 22:01

## 2024-02-23 RX ADMIN — CLOPIDOGREL BISULFATE 75 MG: 75 TABLET ORAL at 14:00

## 2024-02-23 RX ADMIN — ASPIRIN 324 MG: 81 TABLET, CHEWABLE ORAL at 14:00

## 2024-02-23 RX ADMIN — CLOPIDOGREL BISULFATE 225 MG: 75 TABLET ORAL at 14:44

## 2024-02-23 RX ADMIN — HYDROCODONE BITARTRATE AND ACETAMINOPHEN 0.5 TABLET: 5; 325 TABLET ORAL at 23:59

## 2024-02-23 RX ADMIN — HYDROCODONE BITARTRATE AND ACETAMINOPHEN 1 TABLET: 5; 325 TABLET ORAL at 13:36

## 2024-02-23 ASSESSMENT — PAIN - FUNCTIONAL ASSESSMENT: PAIN_FUNCTIONAL_ASSESSMENT: NONE - DENIES PAIN

## 2024-02-23 ASSESSMENT — LIFESTYLE VARIABLES
HOW MANY STANDARD DRINKS CONTAINING ALCOHOL DO YOU HAVE ON A TYPICAL DAY: PATIENT DOES NOT DRINK
HOW OFTEN DO YOU HAVE A DRINK CONTAINING ALCOHOL: NEVER

## 2024-02-23 ASSESSMENT — PAIN DESCRIPTION - FREQUENCY: FREQUENCY: CONTINUOUS

## 2024-02-23 ASSESSMENT — PAIN DESCRIPTION - DESCRIPTORS: DESCRIPTORS: THROBBING

## 2024-02-23 ASSESSMENT — PAIN DESCRIPTION - LOCATION: LOCATION: ARM

## 2024-02-23 ASSESSMENT — PAIN DESCRIPTION - ORIENTATION: ORIENTATION: RIGHT

## 2024-02-23 NOTE — ED NOTES
Stroke Education provided to patient and relative(s) and the following topics were discussed    1. Patients personal risk factors for stroke are none    2. Warning signs of Stroke:        * Sudden numbness or weakness of the face, arm or leg, especially on one side of          The body            * Sudden confusion, trouble speaking or understanding        * Sudden trouble seeing in one or both eyes        * Sudden trouble walking, dizziness, loss of balance or coordination        * Sudden severe headache with no known cause      3. Importance of activation Emergency Medical Services ( 9-1-1 ) immediately if experience any warning signs of stroke.    4. Be sure and schedule a follow-up appointment with your primary care doctor or any specialists as instructed.     5. You must take medicine every day to treat your risk factors for stroke.  Be sure to take your medicines exactly as your doctor tells you: no more, no less.  Know what your medicines are for , what they do.  Anti-thrombotics /anticoagulants can help prevent strokes.  You are taking the following medicine(s)  n/a     6.  Smoking and second-hand smoke greatly increase your risk of stroke, cardiovascular disease and death. Smoking history never    7. Information provided was Verbal Education    8. Documentation of teaching completed in Patient Education Activity and on Care Plan with teaching response noted?  no

## 2024-02-23 NOTE — ED TRIAGE NOTES
Signs and symptoms: 1020am son noticed Left eye and left facial droop. Patient had two episodes prior to arrival to ED and once during triage where she had an episode of expressive aphasia    Code Stroke activation time: 1039  Provider at bedside time:  1238  VAN score: Positive  Last Known Well (Time): 10:00. Per son, he picked her up this morning at 1000 and did not notice any facial droop. But states he can not say for certain. At 1020 patient son noticed left eyelid drooping  Blood Glucose Result/Time: 108   Blood Pressure: 108/72  Anticoagulants (List medications): No

## 2024-02-23 NOTE — ED NOTES
TRANSFER - OUT REPORT:    Verbal report given to LISSY Capellan at [facility] ED on Kathryn Hughes  being transferred to [facility] ED for routine progression of patient care       Report consisted of patient's Situation, Background, Assessment and   Recommendations(SBAR).     Information from the following report(s) Nurse Handoff Report was reviewed with the receiving nurse.    Kinder Fall Assessment:                           Lines:       Opportunity for questions and clarification was provided.      Patient transported with:  [facility], ETA [id number]

## 2024-02-23 NOTE — ED NOTES
Patient son alerted RN that patient was having another episode of difficulty speaking. RN to beside, patient with expressive aphasia and unable to speak any words. Dr. Byers to bedside, patient symptoms resolved. Episode lasted 1-2 minute.

## 2024-02-23 NOTE — ACP (ADVANCE CARE PLANNING)
words.    sCPR works best to restart the heart when there is a sudden event, like a heart attack, in someone who is otherwise healthy. Unfortunately, CPR does not typically restart the heart for people who have serious health conditions or who are very sick.\"     \"In the event your heart stopped as a result of an underlying serious health condition, would you want attempts to be made to restart your heart (answer \"yes\" for attempt to resuscitate) or would you prefer a natural death (answer \"no\" for do not attempt to resuscitate)?\"  No    Patient made it very clear to me that she would not want heroic measures for resuscitation in the event of cardiopulmonary arrest, including chest compressions, defibrillation, intubation/mechanical ventilation.       Interventions Provided:  Referral made for ACP follow-up assistance to: Palliative care

## 2024-02-23 NOTE — ED PROVIDER NOTES
[facility] EMERGENCY DEPT  EMERGENCY DEPARTMENT ENCOUNTER      Pt Name: Kathryn Hughes  MRN: [id number]  Birthdate 1940  Date of evaluation: 2/23/2024  Provider: Devon Byers DO    CHIEF COMPLAINT       Chief Complaint   Patient presents with    Facial Droop         HISTORY OF PRESENT ILLNESS   (Location/Symptom, Timing/Onset, Context/Setting, Quality, Duration, Modifying Factors, Severity)  Note limiting factors.   Patient is an 83-year-old female history as below presenting today with strokelike symptoms.  Uncertain of last known well.  At 8 AM she seemed confused on the phone when her daughter-in-law was speaking to her.  Her son picked her up to take her to an appointment (recently had a fall and broke her shoulder) and on the way he noticed, around 1025 to 10:30 AM, her left face was drooping.  While in the orthopedic office she had an episode lasting 1 to 2 minutes of expressive aphasia.  She had another episode after the initial episode and then the third 1 upon arrival here.  When I evaluated her she had no speech difficulties, mild left facial weakness.  She is not on any aspirin, Plavix or blood thinners.  No history of stroke.  Denies headache but did recently have head injury in the fall.            Review of External Medical Records:     Nursing Notes were reviewed.    REVIEW OF SYSTEMS    (2-9 systems for level 4, 10 or more for level 5)     Review of Systems   Neurological:  Positive for facial asymmetry and speech difficulty. Negative for headaches.   Hematological:  Does not bruise/bleed easily.       Except as noted above the remainder of the review of systems was reviewed and negative.       PAST MEDICAL HISTORY     Past Medical History:   Diagnosis Date    Anemia     Arthritis     osteoporosis    Asthma     Broken ribs 2006    fell    Cancer (HCC)     melanoma     GERD (gastroesophageal reflux disease)     Hypertension     Migraines     Osteoporosis     Rheumatic fever     x2

## 2024-02-23 NOTE — ED NOTES
RN alerted by patient son that she was having another episode of expressive aphasia. RN to bedside, patient unable to form words. Episode lasted 1-2 minutes. Dr. Byers made aware.     This is noted to be the 5th episode of expressive aphasia since patients symptoms began before to arrival to ED.

## 2024-02-23 NOTE — ED PROVIDER NOTES
[facility] EMERGENCY DEPT  EMERGENCY DEPARTMENT ENCOUNTER      Pt Name: Kathryn Hguhes  MRN: [id number]  Birthdate 1940  Date of evaluation: 2/21/2024  Provider: Ziyad Xie MD      HISTORY OF PRESENT ILLNESS      [facility]  83-year-old female presenting to the emergency department after a fall.  She was walking and tripped about an hour ago.  She fell onto her right side.  She says she had her right knee and shoulder on the ground as well as her chin and upper lip.  She is not anticoagulated.  No loss of consciousness.  Denies neck pain.  Denies any dental pain.  She has been able to walk easily since her fall.  No paresthesias in the upper extremities.  No chest wall pain, back pain, lower abdominal pain      Nursing Notes were reviewed.    REVIEW OF SYSTEMS         Review of Systems  A complete review of systems was performed and all systems reviewed are negative less otherwise document in the HPI      PAST MEDICAL HISTORY     Past Medical History:   Diagnosis Date    Anemia     Arthritis     osteoporosis    Asthma     Broken ribs 2006    fell    Cancer (HCC)     melanoma     GERD (gastroesophageal reflux disease)     Hypertension     Migraines     Osteoporosis     Rheumatic fever     x2 without residual    Thyroid disease          SURGICAL HISTORY       Past Surgical History:   Procedure Laterality Date    ABOVE KNEE AMPUTATION Left 05/2015    remove Leonard's neuroma    BREAST BIOPSY Left 1972    benign    CATARACT REMOVAL Bilateral 2009, 2011    CYST INCISION AND DRAINAGE      multiple bilateral cyst aspiration.     GYN  1969    Hysterectomy    HEENT      deviated septum    HEENT  1996    sinus surgery    ORTHOPEDIC SURGERY      back surgery     ORTHOPEDIC SURGERY Left 2012    basal joint surgery     ORTHOPEDIC SURGERY Right 2010,2016    basal joint surgery    ORTHOPEDIC SURGERY Left 2006    Tarsal Tunnel Release     OTHER SURGICAL HISTORY      TVT Sling    OTHER SURGICAL HISTORY      Leonard's Neuroma  Risks discussed:  Poor cosmetic result and poor wound healing  Universal protocol:     Procedure explained and questions answered to patient or proxy's satisfaction: yes      Patient identity confirmed:  Provided demographic data  Anesthesia:     Anesthesia method:  Local infiltration    Local anesthetic:  Lidocaine 1% w/o epi  Laceration details:     Location:  Lip    Lip location:  Upper exterior lip    Wound length (cm): 1.  Pre-procedure details:     Preparation:  Imaging obtained to evaluate for foreign bodies  Exploration:     Limited defect created (wound extended): yes      Imaging obtained comment:  CT    Imaging outcome: foreign body not noted      Wound exploration: entire depth of wound visualized      Wound extent: no foreign bodies/material noted, no underlying fracture noted and no vascular damage noted      Contaminated: no    Treatment:     Area cleansed with:  Saline    Amount of cleaning:  Standard    Irrigation solution:  Sterile saline    Irrigation method:  Syringe    Visualized foreign bodies/material removed: no    Skin repair:     Repair method:  Sutures    Suture size:  5-0    Wound skin closure material used: Vicryl.    Suture technique:  Simple interrupted    Number of sutures:  3  Approximation:     Approximation:  Close    Vermilion border well-aligned: yes    Repair type:     Repair type:  Complex  Post-procedure details:     Dressing:  Open (no dressing)    Procedure completion:  Tolerated well, no immediate complications            (Please note that portions of this note were completed with a voice recognition program.  Efforts were made to edit the dictations but occasionally words are mis-transcribed.)    Ziyad Xie MD (electronically signed)  Emergency Attending Physician              Ziyad Xie MD  02/23/24 1465

## 2024-02-23 NOTE — ED NOTES
TRANSFER - OUT REPORT:    Verbal report given to LISSY Dangelo on Kathryn Hughes  being transferred to [facility] for routine progression of patient care       Report consisted of patient's Situation, Background, Assessment and   Recommendations(SBAR).     Information from the following report(s) Nurse Handoff Report was reviewed with the receiving nurse.    Antimony Fall Assessment:    Presents to emergency department  because of falls (Syncope, seizure, or loss of consciousness): No  Age > 70: No  Altered Mental Status, Intoxication with alcohol or substance confusion (Disorientation, impaired judgment, poor safety awaremess, or inability to follow instructions): No  Impaired Mobility: Ambulates or transfers with assistive devices or assistance; Unable to ambulate or transer.: No  Nursing Judgement: No          Lines:       Opportunity for questions and clarification was provided.      Patient transported with:  Tech

## 2024-02-23 NOTE — ED NOTES
[facility]ospital to home here to transport patient to [facility] ED. Verbal report given to Hospital to Home EMT.     Patient transported with clothing and bilateral hearing aids in ears, patient handbag given to patient son to take home.

## 2024-02-24 LAB
ANION GAP SERPL CALC-SCNC: 5 MMOL/L (ref 5–15)
BASOPHILS # BLD: 0 K/UL (ref 0–0.1)
BASOPHILS NFR BLD: 0 % (ref 0–1)
BUN SERPL-MCNC: 16 MG/DL (ref 6–20)
BUN/CREAT SERPL: 22 (ref 12–20)
CALCIUM SERPL-MCNC: 8.8 MG/DL (ref 8.5–10.1)
CHLORIDE SERPL-SCNC: 105 MMOL/L (ref 97–108)
CHOLEST SERPL-MCNC: 172 MG/DL
CO2 SERPL-SCNC: 25 MMOL/L (ref 21–32)
CREAT SERPL-MCNC: 0.72 MG/DL (ref 0.55–1.02)
DIFFERENTIAL METHOD BLD: ABNORMAL
EOSINOPHIL # BLD: 0.1 K/UL (ref 0–0.4)
EOSINOPHIL NFR BLD: 2 % (ref 0–7)
ERYTHROCYTE [DISTWIDTH] IN BLOOD BY AUTOMATED COUNT: 14.8 % (ref 11.5–14.5)
EST. AVERAGE GLUCOSE BLD GHB EST-MCNC: 120 MG/DL
GLUCOSE SERPL-MCNC: 90 MG/DL (ref 65–100)
HBA1C MFR BLD: 5.8 % (ref 4–5.6)
HCT VFR BLD AUTO: 28.4 % (ref 35–47)
HDLC SERPL-MCNC: 80 MG/DL
HDLC SERPL: 2.2 (ref 0–5)
HGB BLD-MCNC: 9.5 G/DL (ref 11.5–16)
IMM GRANULOCYTES # BLD AUTO: 0 K/UL (ref 0–0.04)
IMM GRANULOCYTES NFR BLD AUTO: 1 % (ref 0–0.5)
LDLC SERPL CALC-MCNC: 76.4 MG/DL (ref 0–100)
LYMPHOCYTES # BLD: 1 K/UL (ref 0.8–3.5)
LYMPHOCYTES NFR BLD: 17 % (ref 12–49)
MCH RBC QN AUTO: 29.3 PG (ref 26–34)
MCHC RBC AUTO-ENTMCNC: 33.5 G/DL (ref 30–36.5)
MCV RBC AUTO: 87.7 FL (ref 80–99)
MONOCYTES # BLD: 0.9 K/UL (ref 0–1)
MONOCYTES NFR BLD: 15 % (ref 5–13)
NEUTS SEG # BLD: 3.7 K/UL (ref 1.8–8)
NEUTS SEG NFR BLD: 65 % (ref 32–75)
NRBC # BLD: 0 K/UL (ref 0–0.01)
NRBC BLD-RTO: 0 PER 100 WBC
PLATELET # BLD AUTO: 160 K/UL (ref 150–400)
PMV BLD AUTO: 9.1 FL (ref 8.9–12.9)
POTASSIUM SERPL-SCNC: 3.7 MMOL/L (ref 3.5–5.1)
RBC # BLD AUTO: 3.24 M/UL (ref 3.8–5.2)
SODIUM SERPL-SCNC: 135 MMOL/L (ref 136–145)
TRIGL SERPL-MCNC: 78 MG/DL
VLDLC SERPL CALC-MCNC: 15.6 MG/DL
WBC # BLD AUTO: 5.7 K/UL (ref 3.6–11)

## 2024-02-24 PROCEDURE — 6370000000 HC RX 637 (ALT 250 FOR IP): Performed by: INTERNAL MEDICINE

## 2024-02-24 PROCEDURE — 83036 HEMOGLOBIN GLYCOSYLATED A1C: CPT

## 2024-02-24 PROCEDURE — 97535 SELF CARE MNGMENT TRAINING: CPT

## 2024-02-24 PROCEDURE — 99223 1ST HOSP IP/OBS HIGH 75: CPT | Performed by: PSYCHIATRY & NEUROLOGY

## 2024-02-24 PROCEDURE — 92610 EVALUATE SWALLOWING FUNCTION: CPT | Performed by: SPEECH-LANGUAGE PATHOLOGIST

## 2024-02-24 PROCEDURE — 97116 GAIT TRAINING THERAPY: CPT

## 2024-02-24 PROCEDURE — 94761 N-INVAS EAR/PLS OXIMETRY MLT: CPT

## 2024-02-24 PROCEDURE — 97163 PT EVAL HIGH COMPLEX 45 MIN: CPT

## 2024-02-24 PROCEDURE — 97530 THERAPEUTIC ACTIVITIES: CPT

## 2024-02-24 PROCEDURE — 36415 COLL VENOUS BLD VENIPUNCTURE: CPT

## 2024-02-24 PROCEDURE — 80061 LIPID PANEL: CPT

## 2024-02-24 PROCEDURE — 6370000000 HC RX 637 (ALT 250 FOR IP)

## 2024-02-24 PROCEDURE — 2060000000 HC ICU INTERMEDIATE R&B

## 2024-02-24 PROCEDURE — 2580000003 HC RX 258: Performed by: INTERNAL MEDICINE

## 2024-02-24 PROCEDURE — 6360000002 HC RX W HCPCS: Performed by: INTERNAL MEDICINE

## 2024-02-24 PROCEDURE — 80048 BASIC METABOLIC PNL TOTAL CA: CPT

## 2024-02-24 PROCEDURE — 97165 OT EVAL LOW COMPLEX 30 MIN: CPT

## 2024-02-24 PROCEDURE — 85025 COMPLETE CBC W/AUTO DIFF WBC: CPT

## 2024-02-24 RX ORDER — METHYLPHENIDATE HYDROCHLORIDE 5 MG/1
10 TABLET ORAL 2 TIMES DAILY WITH MEALS
Status: DISCONTINUED | OUTPATIENT
Start: 2024-02-25 | End: 2024-02-28 | Stop reason: HOSPADM

## 2024-02-24 RX ORDER — DEXMETHYLPHENIDATE HYDROCHLORIDE 10 MG/1
15 TABLET ORAL DAILY
Status: DISCONTINUED | OUTPATIENT
Start: 2024-02-24 | End: 2024-02-24

## 2024-02-24 RX ORDER — ARIPIPRAZOLE 2 MG/1
2 TABLET ORAL DAILY
Status: DISCONTINUED | OUTPATIENT
Start: 2024-02-24 | End: 2024-02-28 | Stop reason: HOSPADM

## 2024-02-24 RX ORDER — HYDROCODONE BITARTRATE AND ACETAMINOPHEN 5; 325 MG/1; MG/1
1 TABLET ORAL EVERY 4 HOURS PRN
Status: DISCONTINUED | OUTPATIENT
Start: 2024-02-24 | End: 2024-02-28 | Stop reason: HOSPADM

## 2024-02-24 RX ORDER — HYDROCODONE BITARTRATE AND ACETAMINOPHEN 5; 325 MG/1; MG/1
1 TABLET ORAL EVERY 8 HOURS PRN
Status: DISCONTINUED | OUTPATIENT
Start: 2024-02-24 | End: 2024-02-24

## 2024-02-24 RX ORDER — CLONIDINE 0.2 MG/24H
1 PATCH, EXTENDED RELEASE TRANSDERMAL WEEKLY
Status: DISCONTINUED | OUTPATIENT
Start: 2024-02-25 | End: 2024-02-28 | Stop reason: HOSPADM

## 2024-02-24 RX ORDER — HYDROCODONE BITARTRATE AND ACETAMINOPHEN 5; 325 MG/1; MG/1
1 TABLET ORAL
Status: COMPLETED | OUTPATIENT
Start: 2024-02-24 | End: 2024-02-24

## 2024-02-24 RX ORDER — MONTELUKAST SODIUM 10 MG/1
10 TABLET ORAL NIGHTLY
Status: DISCONTINUED | OUTPATIENT
Start: 2024-02-24 | End: 2024-02-28 | Stop reason: HOSPADM

## 2024-02-24 RX ADMIN — ENOXAPARIN SODIUM 30 MG: 100 INJECTION SUBCUTANEOUS at 20:09

## 2024-02-24 RX ADMIN — METOPROLOL SUCCINATE 50 MG: 50 TABLET, EXTENDED RELEASE ORAL at 09:18

## 2024-02-24 RX ADMIN — ESCITALOPRAM OXALATE 5 MG: 10 TABLET ORAL at 09:17

## 2024-02-24 RX ADMIN — MONTELUKAST 10 MG: 10 TABLET, FILM COATED ORAL at 20:09

## 2024-02-24 RX ADMIN — HYDROCODONE BITARTRATE AND ACETAMINOPHEN 1 TABLET: 5; 325 TABLET ORAL at 14:00

## 2024-02-24 RX ADMIN — SODIUM CHLORIDE, PRESERVATIVE FREE 10 ML: 5 INJECTION INTRAVENOUS at 07:32

## 2024-02-24 RX ADMIN — BUPROPION HYDROCHLORIDE 300 MG: 150 TABLET, EXTENDED RELEASE ORAL at 09:18

## 2024-02-24 RX ADMIN — ARIPIPRAZOLE 2 MG: 2 TABLET ORAL at 20:09

## 2024-02-24 RX ADMIN — HYDROCODONE BITARTRATE AND ACETAMINOPHEN 1 TABLET: 5; 325 TABLET ORAL at 03:07

## 2024-02-24 RX ADMIN — SODIUM CHLORIDE, PRESERVATIVE FREE 10 ML: 5 INJECTION INTRAVENOUS at 20:10

## 2024-02-24 RX ADMIN — ASPIRIN 81 MG: 81 TABLET, CHEWABLE ORAL at 09:17

## 2024-02-24 RX ADMIN — LEVOTHYROXINE SODIUM 88 MCG: 0.09 TABLET ORAL at 09:17

## 2024-02-24 RX ADMIN — PREDNISONE 2.5 MG: 5 TABLET ORAL at 09:18

## 2024-02-24 RX ADMIN — NIFEDIPINE 30 MG: 30 TABLET, EXTENDED RELEASE ORAL at 09:18

## 2024-02-24 RX ADMIN — HYDROCODONE BITARTRATE AND ACETAMINOPHEN 0.5 TABLET: 5; 325 TABLET ORAL at 09:18

## 2024-02-24 RX ADMIN — HYDROCODONE BITARTRATE AND ACETAMINOPHEN 1 TABLET: 5; 325 TABLET ORAL at 20:09

## 2024-02-24 ASSESSMENT — PAIN DESCRIPTION - ORIENTATION
ORIENTATION: RIGHT

## 2024-02-24 ASSESSMENT — PAIN SCALES - GENERAL
PAINLEVEL_OUTOF10: 4
PAINLEVEL_OUTOF10: 7
PAINLEVEL_OUTOF10: 6
PAINLEVEL_OUTOF10: 7
PAINLEVEL_OUTOF10: 7

## 2024-02-24 ASSESSMENT — PAIN DESCRIPTION - LOCATION
LOCATION: ARM

## 2024-02-25 ENCOUNTER — APPOINTMENT (OUTPATIENT)
Facility: HOSPITAL | Age: 84
DRG: 103 | End: 2024-02-25
Attending: INTERNAL MEDICINE
Payer: MEDICARE

## 2024-02-25 LAB
ECHO AO ASC DIAM: 3.3 CM
ECHO AO ASCENDING AORTA INDEX: 2.31 CM/M2
ECHO AV AREA PEAK VELOCITY: 1.8 CM2
ECHO AV AREA VTI: 2.1 CM2
ECHO AV AREA/BSA PEAK VELOCITY: 1.3 CM2/M2
ECHO AV AREA/BSA VTI: 1.5 CM2/M2
ECHO AV MEAN GRADIENT: 5 MMHG
ECHO AV MEAN VELOCITY: 1.1 M/S
ECHO AV PEAK GRADIENT: 10 MMHG
ECHO AV PEAK VELOCITY: 1.6 M/S
ECHO AV VELOCITY RATIO: 0.56
ECHO AV VTI: 37.3 CM
ECHO BSA: 1.42 M2
ECHO LA DIAMETER INDEX: 1.96 CM/M2
ECHO LA DIAMETER: 2.8 CM
ECHO LA VOL A-L A2C: 34 ML (ref 22–52)
ECHO LA VOL A-L A4C: 30 ML (ref 22–52)
ECHO LA VOL BP: 31 ML (ref 22–52)
ECHO LA VOL MOD A2C: 33 ML (ref 22–52)
ECHO LA VOL MOD A4C: 27 ML (ref 22–52)
ECHO LA VOL/BSA BIPLANE: 22 ML/M2 (ref 16–34)
ECHO LA VOLUME AREA LENGTH: 33 ML
ECHO LA VOLUME INDEX A-L A2C: 24 ML/M2 (ref 16–34)
ECHO LA VOLUME INDEX A-L A4C: 21 ML/M2 (ref 16–34)
ECHO LA VOLUME INDEX AREA LENGTH: 23 ML/M2 (ref 16–34)
ECHO LA VOLUME INDEX MOD A2C: 23 ML/M2 (ref 16–34)
ECHO LA VOLUME INDEX MOD A4C: 19 ML/M2 (ref 16–34)
ECHO LV E' LATERAL VELOCITY: 8 CM/S
ECHO LV E' SEPTAL VELOCITY: 6 CM/S
ECHO LV EDV A2C: 52 ML
ECHO LV EDV A4C: 62 ML
ECHO LV EDV BP: 57 ML (ref 56–104)
ECHO LV EDV INDEX A4C: 43 ML/M2
ECHO LV EDV INDEX BP: 40 ML/M2
ECHO LV EDV NDEX A2C: 36 ML/M2
ECHO LV EJECTION FRACTION A2C: 68 %
ECHO LV EJECTION FRACTION A4C: 67 %
ECHO LV EJECTION FRACTION BIPLANE: 68 % (ref 55–100)
ECHO LV ESV A2C: 17 ML
ECHO LV ESV A4C: 20 ML
ECHO LV ESV BP: 18 ML (ref 19–49)
ECHO LV ESV INDEX A2C: 12 ML/M2
ECHO LV ESV INDEX A4C: 14 ML/M2
ECHO LV ESV INDEX BP: 13 ML/M2
ECHO LV FRACTIONAL SHORTENING: 32 % (ref 28–44)
ECHO LV INTERNAL DIMENSION DIASTOLE INDEX: 3.5 CM/M2
ECHO LV INTERNAL DIMENSION DIASTOLIC: 5 CM (ref 3.9–5.3)
ECHO LV INTERNAL DIMENSION SYSTOLIC INDEX: 2.38 CM/M2
ECHO LV INTERNAL DIMENSION SYSTOLIC: 3.4 CM
ECHO LV IVSD: 0.9 CM (ref 0.6–0.9)
ECHO LV MASS 2D: 158.2 G (ref 67–162)
ECHO LV MASS INDEX 2D: 110.6 G/M2 (ref 43–95)
ECHO LV POSTERIOR WALL DIASTOLIC: 0.9 CM (ref 0.6–0.9)
ECHO LV RELATIVE WALL THICKNESS RATIO: 0.36
ECHO LVOT AREA: 3.1 CM2
ECHO LVOT AV VTI INDEX: 0.69
ECHO LVOT DIAM: 2 CM
ECHO LVOT MEAN GRADIENT: 2 MMHG
ECHO LVOT PEAK GRADIENT: 3 MMHG
ECHO LVOT PEAK VELOCITY: 0.9 M/S
ECHO LVOT STROKE VOLUME INDEX: 56.9 ML/M2
ECHO LVOT SV: 81.3 ML
ECHO LVOT VTI: 25.9 CM
ECHO MV A VELOCITY: 0.94 M/S
ECHO MV E DECELERATION TIME (DT): 284.9 MS
ECHO MV E VELOCITY: 0.82 M/S
ECHO MV E/A RATIO: 0.87
ECHO MV E/E' LATERAL: 10.25
ECHO MV E/E' RATIO (AVERAGED): 11.96
ECHO MV REGURGITANT PEAK GRADIENT: 61 MMHG
ECHO MV REGURGITANT PEAK VELOCITY: 3.9 M/S
ECHO PULMONARY ARTERY END DIASTOLIC PRESSURE: 6 MMHG
ECHO PV MAX VELOCITY: 1 M/S
ECHO PV PEAK GRADIENT: 4 MMHG
ECHO PV REGURGITANT MAX VELOCITY: 1.2 M/S
ECHO RV INTERNAL DIMENSION: 3.8 CM
ECHO RVOT PEAK GRADIENT: 2 MMHG
ECHO RVOT PEAK VELOCITY: 0.7 M/S
ECHO TV REGURGITANT MAX VELOCITY: 2.59 M/S
ECHO TV REGURGITANT PEAK GRADIENT: 27 MMHG
ERYTHROCYTE [DISTWIDTH] IN BLOOD BY AUTOMATED COUNT: 14.9 % (ref 11.5–14.5)
HCT VFR BLD AUTO: 27.9 % (ref 35–47)
HGB BLD-MCNC: 9.2 G/DL (ref 11.5–16)
MCH RBC QN AUTO: 29.3 PG (ref 26–34)
MCHC RBC AUTO-ENTMCNC: 33 G/DL (ref 30–36.5)
MCV RBC AUTO: 88.9 FL (ref 80–99)
NRBC # BLD: 0 K/UL (ref 0–0.01)
NRBC BLD-RTO: 0 PER 100 WBC
PLATELET # BLD AUTO: 181 K/UL (ref 150–400)
PMV BLD AUTO: 9.6 FL (ref 8.9–12.9)
RBC # BLD AUTO: 3.14 M/UL (ref 3.8–5.2)
WBC # BLD AUTO: 5.2 K/UL (ref 3.6–11)

## 2024-02-25 PROCEDURE — 94761 N-INVAS EAR/PLS OXIMETRY MLT: CPT

## 2024-02-25 PROCEDURE — 95720 EEG PHY/QHP EA INCR W/VEEG: CPT | Performed by: PSYCHIATRY & NEUROLOGY

## 2024-02-25 PROCEDURE — 6370000000 HC RX 637 (ALT 250 FOR IP): Performed by: INTERNAL MEDICINE

## 2024-02-25 PROCEDURE — 99233 SBSQ HOSP IP/OBS HIGH 50: CPT | Performed by: PSYCHIATRY & NEUROLOGY

## 2024-02-25 PROCEDURE — 2580000003 HC RX 258: Performed by: INTERNAL MEDICINE

## 2024-02-25 PROCEDURE — 36415 COLL VENOUS BLD VENIPUNCTURE: CPT

## 2024-02-25 PROCEDURE — 85027 COMPLETE CBC AUTOMATED: CPT

## 2024-02-25 PROCEDURE — 2060000000 HC ICU INTERMEDIATE R&B

## 2024-02-25 PROCEDURE — 6360000002 HC RX W HCPCS: Performed by: INTERNAL MEDICINE

## 2024-02-25 PROCEDURE — 93306 TTE W/DOPPLER COMPLETE: CPT | Performed by: INTERNAL MEDICINE

## 2024-02-25 PROCEDURE — 93306 TTE W/DOPPLER COMPLETE: CPT

## 2024-02-25 RX ORDER — BUPRENORPHINE 7.5 UG/H
1 PATCH TRANSDERMAL WEEKLY
Status: DISCONTINUED | OUTPATIENT
Start: 2024-02-25 | End: 2024-02-28 | Stop reason: HOSPADM

## 2024-02-25 RX ADMIN — NIFEDIPINE 30 MG: 30 TABLET, EXTENDED RELEASE ORAL at 08:21

## 2024-02-25 RX ADMIN — ASPIRIN 81 MG: 81 TABLET, CHEWABLE ORAL at 08:20

## 2024-02-25 RX ADMIN — HYDROCODONE BITARTRATE AND ACETAMINOPHEN 1 TABLET: 5; 325 TABLET ORAL at 00:40

## 2024-02-25 RX ADMIN — PREDNISONE 2.5 MG: 5 TABLET ORAL at 08:20

## 2024-02-25 RX ADMIN — MONTELUKAST 10 MG: 10 TABLET, FILM COATED ORAL at 20:46

## 2024-02-25 RX ADMIN — LEVOTHYROXINE SODIUM 88 MCG: 0.09 TABLET ORAL at 06:03

## 2024-02-25 RX ADMIN — METHYLPHENIDATE HYDROCHLORIDE 10 MG: 5 TABLET ORAL at 08:20

## 2024-02-25 RX ADMIN — ARIPIPRAZOLE 2 MG: 2 TABLET ORAL at 08:21

## 2024-02-25 RX ADMIN — ESCITALOPRAM OXALATE 5 MG: 10 TABLET ORAL at 08:20

## 2024-02-25 RX ADMIN — ENOXAPARIN SODIUM 30 MG: 100 INJECTION SUBCUTANEOUS at 20:46

## 2024-02-25 RX ADMIN — SODIUM CHLORIDE, PRESERVATIVE FREE 10 ML: 5 INJECTION INTRAVENOUS at 08:22

## 2024-02-25 RX ADMIN — METHYLPHENIDATE HYDROCHLORIDE 10 MG: 5 TABLET ORAL at 13:30

## 2024-02-25 RX ADMIN — BUPROPION HYDROCHLORIDE 300 MG: 150 TABLET, EXTENDED RELEASE ORAL at 08:21

## 2024-02-25 RX ADMIN — SODIUM CHLORIDE, PRESERVATIVE FREE 10 ML: 5 INJECTION INTRAVENOUS at 20:46

## 2024-02-25 RX ADMIN — HYDROCODONE BITARTRATE AND ACETAMINOPHEN 1 TABLET: 5; 325 TABLET ORAL at 06:03

## 2024-02-25 RX ADMIN — HYDROCODONE BITARTRATE AND ACETAMINOPHEN 1 TABLET: 5; 325 TABLET ORAL at 19:06

## 2024-02-25 RX ADMIN — HYDROCODONE BITARTRATE AND ACETAMINOPHEN 1 TABLET: 5; 325 TABLET ORAL at 13:30

## 2024-02-25 RX ADMIN — METOPROLOL SUCCINATE 50 MG: 50 TABLET, EXTENDED RELEASE ORAL at 08:20

## 2024-02-25 ASSESSMENT — PAIN SCALES - GENERAL
PAINLEVEL_OUTOF10: 7
PAINLEVEL_OUTOF10: 5
PAINLEVEL_OUTOF10: 7
PAINLEVEL_OUTOF10: 7

## 2024-02-25 ASSESSMENT — PAIN DESCRIPTION - DESCRIPTORS: DESCRIPTORS: ACHING

## 2024-02-25 ASSESSMENT — PAIN DESCRIPTION - PAIN TYPE: TYPE: ACUTE PAIN

## 2024-02-25 ASSESSMENT — PAIN DESCRIPTION - ORIENTATION: ORIENTATION: RIGHT

## 2024-02-25 ASSESSMENT — PAIN DESCRIPTION - LOCATION
LOCATION: SHOULDER
LOCATION: SHOULDER

## 2024-02-25 NOTE — PROCEDURES
Long Term (12 to 26 hour) Video EEG Monitoring Report    [place], [place]        [phone] (Main)  [phone] (Medical Records)     Interpreting Physician:   Mary Jo Leigh MD  EEG Technologist:   Guillermo Loomis    Start/ End:    2-/ 1611  To 2-/ 1505  Total Duration:    22 hr 6 min  Date of Interpretation:  2/25/24    Indication:  From Neurology Consult dated 2-    83 y.o. female was admitted on 2/23/2024 12:31 PM for evaluation/ treatment of multiple episodes of expressive aphasia and confusion on 2-23-24.      Neurology Consult is requested by Dr Osullivan to evaluate nature of multiple episodes of expressive aphasia, seizure vs stroke.  Reviewed chart and spoke with Son at bedside.   Son reported to Dr Osullivan that patient is having episodes (6 on 2-23-24) where she seems to start looking around, appears confused, cannot speak, doesn't respond, then regains alertness after about a minute.  Pt reoriented to Dr Osullivan she an feel the episodes coming on and had an episode in front of him, which he estimated lasted about 1-2 minutes, then she was back to baseline alertness.       Son confirms the above hx. Pt and He tell me that she had an unexpected fall on 2-, no warning, legs gave out and she hit right lower face on the floor.  Went to [facility] ER. CT head didn't show any acute abnormality.  CT C-spine didn't either.  XR right shoulder showed shoulder fracture. She was d/c with a Sling and plans to see ortho few days.  Son says that they went to [facility]o Clinic yesterday for appt.  While he was signing her in, she had episode where she seemed to be confused, staring, couldn't get words out.  That lasted about a minute, then she could talk but speech sounded off.  After a short period she was back to baseline alertness.  He says while in the exam room at Ortho clinic, she had another similar episode.  They were advised to take pt to ER. They went back to

## 2024-02-26 PROCEDURE — 97535 SELF CARE MNGMENT TRAINING: CPT

## 2024-02-26 PROCEDURE — 6370000000 HC RX 637 (ALT 250 FOR IP): Performed by: INTERNAL MEDICINE

## 2024-02-26 PROCEDURE — 94761 N-INVAS EAR/PLS OXIMETRY MLT: CPT

## 2024-02-26 PROCEDURE — 97530 THERAPEUTIC ACTIVITIES: CPT

## 2024-02-26 PROCEDURE — 2580000003 HC RX 258: Performed by: INTERNAL MEDICINE

## 2024-02-26 PROCEDURE — 6370000000 HC RX 637 (ALT 250 FOR IP)

## 2024-02-26 PROCEDURE — 1100000000 HC RM PRIVATE

## 2024-02-26 PROCEDURE — 6360000002 HC RX W HCPCS: Performed by: INTERNAL MEDICINE

## 2024-02-26 PROCEDURE — 97116 GAIT TRAINING THERAPY: CPT

## 2024-02-26 PROCEDURE — 99222 1ST HOSP IP/OBS MODERATE 55: CPT | Performed by: NURSE PRACTITIONER

## 2024-02-26 PROCEDURE — 6360000002 HC RX W HCPCS

## 2024-02-26 RX ORDER — NIFEDIPINE 30 MG/1
30 TABLET, EXTENDED RELEASE ORAL ONCE
Status: COMPLETED | OUTPATIENT
Start: 2024-02-26 | End: 2024-02-26

## 2024-02-26 RX ORDER — NIFEDIPINE 30 MG/1
60 TABLET, EXTENDED RELEASE ORAL DAILY
Status: DISCONTINUED | OUTPATIENT
Start: 2024-02-27 | End: 2024-02-28 | Stop reason: HOSPADM

## 2024-02-26 RX ORDER — HYDRALAZINE HYDROCHLORIDE 20 MG/ML
5 INJECTION INTRAMUSCULAR; INTRAVENOUS ONCE
Status: COMPLETED | OUTPATIENT
Start: 2024-02-26 | End: 2024-02-26

## 2024-02-26 RX ADMIN — ENOXAPARIN SODIUM 30 MG: 100 INJECTION SUBCUTANEOUS at 21:14

## 2024-02-26 RX ADMIN — METHYLPHENIDATE HYDROCHLORIDE 10 MG: 5 TABLET ORAL at 12:53

## 2024-02-26 RX ADMIN — LEVOTHYROXINE SODIUM 88 MCG: 0.09 TABLET ORAL at 05:54

## 2024-02-26 RX ADMIN — SODIUM CHLORIDE, PRESERVATIVE FREE 10 ML: 5 INJECTION INTRAVENOUS at 21:14

## 2024-02-26 RX ADMIN — ESCITALOPRAM OXALATE 5 MG: 10 TABLET ORAL at 08:45

## 2024-02-26 RX ADMIN — ARIPIPRAZOLE 2 MG: 2 TABLET ORAL at 08:44

## 2024-02-26 RX ADMIN — MONTELUKAST 10 MG: 10 TABLET, FILM COATED ORAL at 21:14

## 2024-02-26 RX ADMIN — SALINE NASAL SPRAY 2 SPRAY: 1.5 SOLUTION NASAL at 01:30

## 2024-02-26 RX ADMIN — NIFEDIPINE 30 MG: 30 TABLET, EXTENDED RELEASE ORAL at 08:44

## 2024-02-26 RX ADMIN — NIFEDIPINE 30 MG: 30 TABLET, EXTENDED RELEASE ORAL at 12:53

## 2024-02-26 RX ADMIN — METOPROLOL SUCCINATE 50 MG: 50 TABLET, EXTENDED RELEASE ORAL at 08:44

## 2024-02-26 RX ADMIN — METHYLPHENIDATE HYDROCHLORIDE 10 MG: 5 TABLET ORAL at 08:44

## 2024-02-26 RX ADMIN — PREDNISONE 2.5 MG: 5 TABLET ORAL at 08:45

## 2024-02-26 RX ADMIN — SODIUM CHLORIDE, PRESERVATIVE FREE 10 ML: 5 INJECTION INTRAVENOUS at 07:34

## 2024-02-26 RX ADMIN — BUPROPION HYDROCHLORIDE 300 MG: 150 TABLET, EXTENDED RELEASE ORAL at 08:44

## 2024-02-26 RX ADMIN — HYDRALAZINE HYDROCHLORIDE 5 MG: 20 INJECTION INTRAMUSCULAR; INTRAVENOUS at 01:30

## 2024-02-26 RX ADMIN — HYDROCODONE BITARTRATE AND ACETAMINOPHEN 1 TABLET: 5; 325 TABLET ORAL at 16:45

## 2024-02-26 RX ADMIN — HYDROCODONE BITARTRATE AND ACETAMINOPHEN 1 TABLET: 5; 325 TABLET ORAL at 08:45

## 2024-02-26 RX ADMIN — ASPIRIN 81 MG: 81 TABLET, CHEWABLE ORAL at 08:44

## 2024-02-26 ASSESSMENT — PAIN DESCRIPTION - LOCATION: LOCATION: ARM

## 2024-02-26 ASSESSMENT — PAIN SCALES - GENERAL
PAINLEVEL_OUTOF10: 0
PAINLEVEL_OUTOF10: 7

## 2024-02-26 ASSESSMENT — PAIN DESCRIPTION - ORIENTATION: ORIENTATION: RIGHT

## 2024-02-27 LAB
ANION GAP SERPL CALC-SCNC: 7 MMOL/L (ref 5–15)
BUN SERPL-MCNC: 16 MG/DL (ref 6–20)
BUN/CREAT SERPL: 20 (ref 12–20)
CALCIUM SERPL-MCNC: 8.9 MG/DL (ref 8.5–10.1)
CHLORIDE SERPL-SCNC: 107 MMOL/L (ref 97–108)
CO2 SERPL-SCNC: 25 MMOL/L (ref 21–32)
COMMENT:: NORMAL
CREAT SERPL-MCNC: 0.82 MG/DL (ref 0.55–1.02)
GLUCOSE BLD STRIP.AUTO-MCNC: 96 MG/DL (ref 65–117)
GLUCOSE SERPL-MCNC: 91 MG/DL (ref 65–100)
MAGNESIUM SERPL-MCNC: 1.8 MG/DL (ref 1.6–2.4)
POTASSIUM SERPL-SCNC: 3.6 MMOL/L (ref 3.5–5.1)
SERVICE CMNT-IMP: NORMAL
SODIUM SERPL-SCNC: 139 MMOL/L (ref 136–145)
SPECIMEN HOLD: NORMAL
TROPONIN I SERPL HS-MCNC: 13 NG/L (ref 0–51)

## 2024-02-27 PROCEDURE — 97116 GAIT TRAINING THERAPY: CPT

## 2024-02-27 PROCEDURE — 6370000000 HC RX 637 (ALT 250 FOR IP): Performed by: INTERNAL MEDICINE

## 2024-02-27 PROCEDURE — 36415 COLL VENOUS BLD VENIPUNCTURE: CPT

## 2024-02-27 PROCEDURE — 6360000002 HC RX W HCPCS: Performed by: INTERNAL MEDICINE

## 2024-02-27 PROCEDURE — 2580000003 HC RX 258: Performed by: INTERNAL MEDICINE

## 2024-02-27 PROCEDURE — 82962 GLUCOSE BLOOD TEST: CPT

## 2024-02-27 PROCEDURE — 80048 BASIC METABOLIC PNL TOTAL CA: CPT

## 2024-02-27 PROCEDURE — 97112 NEUROMUSCULAR REEDUCATION: CPT

## 2024-02-27 PROCEDURE — 1100000000 HC RM PRIVATE

## 2024-02-27 PROCEDURE — 97535 SELF CARE MNGMENT TRAINING: CPT

## 2024-02-27 PROCEDURE — 84484 ASSAY OF TROPONIN QUANT: CPT

## 2024-02-27 PROCEDURE — 94761 N-INVAS EAR/PLS OXIMETRY MLT: CPT

## 2024-02-27 PROCEDURE — 83735 ASSAY OF MAGNESIUM: CPT

## 2024-02-27 PROCEDURE — 6360000002 HC RX W HCPCS: Performed by: NURSE PRACTITIONER

## 2024-02-27 RX ORDER — MORPHINE SULFATE 2 MG/ML
2 INJECTION, SOLUTION INTRAMUSCULAR; INTRAVENOUS ONCE
Status: COMPLETED | OUTPATIENT
Start: 2024-02-27 | End: 2024-02-27

## 2024-02-27 RX ADMIN — METHYLPHENIDATE HYDROCHLORIDE 10 MG: 5 TABLET ORAL at 13:08

## 2024-02-27 RX ADMIN — ARIPIPRAZOLE 2 MG: 2 TABLET ORAL at 08:45

## 2024-02-27 RX ADMIN — LEVOTHYROXINE SODIUM 88 MCG: 0.09 TABLET ORAL at 06:11

## 2024-02-27 RX ADMIN — ESCITALOPRAM OXALATE 5 MG: 10 TABLET ORAL at 08:45

## 2024-02-27 RX ADMIN — BUPROPION HYDROCHLORIDE 300 MG: 150 TABLET, EXTENDED RELEASE ORAL at 08:45

## 2024-02-27 RX ADMIN — ASPIRIN 81 MG: 81 TABLET, CHEWABLE ORAL at 08:45

## 2024-02-27 RX ADMIN — METOPROLOL SUCCINATE 50 MG: 50 TABLET, EXTENDED RELEASE ORAL at 08:45

## 2024-02-27 RX ADMIN — HYDROCODONE BITARTRATE AND ACETAMINOPHEN 1 TABLET: 5; 325 TABLET ORAL at 21:59

## 2024-02-27 RX ADMIN — SODIUM CHLORIDE, PRESERVATIVE FREE 10 ML: 5 INJECTION INTRAVENOUS at 08:46

## 2024-02-27 RX ADMIN — NIFEDIPINE 60 MG: 30 TABLET, EXTENDED RELEASE ORAL at 10:10

## 2024-02-27 RX ADMIN — SODIUM CHLORIDE, PRESERVATIVE FREE 10 ML: 5 INJECTION INTRAVENOUS at 20:13

## 2024-02-27 RX ADMIN — METHYLPHENIDATE HYDROCHLORIDE 10 MG: 5 TABLET ORAL at 08:45

## 2024-02-27 RX ADMIN — PREDNISONE 2.5 MG: 5 TABLET ORAL at 08:45

## 2024-02-27 RX ADMIN — MORPHINE SULFATE 2 MG: 2 INJECTION, SOLUTION INTRAMUSCULAR; INTRAVENOUS at 06:11

## 2024-02-27 RX ADMIN — MONTELUKAST 10 MG: 10 TABLET, FILM COATED ORAL at 20:11

## 2024-02-27 RX ADMIN — HYDROCODONE BITARTRATE AND ACETAMINOPHEN 1 TABLET: 5; 325 TABLET ORAL at 14:54

## 2024-02-27 RX ADMIN — ENOXAPARIN SODIUM 30 MG: 100 INJECTION SUBCUTANEOUS at 20:11

## 2024-02-27 ASSESSMENT — PAIN SCALES - GENERAL
PAINLEVEL_OUTOF10: 7
PAINLEVEL_OUTOF10: 8
PAINLEVEL_OUTOF10: 5

## 2024-02-27 ASSESSMENT — PAIN DESCRIPTION - DESCRIPTORS
DESCRIPTORS: ACHING
DESCRIPTORS: ACHING;SORE

## 2024-02-27 ASSESSMENT — PAIN DESCRIPTION - ORIENTATION
ORIENTATION: LEFT
ORIENTATION: LEFT

## 2024-02-27 ASSESSMENT — PAIN DESCRIPTION - LOCATION
LOCATION: CHEST
LOCATION: SHOULDER

## 2024-02-27 NOTE — CONSULTS
Orthopedic History and Physical     Patient: Kathryn Hughes MRN: [id number]  SSN: [id number]    YOB: 1940  Age: 83 y.o.  Sex: female          Subjective:     Patient is a 83 y.o.  female who complains of right shoulder pain. Pt admitted due to seizures/ AMS. Pt had a fall last Wednesday fracturing her right greater tuberosity. She was seen by Dr. Hernandez NP last Friday. She was recommended to continue sling and follow up with Dr. Hernandez the following week. Due to patient being discharged to [facility] probably tomorrow, Ortho consulted for follow up since she is not able to see Dr. Hernandez as outpatient.  Pt denies any significant pain in her right shoulder. Sling is intact.        Patient Active Problem List    Diagnosis Date Noted    Expressive aphasia 02/23/2024    Confusion 02/23/2024    Chest pain 03/29/2023    Bradycardia 04/18/2019    Hypokalemia 04/18/2019    GERD (gastroesophageal reflux disease) 05/20/2017    Asthma 05/20/2017    Osteoporosis 05/20/2017    Acquired hypothyroidism 05/20/2017    Essential hypertension 05/20/2017    Lumbar stenosis with neurogenic claudication 05/15/2017     Past Medical History:   Diagnosis Date    Anemia     Arthritis     osteoporosis    Asthma     Broken ribs 2006    fell    Cancer (HCC)     melanoma     GERD (gastroesophageal reflux disease)     Hypertension     Migraines     Osteoporosis     Rheumatic fever     x2 without residual    Thyroid disease       Past Surgical History:   Procedure Laterality Date    ABOVE KNEE AMPUTATION Left 05/2015    remove Leonard's neuroma    BREAST BIOPSY Left 1972    benign    CATARACT REMOVAL Bilateral 2009, 2011    CYST INCISION AND DRAINAGE      multiple bilateral cyst aspiration.     GYN  1969    Hysterectomy    HEENT      deviated septum    HEENT  1996    sinus surgery    ORTHOPEDIC SURGERY      back surgery     ORTHOPEDIC SURGERY Left 2012    basal joint surgery     ORTHOPEDIC 
exacerbation, progression, or side effects of treatment (Moderate)  [] 1 or more chronic illnesses with severe exacerbation, progression, or side effects of treatment (High)  [x] 1 acute or chronic illness or injury that poses a threat to life or bodily function (High)    B) Amount/ Complexity of Data reviewed (1 of 3 categories = Moderate, 2 of 3 categories = High)  Test, documents, or independent historian(s) (any THREE ELEMENTS):   []  Review of external note(s) from unique source:  [x]  Review of the result(s) of each unique test  [x]  Ordered a unique test  [x]  Discussed with a historian other than the patient: Son    Independent interpretation of a test performed by another Physician / QHP:     [x] Yes      Discussion of management or test interpretation with another Physician / QHP:     [x] Yes  Discussed with Dr Lawrence via [employer] (secure messaging)     C) Risk of Complication, Morbidity, or Mortality:     Escalation of hospital level of care (High Risk):  [] Yes [] No  Decision to de-escalate care or DNR due to poor prognosis (High Risk):     [] Yes   [] No  Parenteral controlled substances prescribed (High Risk):     [] Yes [] No  Drug Therapy requiring intensive monitoring for toxicity (High Risk)  [] Yes   [] No  Prescription drug management (Moderate Risk):       [] Yes     [] No  Decision regarding minor surgery (Moderate Risk):      [] Yes      [] No

## 2024-02-27 NOTE — SIGNIFICANT EVENT
Responded to RR with chest pain- pt states she noted pain in her left chest while lying in bed, states the pain in constant in the left chest area, denies radiation to arm, neck or right chest, denies SOB, diaphoresis or associated nausea with it. Pt states she has never had this feeling before.   Pt awake and alert, does not appears to be in significant pain or discomfort, answers all questions.     EKG with sinus paola, no noted ectopi, rate of 54- place on remote tele, order placed, nsg aware.    Neuro alert and oriented, clear speech- no noted speech difficulties  HRR, S1,S2, strong radial pulses, no M, G, R  Lungs CTA, resp easy and non-labored  GI: Abd soft, + bowel sounds  INTEG: Wkin warm and dry.     BP noted to be elevated, will hold treating it now, will treat CP now with 2 mg Morphine and has nursing reassess BP,  BMP, Mg and trop to be sent, will trend trop x 2 more. Will hold on cards consult at this time- attending to further eval.

## 2024-02-28 VITALS
TEMPERATURE: 97.9 F | DIASTOLIC BLOOD PRESSURE: 61 MMHG | SYSTOLIC BLOOD PRESSURE: 136 MMHG | BODY MASS INDEX: 18.58 KG/M2 | HEIGHT: 62 IN | WEIGHT: 101 LBS | OXYGEN SATURATION: 97 % | HEART RATE: 62 BPM | RESPIRATION RATE: 16 BRPM

## 2024-02-28 LAB
EKG ATRIAL RATE: 54 BPM
EKG DIAGNOSIS: NORMAL
EKG P AXIS: 31 DEGREES
EKG P-R INTERVAL: 198 MS
EKG Q-T INTERVAL: 442 MS
EKG QRS DURATION: 86 MS
EKG QTC CALCULATION (BAZETT): 419 MS
EKG R AXIS: -40 DEGREES
EKG T AXIS: 11 DEGREES
EKG VENTRICULAR RATE: 54 BPM

## 2024-02-28 PROCEDURE — 97110 THERAPEUTIC EXERCISES: CPT

## 2024-02-28 PROCEDURE — 97116 GAIT TRAINING THERAPY: CPT

## 2024-02-28 PROCEDURE — 2580000003 HC RX 258: Performed by: INTERNAL MEDICINE

## 2024-02-28 PROCEDURE — 6370000000 HC RX 637 (ALT 250 FOR IP): Performed by: INTERNAL MEDICINE

## 2024-02-28 RX ORDER — ARIPIPRAZOLE 2 MG/1
2 TABLET ORAL DAILY
COMMUNITY

## 2024-02-28 RX ORDER — FLUTICASONE FUROATE, UMECLIDINIUM BROMIDE AND VILANTEROL TRIFENATATE 100; 62.5; 25 UG/1; UG/1; UG/1
1 POWDER RESPIRATORY (INHALATION) DAILY
COMMUNITY

## 2024-02-28 RX ORDER — TRIAMCINOLONE ACETONIDE 55 UG/1
2 SPRAY, METERED NASAL
COMMUNITY

## 2024-02-28 RX ORDER — TRIAMCINOLONE ACETONIDE 1 MG/G
CREAM TOPICAL EVERY OTHER DAY
COMMUNITY

## 2024-02-28 RX ORDER — HYDROCODONE BITARTRATE AND ACETAMINOPHEN 5; 325 MG/1; MG/1
1 TABLET ORAL EVERY 4 HOURS PRN
Qty: 18 TABLET | Refills: 0 | Status: SHIPPED | OUTPATIENT
Start: 2024-02-28 | End: 2024-03-02

## 2024-02-28 RX ORDER — HYDROCODONE BITARTRATE AND ACETAMINOPHEN 5; 325 MG/1; MG/1
0.5 TABLET ORAL DAILY PRN
Status: ON HOLD | COMMUNITY
End: 2024-02-28 | Stop reason: HOSPADM

## 2024-02-28 RX ORDER — DEXMETHYLPHENIDATE HYDROCHLORIDE 10 MG/1
10 CAPSULE, EXTENDED RELEASE ORAL DAILY
COMMUNITY

## 2024-02-28 RX ORDER — MINERAL OIL/HYDROPHIL PETROLAT
OINTMENT (GRAM) TOPICAL DAILY
COMMUNITY

## 2024-02-28 RX ORDER — CLONIDINE 0.2 MG/24H
1 PATCH, EXTENDED RELEASE TRANSDERMAL WEEKLY
COMMUNITY

## 2024-02-28 RX ORDER — ARIPIPRAZOLE 2 MG/1
2 TABLET ORAL DAILY
Qty: 30 TABLET | Refills: 3 | Status: SHIPPED
Start: 2024-02-29 | End: 2024-02-28 | Stop reason: HOSPADM

## 2024-02-28 RX ORDER — MONTELUKAST SODIUM 10 MG/1
10 TABLET ORAL NIGHTLY
COMMUNITY

## 2024-02-28 RX ADMIN — HYDROCODONE BITARTRATE AND ACETAMINOPHEN 1 TABLET: 5; 325 TABLET ORAL at 04:16

## 2024-02-28 RX ADMIN — ASPIRIN 81 MG: 81 TABLET, CHEWABLE ORAL at 09:28

## 2024-02-28 RX ADMIN — PREDNISONE 2.5 MG: 5 TABLET ORAL at 09:28

## 2024-02-28 RX ADMIN — METHYLPHENIDATE HYDROCHLORIDE 10 MG: 5 TABLET ORAL at 11:55

## 2024-02-28 RX ADMIN — METOPROLOL SUCCINATE 50 MG: 50 TABLET, EXTENDED RELEASE ORAL at 09:28

## 2024-02-28 RX ADMIN — LEVOTHYROXINE SODIUM 88 MCG: 0.09 TABLET ORAL at 05:44

## 2024-02-28 RX ADMIN — ARIPIPRAZOLE 2 MG: 2 TABLET ORAL at 09:28

## 2024-02-28 RX ADMIN — METHYLPHENIDATE HYDROCHLORIDE 10 MG: 5 TABLET ORAL at 09:28

## 2024-02-28 RX ADMIN — BUPROPION HYDROCHLORIDE 300 MG: 150 TABLET, EXTENDED RELEASE ORAL at 09:28

## 2024-02-28 RX ADMIN — HYDROCODONE BITARTRATE AND ACETAMINOPHEN 1 TABLET: 5; 325 TABLET ORAL at 15:30

## 2024-02-28 RX ADMIN — SODIUM CHLORIDE, PRESERVATIVE FREE 10 ML: 5 INJECTION INTRAVENOUS at 09:29

## 2024-02-28 RX ADMIN — NIFEDIPINE 60 MG: 30 TABLET, EXTENDED RELEASE ORAL at 09:27

## 2024-02-28 RX ADMIN — ESCITALOPRAM OXALATE 5 MG: 10 TABLET ORAL at 09:28

## 2024-02-28 ASSESSMENT — PAIN DESCRIPTION - LOCATION: LOCATION: ARM;WRIST;ELBOW

## 2024-02-28 ASSESSMENT — PAIN SCALES - GENERAL
PAINLEVEL_OUTOF10: 0
PAINLEVEL_OUTOF10: 6
PAINLEVEL_OUTOF10: 6

## 2024-02-28 ASSESSMENT — PAIN DESCRIPTION - DESCRIPTORS: DESCRIPTORS: ACHING

## 2024-02-28 ASSESSMENT — PAIN DESCRIPTION - ORIENTATION: ORIENTATION: RIGHT

## 2024-02-28 NOTE — PROGRESS NOTES
Hospitalist Progress Note      NAME:  Kathryn Hughes   :  1940  MRM:  [id number]    Date/Time: 2024  3:59 PM           Assessment / Plan:     Ms. Hughes with past medical history of HTN, GERD, asthma, hypothyroidism presented to ER complaining of expressive aphasia and confusion    #Expressive Aphasia: CVA and seizure ruled out. More likely post-concussive in nature. Resolved.     #Acquired hypothyroidism.  Continue levothyroxine     #GERD (gastroesophageal reflux disease).  Continue PPI      #Essential hypertension.  Continue clonidine patch, Bystolic.     #Recent right humeral fracture after a fall 2 days PTA. PT rec IPR. She was to see ortho outpt this week, but wonders if she can be seen inpt. I have reached out for recs, which are appreciated    #Chronic Pain   - Resume home buprenorphine patch; norco    #mood d/o: abilify, bupropion     #Asthma.  Stable.  Not wheezing.  Continue as needed nebulizer    I have personally reviewed the radiographs, laboratory data in Epic and decisions and statements above are based partially on this personal interpretation.                 Care Plan discussed with: Patient and Consultant/Specialist    Discussed:  Care Plan    Prophylaxis:  Lovenox    Disposition:  SAH/Rehab accepted, awaiting bed           ___________________________________________________    Attending Physician: Jomar Lawrence MD        Subjective:     Chief Complaint:  No acute events overnight. Feels a little better. Daughter at beside. All questions answered    ROS:  (bold if positive, if negative)    Tolerating PT  Tolerating Diet          Objective:       Vitals:          Last 24hrs VS reviewed since prior progress note. Most recent are:    Vitals:    24 1526   BP: (!) 158/68   Pulse: 56   Resp: 16   Temp: 97.9 °F (36.6 °C)   SpO2: 97%     SpO2 Readings from Last 6 Encounters:   24 97%   24 96%          Intake/Output Summary (Last 24 hours) at 2024 1559  Last 
    Hospitalist Progress Note      NAME:  Kathryn Hughes   :  1940  MRM:  [id number]    Date/Time: 2024  2:29 PM           Assessment / Plan:     Ms. Hughes with past medical history of HTN, GERD, asthma, hypothyroidism presented to ER complaining of expressive aphasia and confusion    #Expressive Aphasia: MRI neg for CVA. No new medications. Possible sz. Bupropion can lower threshold for such   - Appreciate Neuro recs    #Acquired hypothyroidism.  Continue levothyroxine     #GERD (gastroesophageal reflux disease).  Continue PPI      #Essential hypertension.  Continue clonidine patch, Bystolic.     #Recent right humeral fracture after a fall 2 days ago.  Patient has seen outpatient orthopedics.  Continue pain management and follow-up with orthopedics(may be the fall she sustained could be when she had one of those episodes?)   - PT rec IPR     #Asthma.  Stable.  Not wheezing.  Continue as needed nebulizer    I have personally reviewed the radiographs, laboratory data in Epic and decisions and statements above are based partially on this personal interpretation.                 Care Plan discussed with: Patient and Consultant/Specialist    Discussed:  Care Plan    Prophylaxis:  Lovenox    Disposition:  SA[facility]/Rehab           ___________________________________________________    Attending Physician: Jomar Lawrence MD        Subjective:     Chief Complaint:  No acute events overnight. Feels ok    ROS:  (bold if positive, if negative)    Tolerating PT  Tolerating Diet          Objective:       Vitals:          Last 24hrs VS reviewed since prior progress note. Most recent are:    Vitals:    24 1116   BP: 121/75   Pulse: 54   Resp: 16   Temp: 97.9 °F (36.6 °C)   SpO2: 96%     SpO2 Readings from Last 6 Encounters:   24 96%   24 96%        No intake or output data in the 24 hours ending 24 1429       Exam:     Physical Exam:    Gen:  Well-developed, well-nourished, in no acute 
    Hospitalist Progress Note      NAME:  Kathryn Hughes   :  1940  MRM:  [id number]    Date/Time: 2024  12:39 PM           Assessment / Plan:     Ms. Hughes with past medical history of HTN, GERD, asthma, hypothyroidism presented to ER complaining of expressive aphasia and confusion    #Expressive Aphasia: MRI neg for CVA. No new medications. Possible sz. Bupropion can lower threshold for such   - Appreciate Neuro recs   - 24 hr EEG today    #Acquired hypothyroidism.  Continue levothyroxine     #GERD (gastroesophageal reflux disease).  Continue PPI      #Essential hypertension.  Continue clonidine patch, Bystolic.     #Recent right humeral fracture after a fall 2 days ago.  Patient has seen outpatient orthopedics.  Continue pain management and follow-up with orthopedics(may be the fall she sustained could be when she had one of those episodes?)   - PT rec IPR   - Increased her home chronic pain regimen to accommodate    #Chronic Pain   - Resume home buprenorphine patch; norco    #mood d/o: abilify, bupropion     #Asthma.  Stable.  Not wheezing.  Continue as needed nebulizer    I have personally reviewed the radiographs, laboratory data in Epic and decisions and statements above are based partially on this personal interpretation.                 Care Plan discussed with: Patient and Consultant/Specialist    Discussed:  Care Plan    Prophylaxis:  Lovenox    Disposition:  SAH/Rehab           ___________________________________________________    Attending Physician: Jomar Lawrence MD        Subjective:     Chief Complaint:  No acute events overnight. Feels great today without complaints    ROS:  (bold if positive, if negative)    Tolerating PT  Tolerating Diet          Objective:       Vitals:          Last 24hrs VS reviewed since prior progress note. Most recent are:    Vitals:    24 1130   BP: 139/88   Pulse: 54   Resp: 16   Temp: 98.4 °F (36.9 °C)   SpO2: 95%     SpO2 Readings from Last 6 
  Physician Progress Note      PATIENT:               RIP SHAH  CSN #:                  [id number]  :                       1940  ADMIT DATE:       2024 12:31 PM  DISCH DATE:  RESPONDING  PROVIDER #:        Jomar Lawrence MD          QUERY TEXT:    Patient admitted with expressive aphasia. Noted to have \"moderate   malnutrition\" documented in  assessment. If possible, please document   in progress notes and discharge summary if you are evaluating and /or treating   any of the following:    The medical record reflects the following:  Risk Factors: advanced age, GERD  Clinical Indicators:  RD: Malnutrition Status:  Moderate malnutrition   (24 1054)  Context:  Chronic Illness  Findings of the 6 clinical characteristics of malnutrition:  Weight Loss:  Mild weight loss (specify amount and time period) (13.6% x 8   months)  Body Fat Loss:  Mild body fat loss Fat Overlying Ribs, Orbital  Muscle Mass Loss:  Mild muscle mass loss Clavicles (pectoralis & deltoids)  Treatment: RD assessment, ONS with lunch, monitoring    ASPEN Criteria:    https://aspenjournals.onlinelibrary.carreno.com/doi/full/10.1177/494017242763350  5    Thank you,    Christa Bueno RN  CDI  Options provided:  -- Protein calorie malnutrition moderate  -- Other - I will add my own diagnosis  -- Disagree - Not applicable / Not valid  -- Disagree - Clinically unable to determine / Unknown  -- Refer to Clinical Documentation Reviewer    PROVIDER RESPONSE TEXT:    This patient has moderate protein calorie malnutrition.    Query created by: Christa Bueno on 2024 9:09 AM      Electronically signed by:  Jomar Lawrecne MD 2024 12:06 PM          
[id number] spoke with Sandra JONES From [facility] @ [phone] regarding patient going to room 2154. Provided her with  SBAR, recent vitals went over injuries location and treatment . Patient has received RSV, Covid and Flu vaccine early part of October and November. Patient takes pills whole and on regular diet  
Comprehensive Nutrition Assessment    Type and Reason for Visit:  Initial, Positive Nutrition Screen    Nutrition Recommendations/Plan:   Continue regular diet order  Provide Ensure High Protein once daily (160 kcal, 19 g carbs, 16 g protein)    Consider MVI with Ca   Consider appetite stimulant      Malnutrition Assessment:  Malnutrition Status:  Moderate malnutrition (02/27/24 1054)    Context:  Chronic Illness     Findings of the 6 clinical characteristics of malnutrition:  Energy Intake:  No significant decrease in energy intake  Weight Loss:  Mild weight loss (specify amount and time period) (13.6% x 8 months)     Body Fat Loss:  Mild body fat loss Fat Overlying Ribs, Orbital   Muscle Mass Loss:  Mild muscle mass loss Clavicles (pectoralis & deltoids)  Fluid Accumulation:  No significant fluid accumulation     Strength:  Not Performed    Nutrition Assessment:     Patient is an 83 year old female admitted with TIA (transient ischemic attack) [G45.9]  Expressive aphasia [R47.01]. She  has a past medical history of Anemia, Arthritis, Asthma, Broken ribs, Cancer (HCC), GERD (gastroesophageal reflux disease), Hypertension, Migraines, Osteoporosis, Rheumatic fever, and Thyroid disease. RD screen for low BMI. Patient reports UBW \"110-124#\". Standing scale weight this admission is a 16# (13.6%) loss x 8 months, and ~23# (18.5%) x ~1 year, neither clinically significant for timeframe but concerning nonetheless. Patient reports appetite is \"getting better\" - son states patient eating better inpatient than she usually does at home. Voiced acceptance of ONS once daily. PO intake ~50-75% of most meals. Patient with sling on R arm, could not complete total NFPE.     Wt Readings from Last 10 Encounters:   02/25/24 45.8 kg (101 lb)   02/21/24 45.8 kg (101 lb)   05/23/23 53.3 kg (117 lb 6.4 oz)   04/13/23 53.5 kg (118 lb)   03/30/23 55.3 kg (122 lb)   03/21/23 56.2 kg (124 lb)   09/20/22 53.5 kg (118 lb)   09/20/22 53.5 kg 
INPATIENT NEUROLOGY FOLLOW-UP NOTE    NAME:  Kathryn Hughes    REASON FOR FOLLOW UP:  recent onset episodes of expressive aphasia and confusion (2-23-24)    INTERVAL HX (02/25/24):     Family at bedside.  They report pt did not have any spells of expressive aphasia during the cEEG study but she did have a few time points where she had brief confusion.  I reviewed the entire EEG as wells as those timeframes.  No seizure discharges or subclinical seizures. The background showed mild non-specific generalized slowing suggestive of an encephalopathic process, not specific as to cause, but may be due to medications (buprenorphine, Abilify).      Attempted to contact Neurosurgery for consult regarding finding of \"Left posterior infratentorial probable arachnoid cyst measuring 2.4 x 3.6 x 1.8 cm with associated minimal mass effect on the subjacent left cerebellar hemisphere\" to have them review MRI images and provide input on whether this finding could be the cause of pt's initial fall on 2-2-23.  Charge Nurse relayed that he/she contacted their answering svc and was told the do not do consults at [facility].      Pt awake, alert, resting, NAD      IMPRESSION/ PLAN:     Unexplained sudden fall on 2-21-24 causing pt to fracture right shoulder and significant bruising to right face    2 days later, started having episodes of sudden speech arrest, appears confused, cannot speak, doesn't respond, then speech comes back but doesn't make sense; episodes lasting 1 minute (2 min at most).      No acute abnormality on Brain MRI    CTA Head/ Neck normal    Brain MRI shows incidental finding of \"Left posterior infratentorial probable arachnoid cyst measuring 2.4 x 3.6 x 1.8 cm with associated minimal mass effect on the subjacent left cerebellar hemisphere\"    Prolonged video EEG done from 2-24 to 2-25 did not show any seizure discharges    D/w pt and family that I don't think the episodes were stuttering TIA as she there is no 
Rapid Called at 0545    Responded to RRT at 0546 for Chest Pain    Provider at bedside: YES  Interventions ordered: Labs and EKG  Sepsis Suspected: No  Transfer to Higher Level of Care: yes-remote tele       Vitals:    02/27/24 0547   BP: (!) 175/70   Pulse: 57   Resp: 20   Temp: 98.1 °F (36.7 °C)   SpO2: 100%      Pt reports new onset L sided cp, constant in nature. Denies SOB, dizziness, radiation, n,v, unable to reproduce. EKG done, and labs drawn by PCT. Provider at bedside. Plan troponin x 2, with addl blood work.  Rapid Ended at 0559.  RRT RN assisted with transport to accepting unit No.    Ana Cristina Lott RN   
Report given to Evaristo YUAN for transfer orders to room 505. Updated pt and family at bedside, verbalized understanding.     Pt trasnferred via wheelchair with family at bedside. Assisted by Pippa (tech).   
Speech LAnguage Pathology EVALUATION/DISCHARGE    Patient: Kathryn Hughes (83 y.o. female)  Date: 2/24/2024  Primary Diagnosis: TIA (transient ischemic attack) [G45.9]  Expressive aphasia [R47.01]       Precautions:  Fall Risk, Weight Bearing     Right Upper Extremity Weight Bearing: Non Weight Bearing            ASSESSMENT :  Based on the objective data described below, the patient presents with no oral or pharyngeal dysphagia with timely and complete mastication and suspected timely swallow initiation and functional hyolaryngeal elevation/excursion.  No s/s of aspiration observed.  Per nsg, some difficulty taking pills, but patient reports this is baseline and she has always needed extra time or large sips of water to take pills.  Could trial with applesauce to see if this improves ease of taking meds if patient desires. Patient was able to carry on a complex and fluent conversation without any word finding difficulties appreciated and reports she feels baseline at this time. Formal language evaluation not indicated at this time.     Patient will be discharged from skilled speech-language pathology services at this time.     PLAN :  Recommendations and Planned Interventions:  Diet: Regular and thin liquids       Acute SLP Services: No, patient will be discharged from acute skilled speech-language pathology at this time.    Discharge Recommendations: No, additional SLP treatment not indicated at discharge     SUBJECTIVE:   Patient stated, “it would last for maybe 30 seconds or so.” referring to episodes of aphasia on admission     OBJECTIVE:     Past Medical History:   Diagnosis Date    Anemia     Arthritis     osteoporosis    Asthma     Broken ribs 2006    fell    Cancer (HCC)     melanoma     GERD (gastroesophageal reflux disease)     Hypertension     Migraines     Osteoporosis     Rheumatic fever     x2 without residual    Thyroid disease      Past Surgical History:   Procedure Laterality Date    ABOVE KNEE 
Spiritual Care Assessment/Progress Note  [facility]    Name: Kathryn Hughes MRN: [id number]    Age: 83 y.o.     Sex: female   Language: English     Date: 2/27/2024            Total Time Calculated: 5 min              Spiritual Assessment begun in [facility] MULTI-SPECIALTY ONCOLOGY 1  Service Provided For:: Patient and family together  Referral/Consult From:: Clergy/  Encounter Overview/Reason : Rituals, Rites and Sacraments    Spiritual beliefs:      [x] Involved in a sarmad tradition/spiritual practice: Spiritism     [x] Supported by a sarmad community: [facility]     [] Claims no spiritual orientation:      [] Seeking spiritual identity:           [] Adheres to an individual form of spirituality:      [] Not able to assess:                Identified resources for coping and support system:   Support System: Children, Hinduism/sarmad community       [x] Prayer                  [] Devotional reading               [x] Music                  [] Guided Imagery     [] Pet visits                                        [] Other: (COMMENT)     Specific area/focus of visit   Encounter:    Crisis:    Spiritual/Emotional needs: Type: Spiritual Support  Ritual, Rites and Sacraments: Type: Spiritism Communion  Grief, Loss, and Adjustments:    Ethics/Mediation:    Behavioral Health:    Palliative Care:    Advance Care Planning:      Plan/Referrals: Continue to visit, (comment)    Narrative:  Mrs. Hughes was sitting up eating lunch. Her son and grandson were visiting.  Prayer and communion offered Mrs. Hughes and her son.  Mrs. Hughes expressed her hope to go home soon.  Assured of continued prayer for her well-being.     Sr. JUANPABLO Lopez, RN, ACSW, LCSW   Page:  [phone]    
tablet 88 mcg  88 mcg Oral QAM AC    predniSONE (DELTASONE) tablet 2.5 mg  2.5 mg Oral Daily    sodium chloride flush 0.9 % injection 5-40 mL  5-40 mL IntraVENous 2 times per day    sodium chloride flush 0.9 % injection 5-40 mL  5-40 mL IntraVENous PRN    0.9 % sodium chloride infusion   IntraVENous PRN    ondansetron (ZOFRAN-ODT) disintegrating tablet 4 mg  4 mg Oral Q8H PRN    Or    ondansetron (ZOFRAN) injection 4 mg  4 mg IntraVENous Q6H PRN    polyethylene glycol (GLYCOLAX) packet 17 g  17 g Oral Daily PRN    enoxaparin Sodium (LOVENOX) injection 30 mg  30 mg SubCUTAneous Q24H    aspirin chewable tablet 81 mg  81 mg Oral Daily    Or    aspirin suppository 300 mg  300 mg Rectal Daily    albuterol (PROVENTIL) (2.5 MG/3ML) 0.083% nebulizer solution 2.5 mg  2.5 mg Nebulization Q6H PRN    metoprolol succinate (TOPROL XL) extended release tablet 50 mg  50 mg Oral Daily    buPROPion (WELLBUTRIN XL) extended release tablet 300 mg  300 mg Oral Daily            Lab Review:     Recent Labs     02/25/24  0010   WBC 5.2   HGB 9.2*   HCT 27.9*          Recent Labs     02/27/24  0550      K 3.6      CO2 25   BUN 16   MG 1.8       No components found for: \"GLPOC\"

## 2024-02-28 NOTE — DISCHARGE SUMMARY
Hospitalist Discharge Summary     Patient ID:  Kathryn Hughes  [id number]  83 y.o.  1940    Admit date: 2/23/2024    Discharge date and time: 2/28/2024    Admission Diagnoses: TIA (transient ischemic attack) [G45.9]  Expressive aphasia [R47.01]    Discharge Diagnoses:    Principal Problem:    Expressive aphasia  Active Problems:    GERD (gastroesophageal reflux disease)    Acquired hypothyroidism    Essential hypertension    Confusion  Resolved Problems:    * No resolved hospital problems. *         Hospital Course:   Ms. Hughes with past medical history of HTN, GERD, asthma, hypothyroidism presented to ER complaining of expressive aphasia and confusion     #Expressive Aphasia: CVA and seizure ruled out. More likely post-concussive in nature. Resolved.  Neurosurgery consulted for evaluation of arachnoid cyst and confirm that it is not contributing, further that it is incidental and that no management therein I srequired     #Acquired hypothyroidism.  Continue levothyroxine     #GERD (gastroesophageal reflux disease).  Continue PPI      #Essential hypertension.  Continue clonidine patch, Bystolic.     #Recent right humeral fracture after a fall 2 days PTA. PT rec IPR.Fu ortho outpt     #Chronic Pain              - Resume home buprenorphine patch; norco     #mood d/o: abilify, bupropion     #Asthma.  Stable.  Not wheezing.  Continue as needed nebulizer    Imaging  MRI brain without contrast    Result Date: 2/23/2024  No acute infarct or intracranial hemorrhage, allowing for motion artifact. At least mild chronic microangiopathic white matter changes. Nonspecific brain atrophy. Chronic left maxillary sinusitis.     XR CHEST PORTABLE    Result Date: 2/23/2024  No acute cardiopulmonary process.    CTA HEAD NECK W CONTRAST    Result Date: 2/23/2024  1. No evidence for acute large vessel arterial occlusion. Mild atherosclerosis. 2. Left maxillary sinusitis. 3. 3 mm right lung nodule. Guidelines by the

## 2024-02-28 NOTE — DISCHARGE INSTRUCTIONS
HOSPITALIST DISCHARGE INSTRUCTIONS  NAME:  Kathryn Hughes   :  1940   MRN:  [id number]     Date/Time:  2024 10:56 AM    ADMIT DATE: 2024     DISCHARGE DATE: 2024     DISCHARGE DIAGNOSIS:  Humerus Fracture  Falls  Post-concussive syndrome    DISCHARGE INSTRUCTIONS:  Thank you for allowing us to participate in your care. Your discharging Hospitalist is Jomar Lawrence MD. You were admitted for evaluation and treatment of the above. You will discharge to rehab to get stronger.       MEDICATIONS:    It is important that you take the medication exactly as they are prescribed.   Keep your medication in the bottles provided by the pharmacist and keep a list of the medication names, dosages, and times to be taken in your wallet.   Do not take other medications without consulting your doctor.             If you experience any of the following symptoms then please call your primary care physician or return to the emergency room if you cannot get hold of your doctor:  Fever, chills, nausea, vomiting, diarrhea, change in mentation, falling, bleeding, shortness of breath    Follow Up:  Please call the below provider to arrange hospital follow up appointment      Wilman Hernandez MD  [address] [place] [address]  [phone]    Follow up        For questions regarding your Hospitalization or to contact the Hospital Medicine team, please call [phone].      Information obtained by :  I understand that if any problems occur once I am at home I am to contact my physician.    I understand and acknowledge receipt of the instructions indicated above.                                                                                                                                           Physician's or R.N.'s Signature                                                                  Date/Time

## 2024-02-28 NOTE — PLAN OF CARE
Problem: Occupational Therapy - Adult  Goal: By Discharge: Performs self-care activities at highest level of function for planned discharge setting.  See evaluation for individualized goals.  Description: FUNCTIONAL STATUS PRIOR TO ADMISSION:  Patient was living home alone and was indep with ADL tasks, driving and dog care.  Family [place] the same subdivision.  Recent L proximal distal fx.   Receives Help From: Family, ADL Assistance: Independent,  ,  ,  ,  ,  , Homemaking Assistance: Independent, Ambulation Assistance: Independent, Transfer Assistance: Independent,       HOME SUPPORT: Patient lived alone with family to provide assistance.    Occupational Therapy Goals:  Initiated 2/24/2024  1.  Patient will perform bathing with Minimal Assist within 7 day(s).  2.  Patient will perform sling mgmt with Minimal Assist within 7 day(s).  3.  Patient will perform lower body dressing with Minimal Assist within 7 day(s).  4.  Patient will perform toilet transfers with Contact Guard Assist  within 7 day(s).  5.  Patient will perform all aspects of toileting with Contact Guard Assist within 7 day(s).  6.  Patient will utilize energy conservation techniques during functional activities with verbal cues within 7 day(s).  Outcome: Progressing     OCCUPATIONAL THERAPY TREATMENT  Patient: Kathryn Hughes (83 y.o. female)  Date: 2/26/2024  Primary Diagnosis: TIA (transient ischemic attack) [G45.9]  Expressive aphasia [R47.01]       Precautions: Fall Risk, Weight Bearing     Right Upper Extremity Weight Bearing: Non Weight Bearing          Chart, occupational therapy assessment, plan of care, and goals were reviewed.    ASSESSMENT  Patient continues to benefit from skilled OT services and is progressing towards goals. Patient continues to present with decreased activity tolerance, impaired balance, generally decreased strength, and decreased independence with ADLs and mobility. Pt is pleasant and agreeable to participate this 
  Problem: Occupational Therapy - Adult  Goal: By Discharge: Performs self-care activities at highest level of function for planned discharge setting.  See evaluation for individualized goals.  Description: FUNCTIONAL STATUS PRIOR TO ADMISSION:  Patient was living home alone and was indep with ADL tasks, driving and dog care.  Family lives in the same subdivision.  Recent L proximal distal fx.   Receives Help From: Family, ADL Assistance: Independent,  ,  ,  ,  ,  , Homemaking Assistance: Independent, Ambulation Assistance: Independent, Transfer Assistance: Independent,       HOME SUPPORT: Patient lived alone with family to provide assistance.    Occupational Therapy Goals:  Initiated 2/24/2024  1.  Patient will perform bathing with Minimal Assist within 7 day(s).  2.  Patient will perform sling mgmt with Minimal Assist within 7 day(s).  3.  Patient will perform lower body dressing with Minimal Assist within 7 day(s).  4.  Patient will perform toilet transfers with Contact Guard Assist  within 7 day(s).  5.  Patient will perform all aspects of toileting with Contact Guard Assist within 7 day(s).  6.  Patient will utilize energy conservation techniques during functional activities with verbal cues within 7 day(s).  Outcome: Progressing   OCCUPATIONAL THERAPY TREATMENT  Patient: Kathryn Hughes (83 y.o. female)  Date: 2/27/2024  Primary Diagnosis: TIA (transient ischemic attack) [G45.9]  Expressive aphasia [R47.01]       Precautions: Fall Risk, Weight Bearing     Right Upper Extremity Weight Bearing: Non Weight Bearing          Chart, occupational therapy assessment, plan of care, and goals were reviewed.    ASSESSMENT  Patient continues to benefit from skilled OT services and is progressing towards goals. Pt seated in chair, family present. Educated her as to proper fit for sling. Pt hand held assist to restroom, min assist to transfer on/off commode and standing at sink for grooming task. Pt returned to sit in 
  Problem: Physical Therapy - Adult  Goal: By Discharge: Performs mobility at highest level of function for planned discharge setting.  See evaluation for individualized goals.  Description: FUNCTIONAL STATUS PRIOR TO ADMISSION: Pt reports independent baseline ambulation without assistive device; walks dog outdoors and drives. Son reports pt with decline in balance over past several months.    HOME SUPPORT PRIOR TO ADMISSION: The patient lived alone with family to provide assistance.    Physical Therapy Goals  Initiated 2/24/2024  1.  Patient will move from supine to sit and sit to supine  in bed with modified independence within 7 day(s).    2.  Patient will transfer from bed to chair and chair to bed with CGA using the least restrictive device within 7 day(s).  3.  Patient will perform sit to stand with CGA within 7 day(s).  4.  Patient will ambulate with CGA for 150 feet with the least restrictive device within 7 day(s).   5.  Patient will ascend/descend 4 stairs with one handrail(s) with minimal assistance/contact guard assist within 7 day(s).    Outcome: Progressing   PHYSICAL THERAPY TREATMENT    Patient: Kathryn Hughes (83 y.o. female)  Date: 2/26/2024  Diagnosis: TIA (transient ischemic attack) [G45.9]  Expressive aphasia [R47.01] Expressive aphasia      Precautions: Fall Risk, Weight Bearing     Right Upper Extremity Weight Bearing: Non Weight Bearing         Required Braces or Orthoses  Right Upper Extremity Brace/Splint: Sling      ASSESSMENT:  Patient continues to benefit from skilled PT services and is progressing towards goals. Communicated with nurse cleared for therapy patient supine on bed when received, rolled on the edge of bed, supine to sit, sit to stand and ambulate with hand held assist CGA. No loss of balance. Steady sitting and standing. Able to ambulate with out support just holding on the gait belt. OOB to chair as tolerated performed some active range of motion exercise on both LE all 
Problem: Occupational Therapy - Adult  Goal: By Discharge: Performs self-care activities at highest level of function for planned discharge setting.  See evaluation for individualized goals.  Description: FUNCTIONAL STATUS PRIOR TO ADMISSION:  Patient was living home alone and was indep with ADL tasks, driving and dog care.  Family lives in the same subdivision.  Recent L proximal distal fx.   Receives Help From: Family, ADL Assistance: Independent,  ,  ,  ,  ,  , Homemaking Assistance: Independent, Ambulation Assistance: Independent, Transfer Assistance: Independent,       HOME SUPPORT: Patient lived alone with family to provide assistance.    Occupational Therapy Goals:  Initiated 2/24/2024  1.  Patient will perform bathing with Minimal Assist within 7 day(s).  2.  Patient will perform sling mgmt with Minimal Assist within 7 day(s).  3.  Patient will perform lower body dressing with Minimal Assist within 7 day(s).  4.  Patient will perform toilet transfers with Contact Guard Assist  within 7 day(s).  5.  Patient will perform all aspects of toileting with Contact Guard Assist within 7 day(s).  6.  Patient will utilize energy conservation techniques during functional activities with verbal cues within 7 day(s).  Outcome: Progressing   OCCUPATIONAL THERAPY EVALUATION    Patient: Kathryn Hughes (83 y.o. female)  Date: 2/24/2024  Primary Diagnosis: TIA (transient ischemic attack) [G45.9]  Expressive aphasia [R47.01]         Precautions: Fall Risk, Weight Bearing     Right Upper Extremity Weight Bearing: Non Weight Bearing            ASSESSMENT :  The patient is limited by decreased functional mobility, independence in ADLs, high-level IADLs, ROM, strength, body mechanics, endurance, safety awareness, coordination, balance, orthostatic hypotension, increased pain levels s/p admission for multiple short episodes of expressive aphasia.  Recent fall L prox humerus fx(in sling) and stitches in lip, went to ortho f/u 
LE> R LE, requires cues for safety and upright posture.     PLAN:  Patient continues to benefit from skilled intervention to address the above impairments.  Continue treatment per established plan of care.    Recommendation for discharge: (in order for the patient to meet his/her long term goals): Therapy 3 hours/day 5-7 days/week    Other factors to consider for discharge: lives alone, high risk for falls, and concern for safely navigating or managing the home environment    IF patient discharges home will need the following DME: continuing to assess with progress       SUBJECTIVE:   Patient stated, \" I want to wear pants first\"    OBJECTIVE DATA SUMMARY:   Critical Behavior:  Orientation  Overall Orientation Status: Within Normal Limits  Orientation Level: Oriented X4  Cognition  Overall Cognitive Status: WNL    Functional Mobility Training:  Bed Mobility:  Bed Mobility Training  Bed Mobility Training: No  Transfers:  Transfer Training  Transfer Training: Yes  Interventions: Verbal cues;Safety awareness training  Sit to Stand: Contact-guard assistance;Assist X1;Additional time  Stand to Sit: Contact-guard assistance;Assist X1;Additional time  Stand Pivot Transfers: Contact-guard assistance;Assist X1;Additional time  Balance:  Balance  Sitting: Intact  Standing: Impaired  Standing - Static: Fair  Standing - Dynamic: Fair   Ambulation/Gait Training:     Gait  Gait Training: Yes  Overall Level of Assistance: Minimum assistance;Assist X1;Contact-guard assistance  Distance (ft): 170 Feet  Assistive Device: Gait belt  Interventions: Verbal cues;Safety awareness training  Base of Support: Narrowed  Speed/Liliana: Pace decreased (< 100 feet/min)  Step Length: Right shortened;Left shortened  Gait Abnormalities: Altered arm swing;Path deviations;Foot drop (L foot drop)    Therapeutic Exercises:     EXERCISE   Sets   Reps   Active Active Assist   Passive Self ROM   Comments   Ankle Pumps   [x] [] [] []    Long Arc Quads   
Noted L foot drop compromising balance as well.  Tolerated 100' with HHA on L yet Min A with turns to stablilize. Pt reporting has AFO at home and didn't think she needed it anymore?  Son to acquire brace for rehab to address.  Overall pt is a fall risk and in need of gait and balance re-ed as main rehab focus.  Support transition to IPR.         PLAN:  Patient continues to benefit from skilled intervention to address the above impairments.  Continue treatment per established plan of care.    Recommendation for discharge: (in order for the patient to meet his/her long term goals): Therapy 3 hours/day 5-7 days/week    Other factors to consider for discharge: lives alone, patient's current support system is unable to meet their requirements for physical assistance, poor safety awareness, high risk for falls, not safe to be alone, and concern for safely navigating or managing the home environment    IF patient discharges home will need the following DME:  tbd in rehab       SUBJECTIVE:   Patient stated, \"I have a brace but I dont wear it.\"    OBJECTIVE DATA SUMMARY:   Critical Behavior:  Orientation  Overall Orientation Status: Within Normal Limits  Orientation Level: Oriented X4  Cognition  Overall Cognitive Status: WNL    Functional Mobility Training:  Bed Mobility:     Transfers:  Transfer Training  Transfer Training: Yes  Overall Level of Assistance: Contact-guard assistance;Minimum assistance  Interventions: Safety awareness training  Sit to Stand: Contact-guard assistance;Additional time  Stand to Sit: Contact-guard assistance;Additional time  Stand Pivot Transfers: Contact-guard assistance;Additional time  Bed to Chair: Contact-guard assistance;Additional time  Balance:  Balance  Sitting: Intact  Sitting - Static: Good (unsupported)  Sitting - Dynamic: Good (unsupported)  Standing: With support  Standing - Static: Fair  Standing - Dynamic: Fair;Poor   Ambulation/Gait Training:     Gait  Gait Training: 
discussed with: occupational therapist and registered nurse    Patient Education  Education Given To: Patient  Education Provided: Role of Therapy;Plan of Care;Home Exercise Program;Precautions;Transfer Training;Fall Prevention Strategies;Equipment  Education Method: Demonstration;Verbal  Barriers to Learning: None  Education Outcome: Verbalized understanding;Demonstrated understanding    Thank you for this referral.  Danielle Ludwig, PT  Minutes: 40      Physical Therapy Evaluation Charge Determination   History Examination Presentation Decision-Making   HIGH Complexity :3+ comorbidities / personal factors will impact the outcome/ POC  HIGH Complexity : 4+ Standardized tests and measures addressing body structure, function, activity limitation and / or participation in recreation  MEDIUM Complexity : Evolving with changing characteristics  Sandhu Balance Test  HIGH    Based on the above components, the patient evaluation is determined to be of the following complexity level: Medium

## 2024-02-28 NOTE — CARE COORDINATION
Care Management Initial Assessment       RUR:16%  Readmission? No  1st IM letter given? Yes - 2/27/24  1st  letter given: No    Per chart review. Pt has been accepted to [facility] pending bed availability. CM to follow-up on tomorrow as they do not have a bed available today.       TANYA Manzano, CM  [facility] Care Manager  [phone]      
Care Management Progress Note:   Patient accepted at [facility] for IPR, however per IPR, no bed available until Wednesday of this week. Patient now transferred to 5th floor room 505, CM called and LM with handoff for 5th floor CM who will now follow. HENNA also updated MD as to acceptance and potential for bed on 2/28.    ______________________  Jenn GUTIERREZ, RN  Care Management  2/26/2024  2:28 PM   
Pt is a 83 year old,  female admitted with TIA. CM spoke with pt's dtr in law Karen per pt's request to complete initial assessment. Dtr in law states pt is indpendent with ADLs- has access to walker, cane, WC, BSC and SC. Pt has had HH In the past- agency unknown- has not been to any IPR int he past.     Pt agreeable to go to IPR- family wishes for 1)[facility] 2)[facility] 3)[facility]- FOC offered and referrals sent via [employer] [facility]- waiting on responses.     CM confirmed AMD on file- AMD on file was not updated- involved her  who passed away in 2013. Pt's dtr in law providing RN with updated copies designating pt's son and dtr in law. Pt wishes to complete DDNR- CM notified attending.     Pending pt's progression may need WC van- family agreeable to pay if absolutely needed. Floor CM updated and will continue to follow and assist as needed.        02/25/24 0695   Service Assessment   Patient Orientation Alert and Oriented   Cognition Alert   History Provided By Child/Family   Primary Caregiver Self   Support Systems Children;Family Members   Patient's Healthcare Decision Maker is: Named in Scanned ACP Document   PCP Verified by CM Yes   Last Visit to PCP Within last 3 months   Prior Functional Level Independent in ADLs/IADLs   Current Functional Level Independent in ADLs/IADLs   Can patient return to prior living arrangement Yes   Ability to make needs known: Good   Family able to assist with home care needs: Yes   Would you like for me to discuss the discharge plan with any other family members/significant others, and if so, who? Yes   Financial Resources Medicare   Community Resources None   CM/SW Referral Other (see comment)   Social/Functional History   Lives With Alone   Type of Home House   Home Layout Two level   ADL Assistance Independent   Homemaking Assistance Independent   Homemaking Responsibilities Yes   Ambulation Assistance Independent   Transfer Assistance Independent   Active  Yes  (Dtr states 
with the Choice of Provider and agrees with the Discharge Plan?  Wilman Hughes   The Patient and/Or Patient Representative agree with the Discharge Plan? Yes   Freedom of Choice list was provided with basic dialogue that supports the patient's individualized plan of care/goals, treatment preferences, and shares the quality data associated with the providers?  Yes           ___________________________________________   Stephanie Perez RN Case Manager  2/28/2024   12:24 PM

## 2024-04-01 RX ORDER — NEBIVOLOL 5 MG/1
5 TABLET ORAL DAILY
Qty: 90 TABLET | Refills: 3 | Status: SHIPPED | OUTPATIENT
Start: 2024-04-01

## 2024-11-26 NOTE — PROGRESS NOTES
Bedside and Verbal shift change report given to Nichole Dill RN (oncoming nurse) by Farhat Allen RN (offgoing nurse). Report included the following information SBAR, I/O'S, MAR, Procedure summary, and specifically Blood Pressure which has been elevated, recently treated with Hydralazine and is now being managed by [facility]. 4 (moderate pain)

## 2025-01-29 ENCOUNTER — TRANSCRIBE ORDERS (OUTPATIENT)
Facility: HOSPITAL | Age: 85
End: 2025-01-29

## 2025-01-29 DIAGNOSIS — Z12.31 ENCOUNTER FOR SCREENING MAMMOGRAM FOR MALIGNANT NEOPLASM OF BREAST: Primary | ICD-10-CM

## 2025-03-19 NOTE — PRE-PROCEDURE INSTRUCTIONS
3/19/2025  4:26 p.m.  Spoke with patient to confirm her arrival for her appointment on 3/21/2025; instructions given.

## 2025-03-21 ENCOUNTER — HOSPITAL ENCOUNTER (OUTPATIENT)
Facility: HOSPITAL | Age: 85
Discharge: HOME | End: 2025-03-24
Payer: MEDICARE

## 2025-03-21 VITALS — WEIGHT: 101 LBS | BODY MASS INDEX: 18.58 KG/M2 | HEIGHT: 62 IN

## 2025-03-21 DIAGNOSIS — Z12.31 ENCOUNTER FOR SCREENING MAMMOGRAM FOR MALIGNANT NEOPLASM OF BREAST: ICD-10-CM

## 2025-03-21 DIAGNOSIS — M81.0 AGE-RELATED OSTEOPOROSIS WITHOUT CURRENT PATHOLOGICAL FRACTURE: ICD-10-CM

## 2025-03-21 PROCEDURE — 77080 DXA BONE DENSITY AXIAL: CPT

## 2025-03-21 PROCEDURE — 77067 SCR MAMMO BI INCL CAD: CPT

## 2025-04-22 ENCOUNTER — APPOINTMENT (OUTPATIENT)
Facility: HOSPITAL | Age: 85
DRG: 552 | End: 2025-04-22
Payer: MEDICARE

## 2025-04-22 ENCOUNTER — HOSPITAL ENCOUNTER (INPATIENT)
Facility: HOSPITAL | Age: 85
LOS: 5 days | Discharge: NURSING, REHAB OR LONG-TERM CARE | DRG: 552 | End: 2025-04-27
Attending: EMERGENCY MEDICINE | Admitting: HOSPITALIST
Payer: MEDICARE

## 2025-04-22 DIAGNOSIS — G45.9 TIA (TRANSIENT ISCHEMIC ATTACK): ICD-10-CM

## 2025-04-22 DIAGNOSIS — M48.062 LUMBAR STENOSIS WITH NEUROGENIC CLAUDICATION: ICD-10-CM

## 2025-04-22 DIAGNOSIS — I63.9 CEREBROVASCULAR ACCIDENT (CVA), UNSPECIFIED MECHANISM (HCC): Primary | ICD-10-CM

## 2025-04-22 LAB
ALBUMIN SERPL-MCNC: 4 G/DL (ref 3.5–5)
ALBUMIN/GLOB SERPL: 0.9 (ref 1.1–2.2)
ALP SERPL-CCNC: 62 U/L (ref 45–117)
ALT SERPL-CCNC: 22 U/L (ref 12–78)
ANION GAP SERPL CALC-SCNC: 5 MMOL/L (ref 2–12)
ANION GAP SERPL CALC-SCNC: 8 MMOL/L (ref 2–12)
APPEARANCE UR: CLEAR
AST SERPL-CCNC: 20 U/L (ref 15–37)
BACTERIA URNS QL MICRO: NEGATIVE /HPF
BASOPHILS # BLD: 0.04 K/UL (ref 0–0.1)
BASOPHILS NFR BLD: 0.4 % (ref 0–1)
BILIRUB SERPL-MCNC: 0.4 MG/DL (ref 0.2–1)
BILIRUB UR QL: NEGATIVE
BUN SERPL-MCNC: 24 MG/DL (ref 6–20)
BUN SERPL-MCNC: 27 MG/DL (ref 6–20)
BUN/CREAT SERPL: 22 (ref 12–20)
BUN/CREAT SERPL: 25 (ref 12–20)
CALCIUM SERPL-MCNC: 8.6 MG/DL (ref 8.5–10.1)
CALCIUM SERPL-MCNC: 9.5 MG/DL (ref 8.5–10.1)
CHLORIDE SERPL-SCNC: 101 MMOL/L (ref 97–108)
CHLORIDE SERPL-SCNC: 99 MMOL/L (ref 97–108)
CO2 SERPL-SCNC: 29 MMOL/L (ref 21–32)
CO2 SERPL-SCNC: 31 MMOL/L (ref 21–32)
COLOR UR: ABNORMAL
CREAT SERPL-MCNC: 1.06 MG/DL (ref 0.55–1.02)
CREAT SERPL-MCNC: 1.08 MG/DL (ref 0.55–1.02)
DIFFERENTIAL METHOD BLD: ABNORMAL
EOSINOPHIL # BLD: 0.05 K/UL (ref 0–0.4)
EOSINOPHIL NFR BLD: 0.5 % (ref 0–7)
EPITH CASTS URNS QL MICRO: ABNORMAL /LPF
ERYTHROCYTE [DISTWIDTH] IN BLOOD BY AUTOMATED COUNT: 15.1 % (ref 11.5–14.5)
GLOBULIN SER CALC-MCNC: 4.3 G/DL (ref 2–4)
GLUCOSE SERPL-MCNC: 105 MG/DL (ref 65–100)
GLUCOSE SERPL-MCNC: 90 MG/DL (ref 65–100)
GLUCOSE UR STRIP.AUTO-MCNC: NEGATIVE MG/DL
HCT VFR BLD AUTO: 34.7 % (ref 35–47)
HGB BLD-MCNC: 11.3 G/DL (ref 11.5–16)
HGB UR QL STRIP: NEGATIVE
HYALINE CASTS URNS QL MICRO: ABNORMAL /LPF (ref 0–2)
IMM GRANULOCYTES # BLD AUTO: 0.05 K/UL (ref 0–0.04)
IMM GRANULOCYTES NFR BLD AUTO: 0.5 % (ref 0–0.5)
INR PPP: 1 (ref 0.9–1.1)
KETONES UR QL STRIP.AUTO: NEGATIVE MG/DL
LEUKOCYTE ESTERASE UR QL STRIP.AUTO: NEGATIVE
LYMPHOCYTES # BLD: 0.59 K/UL (ref 0.8–3.5)
LYMPHOCYTES NFR BLD: 5.7 % (ref 12–49)
MCH RBC QN AUTO: 30.4 PG (ref 26–34)
MCHC RBC AUTO-ENTMCNC: 32.6 G/DL (ref 30–36.5)
MCV RBC AUTO: 93.3 FL (ref 80–99)
MONOCYTES # BLD: 0.99 K/UL (ref 0–1)
MONOCYTES NFR BLD: 9.5 % (ref 5–13)
NEUTS SEG # BLD: 8.68 K/UL (ref 1.8–8)
NEUTS SEG NFR BLD: 83.4 % (ref 32–75)
NITRITE UR QL STRIP.AUTO: NEGATIVE
NRBC # BLD: 0 K/UL (ref 0–0.01)
NRBC BLD-RTO: 0 PER 100 WBC
PH UR STRIP: 6 (ref 5–8)
PLATELET # BLD AUTO: 274 K/UL (ref 150–400)
PMV BLD AUTO: 8.8 FL (ref 8.9–12.9)
POTASSIUM SERPL-SCNC: 3.3 MMOL/L (ref 3.5–5.1)
POTASSIUM SERPL-SCNC: 4 MMOL/L (ref 3.5–5.1)
PROT SERPL-MCNC: 8.3 G/DL (ref 6.4–8.2)
PROT UR STRIP-MCNC: NEGATIVE MG/DL
PROTHROMBIN TIME: 10.3 SEC (ref 9.2–11.2)
RBC # BLD AUTO: 3.72 M/UL (ref 3.8–5.2)
RBC #/AREA URNS HPF: ABNORMAL /HPF (ref 0–5)
RBC MORPH BLD: ABNORMAL
SODIUM SERPL-SCNC: 135 MMOL/L (ref 136–145)
SODIUM SERPL-SCNC: 138 MMOL/L (ref 136–145)
SP GR UR REFRACTOMETRY: 1.01 (ref 1–1.03)
SPECIMEN HOLD: NORMAL
TROPONIN I SERPL HS-MCNC: 11 NG/L (ref 0–51)
UROBILINOGEN UR QL STRIP.AUTO: 0.2 EU/DL (ref 0.2–1)
WBC # BLD AUTO: 10.4 K/UL (ref 3.6–11)
WBC URNS QL MICRO: ABNORMAL /HPF (ref 0–4)

## 2025-04-22 PROCEDURE — 70450 CT HEAD/BRAIN W/O DYE: CPT

## 2025-04-22 PROCEDURE — 80053 COMPREHEN METABOLIC PANEL: CPT

## 2025-04-22 PROCEDURE — 2500000003 HC RX 250 WO HCPCS: Performed by: HOSPITALIST

## 2025-04-22 PROCEDURE — 6360000002 HC RX W HCPCS: Performed by: HOSPITALIST

## 2025-04-22 PROCEDURE — 94761 N-INVAS EAR/PLS OXIMETRY MLT: CPT

## 2025-04-22 PROCEDURE — 84484 ASSAY OF TROPONIN QUANT: CPT

## 2025-04-22 PROCEDURE — 36415 COLL VENOUS BLD VENIPUNCTURE: CPT

## 2025-04-22 PROCEDURE — 2060000000 HC ICU INTERMEDIATE R&B

## 2025-04-22 PROCEDURE — 99285 EMERGENCY DEPT VISIT HI MDM: CPT

## 2025-04-22 PROCEDURE — 71045 X-RAY EXAM CHEST 1 VIEW: CPT

## 2025-04-22 PROCEDURE — 81001 URINALYSIS AUTO W/SCOPE: CPT

## 2025-04-22 PROCEDURE — 85025 COMPLETE CBC W/AUTO DIFF WBC: CPT

## 2025-04-22 PROCEDURE — 85610 PROTHROMBIN TIME: CPT

## 2025-04-22 PROCEDURE — 6370000000 HC RX 637 (ALT 250 FOR IP): Performed by: HOSPITALIST

## 2025-04-22 RX ORDER — MONTELUKAST SODIUM 10 MG/1
10 TABLET ORAL NIGHTLY
Status: DISCONTINUED | OUTPATIENT
Start: 2025-04-22 | End: 2025-04-23

## 2025-04-22 RX ORDER — NIFEDIPINE 30 MG/1
30 TABLET, EXTENDED RELEASE ORAL DAILY
Status: DISCONTINUED | OUTPATIENT
Start: 2025-04-23 | End: 2025-04-24

## 2025-04-22 RX ORDER — CLONIDINE 0.2 MG/24H
1 PATCH, EXTENDED RELEASE TRANSDERMAL WEEKLY
Status: DISCONTINUED | OUTPATIENT
Start: 2025-04-22 | End: 2025-04-22

## 2025-04-22 RX ORDER — BUDESONIDE 0.5 MG/2ML
0.5 INHALANT ORAL
Status: DISCONTINUED | OUTPATIENT
Start: 2025-04-22 | End: 2025-04-22

## 2025-04-22 RX ORDER — SODIUM CHLORIDE 9 MG/ML
INJECTION, SOLUTION INTRAVENOUS PRN
Status: DISCONTINUED | OUTPATIENT
Start: 2025-04-22 | End: 2025-04-27 | Stop reason: HOSPADM

## 2025-04-22 RX ORDER — BUDESONIDE 0.5 MG/2ML
0.5 INHALANT ORAL
Status: DISCONTINUED | OUTPATIENT
Start: 2025-04-23 | End: 2025-04-23

## 2025-04-22 RX ORDER — SODIUM CHLORIDE 0.9 % (FLUSH) 0.9 %
5-40 SYRINGE (ML) INJECTION EVERY 12 HOURS SCHEDULED
Status: DISCONTINUED | OUTPATIENT
Start: 2025-04-22 | End: 2025-04-27 | Stop reason: HOSPADM

## 2025-04-22 RX ORDER — BUPROPION HYDROCHLORIDE 150 MG/1
300 TABLET ORAL DAILY
Status: DISCONTINUED | OUTPATIENT
Start: 2025-04-23 | End: 2025-04-27 | Stop reason: HOSPADM

## 2025-04-22 RX ORDER — POTASSIUM CHLORIDE 750 MG/1
40 TABLET, EXTENDED RELEASE ORAL ONCE
Status: COMPLETED | OUTPATIENT
Start: 2025-04-22 | End: 2025-04-22

## 2025-04-22 RX ORDER — ESCITALOPRAM OXALATE 10 MG/1
5 TABLET ORAL DAILY
Status: DISCONTINUED | OUTPATIENT
Start: 2025-04-23 | End: 2025-04-27 | Stop reason: HOSPADM

## 2025-04-22 RX ORDER — ARFORMOTEROL TARTRATE 15 UG/2ML
15 SOLUTION RESPIRATORY (INHALATION)
Status: DISCONTINUED | OUTPATIENT
Start: 2025-04-22 | End: 2025-04-22

## 2025-04-22 RX ORDER — ARIPIPRAZOLE 2 MG/1
2 TABLET ORAL DAILY
Status: DISCONTINUED | OUTPATIENT
Start: 2025-04-23 | End: 2025-04-23

## 2025-04-22 RX ORDER — ARFORMOTEROL TARTRATE 15 UG/2ML
15 SOLUTION RESPIRATORY (INHALATION)
Status: DISCONTINUED | OUTPATIENT
Start: 2025-04-23 | End: 2025-04-23

## 2025-04-22 RX ORDER — ENOXAPARIN SODIUM 100 MG/ML
40 INJECTION SUBCUTANEOUS DAILY
Status: DISCONTINUED | OUTPATIENT
Start: 2025-04-22 | End: 2025-04-27 | Stop reason: HOSPADM

## 2025-04-22 RX ORDER — MORPHINE SULFATE 2 MG/ML
2 INJECTION, SOLUTION INTRAMUSCULAR; INTRAVENOUS EVERY 4 HOURS PRN
Refills: 0 | Status: DISCONTINUED | OUTPATIENT
Start: 2025-04-22 | End: 2025-04-23

## 2025-04-22 RX ORDER — HYDROCODONE BITARTRATE AND ACETAMINOPHEN 5; 325 MG/1; MG/1
1 TABLET ORAL EVERY 6 HOURS PRN
Refills: 0 | Status: DISCONTINUED | OUTPATIENT
Start: 2025-04-22 | End: 2025-04-24

## 2025-04-22 RX ORDER — ATORVASTATIN CALCIUM 20 MG/1
80 TABLET, FILM COATED ORAL NIGHTLY
Status: DISCONTINUED | OUTPATIENT
Start: 2025-04-22 | End: 2025-04-24

## 2025-04-22 RX ORDER — PREDNISONE 5 MG/1
2.5 TABLET ORAL DAILY
Status: DISCONTINUED | OUTPATIENT
Start: 2025-04-23 | End: 2025-04-27 | Stop reason: HOSPADM

## 2025-04-22 RX ORDER — ASPIRIN 300 MG/1
300 SUPPOSITORY RECTAL DAILY
Status: DISCONTINUED | OUTPATIENT
Start: 2025-04-22 | End: 2025-04-24

## 2025-04-22 RX ORDER — METHYLPHENIDATE HYDROCHLORIDE 5 MG/1
5 TABLET ORAL 2 TIMES DAILY WITH MEALS
Status: DISCONTINUED | OUTPATIENT
Start: 2025-04-23 | End: 2025-04-23

## 2025-04-22 RX ORDER — ASPIRIN 81 MG/1
81 TABLET, CHEWABLE ORAL DAILY
Status: DISCONTINUED | OUTPATIENT
Start: 2025-04-22 | End: 2025-04-24

## 2025-04-22 RX ORDER — POLYETHYLENE GLYCOL 3350 17 G/17G
17 POWDER, FOR SOLUTION ORAL DAILY PRN
Status: DISCONTINUED | OUTPATIENT
Start: 2025-04-22 | End: 2025-04-27 | Stop reason: HOSPADM

## 2025-04-22 RX ORDER — LEVOTHYROXINE SODIUM 88 UG/1
88 TABLET ORAL
Status: DISCONTINUED | OUTPATIENT
Start: 2025-04-23 | End: 2025-04-27 | Stop reason: HOSPADM

## 2025-04-22 RX ORDER — ONDANSETRON 2 MG/ML
4 INJECTION INTRAMUSCULAR; INTRAVENOUS EVERY 6 HOURS PRN
Status: DISCONTINUED | OUTPATIENT
Start: 2025-04-22 | End: 2025-04-27 | Stop reason: HOSPADM

## 2025-04-22 RX ORDER — ALBUTEROL SULFATE 0.83 MG/ML
2.5 SOLUTION RESPIRATORY (INHALATION) EVERY 6 HOURS PRN
Status: DISCONTINUED | OUTPATIENT
Start: 2025-04-22 | End: 2025-04-27 | Stop reason: HOSPADM

## 2025-04-22 RX ORDER — ONDANSETRON 4 MG/1
4 TABLET, ORALLY DISINTEGRATING ORAL EVERY 8 HOURS PRN
Status: DISCONTINUED | OUTPATIENT
Start: 2025-04-22 | End: 2025-04-27 | Stop reason: HOSPADM

## 2025-04-22 RX ORDER — METOPROLOL SUCCINATE 25 MG/1
25 TABLET, EXTENDED RELEASE ORAL DAILY
Status: DISCONTINUED | OUTPATIENT
Start: 2025-04-23 | End: 2025-04-22

## 2025-04-22 RX ORDER — SODIUM CHLORIDE 0.9 % (FLUSH) 0.9 %
5-40 SYRINGE (ML) INJECTION PRN
Status: DISCONTINUED | OUTPATIENT
Start: 2025-04-22 | End: 2025-04-27 | Stop reason: HOSPADM

## 2025-04-22 RX ADMIN — ASPIRIN 81 MG: 81 TABLET, CHEWABLE ORAL at 19:23

## 2025-04-22 RX ADMIN — ENOXAPARIN SODIUM 40 MG: 100 INJECTION SUBCUTANEOUS at 19:25

## 2025-04-22 RX ADMIN — SODIUM CHLORIDE, PRESERVATIVE FREE 10 ML: 5 INJECTION INTRAVENOUS at 21:41

## 2025-04-22 RX ADMIN — ATORVASTATIN CALCIUM 80 MG: 20 TABLET, FILM COATED ORAL at 21:33

## 2025-04-22 RX ADMIN — POTASSIUM CHLORIDE 40 MEQ: 750 TABLET, FILM COATED, EXTENDED RELEASE ORAL at 19:24

## 2025-04-22 RX ADMIN — HYDROCODONE BITARTRATE AND ACETAMINOPHEN 1 TABLET: 5; 325 TABLET ORAL at 17:33

## 2025-04-22 ASSESSMENT — PAIN DESCRIPTION - DESCRIPTORS: DESCRIPTORS: ACHING

## 2025-04-22 ASSESSMENT — PAIN DESCRIPTION - ORIENTATION: ORIENTATION: LEFT

## 2025-04-22 ASSESSMENT — PAIN - FUNCTIONAL ASSESSMENT: PAIN_FUNCTIONAL_ASSESSMENT: NONE - DENIES PAIN

## 2025-04-22 ASSESSMENT — PAIN DESCRIPTION - LOCATION: LOCATION: HEAD

## 2025-04-22 ASSESSMENT — PAIN SCALES - GENERAL: PAINLEVEL_OUTOF10: 5

## 2025-04-22 NOTE — ED PROVIDER NOTES
[facility] EMERGENCY DEPARTMENT  EMERGENCY DEPARTMENT ENCOUNTER      Pt Name: Kathryn Hughes  MRN: [id number]  Birthdate 1940  Date of evaluation: 4/22/2025  Provider: Milo Díaz MD    CHIEF COMPLAINT       Chief Complaint   Patient presents with    Fatigue         HISTORY OF PRESENT ILLNESS   (Location/Symptom, Timing/Onset, Context/Setting, Quality, Duration, Modifying Factors, Severity)  Note limiting factors.   84-year-old with a history of hypertension, migraines, anemia, arthritis/spinal stenosis, asthma, skin cancer, GERD, osteoporosis, thyroid disease.  She presents accompanied by her son and daughter (who help provide most of the history) with complaints of difficulty ambulating and a suspected stroke.  She was referred by her PCP.  Her children reports that she began to have trouble with ambulation about a week ago.  She has chronic left-sided foot drop as a complication from a previous spinal surgery.  However, the foot drop does not normally impede her ability to ambulate.  She flew to [place] 3 days ago.  She had trouble getting on and off the plane.  She had to be assisted off in a wheelchair.  Her daughter states that she has not been able to walk unassisted since she arrived in [place] 3 days ago.  They flew back today and saw her PCP.  He referred to the ED suspecting a stroke.  Her daughter states that the patient is constantly leaning backwards while standing like she is falling forwards.  She has been shaking.  Her children reports that she fell 3 days ago while at the airport as well.  She followed up in an urgent care center and was diagnosed with 7th and 8th rib fractures.  Good p.o. intake.          Review of External Medical Records:     Nursing Notes were reviewed.    REVIEW OF SYSTEMS    (2-9 systems for level 4, 10 or more for level 5)     Review of Systems    Except as noted above the remainder of the review of systems was reviewed and negative.       PAST

## 2025-04-22 NOTE — ED NOTES
TRANSFER - OUT REPORT:    Verbal report given to Isabelle sarah Hughes  being transferred to [facility] for pt care     Report consisted of patient's Situation, Background, Assessment and   Recommendations(SBAR).     Information from the following report(s) ED Encounter Summary, ED SBAR, Adult Overview, MAR, and Recent Results was reviewed with the receiving nurse.    [place] Fall Assessment:                           Lines:       Opportunity for questions and clarification was provided.

## 2025-04-22 NOTE — ED TRIAGE NOTES
PT arrives with family and needing a wheelchair with complaints of decreased mobility that started last week. And then was going on a trip and fell at the airport and she went to her Dr and they were concerned about a stroke    Pt has hx of Left foot drop from a spinal sx and hearing loss    Pt denies being on blood thinners

## 2025-04-22 NOTE — H&P
Hospitalist Admission Note      NAME:  Kathryn Hughes   :  1940   MRN:  [id number]     Date/Time:  2025 5:12 PM    Patient PCP: Demetrio Mei MD    ________________________________________________________________________    Given the patient's current clinical presentation, I have a high level of concern for decompensation if discharged from the emergency department.  Complex decision making was performed, which includes reviewing the patient's available past medical records, laboratory results, and x-ray films.       My assessment of this patient's clinical condition and my plan of care is as follows.    Assessment / Plan:  Patient is a 84-year-old female with a history of asthma, GERD, hypertension, rheumatic fever, hypothyroidism comes to the hospital with generalized weakness and difficulty ambulation.  Patient is admitted for stroke workup.    Bilateral lower extremity weakness  There is a concern about stroke.  CT head is negative for any acute findings.  MRI of the brain  Echocardiogram, lipid panel, hemoglobin A1c.  Aspirin and statins  Neurology consult  Will need to do MRI of the lumbosacral spine as well.    2.  History of spinal stenosis  Patient had 2 back surgeries done in the past.    3.  Hypothyroidism  Continue Synthroid 88 mcg daily.    4.  Anxiety and depression  Patient is on Abilify, bupropion    5.  COPD  Patient is on Trelegy    6.  Hypertension  Patient is on Nebivolol and nifedipine    7.  Chronic pain  Patient is on buprenorphine and Norco at home.    8.  Right 7th and 8th rib fracture  Patient was found to have 2 ribs fracture while she was in [place].  She went to the urgent care at that time.    Chest x-ray today does not show any rib fractures.  PT OT evaluation.        I have personally reviewed the radiographs, laboratory data in Epic and decisions and statements above are based partially on this personal interpretation.    Code Status: Full Code  DVT

## 2025-04-23 ENCOUNTER — HOSPITAL ENCOUNTER (INPATIENT)
Facility: HOSPITAL | Age: 85
Discharge: HOME | DRG: 552 | End: 2025-04-26
Payer: MEDICARE

## 2025-04-23 ENCOUNTER — APPOINTMENT (OUTPATIENT)
Facility: HOSPITAL | Age: 85
DRG: 552 | End: 2025-04-23
Payer: MEDICARE

## 2025-04-23 ENCOUNTER — APPOINTMENT (OUTPATIENT)
Dept: VASCULAR SURGERY | Facility: HOSPITAL | Age: 85
DRG: 552 | End: 2025-04-23
Payer: MEDICARE

## 2025-04-23 LAB
CHOLEST SERPL-MCNC: 184 MG/DL
ERYTHROCYTE [DISTWIDTH] IN BLOOD BY AUTOMATED COUNT: 15.1 % (ref 11.5–14.5)
EST. AVERAGE GLUCOSE BLD GHB EST-MCNC: 111 MG/DL
HBA1C MFR BLD: 5.5 % (ref 4–5.6)
HCT VFR BLD AUTO: 31.8 % (ref 35–47)
HDLC SERPL-MCNC: 93 MG/DL
HDLC SERPL: 2 (ref 0–5)
HGB BLD-MCNC: 10.5 G/DL (ref 11.5–16)
LDLC SERPL CALC-MCNC: 75.2 MG/DL (ref 0–100)
MCH RBC QN AUTO: 30.8 PG (ref 26–34)
MCHC RBC AUTO-ENTMCNC: 33 G/DL (ref 30–36.5)
MCV RBC AUTO: 93.3 FL (ref 80–99)
NRBC # BLD: 0 K/UL (ref 0–0.01)
NRBC BLD-RTO: 0 PER 100 WBC
PLATELET # BLD AUTO: 202 K/UL (ref 150–400)
PMV BLD AUTO: 8.7 FL (ref 8.9–12.9)
RBC # BLD AUTO: 3.41 M/UL (ref 3.8–5.2)
TRIGL SERPL-MCNC: 79 MG/DL
VLDLC SERPL CALC-MCNC: 15.8 MG/DL
WBC # BLD AUTO: 4.6 K/UL (ref 3.6–11)

## 2025-04-23 PROCEDURE — 97161 PT EVAL LOW COMPLEX 20 MIN: CPT

## 2025-04-23 PROCEDURE — 72148 MRI LUMBAR SPINE W/O DYE: CPT

## 2025-04-23 PROCEDURE — 6370000000 HC RX 637 (ALT 250 FOR IP): Performed by: HOSPITALIST

## 2025-04-23 PROCEDURE — 97530 THERAPEUTIC ACTIVITIES: CPT

## 2025-04-23 PROCEDURE — 94761 N-INVAS EAR/PLS OXIMETRY MLT: CPT

## 2025-04-23 PROCEDURE — 2500000003 HC RX 250 WO HCPCS: Performed by: HOSPITALIST

## 2025-04-23 PROCEDURE — 83036 HEMOGLOBIN GLYCOSYLATED A1C: CPT

## 2025-04-23 PROCEDURE — 97165 OT EVAL LOW COMPLEX 30 MIN: CPT

## 2025-04-23 PROCEDURE — 99223 1ST HOSP IP/OBS HIGH 75: CPT | Performed by: NURSE PRACTITIONER

## 2025-04-23 PROCEDURE — 72146 MRI CHEST SPINE W/O DYE: CPT

## 2025-04-23 PROCEDURE — 80061 LIPID PANEL: CPT

## 2025-04-23 PROCEDURE — 70551 MRI BRAIN STEM W/O DYE: CPT

## 2025-04-23 PROCEDURE — 97116 GAIT TRAINING THERAPY: CPT

## 2025-04-23 PROCEDURE — 6360000002 HC RX W HCPCS: Performed by: HOSPITALIST

## 2025-04-23 PROCEDURE — 2060000000 HC ICU INTERMEDIATE R&B

## 2025-04-23 PROCEDURE — 97535 SELF CARE MNGMENT TRAINING: CPT

## 2025-04-23 PROCEDURE — 85027 COMPLETE CBC AUTOMATED: CPT

## 2025-04-23 RX ORDER — GABAPENTIN 100 MG/1
100 CAPSULE ORAL 3 TIMES DAILY PRN
Status: ON HOLD | COMMUNITY
End: 2025-04-24 | Stop reason: HOSPADM

## 2025-04-23 RX ORDER — BUPRENORPHINE 5 UG/H
1 PATCH TRANSDERMAL WEEKLY
Status: DISCONTINUED | OUTPATIENT
Start: 2025-04-27 | End: 2025-04-27 | Stop reason: HOSPADM

## 2025-04-23 RX ORDER — GUAIFENESIN 200 MG/10ML
200 LIQUID ORAL EVERY 4 HOURS PRN
Status: DISCONTINUED | OUTPATIENT
Start: 2025-04-23 | End: 2025-04-27 | Stop reason: HOSPADM

## 2025-04-23 RX ORDER — LISDEXAMFETAMINE DIMESYLATE 20 MG/1
20 CAPSULE ORAL DAILY
Status: ON HOLD | COMMUNITY
End: 2025-04-24 | Stop reason: HOSPADM

## 2025-04-23 RX ORDER — ARIPIPRAZOLE 2 MG/1
2 TABLET ORAL
Status: DISCONTINUED | OUTPATIENT
Start: 2025-04-24 | End: 2025-04-27 | Stop reason: HOSPADM

## 2025-04-23 RX ORDER — BUPRENORPHINE 10 UG/H
1 PATCH TRANSDERMAL WEEKLY
Status: ON HOLD | COMMUNITY
End: 2025-04-24

## 2025-04-23 RX ADMIN — ARIPIPRAZOLE 2 MG: 2 TABLET ORAL at 07:59

## 2025-04-23 RX ADMIN — ASPIRIN 81 MG: 81 TABLET, CHEWABLE ORAL at 08:00

## 2025-04-23 RX ADMIN — LEVOTHYROXINE SODIUM 88 MCG: 0.09 TABLET ORAL at 07:59

## 2025-04-23 RX ADMIN — HYDROCODONE BITARTRATE AND ACETAMINOPHEN 1 TABLET: 5; 325 TABLET ORAL at 20:59

## 2025-04-23 RX ADMIN — ATORVASTATIN CALCIUM 80 MG: 20 TABLET, FILM COATED ORAL at 21:01

## 2025-04-23 RX ADMIN — ENOXAPARIN SODIUM 40 MG: 100 INJECTION SUBCUTANEOUS at 08:00

## 2025-04-23 RX ADMIN — SODIUM CHLORIDE, PRESERVATIVE FREE 10 ML: 5 INJECTION INTRAVENOUS at 21:01

## 2025-04-23 RX ADMIN — BUPROPION HYDROCHLORIDE 300 MG: 150 TABLET, EXTENDED RELEASE ORAL at 07:59

## 2025-04-23 RX ADMIN — METHYLPHENIDATE HYDROCHLORIDE 5 MG: 5 TABLET ORAL at 07:59

## 2025-04-23 RX ADMIN — HYDROCODONE BITARTRATE AND ACETAMINOPHEN 1 TABLET: 5; 325 TABLET ORAL at 11:30

## 2025-04-23 RX ADMIN — NIFEDIPINE 30 MG: 30 TABLET, FILM COATED, EXTENDED RELEASE ORAL at 07:59

## 2025-04-23 RX ADMIN — PREDNISONE 2.5 MG: 5 TABLET ORAL at 07:59

## 2025-04-23 RX ADMIN — ESCITALOPRAM OXALATE 5 MG: 10 TABLET ORAL at 07:59

## 2025-04-23 RX ADMIN — ONDANSETRON 4 MG: 2 INJECTION, SOLUTION INTRAMUSCULAR; INTRAVENOUS at 11:30

## 2025-04-23 RX ADMIN — SODIUM CHLORIDE, PRESERVATIVE FREE 10 ML: 5 INJECTION INTRAVENOUS at 08:00

## 2025-04-23 ASSESSMENT — PAIN DESCRIPTION - LOCATION
LOCATION: HEAD
LOCATION: ABDOMEN

## 2025-04-23 ASSESSMENT — PAIN SCALES - GENERAL
PAINLEVEL_OUTOF10: 2
PAINLEVEL_OUTOF10: 4
PAINLEVEL_OUTOF10: 4

## 2025-04-23 ASSESSMENT — PAIN DESCRIPTION - DESCRIPTORS
DESCRIPTORS: ACHING
DESCRIPTORS: ACHING;DULL

## 2025-04-23 ASSESSMENT — PAIN - FUNCTIONAL ASSESSMENT: PAIN_FUNCTIONAL_ASSESSMENT: ACTIVITIES ARE NOT PREVENTED

## 2025-04-23 ASSESSMENT — PAIN DESCRIPTION - ORIENTATION
ORIENTATION: LEFT
ORIENTATION: OTHER (COMMENT)

## 2025-04-23 ASSESSMENT — PAIN DESCRIPTION - PAIN TYPE: TYPE: ACUTE PAIN

## 2025-04-23 NOTE — CONSULTS
75.2 0 - 100 MG/DL    VLDL Cholesterol Calculated 15.8 MG/DL    Chol/HDL Ratio 2.0 0.0 - 5.0           Portions of this note were completed with Dragon, the computer voice recognition software.  Quite often unanticipated grammatical, syntax, homophones, and other interpretive errors are inadvertently transcribed by the computer software.  Please disregard these errors.  Efforts were made to edit the dictations but occasionally words are mis-transcribed.

## 2025-04-23 NOTE — FLOWSHEET NOTE
Patient hypertensive - 198/65. Asymptomatic. No s/sx of target organ damage. Will reasses as patient was recently straight cathed. Consider PO antihypertensive w/ continued hypertensive urgency after reeval.    /69 on reassessment. Nifedipine as scheduled. Continue to monitor.

## 2025-04-24 ENCOUNTER — APPOINTMENT (OUTPATIENT)
Facility: HOSPITAL | Age: 85
DRG: 552 | End: 2025-04-24
Attending: FAMILY MEDICINE
Payer: MEDICARE

## 2025-04-24 ENCOUNTER — APPOINTMENT (OUTPATIENT)
Dept: VASCULAR SURGERY | Facility: HOSPITAL | Age: 85
DRG: 552 | End: 2025-04-24
Payer: MEDICARE

## 2025-04-24 LAB
ECHO AO ARCH DIAM: 2 CM
ECHO AO ASC DIAM: 2.9 CM
ECHO AO ASCENDING AORTA INDEX: 1.84 CM/M2
ECHO AO ROOT DIAM: 2.9 CM
ECHO AO ROOT INDEX: 1.84 CM/M2
ECHO AV AREA PEAK VELOCITY: 1.2 CM2
ECHO AV AREA VTI: 1.2 CM2
ECHO AV AREA/BSA PEAK VELOCITY: 0.8 CM2/M2
ECHO AV AREA/BSA VTI: 0.8 CM2/M2
ECHO AV MEAN GRADIENT: 4 MMHG
ECHO AV MEAN VELOCITY: 1 M/S
ECHO AV PEAK GRADIENT: 8 MMHG
ECHO AV PEAK VELOCITY: 1.4 M/S
ECHO AV VELOCITY RATIO: 0.64
ECHO AV VTI: 29.9 CM
ECHO BSA: 1.59 M2
ECHO EST RA PRESSURE: 3 MMHG
ECHO LA DIAMETER INDEX: 1.52 CM/M2
ECHO LA DIAMETER: 2.4 CM
ECHO LA TO AORTIC ROOT RATIO: 0.83
ECHO LA VOL A-L A2C: 28 ML (ref 22–52)
ECHO LA VOL A-L A4C: 31 ML (ref 22–52)
ECHO LA VOL BP: 32 ML (ref 22–52)
ECHO LA VOL MOD A2C: 26 ML (ref 22–52)
ECHO LA VOL MOD A4C: 29 ML (ref 22–52)
ECHO LA VOL/BSA BIPLANE: 20 ML/M2 (ref 16–34)
ECHO LA VOLUME AREA LENGTH: 34 ML
ECHO LA VOLUME INDEX A-L A2C: 18 ML/M2 (ref 16–34)
ECHO LA VOLUME INDEX A-L A4C: 20 ML/M2 (ref 16–34)
ECHO LA VOLUME INDEX AREA LENGTH: 22 ML/M2 (ref 16–34)
ECHO LA VOLUME INDEX MOD A2C: 16 ML/M2 (ref 16–34)
ECHO LA VOLUME INDEX MOD A4C: 18 ML/M2 (ref 16–34)
ECHO LV E' LATERAL VELOCITY: 7.36 CM/S
ECHO LV E' SEPTAL VELOCITY: 6.61 CM/S
ECHO LV EDV A2C: 75 ML
ECHO LV EDV A4C: 76 ML
ECHO LV EDV BP: 78 ML (ref 56–104)
ECHO LV EDV INDEX A4C: 48 ML/M2
ECHO LV EDV INDEX BP: 49 ML/M2
ECHO LV EDV NDEX A2C: 47 ML/M2
ECHO LV EF PHYSICIAN: 55 %
ECHO LV EJECTION FRACTION A2C: 58 %
ECHO LV EJECTION FRACTION A4C: 37 %
ECHO LV ESV A2C: 32 ML
ECHO LV ESV A4C: 48 ML
ECHO LV ESV BP: 40 ML (ref 19–49)
ECHO LV ESV INDEX A2C: 20 ML/M2
ECHO LV ESV INDEX A4C: 30 ML/M2
ECHO LV ESV INDEX BP: 25 ML/M2
ECHO LV FRACTIONAL SHORTENING: 29 % (ref 28–44)
ECHO LV INTERNAL DIMENSION DIASTOLE INDEX: 2.66 CM/M2
ECHO LV INTERNAL DIMENSION DIASTOLIC: 4.2 CM (ref 3.9–5.3)
ECHO LV INTERNAL DIMENSION SYSTOLIC INDEX: 1.9 CM/M2
ECHO LV INTERNAL DIMENSION SYSTOLIC: 3 CM
ECHO LV IVSD: 0.9 CM (ref 0.6–0.9)
ECHO LV MASS 2D: 109.8 G (ref 67–162)
ECHO LV MASS INDEX 2D: 69.5 G/M2 (ref 43–95)
ECHO LV POSTERIOR WALL DIASTOLIC: 0.8 CM (ref 0.6–0.9)
ECHO LV RELATIVE WALL THICKNESS RATIO: 0.38
ECHO LVOT AREA: 1.8 CM2
ECHO LVOT AV VTI INDEX: 0.66
ECHO LVOT DIAM: 1.5 CM
ECHO LVOT MEAN GRADIENT: 2 MMHG
ECHO LVOT PEAK GRADIENT: 3 MMHG
ECHO LVOT PEAK VELOCITY: 0.9 M/S
ECHO LVOT STROKE VOLUME INDEX: 21.9 ML/M2
ECHO LVOT SV: 34.6 ML
ECHO LVOT VTI: 19.6 CM
ECHO MV A VELOCITY: 1.08 M/S
ECHO MV AREA VTI: 1.1 CM2
ECHO MV E DECELERATION TIME (DT): 289.1 MS
ECHO MV E VELOCITY: 0.79 M/S
ECHO MV E/A RATIO: 0.73
ECHO MV E/E' LATERAL: 10.73
ECHO MV E/E' RATIO (AVERAGED): 11.34
ECHO MV E/E' SEPTAL: 11.95
ECHO MV LVOT VTI INDEX: 1.6
ECHO MV MAX VELOCITY: 1.4 M/S
ECHO MV MEAN GRADIENT: 2 MMHG
ECHO MV MEAN VELOCITY: 0.7 M/S
ECHO MV PEAK GRADIENT: 7 MMHG
ECHO MV REGURGITANT PEAK GRADIENT: 67 MMHG
ECHO MV REGURGITANT PEAK VELOCITY: 4.1 M/S
ECHO MV VTI: 31.3 CM
ECHO PV MAX VELOCITY: 1 M/S
ECHO PV PEAK GRADIENT: 4 MMHG
ECHO RIGHT VENTRICULAR SYSTOLIC PRESSURE (RVSP): 15 MMHG
ECHO RV FREE WALL PEAK S': 8.6 CM/S
ECHO RV INTERNAL DIMENSION: 3.2 CM
ECHO RV TAPSE: 1.9 CM (ref 1.7–?)
ECHO TV REGURGITANT MAX VELOCITY: 1.76 M/S
ECHO TV REGURGITANT PEAK GRADIENT: 12 MMHG

## 2025-04-24 PROCEDURE — 2500000003 HC RX 250 WO HCPCS: Performed by: NURSE PRACTITIONER

## 2025-04-24 PROCEDURE — 93306 TTE W/DOPPLER COMPLETE: CPT

## 2025-04-24 PROCEDURE — 6370000000 HC RX 637 (ALT 250 FOR IP): Performed by: HOSPITALIST

## 2025-04-24 PROCEDURE — 94761 N-INVAS EAR/PLS OXIMETRY MLT: CPT

## 2025-04-24 PROCEDURE — 6360000002 HC RX W HCPCS: Performed by: HOSPITALIST

## 2025-04-24 PROCEDURE — 2500000003 HC RX 250 WO HCPCS: Performed by: HOSPITALIST

## 2025-04-24 PROCEDURE — 93306 TTE W/DOPPLER COMPLETE: CPT | Performed by: SPECIALIST

## 2025-04-24 PROCEDURE — 6370000000 HC RX 637 (ALT 250 FOR IP): Performed by: FAMILY MEDICINE

## 2025-04-24 PROCEDURE — 93880 EXTRACRANIAL BILAT STUDY: CPT

## 2025-04-24 PROCEDURE — 97535 SELF CARE MNGMENT TRAINING: CPT

## 2025-04-24 PROCEDURE — 2060000000 HC ICU INTERMEDIATE R&B

## 2025-04-24 RX ORDER — BUPRENORPHINE 10 UG/H
1 PATCH TRANSDERMAL WEEKLY
Qty: 4 PATCH | Refills: 0 | Status: SHIPPED | OUTPATIENT
Start: 2025-04-24 | End: 2025-05-24

## 2025-04-24 RX ORDER — NIFEDIPINE 30 MG/1
60 TABLET, EXTENDED RELEASE ORAL DAILY
Status: DISCONTINUED | OUTPATIENT
Start: 2025-04-25 | End: 2025-04-27 | Stop reason: HOSPADM

## 2025-04-24 RX ORDER — HYDROCODONE BITARTRATE AND ACETAMINOPHEN 5; 325 MG/1; MG/1
1 TABLET ORAL EVERY 6 HOURS PRN
Status: ON HOLD | COMMUNITY
End: 2025-04-24

## 2025-04-24 RX ORDER — METOPROLOL TARTRATE 25 MG/1
25 TABLET, FILM COATED ORAL 2 TIMES DAILY
Status: COMPLETED | OUTPATIENT
Start: 2025-04-24 | End: 2025-04-24

## 2025-04-24 RX ORDER — HYDROCODONE BITARTRATE AND ACETAMINOPHEN 5; 325 MG/1; MG/1
1 TABLET ORAL EVERY 6 HOURS PRN
Qty: 8 TABLET | Refills: 0 | Status: SHIPPED | OUTPATIENT
Start: 2025-04-24 | End: 2025-05-04

## 2025-04-24 RX ORDER — METOPROLOL SUCCINATE 25 MG/1
25 TABLET, EXTENDED RELEASE ORAL DAILY
Status: DISCONTINUED | OUTPATIENT
Start: 2025-04-25 | End: 2025-04-27 | Stop reason: HOSPADM

## 2025-04-24 RX ADMIN — BUPROPION HYDROCHLORIDE 300 MG: 150 TABLET, EXTENDED RELEASE ORAL at 08:20

## 2025-04-24 RX ADMIN — GUAIFENESIN 200 MG: 200 SOLUTION ORAL at 17:14

## 2025-04-24 RX ADMIN — LEVOTHYROXINE SODIUM 88 MCG: 0.09 TABLET ORAL at 07:04

## 2025-04-24 RX ADMIN — ASPIRIN 81 MG: 81 TABLET, CHEWABLE ORAL at 08:20

## 2025-04-24 RX ADMIN — ARIPIPRAZOLE 2 MG: 2 TABLET ORAL at 21:32

## 2025-04-24 RX ADMIN — NIFEDIPINE 30 MG: 30 TABLET, FILM COATED, EXTENDED RELEASE ORAL at 08:20

## 2025-04-24 RX ADMIN — SODIUM CHLORIDE, PRESERVATIVE FREE 10 ML: 5 INJECTION INTRAVENOUS at 08:21

## 2025-04-24 RX ADMIN — METOPROLOL TARTRATE 25 MG: 25 TABLET, FILM COATED ORAL at 21:32

## 2025-04-24 RX ADMIN — ESCITALOPRAM OXALATE 5 MG: 10 TABLET ORAL at 08:20

## 2025-04-24 RX ADMIN — ENOXAPARIN SODIUM 40 MG: 100 INJECTION SUBCUTANEOUS at 08:21

## 2025-04-24 RX ADMIN — GUAIFENESIN 200 MG: 200 SOLUTION ORAL at 21:33

## 2025-04-24 RX ADMIN — SODIUM CHLORIDE, PRESERVATIVE FREE 10 ML: 5 INJECTION INTRAVENOUS at 21:32

## 2025-04-24 RX ADMIN — PREDNISONE 2.5 MG: 5 TABLET ORAL at 08:20

## 2025-04-24 RX ADMIN — GUAIFENESIN 200 MG: 200 SOLUTION ORAL at 08:21

## 2025-04-24 NOTE — DISCHARGE INSTRUCTIONS
asthma, GERD, hypertension, rheumatic fever, hypothyroidism comes to the hospital with generalized weakness and difficulty ambulation.  Patient is admitted for stroke workup.     Bilateral lower extremity weakness: Present admission.  More likely stenosis. MRI of the L-spine and T-spine did reveal some moderate to severe foraminal stenosis on the bilateral lumbar spine at L2-3.  No cord compression.  MRI of the brain was negative for CVA.  Neurology was consulted did not believe this to represent cerebrovascular disease.  Echo was also negative.  Recommend follow-up with neurosurgery in the outpatient setting as patient's intermittent lower extremity weakness is likely secondary to the foraminal stenosis.  CT head is negative for any acute findings.  MRI of the brain neg  Echocardiogram neg  A1c 5.5   lipid panel 75         History of spinal stenosis  Patient had 2 back surgeries done in the past.  MRI of the L-spine shows L2-3 foraminal stenosis bilaterally.     Hypothyroidism  Continue Synthroid 88 mcg daily.      Anxiety and depression  Patient is on Abilify, bupropion, and lexapro.     COPD  Patient is on Trelegy  On chronic prednisone 2.5 mg po daily        Hypertension  Patient is on Nebivolol and nifedipine.     Chronic pain  Patient is on buprenorphine 10 mcg/hr weekly patch and norco prn  Was recently prescribed gabapentin 100 mg TID prn we recommend stopping     AHDH: on lisdexamfetamine at home.  Recommend stopping this medication at discharge.      Right 7th and 8th rib fracture  Patient was found to have 2 ribs fracture while she was in [place].  She went to the urgent care at that time.    Chest x-ray here does not show any rib fractures.       Hormone replacement therapy: on estradiol daily.  Recommended stopping this medication on discharge.     Polypharmacy: Patient is on lisdexamfetamine, gabapentin, buprenorphine, Norco, Abilify, bupropion, Lexapro, prednisone, estradiol.  I recommend stopping

## 2025-04-25 LAB
ECHO BSA: 1.59 M2
VAS LEFT CCA DIST EDV: 13.8 CM/S
VAS LEFT CCA DIST PSV: 62.2 CM/S
VAS LEFT CCA PROX EDV: 17.5 CM/S
VAS LEFT CCA PROX PSV: 112.5 CM/S
VAS LEFT ECA EDV: 6.9 CM/S
VAS LEFT ECA PSV: 83 CM/S
VAS LEFT ICA DIST EDV: 24.8 CM/S
VAS LEFT ICA DIST PSV: 97.9 CM/S
VAS LEFT ICA MID EDV: 21.6 CM/S
VAS LEFT ICA MID PSV: 81.8 CM/S
VAS LEFT ICA PROX EDV: 24.8 CM/S
VAS LEFT ICA PROX PSV: 83.3 CM/S
VAS LEFT ICA/CCA PSV: 1.6 NO UNITS
VAS LEFT SUBCLAVIAN PROX EDV: 2.6 CM/S
VAS LEFT SUBCLAVIAN PROX PSV: 116.2 CM/S
VAS LEFT VERTEBRAL EDV: 14.2 CM/S
VAS LEFT VERTEBRAL PSV: 60.9 CM/S
VAS RIGHT CCA DIST EDV: 15.5 CM/S
VAS RIGHT CCA DIST PSV: 74.4 CM/S
VAS RIGHT CCA PROX EDV: 6.9 CM/S
VAS RIGHT CCA PROX PSV: 76.9 CM/S
VAS RIGHT ECA EDV: 6.9 CM/S
VAS RIGHT ECA PSV: 75.6 CM/S
VAS RIGHT ICA DIST EDV: 19.8 CM/S
VAS RIGHT ICA DIST PSV: 67.1 CM/S
VAS RIGHT ICA MID EDV: 17.1 CM/S
VAS RIGHT ICA MID PSV: 77.6 CM/S
VAS RIGHT ICA PROX EDV: 16.7 CM/S
VAS RIGHT ICA PROX PSV: 86.7 CM/S
VAS RIGHT ICA/CCA PSV: 1.2 NO UNITS
VAS RIGHT SUBCLAVIAN PROX EDV: 0 CM/S
VAS RIGHT SUBCLAVIAN PROX PSV: 221.6 CM/S
VAS RIGHT VERTEBRAL EDV: 10 CM/S
VAS RIGHT VERTEBRAL PSV: 48.7 CM/S

## 2025-04-25 PROCEDURE — 6370000000 HC RX 637 (ALT 250 FOR IP): Performed by: FAMILY MEDICINE

## 2025-04-25 PROCEDURE — 6360000002 HC RX W HCPCS: Performed by: HOSPITALIST

## 2025-04-25 PROCEDURE — 97530 THERAPEUTIC ACTIVITIES: CPT

## 2025-04-25 PROCEDURE — 2060000000 HC ICU INTERMEDIATE R&B

## 2025-04-25 PROCEDURE — 2500000003 HC RX 250 WO HCPCS: Performed by: HOSPITALIST

## 2025-04-25 PROCEDURE — 2500000003 HC RX 250 WO HCPCS: Performed by: NURSE PRACTITIONER

## 2025-04-25 PROCEDURE — 6370000000 HC RX 637 (ALT 250 FOR IP): Performed by: HOSPITALIST

## 2025-04-25 PROCEDURE — 97535 SELF CARE MNGMENT TRAINING: CPT

## 2025-04-25 PROCEDURE — 94761 N-INVAS EAR/PLS OXIMETRY MLT: CPT

## 2025-04-25 RX ORDER — METOPROLOL TARTRATE 25 MG/1
25 TABLET, FILM COATED ORAL ONCE
Status: DISCONTINUED | OUTPATIENT
Start: 2025-04-25 | End: 2025-04-25

## 2025-04-25 RX ORDER — HYDRALAZINE HYDROCHLORIDE 20 MG/ML
10 INJECTION INTRAMUSCULAR; INTRAVENOUS ONCE
Status: COMPLETED | OUTPATIENT
Start: 2025-04-25 | End: 2025-04-25

## 2025-04-25 RX ORDER — HYDRALAZINE HYDROCHLORIDE 20 MG/ML
10 INJECTION INTRAMUSCULAR; INTRAVENOUS
Status: DISCONTINUED | OUTPATIENT
Start: 2025-04-25 | End: 2025-04-25

## 2025-04-25 RX ORDER — HYDRALAZINE HYDROCHLORIDE 20 MG/ML
20 INJECTION INTRAMUSCULAR; INTRAVENOUS ONCE
Status: DISCONTINUED | OUTPATIENT
Start: 2025-04-25 | End: 2025-04-25

## 2025-04-25 RX ORDER — ACETAMINOPHEN 325 MG/1
650 TABLET ORAL EVERY 8 HOURS PRN
Status: DISCONTINUED | OUTPATIENT
Start: 2025-04-25 | End: 2025-04-27 | Stop reason: HOSPADM

## 2025-04-25 RX ORDER — OXYCODONE HYDROCHLORIDE 5 MG/1
5 TABLET ORAL ONCE
Refills: 0 | Status: COMPLETED | OUTPATIENT
Start: 2025-04-25 | End: 2025-04-25

## 2025-04-25 RX ORDER — OXYCODONE HYDROCHLORIDE 5 MG/1
5 TABLET ORAL EVERY 6 HOURS PRN
Refills: 0 | Status: DISCONTINUED | OUTPATIENT
Start: 2025-04-25 | End: 2025-04-27 | Stop reason: HOSPADM

## 2025-04-25 RX ADMIN — GUAIFENESIN 200 MG: 200 SOLUTION ORAL at 21:49

## 2025-04-25 RX ADMIN — NIFEDIPINE 60 MG: 30 TABLET, FILM COATED, EXTENDED RELEASE ORAL at 08:42

## 2025-04-25 RX ADMIN — GUAIFENESIN 200 MG: 200 SOLUTION ORAL at 15:10

## 2025-04-25 RX ADMIN — BUPROPION HYDROCHLORIDE 300 MG: 150 TABLET, EXTENDED RELEASE ORAL at 08:43

## 2025-04-25 RX ADMIN — OXYCODONE 5 MG: 5 TABLET ORAL at 21:00

## 2025-04-25 RX ADMIN — SODIUM CHLORIDE, PRESERVATIVE FREE 10 ML: 5 INJECTION INTRAVENOUS at 08:45

## 2025-04-25 RX ADMIN — ESCITALOPRAM OXALATE 5 MG: 10 TABLET ORAL at 08:42

## 2025-04-25 RX ADMIN — ENOXAPARIN SODIUM 40 MG: 100 INJECTION SUBCUTANEOUS at 08:41

## 2025-04-25 RX ADMIN — HYDRALAZINE HYDROCHLORIDE 10 MG: 20 INJECTION INTRAMUSCULAR; INTRAVENOUS at 10:34

## 2025-04-25 RX ADMIN — ACETAMINOPHEN 650 MG: 325 TABLET ORAL at 15:11

## 2025-04-25 RX ADMIN — METOPROLOL SUCCINATE 25 MG: 25 TABLET, EXTENDED RELEASE ORAL at 08:43

## 2025-04-25 RX ADMIN — PREDNISONE 2.5 MG: 5 TABLET ORAL at 08:42

## 2025-04-25 RX ADMIN — OXYCODONE 5 MG: 5 TABLET ORAL at 08:41

## 2025-04-25 RX ADMIN — ARIPIPRAZOLE 2 MG: 2 TABLET ORAL at 21:00

## 2025-04-25 RX ADMIN — LEVOTHYROXINE SODIUM 88 MCG: 0.09 TABLET ORAL at 06:55

## 2025-04-25 ASSESSMENT — PAIN SCALES - GENERAL
PAINLEVEL_OUTOF10: 3
PAINLEVEL_OUTOF10: 6
PAINLEVEL_OUTOF10: 3
PAINLEVEL_OUTOF10: 6
PAINLEVEL_OUTOF10: 4
PAINLEVEL_OUTOF10: 2

## 2025-04-25 ASSESSMENT — PAIN DESCRIPTION - DESCRIPTORS
DESCRIPTORS: ACHING
DESCRIPTORS: ACHING

## 2025-04-25 ASSESSMENT — PAIN - FUNCTIONAL ASSESSMENT
PAIN_FUNCTIONAL_ASSESSMENT: ACTIVITIES ARE NOT PREVENTED
PAIN_FUNCTIONAL_ASSESSMENT: ACTIVITIES ARE NOT PREVENTED

## 2025-04-25 ASSESSMENT — PAIN DESCRIPTION - LOCATION
LOCATION: RIB CAGE
LOCATION: RIB CAGE

## 2025-04-25 ASSESSMENT — PAIN DESCRIPTION - ORIENTATION
ORIENTATION: LEFT
ORIENTATION: LEFT

## 2025-04-26 PROCEDURE — 6370000000 HC RX 637 (ALT 250 FOR IP): Performed by: HOSPITALIST

## 2025-04-26 PROCEDURE — 6360000002 HC RX W HCPCS: Performed by: HOSPITALIST

## 2025-04-26 PROCEDURE — 2500000003 HC RX 250 WO HCPCS: Performed by: NURSE PRACTITIONER

## 2025-04-26 PROCEDURE — 6370000000 HC RX 637 (ALT 250 FOR IP): Performed by: FAMILY MEDICINE

## 2025-04-26 PROCEDURE — 2500000003 HC RX 250 WO HCPCS: Performed by: HOSPITALIST

## 2025-04-26 PROCEDURE — 2060000000 HC ICU INTERMEDIATE R&B

## 2025-04-26 PROCEDURE — 94761 N-INVAS EAR/PLS OXIMETRY MLT: CPT

## 2025-04-26 RX ADMIN — GUAIFENESIN 200 MG: 200 SOLUTION ORAL at 20:33

## 2025-04-26 RX ADMIN — ESCITALOPRAM OXALATE 5 MG: 10 TABLET ORAL at 09:17

## 2025-04-26 RX ADMIN — BUPROPION HYDROCHLORIDE 300 MG: 150 TABLET, EXTENDED RELEASE ORAL at 09:15

## 2025-04-26 RX ADMIN — GUAIFENESIN 200 MG: 200 SOLUTION ORAL at 13:37

## 2025-04-26 RX ADMIN — SODIUM CHLORIDE, PRESERVATIVE FREE 10 ML: 5 INJECTION INTRAVENOUS at 09:21

## 2025-04-26 RX ADMIN — LEVOTHYROXINE SODIUM 88 MCG: 0.09 TABLET ORAL at 06:13

## 2025-04-26 RX ADMIN — PREDNISONE 2.5 MG: 5 TABLET ORAL at 09:16

## 2025-04-26 RX ADMIN — GUAIFENESIN 200 MG: 200 SOLUTION ORAL at 06:13

## 2025-04-26 RX ADMIN — NIFEDIPINE 60 MG: 30 TABLET, FILM COATED, EXTENDED RELEASE ORAL at 09:17

## 2025-04-26 RX ADMIN — ENOXAPARIN SODIUM 40 MG: 100 INJECTION SUBCUTANEOUS at 09:21

## 2025-04-26 RX ADMIN — ACETAMINOPHEN 650 MG: 325 TABLET ORAL at 13:43

## 2025-04-26 RX ADMIN — ACETAMINOPHEN 650 MG: 325 TABLET ORAL at 06:13

## 2025-04-26 RX ADMIN — ARIPIPRAZOLE 2 MG: 2 TABLET ORAL at 20:33

## 2025-04-26 RX ADMIN — OXYCODONE 5 MG: 5 TABLET ORAL at 19:06

## 2025-04-26 RX ADMIN — SODIUM CHLORIDE, PRESERVATIVE FREE 10 ML: 5 INJECTION INTRAVENOUS at 20:35

## 2025-04-26 RX ADMIN — METOPROLOL SUCCINATE 25 MG: 25 TABLET, EXTENDED RELEASE ORAL at 09:18

## 2025-04-26 ASSESSMENT — PAIN SCALES - GENERAL
PAINLEVEL_OUTOF10: 0
PAINLEVEL_OUTOF10: 3
PAINLEVEL_OUTOF10: 8
PAINLEVEL_OUTOF10: 3

## 2025-04-26 ASSESSMENT — PAIN DESCRIPTION - LOCATION
LOCATION: RIB CAGE
LOCATION: OTHER (COMMENT);RIB CAGE
LOCATION: RIB CAGE
LOCATION: RIB CAGE

## 2025-04-26 ASSESSMENT — PAIN - FUNCTIONAL ASSESSMENT: PAIN_FUNCTIONAL_ASSESSMENT: ACTIVITIES ARE NOT PREVENTED

## 2025-04-26 ASSESSMENT — PAIN DESCRIPTION - DESCRIPTORS
DESCRIPTORS: ACHING

## 2025-04-26 ASSESSMENT — PAIN DESCRIPTION - ORIENTATION
ORIENTATION: LEFT

## 2025-04-26 NOTE — PROGRESS NOTES
[facility]    Soo Fam, IFEOMAA, CNRN, ACNP-BC  [facility]  [address]  [phone]        Name:   Kathryn Hughes   Medical record #: [id number]  Admission Date: 4/22/2025   Reason for Consult:    Bilateral lower extremity weakness     HISTORY OF PRESENT ILLNESS:       Kathryn Hughes is a 84 y.o. female who presented to the ED at the request of her PCP on 4/22/2025 with a 1 week history of difficulty with ambulation.  She has chronic left foot drop as a complication from a previous spine surgery.  Her daughter reported that Mrs. Hughes had flown back from [place] 3 days prior to admission and had had trouble getting out of her seat and off the plane.  She was seen by her PCP who referred her to the for suspected stroke.  Daughters stated that patient is constantly leaning backwards while standing like she is falling and has been shaking.  Upon admission to the ED the provider did note an unsteady gait, that she was able to raise her legs against gravity but had decreased sensation in her left lower extremity.  Upon arrival to the ED her blood pressure was 195/64, sats 92% on room air, temperature 98.1, review of labs reveals a sodium of 135, creatinine of 1.08, glucose of 105, normal CBC, urine without evidence of bacteria or leukocytes.    Subjective/Objective:   Overnight events:      No acute events overnight, sitting up in bed visiting with .                Plan of care discussed with:  Patient    Impression/ Plan:      1.  84-year-old female who presents with lower extremity weakness, symptoms and imaging are not consistent with a neurovascular event:    MRI brain with no acute process, MRI thoracic and lumbar with postsurgical changes and L2-3 mild to moderate narrowing  Patient will need follow-up with orthospine, if they feel that her stenosis is not significant she can pursue outpatient EMG     Risk factors for stroke include: 
0700 : Bedside and Verbal shift change report given to Gail RN (oncoming nurse) by Mary RN (offgoing nurse). Report included the following information Nurse Handoff Report, Recent Results, Cardiac Rhythm NSR, and Event Log.       Stroke Education provided to patient and the following topics were discussed    1. Patients personal risk factors for stroke are hypertension    2. Warning signs of Stroke:        * Sudden numbness or weakness of the face, arm or leg, especially on one side of          The body            * Sudden confusion, trouble speaking or understanding        * Sudden trouble seeing in one or both eyes        * Sudden trouble walking, dizziness, loss of balance or coordination        * Sudden severe headache with no known cause      3. Importance of activation Emergency Medical Services ( 9-1-1 ) immediately if experience any warning signs of stroke.    4. Be sure and schedule a follow-up appointment with your primary care doctor or any specialists as instructed.     5. You must take medicine every day to treat your risk factors for stroke.  Be sure to take your medicines exactly as your doctor tells you: no more, no less.  Know what your medicines are for , what they do.  Anti-thrombotics /anticoagulants can help prevent strokes.  You are taking the following medicine(s)  nifedipine, llipitor      6.  Smoking and second-hand smoke greatly increase your risk of stroke, cardiovascular disease and death. Smoking history never    7. Information provided was [facility] Stroke Education Binder or Verbal Education    8. Documentation of teaching completed in Patient Education Activity and on Care Plan with teaching response noted?  yes      1900 : Bedside and Verbal shift change report given to Mary RN (oncoming nurse) by Gail RN (offgoing nurse). Report included the following information Nurse Handoff Report, Recent Results, Cardiac Rhythm NSR , and Event Log.     
1124 Notified Dr. Dietrich about Pt having pain relief with Oxycodone and that she was orthostatic and experienced a low BP reading during OT eval. She became lightheaded during ambulation around the room. Orders received to monitor BP.   
[facility]  [address] [place] [address] [phone]      Medical Progress Note      NAME: Kathryn Hughes   :  1940  MRM:  [id number]    Date/Time: 2025  1:20 PM           Assessment / Plan:   Kathryn  is a 84-year-old female with a history of asthma, GERD, hypertension, rheumatic fever, hypothyroidism comes to the hospital with generalized weakness and difficulty ambulation.  Patient is admitted for stroke workup.     Bilateral lower extremity weakness, POA  History of spinal stenosis s/p surgery in the past  Unlikely acute CVA and likely related to spinal disease.  CT head with no acute pathology except noted left maxillary sinus disease; MRI brain also with no acute ischemic pathology.  MRI obtained of thoracic and lumbar spine: No acute pathology on thoracic spine; postsurgical and degenerative changes most pronounced at L2-L3 where there is mild canal narrowing and moderate to severe right foraminal narrowing: This likely explains patient's symptoms.  Certainly requires outpatient follow-up with orthopedic spine or neurosurgery.  Assessed by PT and OT with recommendations for IPR with discharge pending.    Elevated blood pressure with hypertensive urgency  History of essential hypertension  Discharge was held yesterday  given elevated blood pressure in the 180s.  Today, slightly improved blood pressure; noted patient already on nifedipine and metoprolol.  Patient's repeat pain likely contributing to elevated blood pressure.  Give hydralazine 10 mg IV x 1 to help with better blood pressure control; patient's blood pressure significantly dropped; however, this is the reason why I am not aggressive in further parenteral medications.  For now we will continue to monitor.  Continue oral regimen which will be titrated accordingly.    IPR discharge declined today as patient did receive IV antihypertensives; they will reevaluate patient in  
[facility]  [address] [place] [address] [phone]      Medical Progress Note      NAME: Kathryn Hughes   :  1940  MRM:  [id number]    Date/Time: 2025  1:18 PM           Assessment / Plan:   Kathryn  is a 84-year-old female with a history of asthma, GERD, hypertension, rheumatic fever, hypothyroidism comes to the hospital with generalized weakness and difficulty ambulation.  Patient is admitted for stroke workup.     Bilateral lower extremity weakness, POA  History of spinal stenosis s/p surgery in the past  Unlikely acute CVA and likely related to spinal disease.  CT head with no acute pathology except noted left maxillary sinus disease; MRI brain also with no acute ischemic pathology.  MRI obtained of thoracic and lumbar spine: No acute pathology on thoracic spine; postsurgical and degenerative changes most pronounced at L2-L3 where there is mild canal narrowing and moderate to severe right foraminal narrowing: This likely explains patient's symptoms.  Certainly requires outpatient follow-up with orthopedic spine or neurosurgery.  Assessed by PT and OT with recommendations for IPR with discharge pending.    Elevated blood pressure with hypertensive urgency  History of essential hypertension  Blood pressure better controlled today, last 158/64.  Will continue with current regimen.  [facility] reports they will likely take patient tomorrow.    Chronic pain  Patient is on buprenorphine 10 mcg/hr weekly patch.  Was recently prescribed gabapentin 100 mg TID prn and norco prn.  She seems to be tolerating oral oxycodone which I added yesterday.    Right 7th and 8th rib fracture  Patient was found to have 2 ribs fracture while she was in [place].  She went to the urgent care at that time.    Chest x-ray here does not show any rib fractures.  She was prescribe norco and hydrocodone at the urgent care: These have been stopped for now with oxycodone ordered 
[facility]  [address] [place] [address] [phone]        Hospitalist Progress Note      NAME: Kathryn Hughes   :  1940  MRM:  [id number]    Date/Time of service: 2025  4:59 PM       Subjective:   Patient was personally seen and examined by me during this time period.  Chart reviewed.     Patient reports lower extremity weakness has resolved.  Denies any other complaints.  Does report that weakness has been intermittent.  Did have some urinary retention this morning and had to be straight cathed which is unusual for her.     ROS: per history above       Objective:       Vitals:       Last 24hrs VS reviewed since prior progress note. Most recent are:    Vitals:    25 1603   BP: (!) 163/56   Pulse: 60   Resp: 16   Temp: 98.2 °F (36.8 °C)   SpO2: 97%     SpO2 Readings from Last 6 Encounters:   25 97%   24 97%   24 96%          Intake/Output Summary (Last 24 hours) at 2025 1659  Last data filed at 2025 2141  Gross per 24 hour   Intake 10 ml   Output --   Net 10 ml        Exam:     Physical Exam:    Gen:  Well-developed, well-nourished, in no acute distress  HEENT:  Pink conjunctivae, hearing intact to voice, moist mucous membranes  Neck:  Supple  Resp:  No accessory muscle use, clear breath sounds without wheezes rales or rhonchi  Card:  No murmurs, normal S1, S2 without thrills, bruits or peripheral edema  Abd:  Soft, non-tender, non-distended  Skin:  No rashes or ulcers, skin turgor is good  Neuro:  Cranial nerves 3-12 are intact, follows commands appropriately. Normal strength and sensation in upper and lower extremities bilaterally.   Psych:  Good insight, oriented to person, place and time, alert      Medications Reviewed: (see below)    Lab Data Reviewed: (see below)    ______________________________________________________________________    Medications:     Current Facility-Administered Medications   Medication 
Patient assisted out of bed to bathroom, assistance required for safe transfer, shuffled gait observed, denies any lightheadedness or dizziness. Safely assisted back to chair.  Received a phone call from [facility] inquiring discharge plan. Notified  of their call who asked me to call them back and notify them no discharge today. Phone call to [phone] and notified patient is not being discharged today.  LISSY Pimentel  
Physical Therapy    Attempted to see patient but just finished working with OT and had been orthostatic and symptomatic.  Deferring session at this time and will follow as appropriate.    Diane Ortiz, MS, PT  
Stroke Education provided to patient and the following topics were discussed    1. Patients personal risk factors for stroke are carotid stenosis and hypertension    2. Warning signs of Stroke:        * Sudden numbness or weakness of the face, arm or leg, especially on one side of          The body            * Sudden confusion, trouble speaking or understanding        * Sudden trouble seeing in one or both eyes        * Sudden trouble walking, dizziness, loss of balance or coordination        * Sudden severe headache with no known cause      3. Importance of activation Emergency Medical Services ( 9-1-1 ) immediately if experience any warning signs of stroke.    4. Be sure and schedule a follow-up appointment with your primary care doctor or any specialists as instructed.     5. You must take medicine every day to treat your risk factors for stroke.  Be sure to take your medicines exactly as your doctor tells you: no more, no less.  Know what your medicines are for , what they do.  Anti-thrombotics /anticoagulants can help prevent strokes.  You are taking the following medicine(s)  : aspirin, lipitor, (nifedipine for hypertension).    6.  Smoking and second-hand smoke greatly increase your risk of stroke, cardiovascular disease and death. Smoking history : never was a smoker.    7. Information provided was Verbal Education    8. Documentation of teaching completed in Patient Education Activity and on Care Plan with teaching response noted?   Yes   
Unable to perform bilateral carotid test due to patient in MRI  
gabapentin, buprenorphine, Norco, Abilify, bupropion, Lexapro, prednisone, estradiol.  I recommend stopping Vyvanse and estradiol and gabapentin and norco.  Likely does not need 3 psychiatric medications as well.      **Prior records, notes, labs, radiology, and medications reviewed in [facility] if needed**    Total time spent with patient care: 35 min **I personally saw and examined the patient during this time period**                 Care Plan discussed with: Patient    Discussed:  Care Plan    Prophylaxis:  Lovenox    Disposition:  SNF           ___________________________________________________    Attending Physician: Jomar Moody MD

## 2025-04-27 VITALS
HEIGHT: 62 IN | DIASTOLIC BLOOD PRESSURE: 68 MMHG | RESPIRATION RATE: 17 BRPM | HEART RATE: 57 BPM | WEIGHT: 127 LBS | SYSTOLIC BLOOD PRESSURE: 152 MMHG | TEMPERATURE: 97.9 F | OXYGEN SATURATION: 95 % | BODY MASS INDEX: 23.37 KG/M2

## 2025-04-27 PROCEDURE — 6370000000 HC RX 637 (ALT 250 FOR IP): Performed by: FAMILY MEDICINE

## 2025-04-27 PROCEDURE — 2500000003 HC RX 250 WO HCPCS: Performed by: NURSE PRACTITIONER

## 2025-04-27 PROCEDURE — 6360000002 HC RX W HCPCS: Performed by: HOSPITALIST

## 2025-04-27 PROCEDURE — 6370000000 HC RX 637 (ALT 250 FOR IP): Performed by: HOSPITALIST

## 2025-04-27 PROCEDURE — 94761 N-INVAS EAR/PLS OXIMETRY MLT: CPT

## 2025-04-27 PROCEDURE — 2500000003 HC RX 250 WO HCPCS: Performed by: HOSPITALIST

## 2025-04-27 RX ADMIN — GUAIFENESIN 200 MG: 200 SOLUTION ORAL at 14:32

## 2025-04-27 RX ADMIN — BUPROPION HYDROCHLORIDE 300 MG: 150 TABLET, EXTENDED RELEASE ORAL at 09:53

## 2025-04-27 RX ADMIN — NIFEDIPINE 60 MG: 30 TABLET, FILM COATED, EXTENDED RELEASE ORAL at 09:54

## 2025-04-27 RX ADMIN — ESCITALOPRAM OXALATE 5 MG: 10 TABLET ORAL at 09:54

## 2025-04-27 RX ADMIN — OXYCODONE 5 MG: 5 TABLET ORAL at 14:32

## 2025-04-27 RX ADMIN — ENOXAPARIN SODIUM 40 MG: 100 INJECTION SUBCUTANEOUS at 09:54

## 2025-04-27 RX ADMIN — LEVOTHYROXINE SODIUM 88 MCG: 0.09 TABLET ORAL at 06:06

## 2025-04-27 RX ADMIN — OXYCODONE 5 MG: 5 TABLET ORAL at 06:06

## 2025-04-27 RX ADMIN — PREDNISONE 2.5 MG: 5 TABLET ORAL at 09:54

## 2025-04-27 RX ADMIN — SODIUM CHLORIDE, PRESERVATIVE FREE 10 ML: 5 INJECTION INTRAVENOUS at 09:54

## 2025-04-27 RX ADMIN — METOPROLOL SUCCINATE 25 MG: 25 TABLET, EXTENDED RELEASE ORAL at 09:54

## 2025-04-27 ASSESSMENT — PAIN DESCRIPTION - LOCATION
LOCATION: RIB CAGE
LOCATION: RIB CAGE

## 2025-04-27 ASSESSMENT — PAIN - FUNCTIONAL ASSESSMENT
PAIN_FUNCTIONAL_ASSESSMENT: PREVENTS OR INTERFERES SOME ACTIVE ACTIVITIES AND ADLS
PAIN_FUNCTIONAL_ASSESSMENT: PREVENTS OR INTERFERES SOME ACTIVE ACTIVITIES AND ADLS

## 2025-04-27 ASSESSMENT — PAIN SCALES - GENERAL
PAINLEVEL_OUTOF10: 8
PAINLEVEL_OUTOF10: 7
PAINLEVEL_OUTOF10: 7

## 2025-04-27 ASSESSMENT — PAIN DESCRIPTION - ORIENTATION
ORIENTATION: LEFT
ORIENTATION: LEFT

## 2025-04-27 ASSESSMENT — PAIN DESCRIPTION - PAIN TYPE
TYPE: ACUTE PAIN
TYPE: ACUTE PAIN

## 2025-04-27 ASSESSMENT — PAIN DESCRIPTION - DESCRIPTORS
DESCRIPTORS: ACHING
DESCRIPTORS: ACHING

## 2025-04-27 NOTE — DISCHARGE SUMMARY
Physician Discharge Summary     Patient ID:  Kathryn Hughes  [id number]  84 y.o.  1940    Admit date: 4/22/2025    Discharge date and time: 4/27/2025    HPI: Patient is a 84-year-old female with a history of asthma, GERD, hypertension, rheumatic fever, hypothyroidism comes to the hospital with generalized weakness and difficulty ambulation. Patient is admitted for stroke workup.     Admission Diagnoses: Acute cerebrovascular accident (CVA) (HCC) [I63.9]  Cerebrovascular accident (CVA), unspecified mechanism (HCC) [I63.9]    Discharge Diagnoses and Hospital Course:     Kathryn  is a 84-year-old female with a history of asthma, GERD, hypertension, rheumatic fever, hypothyroidism comes to the hospital with generalized weakness and difficulty ambulation.  Patient is admitted for stroke workup.     Bilateral lower extremity weakness, POA  History of spinal stenosis s/p surgery in the past  Unlikely acute CVA and likely related to spinal disease.  CT head with no acute pathology except noted left maxillary sinus disease; MRI brain also with no acute ischemic pathology.  MRI obtained of thoracic and lumbar spine: No acute pathology on thoracic spine; postsurgical and degenerative changes most pronounced at L2-L3 where there is mild canal narrowing and moderate to severe right foraminal narrowing: This likely explains patient's symptoms.  Certainly requires outpatient follow-up with orthopedic spine or neurosurgery.  Assessed by PT and OT with recommendations for IPR: Patient being discharged today.     Elevated blood pressure with hypertensive urgency  History of essential hypertension  Blood pressure better controlled on home regimen.     Chronic pain  Patient is on buprenorphine 10 mcg/hr weekly patch.  Was recently prescribed gabapentin 100 mg TID prn and norco prn; currently on hold given concerns for polypharmacy; tolerating oral oxycodone.     Right 7th and 8th rib fracture  Patient was found to have 2 ribs 
  ergocalciferol (ERGOCALCIFEROL) 1.25 MG (24060 UT) capsule Take 1 capsule by mouth once a week      levothyroxine (SYNTHROID) 88 MCG tablet Take 1 tablet by mouth every morning (before breakfast)           STOP taking these medications       Lisdexamfetamine Dimesylate (VYVANSE) 20 MG CAPS Comments:   Reason for Stopping:         gabapentin (NEURONTIN) 100 MG capsule Comments:   Reason for Stopping:         cloNIDine (CATAPRES-TTS-2) 0.2 MG/24HR PTWK Comments:   Reason for Stopping:         Dexmethylphenidate HCl ER 10 MG CP24 Comments:   Reason for Stopping:         buprenorphine (BUPRENEX) 7.5 MCG/HR PTWK Comments:   Reason for Stopping:         estradiol (ESTRACE) 0.5 MG tablet Comments:   Reason for Stopping:             Activity: activity as tolerated  Diet: regular diet  Wound Care: as directed    Follow-up with Demetrio Mei MD within one week unless specified earlier.     Approximate time spent in patient care on day of discharge: 36 min    Signed:  Jomar Moody MD  4/24/2025  2:33 PM

## 2025-04-27 NOTE — PLAN OF CARE
Problem: Chronic Conditions and Co-morbidities  Goal: Patient's chronic conditions and co-morbidity symptoms are monitored and maintained or improved  4/27/2025 0326 by Yessenia Leonard RN  Outcome: Progressing  4/27/2025 0325 by Yessenia Leonard RN  Outcome: Progressing  Flowsheets (Taken 4/26/2025 1949)  Care Plan - Patient's Chronic Conditions and Co-Morbidity Symptoms are Monitored and Maintained or Improved: Monitor and assess patient's chronic conditions and comorbid symptoms for stability, deterioration, or improvement     Problem: Discharge Planning  Goal: Discharge to home or other facility with appropriate resources  4/27/2025 0326 by Yessenia Leonard RN  Outcome: Progressing  4/27/2025 0325 by Yessenia Leonard RN  Outcome: Progressing  Flowsheets (Taken 4/26/2025 1949)  Discharge to home or other facility with appropriate resources:   Identify barriers to discharge with patient and caregiver   Arrange for needed discharge resources and transportation as appropriate     Problem: Discharge Planning  Goal: Discharge to home or other facility with appropriate resources  4/27/2025 0326 by Yessenia Leonard RN  Outcome: Progressing  4/27/2025 0325 by Yessenia Leonard RN  Outcome: Progressing  Flowsheets (Taken 4/26/2025 1949)  Discharge to home or other facility with appropriate resources:   Identify barriers to discharge with patient and caregiver   Arrange for needed discharge resources and transportation as appropriate     Problem: ABCDS Injury Assessment  Goal: Absence of physical injury  Outcome: Progressing     Problem: Neurosensory - Adult  Goal: Achieves stable or improved neurological status  Outcome: Progressing  Flowsheets (Taken 4/26/2025 1949)  Achieves stable or improved neurological status: Assess for and report changes in neurological status     
  Problem: Physical Therapy - Adult  Goal: By Discharge: Performs mobility at highest level of function for planned discharge setting.  See evaluation for individualized goals.  Description: FUNCTIONAL STATUS PRIOR TO ADMISSION: [place] AFO but no additional DME. The patient reports her last fall as 2/21/24 (a very significant fall requiring hospitalization) but family reports much more frequent falls of small magnitude    HOME SUPPORT PRIOR TO ADMISSION: The patient lived alone with local daughters and a grandson to provide assistance. The patient lives in [facility].    Physical Therapy Goals  Initiated 4/23/2025  1.  Patient will move from supine to sit and sit to supine and scoot up and down in bed with supervision/set-up within 7 day(s).    2.  Patient will perform sit to stand with supervision/set-up within 7 day(s).  3.  Patient will transfer from bed to chair and chair to bed with supervision/set-up using the least restrictive device within 7 day(s).  4.  Patient will ambulate with supervision/set-up for 150 feet with the least restrictive device within 7 day(s).   5.  Patient will improve her score on the PALOMINO balance test by 7 points within 7 day(s).    Outcome: Progressing   PHYSICAL THERAPY EVALUATION    Patient: Kathryn Hughes (84 y.o. female)  Date: 4/23/2025  Primary Diagnosis: Acute cerebrovascular accident (CVA) (HCC) [I63.9]  Cerebrovascular accident (CVA), unspecified mechanism (HCC) [I63.9]       Precautions:              ASSESSMENT :   DEFICITS/IMPAIRMENTS:   The patient is limited by decreased functional mobility, strength, body mechanics, activity tolerance, balance, proprioception     Based on the impairments listed above the patient presents below her functional baseline following admission d/t concern for CVA. She was visiting [place] 3 days ago with onset of worsening balance leading to a GLF. Imaging in [place] indicated 
  Problem: Safety - Adult  Goal: Free from fall injury  4/23/2025 0234 by Isabelle Marie RN  Outcome: Progressing  4/23/2025 0234 by Isabelle Marie RN  Outcome: Progressing     Problem: Chronic Conditions and Co-morbidities  Goal: Patient's chronic conditions and co-morbidity symptoms are monitored and maintained or improved  4/23/2025 0234 by Isabelle Marie RN  Outcome: Progressing  4/23/2025 0234 by Isabelle Marie RN  Outcome: Progressing  Flowsheets (Taken 4/22/2025 2030)  Care Plan - Patient's Chronic Conditions and Co-Morbidity Symptoms are Monitored and Maintained or Improved:   Monitor and assess patient's chronic conditions and comorbid symptoms for stability, deterioration, or improvement   Collaborate with multidisciplinary team to address chronic and comorbid conditions and prevent exacerbation or deterioration   Update acute care plan with appropriate goals if chronic or comorbid symptoms are exacerbated and prevent overall improvement and discharge     Problem: Discharge Planning  Goal: Discharge to home or other facility with appropriate resources  4/23/2025 0234 by Isabelle Marie RN  Outcome: Progressing  4/23/2025 0234 by Isabelle Marie RN  Outcome: Progressing  Flowsheets (Taken 4/22/2025 2030)  Discharge to home or other facility with appropriate resources:   Identify barriers to discharge with patient and caregiver   Arrange for needed discharge resources and transportation as appropriate   Identify discharge learning needs (meds, wound care, etc)   Refer to discharge planning if patient needs post-hospital services based on physician order or complex needs related to functional status, cognitive ability or social support system     Problem: Skin/Tissue Integrity  Goal: Skin integrity remains intact  Description: 1.  Monitor for areas of redness and/or skin breakdown2.  Assess vascular access sites hourly3.  Every 4-6 hours minimum:  Change oxygen saturation 
  Problem: Safety - Adult  Goal: Free from fall injury  4/23/2025 1127 by Alanna Diaz RN  Outcome: Progressing  Flowsheets (Taken 4/23/2025 0237 by Isabelle Marie RN)  Free From Fall Injury:   Instruct family/caregiver on patient safety   Based on caregiver fall risk screen, instruct family/caregiver to ask for assistance with transferring infant if caregiver noted to have fall risk factors  4/23/2025 0234 by Isabelle Marie RN  Outcome: Progressing  4/23/2025 0234 by Isabelle Marie RN  Outcome: Progressing     Problem: Chronic Conditions and Co-morbidities  Goal: Patient's chronic conditions and co-morbidity symptoms are monitored and maintained or improved  4/23/2025 1127 by Alanna Diaz RN  Outcome: Progressing  4/23/2025 0234 by Isabelle Marie RN  Outcome: Progressing  4/23/2025 0234 by Isabelle Marie RN  Outcome: Progressing  Flowsheets (Taken 4/22/2025 2030)  Care Plan - Patient's Chronic Conditions and Co-Morbidity Symptoms are Monitored and Maintained or Improved:   Monitor and assess patient's chronic conditions and comorbid symptoms for stability, deterioration, or improvement   Collaborate with multidisciplinary team to address chronic and comorbid conditions and prevent exacerbation or deterioration   Update acute care plan with appropriate goals if chronic or comorbid symptoms are exacerbated and prevent overall improvement and discharge     Problem: Discharge Planning  Goal: Discharge to home or other facility with appropriate resources  4/23/2025 1127 by Alanna Diaz RN  Outcome: Progressing  4/23/2025 0234 by Isabelle Marie RN  Outcome: Progressing  4/23/2025 0234 by Isabelle Marie RN  Outcome: Progressing  Flowsheets (Taken 4/22/2025 2030)  Discharge to home or other facility with appropriate resources:   Identify barriers to discharge with patient and caregiver   Arrange for needed discharge resources and transportation as appropriate   Identify 
  Problem: Safety - Adult  Goal: Free from fall injury  4/26/2025 0130 by Yessenia Leonard RN  Outcome: Progressing  4/25/2025 1232 by Soha Noble RN  Outcome: Progressing     Problem: Chronic Conditions and Co-morbidities  Goal: Patient's chronic conditions and co-morbidity symptoms are monitored and maintained or improved  4/26/2025 0130 by Yessenia Leonard RN  Outcome: Progressing  Flowsheets (Taken 4/25/2025 2015)  Care Plan - Patient's Chronic Conditions and Co-Morbidity Symptoms are Monitored and Maintained or Improved: Monitor and assess patient's chronic conditions and comorbid symptoms for stability, deterioration, or improvement  4/25/2025 1232 by Soha Noble RN  Outcome: Progressing     Problem: Discharge Planning  Goal: Discharge to home or other facility with appropriate resources  4/26/2025 0130 by Yessenia Leonard RN  Outcome: Progressing  Flowsheets (Taken 4/25/2025 2015)  Discharge to home or other facility with appropriate resources: Identify barriers to discharge with patient and caregiver  4/25/2025 1232 by Soha Noble RN  Outcome: Progressing     
  Problem: Safety - Adult  Goal: Free from fall injury  Outcome: Progressing     Problem: Chronic Conditions and Co-morbidities  Goal: Patient's chronic conditions and co-morbidity symptoms are monitored and maintained or improved  Outcome: Progressing     Problem: Discharge Planning  Goal: Discharge to home or other facility with appropriate resources  Outcome: Progressing     Problem: Skin/Tissue Integrity  Goal: Skin integrity remains intact  Description: 1.  Monitor for areas of redness and/or skin breakdown2.  Assess vascular access sites hourly3.  Every 4-6 hours minimum:  Change oxygen saturation probe site4.  Every 4-6 hours:  If on nasal continuous positive airway pressure, respiratory therapy assess nares and determine need for appliance change or resting period  Outcome: Progressing  Flowsheets (Taken 4/25/2025 0831)  Skin Integrity Remains Intact: Monitor for areas of redness and/or skin breakdown     Problem: ABCDS Injury Assessment  Goal: Absence of physical injury  Outcome: Progressing     Problem: Neurosensory - Adult  Goal: Achieves stable or improved neurological status  Outcome: Progressing  Flowsheets (Taken 4/25/2025 0831)  Achieves stable or improved neurological status: Assess for and report changes in neurological status     Problem: Pain  Goal: Verbalizes/displays adequate comfort level or baseline comfort level  Outcome: Progressing     
  Problem: Safety - Adult  Goal: Free from fall injury  Outcome: Progressing     Problem: Chronic Conditions and Co-morbidities  Goal: Patient's chronic conditions and co-morbidity symptoms are monitored and maintained or improved  Outcome: Progressing  Flowsheets (Taken 4/26/2025 1949)  Care Plan - Patient's Chronic Conditions and Co-Morbidity Symptoms are Monitored and Maintained or Improved: Monitor and assess patient's chronic conditions and comorbid symptoms for stability, deterioration, or improvement     Problem: Discharge Planning  Goal: Discharge to home or other facility with appropriate resources  Outcome: Progressing  Flowsheets (Taken 4/26/2025 1949)  Discharge to home or other facility with appropriate resources:   Identify barriers to discharge with patient and caregiver   Arrange for needed discharge resources and transportation as appropriate     Problem: Skin/Tissue Integrity  Goal: Skin integrity remains intact  Description: 1.  Monitor for areas of redness and/or skin breakdown2.  Assess vascular access sites hourly3.  Every 4-6 hours minimum:  Change oxygen saturation probe site4.  Every 4-6 hours:  If on nasal continuous positive airway pressure, respiratory therapy assess nares and determine need for appliance change or resting period  Outcome: Progressing  Flowsheets (Taken 4/26/2025 1949)  Skin Integrity Remains Intact: Monitor for areas of redness and/or skin breakdown     Problem: ABCDS Injury Assessment  Goal: Absence of physical injury  Outcome: Progressing     Problem: Neurosensory - Adult  Goal: Achieves stable or improved neurological status  Outcome: Progressing  Flowsheets (Taken 4/26/2025 1949)  Achieves stable or improved neurological status: Assess for and report changes in neurological status     
assistance;Contact guard assistance  Stand to Sit: Contact guard assistance  Bed to Chair: Contact guard assistance         Balance:     Balance  Sitting: Intact  Standing: Impaired  Standing - Static: Good  Standing - Dynamic: Fair    ADL Intervention:  Grooming: Contact guard assistance   Grooming Skilled Clinical Factors: standing at sink to wash hands; able to find and locate all items; ed on appropriate technique and placement with RW at sink    LE Dressing: Minimal assistance  LE Dressing Skilled Clinical Factors: able to cross legs to don/doff socks with min A; CGA to manage L AFO and slip into slip-on shoes at EOB    Product Used : Bath wipes    Pain Rating:  Pt reporting minimal pain at rest; increased pain in L ribs with activity/10   Pain Intervention(s):   rest and repositioning      Activity Tolerance:   Good  Please refer to the flowsheet for vital signs taken during this treatment.    After treatment:   Patient left in no apparent distress sitting up in chair, Call bell within reach, and Bed/ chair alarm activated    COMMUNICATION/EDUCATION:   The patient's plan of care was discussed with: physical therapist and registered nurse    Patient Education  Education Given To: Patient  Education Provided: ADL Adaptive Strategies;Transfer Training;Fall Prevention Strategies;Equipment  Education Method: Verbal  Barriers to Learning: None  Education Outcome: Verbalized understanding;Continued education needed    Thank you for this referral.  Sara Hunter OT  Minutes: 23  
Learning: None  Education Outcome: Verbalized understanding;Continued education needed    Thank you for this referral.  Sara Hunter, OT  Minutes: 27  
Little or No Synergy  Shoulder Abduction (0-90 degrees): Full  Shoulder Flexion ( degrees): Full  Pronation/Supination: Full  Subtotal: 6    Normal Reflex Activity  Biceps, Triceps, Finger Flexors: Full  Subtotal: 2    Upper Extremity Total   Upper Extremity Total: 36    Wrist  Stability at 15 Degree Dorsiflexion: Full  Repeated Dorsiflexion/ Volar Flexion: Full  Stability at 15 Degree Dorsiflexion: Full  Repeated Dorsiflexion/ Volar Flexion: Full  Circumduction: Full  Wrist Total: 10    Hand  Mass Flexion: Full  Mass Extension: Full  Grasp A: Full  Grasp B: Full  Grasp C: Full  Grasp D: Full  Grasp E: Full  Hand Total: 14    Coordination/Speed  Tremor: Marked  Dysmetria: None  Time: 2-5s  Coordination/Speed Total: 3    Total A-D  Total A-D (Motor Function): 63         This is a reliable/valid measure of arm function after a neurological event. It has established value to characterize functional status and for measuring spontaneous and therapy-induced recovery; tests proximal and distal motor functions. Fugl-Fisher Assessment - UE scores recorded between five and 30 days post neurologic event can be used to predict UE recovery at six months post neurologic event.  Severe = 0-21 points   Moderately Severe = 22-33 points   Moderate = 34-47 points   Mild = 48-66 points  CAROLE Burton, SHEKHAR Butler, MAK Miguel, GABRIELLA Eaton, & MADISON Valle (1992). Measurement of motor recovery after stroke: Outcome assessment and sample size requirements. Stroke, 23, pp. 7581-9786.   --------------------------------------------------------------------------------------------------------------------------------------------------------------------  MCID:  Stroke:   (Yeyo et al, 2001; n = 171; mean age 70 (11) years; assessed within 17 (12) days of stroke, Acute Stroke)  FMA Motor Scores from Admission to Discharge   10 point increase in FMA Upper Extremity = 1.5 change in discharge FIM   10 point increase in FMA Lower Extremity = 1.9

## 2025-04-27 NOTE — CARE COORDINATION
Case Management Weekend Follow-up:      Discharge Plan:  [facility]--Accepted. However, they want to see updates PT note, as patient was unable to participate with PT yesterday, due to orthostatic.  Patient is scheduled to be seen by therapy today.      Will continue to follow.  Attending informed.    ______________________________________  BRENDA Calle, RN-CM  [facility]- Care Management  Available via [employer]@ [employer]  4/26/2025  2:01 PM    
  Case Management Discharge Note    Discharge Plan:  Patient discharging to [facility] today.    Transportation:  Hospital to Home.  Wheelchair.  Pick-up time:  1500.  Patient's son paid for transportation.    CM met with patient at bedside to discuss discharge planning updates.  She stated that she will inform her son of updates, as he is out of town.      [facility]  Room #:  2028  Nurse Report:  [phone]  Accepting MD:  Dr. Worthington      Medical treatment team informed of updates.  Patient stated no other needs.    ______________________________________  BRENDA Calle, RN-[facility]- Care Management  Available via [employer]  4/27/2025  12:48 PM            
Care Management Progress Note    Reason for Admission:   Acute cerebrovascular accident (CVA) (HCC) [I63.9]  Cerebrovascular accident (CVA), unspecified mechanism (HCC) [I63.9]         Patient Admission Status: Inpatient  RUR:   Hospitalization in the last 30 days (Readmission):  No        Transition of care plan:  Medical - BP management  DC plan - [facility] once BP controlled. CM spoke with Caitlin with [facility] who will monitor vitals today, possible dc to [facility] later this afternoon. [facility] WC transport is on will call  If SNF or IPR:  Date FOC offered:   Accepting facility: [facility]  Date authorization started with reference number:   Date authorization received and expires:   Discharge plan communicated with patient and/or discharge caregiver: Yes, patient and family    Date 1st IMM letter given:   Outpatient follow-up.  Transport at discharge:  [facility] WC on will call at present     Care Management Progress Note:   Update received from Caitlin, liaison with [facility]. Patient unable to dc and go to [facility] as patient received IV BP meds. [facility] requires patient to be off all IV antihypertensive meds for 24 hours prior to arrival at [facility]. [facility] will review again tomorrow. Per Caitlin she is the weekend liaison and will f/u with weekend CM to coordinate. This CM called [facility] and requested WC transportation remain in the will call status through tomorrow. MD updated via [employer].  ______________________  Jenn GUTIERREZ, RN  Care Management  4/25/2025    
Care Management Progress Note    Reason for Admission:   Acute cerebrovascular accident (CVA) (HCC) [I63.9]  Cerebrovascular accident (CVA), unspecified mechanism (HCC) [I63.9]         Patient Admission Status: Inpatient  RUR:   Hospitalization in the last 30 days (Readmission):  No        Transition of care plan:  Medical - ongoing medical management, neuro following  DC plan - [facility]  If SNF or IPR:  Date FOC offered:   Accepting facility: [facility] accepted, bed availability pending  Date authorization started with reference number:   Date authorization received and expires:   Discharge plan communicated with patient and/or discharge caregiver: Yes    Date 1st IMM letter given:   Outpatient follow-up - Per MD  Transport at discharge:  Family    Care Management Discharge Note:   Patient with dc order. Patient with bed at [facility]. Patient will go to room 3152, accepting MD is Dr. Worthington. Primary RN Michelle to call report to [phone]. CM to set up WC transportation private pay. [facility] WC for dc with  time of 5pm.     Care Management Progress Note:   [facility] has declined to accept patient today d/t elevated BP, MD made aware. CM called and placed transport on a will call basis for 4/25. CM updated patient at bedside as to delay in dc as well as primary RN. [facility] liaison Caitlin has called and updated family. CM following   ______________________  Jenn GUTIERREZ, RN  Care Management  4/24/2025    
Awaiting clinical progress, and disposition recommendations.    [] Home. No assistance required.     [] Home. Pt refused recommended services.    [] Home with family assistance as needed, and outpatient follow-up.    [] Home with Outpatient PT and outpatient follow-up   Pt aware of OP appt? []Yes, Provider:   []Not scheduled   Transport provider:     [] Home with outpatient services.    Specify:    [] Home with Home Health   - Petty of Choice offered? [] Yes, Preference:   [] NA    [x]SNF/IPR   -[x]Freedom of Choice offered, and preferences given: [facility]   []Listing provided and preferences requested   -Status: []Pending []Accepted:    -Auth required: []Yes [x]No    -Auth initiated date:   -3 midnight stay required: []Yes []No  Date satisfied:     [] LTC:     [] Home with Hospice   - Petty of Choice offered? [] Yes, Preference:   [] NA    [] [facility] information provided.     [] Other:       Jenn Paris RN  Case Management Department  For questions or concerns, please Perfect[employer]erve

## 2025-07-26 ENCOUNTER — APPOINTMENT (OUTPATIENT)
Facility: HOSPITAL | Age: 85
End: 2025-07-26
Payer: MEDICARE

## 2025-07-26 ENCOUNTER — HOSPITAL ENCOUNTER (EMERGENCY)
Facility: HOSPITAL | Age: 85
Discharge: HOME | End: 2025-07-26
Attending: EMERGENCY MEDICINE
Payer: MEDICARE

## 2025-07-26 VITALS
OXYGEN SATURATION: 98 % | BODY MASS INDEX: 23.23 KG/M2 | HEIGHT: 62 IN | DIASTOLIC BLOOD PRESSURE: 79 MMHG | RESPIRATION RATE: 20 BRPM | TEMPERATURE: 98.6 F | HEART RATE: 81 BPM | SYSTOLIC BLOOD PRESSURE: 162 MMHG

## 2025-07-26 DIAGNOSIS — S40.819A ABRASION OF UPPER EXTREMITY, UNSPECIFIED LATERALITY, INITIAL ENCOUNTER: ICD-10-CM
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
## 2025-07-26 DIAGNOSIS — S09.90XA CLOSED HEAD INJURY, INITIAL ENCOUNTER: ICD-10-CM

## 2025-07-26 DIAGNOSIS — R60.0 PEDAL EDEMA: ICD-10-CM

## 2025-07-26 LAB
ALBUMIN SERPL-MCNC: 3.3 G/DL (ref 3.5–5)
ALBUMIN/GLOB SERPL: 0.9 (ref 1.1–2.2)
ALP SERPL-CCNC: 95 U/L (ref 45–117)
ALT SERPL-CCNC: 13 U/L (ref 12–78)
ANION GAP SERPL CALC-SCNC: 8 MMOL/L (ref 2–12)
AST SERPL-CCNC: 21 U/L (ref 15–37)
BASOPHILS # BLD: 0.02 K/UL (ref 0–0.1)
BASOPHILS NFR BLD: 0.4 % (ref 0–1)
BILIRUB SERPL-MCNC: 0.4 MG/DL (ref 0.2–1)
BUN SERPL-MCNC: 20 MG/DL (ref 6–20)
BUN/CREAT SERPL: 22 (ref 12–20)
CALCIUM SERPL-MCNC: 9.3 MG/DL (ref 8.5–10.1)
CHLORIDE SERPL-SCNC: 100 MMOL/L (ref 97–108)
CO2 SERPL-SCNC: 29 MMOL/L (ref 21–32)
CREAT SERPL-MCNC: 0.9 MG/DL (ref 0.55–1.02)
DIFFERENTIAL METHOD BLD: ABNORMAL
EOSINOPHIL # BLD: 0.07 K/UL (ref 0–0.4)
EOSINOPHIL NFR BLD: 1.3 % (ref 0–7)
ERYTHROCYTE [DISTWIDTH] IN BLOOD BY AUTOMATED COUNT: 15.7 % (ref 11.5–14.5)
GLOBULIN SER CALC-MCNC: 3.8 G/DL (ref 2–4)
GLUCOSE SERPL-MCNC: 93 MG/DL (ref 65–100)
HCT VFR BLD AUTO: 30.6 % (ref 35–47)
HGB BLD-MCNC: 9.9 G/DL (ref 11.5–16)
IMM GRANULOCYTES # BLD AUTO: 0.02 K/UL (ref 0–0.04)
IMM GRANULOCYTES NFR BLD AUTO: 0.4 % (ref 0–0.5)
LYMPHOCYTES # BLD: 1.51 K/UL (ref 0.8–3.5)
LYMPHOCYTES NFR BLD: 27 % (ref 12–49)
MCH RBC QN AUTO: 27.8 PG (ref 26–34)
MCHC RBC AUTO-ENTMCNC: 32.4 G/DL (ref 30–36.5)
MCV RBC AUTO: 86 FL (ref 80–99)
MONOCYTES # BLD: 0.51 K/UL (ref 0–1)
MONOCYTES NFR BLD: 9.1 % (ref 5–13)
NEUTS SEG # BLD: 3.46 K/UL (ref 1.8–8)
NEUTS SEG NFR BLD: 61.8 % (ref 32–75)
NRBC # BLD: 0 K/UL (ref 0–0.01)
NRBC BLD-RTO: 0 PER 100 WBC
NT PRO BNP: 546 PG/ML (ref 0–450)
PLATELET # BLD AUTO: 282 K/UL (ref 150–400)
PMV BLD AUTO: 8.9 FL (ref 8.9–12.9)
POTASSIUM SERPL-SCNC: 4.1 MMOL/L (ref 3.5–5.1)
PROT SERPL-MCNC: 7.1 G/DL (ref 6.4–8.2)
RBC # BLD AUTO: 3.56 M/UL (ref 3.8–5.2)
SODIUM SERPL-SCNC: 137 MMOL/L (ref 136–145)
TROPONIN I SERPL HS-MCNC: 21 NG/L (ref 0–51)
WBC # BLD AUTO: 5.6 K/UL (ref 3.6–11)

## 2025-07-26 PROCEDURE — 93005 ELECTROCARDIOGRAM TRACING: CPT | Performed by: EMERGENCY MEDICINE

## 2025-07-26 PROCEDURE — 99284 EMERGENCY DEPT VISIT MOD MDM: CPT

## 2025-07-26 PROCEDURE — 85025 COMPLETE CBC W/AUTO DIFF WBC: CPT

## 2025-07-26 PROCEDURE — 70450 CT HEAD/BRAIN W/O DYE: CPT

## 2025-07-26 PROCEDURE — 84484 ASSAY OF TROPONIN QUANT: CPT

## 2025-07-26 PROCEDURE — 83880 ASSAY OF NATRIURETIC PEPTIDE: CPT

## 2025-07-26 PROCEDURE — 6370000000 HC RX 637 (ALT 250 FOR IP): Performed by: EMERGENCY MEDICINE

## 2025-07-26 PROCEDURE — 80053 COMPREHEN METABOLIC PANEL: CPT

## 2025-07-26 RX ORDER — GINSENG 100 MG
CAPSULE ORAL
Status: COMPLETED | OUTPATIENT
Start: 2025-07-26 | End: 2025-07-26

## 2025-07-26 RX ORDER — WHEAT DEXTRIN 3 G/3.8 G
4 POWDER (GRAM) ORAL
COMMUNITY

## 2025-07-26 RX ORDER — POLYVINYL ALCOHOL 14 MG/ML
1 SOLUTION/ DROPS OPHTHALMIC PRN
COMMUNITY

## 2025-07-26 RX ORDER — ACETAMINOPHEN 325 MG/1
650 TABLET ORAL EVERY 6 HOURS PRN
COMMUNITY

## 2025-07-26 RX ADMIN — BACITRACIN 1 PACKET: 500 OINTMENT TOPICAL at 19:04

## 2025-07-26 ASSESSMENT — PAIN DESCRIPTION - LOCATION: LOCATION: ELBOW

## 2025-07-26 ASSESSMENT — PAIN SCALES - GENERAL: PAINLEVEL_OUTOF10: 3

## 2025-07-26 ASSESSMENT — PAIN DESCRIPTION - ORIENTATION: ORIENTATION: RIGHT

## 2025-07-26 ASSESSMENT — LIFESTYLE VARIABLES
HOW OFTEN DO YOU HAVE A DRINK CONTAINING ALCOHOL: NEVER
HOW MANY STANDARD DRINKS CONTAINING ALCOHOL DO YOU HAVE ON A TYPICAL DAY: PATIENT DOES NOT DRINK

## 2025-07-26 ASSESSMENT — PAIN - FUNCTIONAL ASSESSMENT
PAIN_FUNCTIONAL_ASSESSMENT: ACTIVITIES ARE NOT PREVENTED
PAIN_FUNCTIONAL_ASSESSMENT: 0-10

## 2025-07-26 ASSESSMENT — PAIN DESCRIPTION - DESCRIPTORS: DESCRIPTORS: SORE

## 2025-07-26 NOTE — ED TRIAGE NOTES
Pt arrives via [place] squad 5 report of \" pt had a GLF complaining of right arm pain and abrasion VSS alert and oriented resides at [facility] on [address]. Nurse reported she hit her head pt stated \"I'm not sure I even fell.' Pt also reports \"I noticed my legs got very swollen yesterday.\" Pt denies shortness of breath.

## 2025-07-26 NOTE — ED PROVIDER NOTES
Heart Disease Mother     Stroke Mother         TIA    Hypertension Mother           SOCIAL HISTORY       Social History     Socioeconomic History    Marital status:      Spouse name: None    Number of children: None    Years of education: None    Highest education level: None   Tobacco Use    Smoking status: Never     Passive exposure: Past    Smokeless tobacco: Never   Substance and Sexual Activity    Alcohol use: Yes    Drug use: Never     Social Drivers of Health     Food Insecurity: No Food Insecurity (2/26/2024)    Hunger Vital Sign     Worried About Running Out of Food in the Last Year: Never true     Ran Out of Food in the Last Year: Never true   Transportation Needs: No Transportation Needs (2/26/2024)    PRAPARE - Transportation     Lack of Transportation (Medical): No     Lack of Transportation (Non-Medical): No   Housing Stability: Low Risk  (2/26/2024)    Housing Stability Vital Sign     Unable to Pay for Housing in the Last Year: No     Number of Places Lived in the Last Year: 1     Unstable Housing in the Last Year: No         PHYSICAL EXAM       ED Triage Vitals [07/26/25 1750]   BP Systolic BP Percentile Diastolic BP Percentile Temp Temp Source Pulse Respirations SpO2   (!) 162/79 -- -- 98.6 °F (37 °C) Oral 81 20 98 %      Height Weight         1.575 m (5' 2\") --             Body mass index is 23.23 kg/m².    Physical Exam  Vitals and nursing note reviewed.   Constitutional:       General: She is not in acute distress.     Appearance: Normal appearance. She is not ill-appearing.   HENT:      Head: Normocephalic and atraumatic.      Nose: Nose normal.      Mouth/Throat:      Mouth: Mucous membranes are moist.   Eyes:      Extraocular Movements: Extraocular movements intact.      Conjunctiva/sclera: Conjunctivae normal.      Pupils: Pupils are equal, round, and reactive to light.   Cardiovascular:      Rate and Rhythm: Normal rate and regular rhythm.      Heart sounds: Normal heart sounds.  Rick KRISHNA DO         CONSULTS:  None    PROCEDURES:     Procedures            (Please note that portions of this note were completed with a voice recognition program.  Efforts were made to edit the dictations but occasionally words are mis-transcribed.)    Rick Hale DO (electronically signed)  Emergency Attending Physician              Rick Hale DO  07/26/25 7737

## 2025-07-26 NOTE — ED NOTES
ED Course as of 07/26/25 2302   Sat Jul 26, 2025 1817 6:11 PM EKG shows normal sinus rhythm with a rate of 81 bpm with no acute ST or T wave abnormality suggestive of ischemia. [WJ]   1853 Received patient in signout  84f here from [facility] for trip and fall. Abrasion elbow and minor head contusion. Also having some bilateral 1+ pitting edema. No concern for DVT. Pending labs/bnp.  [AS]   1922 NT Pro-BNP(!): 546  Not significantly elevated. Minimal edema on exam. Would defer diuretics as I feel risk of dehydration/electrolyte disturbance/etc outweigh potential benefits. Will recommend conservative measures including compression socks, leg elevation, low salt diet. Will recommend close PCP follow up for repeat testing/echo as needed. No concerning findings on workup today. Chronic anemia at baseline.  [AS]      ED Course User Index  [AS] Michele Naranjo MD  [WJ] Rick Hale, DO          Michele Naranjo MD  07/26/25 2302

## 2025-07-26 NOTE — ED NOTES
Bedside and Verbal shift change report given to Kian RN (oncoming nurse) by Neva RN (offgoing nurse). Report included the following information ED SBARd.

## 2025-07-26 NOTE — DISCHARGE INSTRUCTIONS
Your testing in the ER was reassuring.  There was no signs of internal injury on the CT scan.  Your blood work was within normal limits.  There was no evidence of kidney failure, liver dysfunction or severe heart failure, which can sometimes be causes of the leg swelling.  We suspect your leg swelling is likely related to circulatory issues.  Try to elevate your legs whenever possible and wear compression socks to help the fluid be removed from your legs.  Maintain a low-salt diet as this can also help prevent fluid accumulation.  Please follow-up with your primary doctor within 1 week for reevaluation and further care and testing and treatment as needed.  Return to the emergency department sooner if your symptoms change or worsen, you develop any new symptoms or have any other concerns.

## 2025-07-27 LAB
EKG ATRIAL RATE: 81 BPM
EKG DIAGNOSIS: NORMAL
EKG P AXIS: 27 DEGREES
EKG P-R INTERVAL: 166 MS
EKG Q-T INTERVAL: 396 MS
EKG QRS DURATION: 84 MS
EKG QTC CALCULATION (BAZETT): 460 MS
EKG R AXIS: -45 DEGREES
EKG T AXIS: 21 DEGREES
EKG VENTRICULAR RATE: 81 BPM

## 2025-07-28 PROCEDURE — 93010 ELECTROCARDIOGRAM REPORT: CPT | Performed by: STUDENT IN AN ORGANIZED HEALTH CARE EDUCATION/TRAINING PROGRAM

## 2025-08-30 ENCOUNTER — HOSPITAL ENCOUNTER (INPATIENT)
Facility: HOSPITAL | Age: 85
LOS: 3 days | Discharge: NURSING, REHAB OR LONG-TERM CARE | DRG: 871 | End: 2025-09-02
Attending: EMERGENCY MEDICINE | Admitting: INTERNAL MEDICINE
Payer: MEDICARE

## 2025-08-30 ENCOUNTER — APPOINTMENT (OUTPATIENT)
Facility: HOSPITAL | Age: 85
DRG: 871 | End: 2025-08-30
Payer: MEDICARE

## 2025-08-30 DIAGNOSIS — U07.1 COVID-19: ICD-10-CM

## 2025-08-30 DIAGNOSIS — J18.9 PNEUMONIA OF RIGHT LOWER LOBE DUE TO INFECTIOUS ORGANISM: Primary | ICD-10-CM

## 2025-08-30 LAB
ALBUMIN SERPL-MCNC: 3.7 G/DL (ref 3.5–5.2)
ALBUMIN/GLOB SERPL: 0.9 (ref 1.1–2.2)
ALP SERPL-CCNC: 133 U/L (ref 35–104)
ALT SERPL-CCNC: 5 U/L (ref 10–35)
ANION GAP SERPL CALC-SCNC: 17 MMOL/L (ref 2–14)
AST SERPL-CCNC: 22 U/L (ref 10–35)
BASOPHILS # BLD: 0.02 K/UL (ref 0–0.1)
BASOPHILS NFR BLD: 0.2 % (ref 0–1)
BILIRUB SERPL-MCNC: 0.4 MG/DL (ref 0–1.2)
BUN SERPL-MCNC: 32 MG/DL (ref 8–23)
BUN/CREAT SERPL: 19 (ref 12–20)
CALCIUM SERPL-MCNC: 9.3 MG/DL (ref 8.8–10.2)
CHLORIDE SERPL-SCNC: 100 MMOL/L (ref 98–107)
CHOLEST SERPL-MCNC: 165 MG/DL (ref 0–200)
CO2 SERPL-SCNC: 21 MMOL/L (ref 20–29)
CREAT SERPL-MCNC: 1.65 MG/DL (ref 0.6–1)
CRP SERPL-MCNC: 10.9 MG/DL (ref 0–0.5)
CRP SERPL-MCNC: 13.4 MG/DL (ref 0–0.5)
D DIMER PPP FEU-MCNC: 0.75 MG/L FEU (ref 0–0.65)
D DIMER PPP FEU-MCNC: 0.92 MG/L FEU (ref 0–0.65)
DIFFERENTIAL METHOD BLD: ABNORMAL
EKG ATRIAL RATE: 103 BPM
EKG DIAGNOSIS: NORMAL
EKG P AXIS: 10 DEGREES
EKG P-R INTERVAL: 152 MS
EKG Q-T INTERVAL: 322 MS
EKG QRS DURATION: 82 MS
EKG QTC CALCULATION (BAZETT): 421 MS
EKG R AXIS: -45 DEGREES
EKG T AXIS: 63 DEGREES
EKG VENTRICULAR RATE: 103 BPM
EOSINOPHIL # BLD: 0.01 K/UL (ref 0–0.4)
EOSINOPHIL NFR BLD: 0.1 % (ref 0–7)
ERYTHROCYTE [DISTWIDTH] IN BLOOD BY AUTOMATED COUNT: 16.7 % (ref 11.5–14.5)
EST. AVERAGE GLUCOSE BLD GHB EST-MCNC: 113 MG/DL
FERRITIN SERPL-MCNC: 106 NG/ML (ref 13–252)
GLOBULIN SER CALC-MCNC: 3.9 G/DL (ref 2–4)
GLUCOSE SERPL-MCNC: 141 MG/DL (ref 65–100)
HBA1C MFR BLD: 5.6 % (ref 4–5.6)
HCT VFR BLD AUTO: 31.8 % (ref 35–47)
HDLC SERPL-MCNC: 67 MG/DL (ref 40–60)
HDLC SERPL: 2.5 (ref 0–5)
HGB BLD-MCNC: 10.8 G/DL (ref 11.5–16)
IMM GRANULOCYTES # BLD AUTO: 0.05 K/UL (ref 0–0.04)
IMM GRANULOCYTES NFR BLD AUTO: 0.4 % (ref 0–0.5)
LACTATE BLD-SCNC: 1.43 MMOL/L (ref 0.4–2)
LDLC SERPL CALC-MCNC: 82 MG/DL (ref 0–100)
LYMPHOCYTES # BLD: 1.4 K/UL (ref 0.8–3.5)
LYMPHOCYTES NFR BLD: 11.9 % (ref 12–49)
MCH RBC QN AUTO: 28.8 PG (ref 26–34)
MCHC RBC AUTO-ENTMCNC: 34 G/DL (ref 30–36.5)
MCV RBC AUTO: 84.8 FL (ref 80–99)
MONOCYTES # BLD: 0.82 K/UL (ref 0–1)
MONOCYTES NFR BLD: 7 % (ref 5–13)
NEUTS SEG # BLD: 9.42 K/UL (ref 1.8–8)
NEUTS SEG NFR BLD: 80.4 % (ref 32–75)
NRBC # BLD: 0 K/UL (ref 0–0.01)
NRBC BLD-RTO: 0 PER 100 WBC
NT PRO BNP: 284 PG/ML (ref 0–450)
PLATELET # BLD AUTO: 275 K/UL (ref 150–400)
PMV BLD AUTO: 9.3 FL (ref 8.9–12.9)
POTASSIUM SERPL-SCNC: 3.9 MMOL/L (ref 3.5–5.1)
PROCALCITONIN SERPL-MCNC: 0.34 NG/ML
PROCALCITONIN SERPL-MCNC: 0.45 NG/ML
PROT SERPL-MCNC: 7.6 G/DL (ref 6.4–8.3)
RBC # BLD AUTO: 3.75 M/UL (ref 3.8–5.2)
SODIUM SERPL-SCNC: 138 MMOL/L (ref 136–145)
T4 FREE SERPL-MCNC: 1 NG/DL (ref 0.9–1.6)
TRIGL SERPL-MCNC: 80 MG/DL (ref 0–150)
TROPONIN T SERPL HS-MCNC: 28.6 NG/L (ref 0–14)
TSH, 3RD GENERATION: 1.05 UIU/ML (ref 0.27–4.2)
VLDLC SERPL CALC-MCNC: 16 MG/DL
WBC # BLD AUTO: 11.7 K/UL (ref 3.6–11)

## 2025-08-30 PROCEDURE — 82728 ASSAY OF FERRITIN: CPT

## 2025-08-30 PROCEDURE — 6370000000 HC RX 637 (ALT 250 FOR IP): Performed by: HOSPITALIST

## 2025-08-30 PROCEDURE — 84439 ASSAY OF FREE THYROXINE: CPT

## 2025-08-30 PROCEDURE — 96375 TX/PRO/DX INJ NEW DRUG ADDON: CPT

## 2025-08-30 PROCEDURE — 85025 COMPLETE CBC W/AUTO DIFF WBC: CPT

## 2025-08-30 PROCEDURE — 83036 HEMOGLOBIN GLYCOSYLATED A1C: CPT

## 2025-08-30 PROCEDURE — 36415 COLL VENOUS BLD VENIPUNCTURE: CPT

## 2025-08-30 PROCEDURE — 87040 BLOOD CULTURE FOR BACTERIA: CPT

## 2025-08-30 PROCEDURE — 96376 TX/PRO/DX INJ SAME DRUG ADON: CPT

## 2025-08-30 PROCEDURE — 94761 N-INVAS EAR/PLS OXIMETRY MLT: CPT

## 2025-08-30 PROCEDURE — 83880 ASSAY OF NATRIURETIC PEPTIDE: CPT

## 2025-08-30 PROCEDURE — 93005 ELECTROCARDIOGRAM TRACING: CPT | Performed by: EMERGENCY MEDICINE

## 2025-08-30 PROCEDURE — 84484 ASSAY OF TROPONIN QUANT: CPT

## 2025-08-30 PROCEDURE — 99285 EMERGENCY DEPT VISIT HI MDM: CPT

## 2025-08-30 PROCEDURE — 83605 ASSAY OF LACTIC ACID: CPT

## 2025-08-30 PROCEDURE — 6370000000 HC RX 637 (ALT 250 FOR IP): Performed by: INTERNAL MEDICINE

## 2025-08-30 PROCEDURE — 92610 EVALUATE SWALLOWING FUNCTION: CPT

## 2025-08-30 PROCEDURE — 71045 X-RAY EXAM CHEST 1 VIEW: CPT

## 2025-08-30 PROCEDURE — 96374 THER/PROPH/DIAG INJ IV PUSH: CPT

## 2025-08-30 PROCEDURE — 2580000003 HC RX 258: Performed by: EMERGENCY MEDICINE

## 2025-08-30 PROCEDURE — 6360000002 HC RX W HCPCS: Performed by: INTERNAL MEDICINE

## 2025-08-30 PROCEDURE — 80053 COMPREHEN METABOLIC PANEL: CPT

## 2025-08-30 PROCEDURE — 2500000003 HC RX 250 WO HCPCS: Performed by: INTERNAL MEDICINE

## 2025-08-30 PROCEDURE — 86738 MYCOPLASMA ANTIBODY: CPT

## 2025-08-30 PROCEDURE — 86140 C-REACTIVE PROTEIN: CPT

## 2025-08-30 PROCEDURE — 2580000003 HC RX 258: Performed by: INTERNAL MEDICINE

## 2025-08-30 PROCEDURE — 1100000000 HC RM PRIVATE

## 2025-08-30 PROCEDURE — 84145 PROCALCITONIN (PCT): CPT

## 2025-08-30 PROCEDURE — 93010 ELECTROCARDIOGRAM REPORT: CPT | Performed by: INTERNAL MEDICINE

## 2025-08-30 PROCEDURE — 2700000000 HC OXYGEN THERAPY PER DAY

## 2025-08-30 PROCEDURE — 2580000003 HC RX 258: Performed by: HOSPITALIST

## 2025-08-30 PROCEDURE — 84443 ASSAY THYROID STIM HORMONE: CPT

## 2025-08-30 PROCEDURE — 85379 FIBRIN DEGRADATION QUANT: CPT

## 2025-08-30 PROCEDURE — 6360000002 HC RX W HCPCS: Performed by: EMERGENCY MEDICINE

## 2025-08-30 PROCEDURE — 80061 LIPID PANEL: CPT

## 2025-08-30 PROCEDURE — 2500000003 HC RX 250 WO HCPCS: Performed by: EMERGENCY MEDICINE

## 2025-08-30 RX ORDER — BUPROPION HYDROCHLORIDE 75 MG/1
75 TABLET ORAL DAILY
COMMUNITY

## 2025-08-30 RX ORDER — CARBIDOPA AND LEVODOPA 25; 100 MG/1; MG/1
1 TABLET, EXTENDED RELEASE ORAL NIGHTLY
Status: DISCONTINUED | OUTPATIENT
Start: 2025-08-30 | End: 2025-09-02 | Stop reason: HOSPADM

## 2025-08-30 RX ORDER — PREDNISONE 1 MG/1
1 TABLET ORAL DAILY
COMMUNITY

## 2025-08-30 RX ORDER — SODIUM CHLORIDE 0.9 % (FLUSH) 0.9 %
5-40 SYRINGE (ML) INJECTION PRN
Status: DISCONTINUED | OUTPATIENT
Start: 2025-08-30 | End: 2025-09-02 | Stop reason: HOSPADM

## 2025-08-30 RX ORDER — VITAMIN B COMPLEX
1000 TABLET ORAL DAILY
Status: DISCONTINUED | OUTPATIENT
Start: 2025-08-30 | End: 2025-09-02 | Stop reason: HOSPADM

## 2025-08-30 RX ORDER — CHOLECALCIFEROL (VITAMIN D3) 1250 MCG
1 CAPSULE ORAL WEEKLY
COMMUNITY

## 2025-08-30 RX ORDER — DEXAMETHASONE SODIUM PHOSPHATE 10 MG/ML
10 INJECTION, SOLUTION INTRAMUSCULAR; INTRAVENOUS ONCE
Status: COMPLETED | OUTPATIENT
Start: 2025-08-30 | End: 2025-08-30

## 2025-08-30 RX ORDER — DEXAMETHASONE SODIUM PHOSPHATE 10 MG/ML
6 INJECTION, SOLUTION INTRAMUSCULAR; INTRAVENOUS EVERY 24 HOURS
Status: DISCONTINUED | OUTPATIENT
Start: 2025-08-31 | End: 2025-09-02 | Stop reason: HOSPADM

## 2025-08-30 RX ORDER — BENZONATATE 100 MG/1
100 CAPSULE ORAL 3 TIMES DAILY PRN
Status: DISCONTINUED | OUTPATIENT
Start: 2025-08-30 | End: 2025-09-02 | Stop reason: HOSPADM

## 2025-08-30 RX ORDER — 0.9 % SODIUM CHLORIDE 0.9 %
30 INTRAVENOUS SOLUTION INTRAVENOUS PRN
Status: DISCONTINUED | OUTPATIENT
Start: 2025-08-30 | End: 2025-09-02 | Stop reason: HOSPADM

## 2025-08-30 RX ORDER — CARBIDOPA AND LEVODOPA 25; 100 MG/1; MG/1
1 TABLET, EXTENDED RELEASE ORAL NIGHTLY
Status: ON HOLD | COMMUNITY
Start: 2025-08-14 | End: 2025-09-02 | Stop reason: HOSPADM

## 2025-08-30 RX ORDER — ESCITALOPRAM OXALATE 10 MG/1
5 TABLET ORAL DAILY
Status: DISCONTINUED | OUTPATIENT
Start: 2025-08-30 | End: 2025-09-02 | Stop reason: HOSPADM

## 2025-08-30 RX ORDER — CARBIDOPA AND LEVODOPA 25; 100 MG/1; MG/1
1 TABLET ORAL
Status: ON HOLD | COMMUNITY
Start: 2025-08-14 | End: 2025-09-02 | Stop reason: HOSPADM

## 2025-08-30 RX ORDER — CODEINE PHOSPHATE AND GUAIFENESIN 10; 100 MG/5ML; MG/5ML
5 SOLUTION ORAL EVERY 4 HOURS PRN
Status: DISCONTINUED | OUTPATIENT
Start: 2025-08-30 | End: 2025-09-02 | Stop reason: HOSPADM

## 2025-08-30 RX ORDER — POTASSIUM CHLORIDE 1500 MG/1
20 TABLET, EXTENDED RELEASE ORAL DAILY
COMMUNITY

## 2025-08-30 RX ORDER — ARFORMOTEROL TARTRATE 15 UG/2ML
15 SOLUTION RESPIRATORY (INHALATION)
Status: DISCONTINUED | OUTPATIENT
Start: 2025-08-30 | End: 2025-08-31

## 2025-08-30 RX ORDER — PANTOPRAZOLE SODIUM 20 MG/1
20 TABLET, DELAYED RELEASE ORAL 2 TIMES DAILY
COMMUNITY

## 2025-08-30 RX ORDER — ACETAMINOPHEN 650 MG/1
650 SUPPOSITORY RECTAL EVERY 6 HOURS PRN
Status: DISCONTINUED | OUTPATIENT
Start: 2025-08-30 | End: 2025-09-02 | Stop reason: HOSPADM

## 2025-08-30 RX ORDER — POLYETHYLENE GLYCOL 3350 17 G
2 POWDER IN PACKET (EA) ORAL
Status: DISCONTINUED | OUTPATIENT
Start: 2025-08-30 | End: 2025-09-02 | Stop reason: HOSPADM

## 2025-08-30 RX ORDER — SODIUM CHLORIDE, SODIUM LACTATE, POTASSIUM CHLORIDE, CALCIUM CHLORIDE 600; 310; 30; 20 MG/100ML; MG/100ML; MG/100ML; MG/100ML
INJECTION, SOLUTION INTRAVENOUS CONTINUOUS
Status: DISCONTINUED | OUTPATIENT
Start: 2025-08-30 | End: 2025-08-30

## 2025-08-30 RX ORDER — BUDESONIDE 0.5 MG/2ML
1 INHALANT ORAL
Status: DISCONTINUED | OUTPATIENT
Start: 2025-08-30 | End: 2025-08-31

## 2025-08-30 RX ORDER — SODIUM CHLORIDE 0.9 % (FLUSH) 0.9 %
5-40 SYRINGE (ML) INJECTION EVERY 12 HOURS SCHEDULED
Status: DISCONTINUED | OUTPATIENT
Start: 2025-08-30 | End: 2025-09-02 | Stop reason: HOSPADM

## 2025-08-30 RX ORDER — 0.9 % SODIUM CHLORIDE 0.9 %
1000 INTRAVENOUS SOLUTION INTRAVENOUS ONCE
Status: COMPLETED | OUTPATIENT
Start: 2025-08-30 | End: 2025-08-30

## 2025-08-30 RX ORDER — BUPROPION HYDROCHLORIDE 75 MG/1
75 TABLET ORAL DAILY
Status: DISCONTINUED | OUTPATIENT
Start: 2025-08-30 | End: 2025-09-02 | Stop reason: HOSPADM

## 2025-08-30 RX ORDER — ONDANSETRON 2 MG/ML
4 INJECTION INTRAMUSCULAR; INTRAVENOUS
Status: COMPLETED | OUTPATIENT
Start: 2025-08-30 | End: 2025-08-30

## 2025-08-30 RX ORDER — LEVOTHYROXINE SODIUM 88 UG/1
44 TABLET ORAL EVERY OTHER DAY
Status: DISCONTINUED | OUTPATIENT
Start: 2025-09-01 | End: 2025-09-02 | Stop reason: HOSPADM

## 2025-08-30 RX ORDER — LEVOTHYROXINE SODIUM 88 UG/1
44 TABLET ORAL EVERY OTHER DAY
COMMUNITY

## 2025-08-30 RX ORDER — DEXAMETHASONE SODIUM PHOSPHATE 10 MG/ML
6 INJECTION, SOLUTION INTRAMUSCULAR; INTRAVENOUS EVERY 24 HOURS
Status: DISCONTINUED | OUTPATIENT
Start: 2025-08-30 | End: 2025-08-30

## 2025-08-30 RX ORDER — DIPHENHYDRAMINE HYDROCHLORIDE 50 MG/ML
25 INJECTION, SOLUTION INTRAMUSCULAR; INTRAVENOUS
Status: COMPLETED | OUTPATIENT
Start: 2025-08-30 | End: 2025-08-30

## 2025-08-30 RX ORDER — MAGNESIUM HYDROXIDE/ALUMINUM HYDROXICE/SIMETHICONE 120; 1200; 1200 MG/30ML; MG/30ML; MG/30ML
30 SUSPENSION ORAL EVERY 6 HOURS PRN
Status: DISCONTINUED | OUTPATIENT
Start: 2025-08-30 | End: 2025-09-02 | Stop reason: HOSPADM

## 2025-08-30 RX ORDER — IPRATROPIUM BROMIDE AND ALBUTEROL SULFATE 2.5; .5 MG/3ML; MG/3ML
1 SOLUTION RESPIRATORY (INHALATION)
Status: DISCONTINUED | OUTPATIENT
Start: 2025-08-30 | End: 2025-08-30

## 2025-08-30 RX ORDER — BACILLUS COAGULANS/INULIN 1B-250 MG
1 CAPSULE ORAL 2 TIMES DAILY
COMMUNITY

## 2025-08-30 RX ORDER — ASCORBIC ACID 500 MG
500 TABLET ORAL 2 TIMES DAILY
Status: DISCONTINUED | OUTPATIENT
Start: 2025-08-30 | End: 2025-09-02 | Stop reason: HOSPADM

## 2025-08-30 RX ORDER — LEVOTHYROXINE SODIUM 88 UG/1
88 TABLET ORAL EVERY OTHER DAY
Status: DISCONTINUED | OUTPATIENT
Start: 2025-08-31 | End: 2025-09-02 | Stop reason: HOSPADM

## 2025-08-30 RX ORDER — NIFEDIPINE 30 MG/1
90 TABLET, EXTENDED RELEASE ORAL DAILY
Status: DISCONTINUED | OUTPATIENT
Start: 2025-08-30 | End: 2025-08-31

## 2025-08-30 RX ORDER — HEPARIN SODIUM 5000 [USP'U]/ML
5000 INJECTION, SOLUTION INTRAVENOUS; SUBCUTANEOUS 2 TIMES DAILY
Status: DISCONTINUED | OUTPATIENT
Start: 2025-08-30 | End: 2025-09-02 | Stop reason: HOSPADM

## 2025-08-30 RX ORDER — GUAIFENESIN 600 MG/1
600 TABLET, EXTENDED RELEASE ORAL 2 TIMES DAILY
Status: DISCONTINUED | OUTPATIENT
Start: 2025-08-30 | End: 2025-08-30

## 2025-08-30 RX ORDER — PREDNISONE 5 MG/1
2.5 TABLET ORAL DAILY
Status: DISCONTINUED | OUTPATIENT
Start: 2025-08-30 | End: 2025-08-30 | Stop reason: SDUPTHER

## 2025-08-30 RX ORDER — METOPROLOL SUCCINATE 50 MG/1
50 TABLET, EXTENDED RELEASE ORAL DAILY
Status: DISCONTINUED | OUTPATIENT
Start: 2025-08-30 | End: 2025-08-30

## 2025-08-30 RX ORDER — LANOLIN ALCOHOL/MO/W.PET/CERES
400 CREAM (GRAM) TOPICAL
COMMUNITY

## 2025-08-30 RX ORDER — GAUZE BANDAGE 2" X 2"
100 BANDAGE TOPICAL DAILY
Status: DISCONTINUED | OUTPATIENT
Start: 2025-08-30 | End: 2025-09-02 | Stop reason: HOSPADM

## 2025-08-30 RX ORDER — CARBIDOPA AND LEVODOPA 25; 100 MG/1; MG/1
1 TABLET ORAL
Status: DISCONTINUED | OUTPATIENT
Start: 2025-08-30 | End: 2025-09-02 | Stop reason: HOSPADM

## 2025-08-30 RX ORDER — POLYETHYLENE GLYCOL 3350 17 G/17G
17 POWDER, FOR SOLUTION ORAL DAILY PRN
Status: DISCONTINUED | OUTPATIENT
Start: 2025-08-30 | End: 2025-09-02 | Stop reason: HOSPADM

## 2025-08-30 RX ORDER — PROCHLORPERAZINE EDISYLATE 5 MG/ML
10 INJECTION INTRAMUSCULAR; INTRAVENOUS
Status: COMPLETED | OUTPATIENT
Start: 2025-08-30 | End: 2025-08-30

## 2025-08-30 RX ORDER — LEVOTHYROXINE SODIUM 88 UG/1
88 TABLET ORAL
Status: DISCONTINUED | OUTPATIENT
Start: 2025-08-30 | End: 2025-08-30

## 2025-08-30 RX ORDER — ONDANSETRON 2 MG/ML
4 INJECTION INTRAMUSCULAR; INTRAVENOUS EVERY 6 HOURS PRN
Status: DISCONTINUED | OUTPATIENT
Start: 2025-08-30 | End: 2025-09-02 | Stop reason: HOSPADM

## 2025-08-30 RX ORDER — FLUTICASONE PROPIONATE 50 MCG
1 SPRAY, SUSPENSION (ML) NASAL 2 TIMES DAILY
COMMUNITY

## 2025-08-30 RX ORDER — ACETAMINOPHEN 325 MG/1
650 TABLET ORAL EVERY 6 HOURS PRN
Status: DISCONTINUED | OUTPATIENT
Start: 2025-08-30 | End: 2025-09-02 | Stop reason: HOSPADM

## 2025-08-30 RX ORDER — ARIPIPRAZOLE 2 MG/1
2 TABLET ORAL
Status: DISCONTINUED | OUTPATIENT
Start: 2025-08-30 | End: 2025-08-30

## 2025-08-30 RX ORDER — ONDANSETRON 4 MG/1
4 TABLET, ORALLY DISINTEGRATING ORAL EVERY 8 HOURS PRN
Status: DISCONTINUED | OUTPATIENT
Start: 2025-08-30 | End: 2025-09-02 | Stop reason: HOSPADM

## 2025-08-30 RX ORDER — ZINC GLUCONATE 50 MG
50 TABLET ORAL DAILY
Status: DISCONTINUED | OUTPATIENT
Start: 2025-08-30 | End: 2025-09-02 | Stop reason: HOSPADM

## 2025-08-30 RX ORDER — SODIUM CHLORIDE 9 MG/ML
INJECTION, SOLUTION INTRAVENOUS PRN
Status: DISCONTINUED | OUTPATIENT
Start: 2025-08-30 | End: 2025-09-02 | Stop reason: HOSPADM

## 2025-08-30 RX ADMIN — DEXAMETHASONE SODIUM PHOSPHATE 10 MG: 10 INJECTION, SOLUTION INTRAMUSCULAR; INTRAVENOUS at 05:22

## 2025-08-30 RX ADMIN — ONDANSETRON 4 MG: 2 INJECTION, SOLUTION INTRAMUSCULAR; INTRAVENOUS at 03:32

## 2025-08-30 RX ADMIN — Medication 3 MG: at 22:59

## 2025-08-30 RX ADMIN — CARBIDOPA AND LEVODOPA 1 TABLET: 25; 100 TABLET ORAL at 16:41

## 2025-08-30 RX ADMIN — ONDANSETRON 4 MG: 2 INJECTION, SOLUTION INTRAMUSCULAR; INTRAVENOUS at 20:10

## 2025-08-30 RX ADMIN — WATER 1000 MG: 1 INJECTION INTRAMUSCULAR; INTRAVENOUS; SUBCUTANEOUS at 05:42

## 2025-08-30 RX ADMIN — REMDESIVIR 200 MG: 100 INJECTION, POWDER, LYOPHILIZED, FOR SOLUTION INTRAVENOUS at 09:54

## 2025-08-30 RX ADMIN — ALUMINUM HYDROXIDE, MAGNESIUM HYDROXIDE, AND SIMETHICONE 30 ML: 200; 200; 20 SUSPENSION ORAL at 19:51

## 2025-08-30 RX ADMIN — GUAIFENESIN AND CODEINE PHOSPHATE 5 ML: 100; 10 SOLUTION ORAL at 16:48

## 2025-08-30 RX ADMIN — GUAIFENESIN AND CODEINE PHOSPHATE 5 ML: 100; 10 SOLUTION ORAL at 23:23

## 2025-08-30 RX ADMIN — SODIUM CHLORIDE 1000 ML: 0.9 INJECTION, SOLUTION INTRAVENOUS at 03:49

## 2025-08-30 RX ADMIN — ONDANSETRON 4 MG: 2 INJECTION, SOLUTION INTRAMUSCULAR; INTRAVENOUS at 04:44

## 2025-08-30 RX ADMIN — SODIUM CHLORIDE, PRESERVATIVE FREE 10 ML: 5 INJECTION INTRAVENOUS at 09:58

## 2025-08-30 RX ADMIN — HEPARIN SODIUM 5000 UNITS: 5000 INJECTION, SOLUTION INTRAVENOUS; SUBCUTANEOUS at 09:57

## 2025-08-30 RX ADMIN — Medication 50 MG: at 16:55

## 2025-08-30 RX ADMIN — AZITHROMYCIN MONOHYDRATE 500 MG: 500 INJECTION, POWDER, LYOPHILIZED, FOR SOLUTION INTRAVENOUS at 05:49

## 2025-08-30 RX ADMIN — OXYCODONE HYDROCHLORIDE AND ACETAMINOPHEN 500 MG: 500 TABLET ORAL at 19:51

## 2025-08-30 RX ADMIN — CARBIDOPA AND LEVODOPA 1 TABLET: 25; 100 TABLET, EXTENDED RELEASE ORAL at 19:52

## 2025-08-30 RX ADMIN — PROCHLORPERAZINE EDISYLATE 10 MG: 5 INJECTION INTRAMUSCULAR; INTRAVENOUS at 05:38

## 2025-08-30 RX ADMIN — ESCITALOPRAM OXALATE 5 MG: 10 TABLET ORAL at 16:55

## 2025-08-30 RX ADMIN — DIPHENHYDRAMINE HYDROCHLORIDE 25 MG: 50 INJECTION INTRAMUSCULAR; INTRAVENOUS at 05:37

## 2025-08-30 RX ADMIN — SODIUM CHLORIDE, PRESERVATIVE FREE 10 ML: 5 INJECTION INTRAVENOUS at 19:52

## 2025-08-30 RX ADMIN — HEPARIN SODIUM 5000 UNITS: 5000 INJECTION, SOLUTION INTRAVENOUS; SUBCUTANEOUS at 19:53

## 2025-08-30 RX ADMIN — SODIUM CHLORIDE, SODIUM LACTATE, POTASSIUM CHLORIDE, AND CALCIUM CHLORIDE: .6; .31; .03; .02 INJECTION, SOLUTION INTRAVENOUS at 14:01

## 2025-08-30 RX ADMIN — WATER 1000 MG: 1 INJECTION INTRAMUSCULAR; INTRAVENOUS; SUBCUTANEOUS at 09:51

## 2025-08-30 ASSESSMENT — PAIN SCALES - GENERAL
PAINLEVEL_OUTOF10: 3
PAINLEVEL_OUTOF10: 0
PAINLEVEL_OUTOF10: 0

## 2025-08-30 ASSESSMENT — PAIN DESCRIPTION - ORIENTATION: ORIENTATION: UPPER;RIGHT

## 2025-08-30 ASSESSMENT — PAIN DESCRIPTION - LOCATION: LOCATION: ABDOMEN

## 2025-08-30 ASSESSMENT — PAIN - FUNCTIONAL ASSESSMENT: PAIN_FUNCTIONAL_ASSESSMENT: 0-10

## 2025-08-31 LAB
ANION GAP SERPL CALC-SCNC: 12 MMOL/L (ref 2–14)
BASOPHILS # BLD: 0 K/UL (ref 0–0.1)
BASOPHILS NFR BLD: 0 % (ref 0–1)
BUN SERPL-MCNC: 31 MG/DL (ref 8–23)
BUN/CREAT SERPL: 42 (ref 12–20)
CALCIUM SERPL-MCNC: 8.8 MG/DL (ref 8.8–10.2)
CHLORIDE SERPL-SCNC: 100 MMOL/L (ref 98–107)
CO2 SERPL-SCNC: 22 MMOL/L (ref 20–29)
CREAT SERPL-MCNC: 0.73 MG/DL (ref 0.6–1)
DIFFERENTIAL METHOD BLD: ABNORMAL
EOSINOPHIL # BLD: 0 K/UL (ref 0–0.4)
EOSINOPHIL NFR BLD: 0 % (ref 0–7)
ERYTHROCYTE [DISTWIDTH] IN BLOOD BY AUTOMATED COUNT: 16.6 % (ref 11.5–14.5)
GLUCOSE SERPL-MCNC: 118 MG/DL (ref 65–100)
HCT VFR BLD AUTO: 27 % (ref 35–47)
HGB BLD-MCNC: 8.9 G/DL (ref 11.5–16)
IMM GRANULOCYTES # BLD AUTO: 0.05 K/UL (ref 0–0.04)
IMM GRANULOCYTES NFR BLD AUTO: 0.8 % (ref 0–0.5)
LYMPHOCYTES # BLD: 0.57 K/UL (ref 0.8–3.5)
LYMPHOCYTES NFR BLD: 8.6 % (ref 12–49)
MAGNESIUM SERPL-MCNC: 1.9 MG/DL (ref 1.6–2.4)
MCH RBC QN AUTO: 27.8 PG (ref 26–34)
MCHC RBC AUTO-ENTMCNC: 33 G/DL (ref 30–36.5)
MCV RBC AUTO: 84.4 FL (ref 80–99)
MONOCYTES # BLD: 0.22 K/UL (ref 0–1)
MONOCYTES NFR BLD: 3.3 % (ref 5–13)
NEUTS SEG # BLD: 5.76 K/UL (ref 1.8–8)
NEUTS SEG NFR BLD: 87.3 % (ref 32–75)
NRBC # BLD: 0 K/UL (ref 0–0.01)
NRBC BLD-RTO: 0 PER 100 WBC
PHOSPHATE SERPL-MCNC: 3.4 MG/DL (ref 2.5–4.5)
PLATELET # BLD AUTO: 238 K/UL (ref 150–400)
PMV BLD AUTO: 9.3 FL (ref 8.9–12.9)
POTASSIUM SERPL-SCNC: 3.5 MMOL/L (ref 3.5–5.1)
RBC # BLD AUTO: 3.2 M/UL (ref 3.8–5.2)
RBC MORPH BLD: ABNORMAL
SODIUM SERPL-SCNC: 135 MMOL/L (ref 136–145)
WBC # BLD AUTO: 6.6 K/UL (ref 3.6–11)

## 2025-08-31 PROCEDURE — 97161 PT EVAL LOW COMPLEX 20 MIN: CPT

## 2025-08-31 PROCEDURE — 6370000000 HC RX 637 (ALT 250 FOR IP): Performed by: HOSPITALIST

## 2025-08-31 PROCEDURE — 84100 ASSAY OF PHOSPHORUS: CPT

## 2025-08-31 PROCEDURE — 2580000003 HC RX 258: Performed by: INTERNAL MEDICINE

## 2025-08-31 PROCEDURE — 97165 OT EVAL LOW COMPLEX 30 MIN: CPT | Performed by: OCCUPATIONAL THERAPIST

## 2025-08-31 PROCEDURE — 97116 GAIT TRAINING THERAPY: CPT

## 2025-08-31 PROCEDURE — 6370000000 HC RX 637 (ALT 250 FOR IP): Performed by: NURSE PRACTITIONER

## 2025-08-31 PROCEDURE — 6370000000 HC RX 637 (ALT 250 FOR IP): Performed by: INTERNAL MEDICINE

## 2025-08-31 PROCEDURE — 6360000002 HC RX W HCPCS: Performed by: INTERNAL MEDICINE

## 2025-08-31 PROCEDURE — 2500000003 HC RX 250 WO HCPCS: Performed by: INTERNAL MEDICINE

## 2025-08-31 PROCEDURE — 1100000000 HC RM PRIVATE

## 2025-08-31 PROCEDURE — 85025 COMPLETE CBC W/AUTO DIFF WBC: CPT

## 2025-08-31 PROCEDURE — 80048 BASIC METABOLIC PNL TOTAL CA: CPT

## 2025-08-31 PROCEDURE — 97530 THERAPEUTIC ACTIVITIES: CPT | Performed by: OCCUPATIONAL THERAPIST

## 2025-08-31 PROCEDURE — 2700000000 HC OXYGEN THERAPY PER DAY

## 2025-08-31 PROCEDURE — 94761 N-INVAS EAR/PLS OXIMETRY MLT: CPT

## 2025-08-31 PROCEDURE — 6370000000 HC RX 637 (ALT 250 FOR IP)

## 2025-08-31 PROCEDURE — 97535 SELF CARE MNGMENT TRAINING: CPT | Performed by: OCCUPATIONAL THERAPIST

## 2025-08-31 PROCEDURE — 83735 ASSAY OF MAGNESIUM: CPT

## 2025-08-31 PROCEDURE — 97530 THERAPEUTIC ACTIVITIES: CPT

## 2025-08-31 RX ORDER — ARFORMOTEROL TARTRATE 15 UG/2ML
15 SOLUTION RESPIRATORY (INHALATION)
Status: DISCONTINUED | OUTPATIENT
Start: 2025-08-31 | End: 2025-09-01

## 2025-08-31 RX ORDER — AMLODIPINE BESYLATE 5 MG/1
10 TABLET ORAL DAILY
Status: DISCONTINUED | OUTPATIENT
Start: 2025-08-31 | End: 2025-09-02 | Stop reason: HOSPADM

## 2025-08-31 RX ORDER — BUDESONIDE 0.5 MG/2ML
1 INHALANT ORAL
Status: DISCONTINUED | OUTPATIENT
Start: 2025-08-31 | End: 2025-09-01

## 2025-08-31 RX ORDER — CARBIDOPA AND LEVODOPA 25; 100 MG/1; MG/1
1 TABLET ORAL NIGHTLY
Status: DISCONTINUED | OUTPATIENT
Start: 2025-08-31 | End: 2025-09-02 | Stop reason: HOSPADM

## 2025-08-31 RX ORDER — TRAZODONE HYDROCHLORIDE 50 MG/1
50 TABLET ORAL NIGHTLY
Status: DISCONTINUED | OUTPATIENT
Start: 2025-08-31 | End: 2025-09-01

## 2025-08-31 RX ORDER — BUDESONIDE AND FORMOTEROL FUMARATE DIHYDRATE 160; 4.5 UG/1; UG/1
2 AEROSOL RESPIRATORY (INHALATION)
Status: DISCONTINUED | OUTPATIENT
Start: 2025-08-31 | End: 2025-09-02 | Stop reason: HOSPADM

## 2025-08-31 RX ORDER — ALPRAZOLAM 0.25 MG
0.25 TABLET ORAL ONCE
Status: COMPLETED | OUTPATIENT
Start: 2025-08-31 | End: 2025-08-31

## 2025-08-31 RX ADMIN — HEPARIN SODIUM 5000 UNITS: 5000 INJECTION, SOLUTION INTRAVENOUS; SUBCUTANEOUS at 20:54

## 2025-08-31 RX ADMIN — CARBIDOPA AND LEVODOPA 1 TABLET: 25; 100 TABLET ORAL at 20:54

## 2025-08-31 RX ADMIN — CARBIDOPA AND LEVODOPA 1 TABLET: 25; 100 TABLET ORAL at 06:35

## 2025-08-31 RX ADMIN — GUAIFENESIN AND CODEINE PHOSPHATE 5 ML: 100; 10 SOLUTION ORAL at 21:45

## 2025-08-31 RX ADMIN — Medication 100 MG: at 08:23

## 2025-08-31 RX ADMIN — CARBIDOPA AND LEVODOPA 1 TABLET: 25; 100 TABLET ORAL at 17:12

## 2025-08-31 RX ADMIN — TRAZODONE HYDROCHLORIDE 50 MG: 50 TABLET ORAL at 20:53

## 2025-08-31 RX ADMIN — ACETAMINOPHEN 650 MG: 325 TABLET ORAL at 18:47

## 2025-08-31 RX ADMIN — OXYCODONE HYDROCHLORIDE AND ACETAMINOPHEN 500 MG: 500 TABLET ORAL at 08:23

## 2025-08-31 RX ADMIN — NIFEDIPINE 90 MG: 30 TABLET, FILM COATED, EXTENDED RELEASE ORAL at 08:23

## 2025-08-31 RX ADMIN — DEXAMETHASONE SODIUM PHOSPHATE 6 MG: 10 INJECTION, SOLUTION INTRAMUSCULAR; INTRAVENOUS at 08:25

## 2025-08-31 RX ADMIN — BENZONATATE 100 MG: 100 CAPSULE ORAL at 23:48

## 2025-08-31 RX ADMIN — HEPARIN SODIUM 5000 UNITS: 5000 INJECTION, SOLUTION INTRAVENOUS; SUBCUTANEOUS at 08:26

## 2025-08-31 RX ADMIN — LEVOTHYROXINE SODIUM 88 MCG: 0.09 TABLET ORAL at 06:35

## 2025-08-31 RX ADMIN — BARICITINIB 4 MG: 2 TABLET, FILM COATED ORAL at 17:12

## 2025-08-31 RX ADMIN — ALPRAZOLAM 0.25 MG: 0.25 TABLET ORAL at 03:10

## 2025-08-31 RX ADMIN — REMDESIVIR 100 MG: 100 INJECTION, POWDER, LYOPHILIZED, FOR SOLUTION INTRAVENOUS at 08:56

## 2025-08-31 RX ADMIN — AZITHROMYCIN MONOHYDRATE 500 MG: 500 INJECTION, POWDER, LYOPHILIZED, FOR SOLUTION INTRAVENOUS at 09:09

## 2025-08-31 RX ADMIN — ESCITALOPRAM OXALATE 5 MG: 10 TABLET ORAL at 08:24

## 2025-08-31 RX ADMIN — WATER 2000 MG: 1 INJECTION INTRAMUSCULAR; INTRAVENOUS; SUBCUTANEOUS at 08:55

## 2025-08-31 RX ADMIN — Medication 1000 UNITS: at 08:24

## 2025-08-31 RX ADMIN — SODIUM CHLORIDE, PRESERVATIVE FREE 10 ML: 5 INJECTION INTRAVENOUS at 08:26

## 2025-08-31 RX ADMIN — BUPROPION HYDROCHLORIDE 75 MG: 75 TABLET, FILM COATED ORAL at 08:25

## 2025-08-31 RX ADMIN — SODIUM CHLORIDE, PRESERVATIVE FREE 10 ML: 5 INJECTION INTRAVENOUS at 20:54

## 2025-08-31 RX ADMIN — Medication 50 MG: at 08:25

## 2025-08-31 RX ADMIN — ALUMINUM HYDROXIDE, MAGNESIUM HYDROXIDE, AND SIMETHICONE 30 ML: 200; 200; 20 SUSPENSION ORAL at 18:48

## 2025-08-31 RX ADMIN — OXYCODONE HYDROCHLORIDE AND ACETAMINOPHEN 500 MG: 500 TABLET ORAL at 20:53

## 2025-08-31 ASSESSMENT — PAIN SCALES - GENERAL
PAINLEVEL_OUTOF10: 0
PAINLEVEL_OUTOF10: 4

## 2025-08-31 ASSESSMENT — PAIN DESCRIPTION - LOCATION: LOCATION: HEAD

## 2025-08-31 ASSESSMENT — PAIN - FUNCTIONAL ASSESSMENT: PAIN_FUNCTIONAL_ASSESSMENT: 0-10

## 2025-09-01 LAB
ALBUMIN SERPL-MCNC: 3.2 G/DL (ref 3.5–5.2)
ALBUMIN/GLOB SERPL: 0.8 (ref 1.1–2.2)
ALP SERPL-CCNC: 104 U/L (ref 35–104)
ALT SERPL-CCNC: <5 U/L (ref 10–35)
ANION GAP SERPL CALC-SCNC: 12 MMOL/L (ref 2–14)
AST SERPL-CCNC: 23 U/L (ref 10–35)
BASOPHILS # BLD: 0 K/UL (ref 0–0.1)
BASOPHILS NFR BLD: 0 % (ref 0–1)
BILIRUB SERPL-MCNC: 0.3 MG/DL (ref 0–1.2)
BUN SERPL-MCNC: 31 MG/DL (ref 8–23)
BUN/CREAT SERPL: 43 (ref 12–20)
CALCIUM SERPL-MCNC: 8.9 MG/DL (ref 8.8–10.2)
CHLORIDE SERPL-SCNC: 103 MMOL/L (ref 98–107)
CO2 SERPL-SCNC: 24 MMOL/L (ref 20–29)
CREAT SERPL-MCNC: 0.73 MG/DL (ref 0.6–1)
DIFFERENTIAL METHOD BLD: ABNORMAL
EOSINOPHIL # BLD: 0 K/UL (ref 0–0.4)
EOSINOPHIL NFR BLD: 0 % (ref 0–7)
ERYTHROCYTE [DISTWIDTH] IN BLOOD BY AUTOMATED COUNT: 16.8 % (ref 11.5–14.5)
GLOBULIN SER CALC-MCNC: 3.9 G/DL (ref 2–4)
GLUCOSE SERPL-MCNC: 106 MG/DL (ref 65–100)
HCT VFR BLD AUTO: 27.5 % (ref 35–47)
HGB BLD-MCNC: 9.1 G/DL (ref 11.5–16)
IMM GRANULOCYTES # BLD AUTO: 0.06 K/UL (ref 0–0.04)
IMM GRANULOCYTES NFR BLD AUTO: 1.7 % (ref 0–0.5)
LYMPHOCYTES # BLD: 0.79 K/UL (ref 0.8–3.5)
LYMPHOCYTES NFR BLD: 22.6 % (ref 12–49)
M PNEUMO IGG SER IA-ACNC: 464 U/ML (ref 0–99)
M PNEUMO IGM SER IA-ACNC: 950 U/ML (ref 0–769)
MAGNESIUM SERPL-MCNC: 2.2 MG/DL (ref 1.6–2.4)
MCH RBC QN AUTO: 27.9 PG (ref 26–34)
MCHC RBC AUTO-ENTMCNC: 33.1 G/DL (ref 30–36.5)
MCV RBC AUTO: 84.4 FL (ref 80–99)
MONOCYTES # BLD: 0.25 K/UL (ref 0–1)
MONOCYTES NFR BLD: 7.2 % (ref 5–13)
NEUTS SEG # BLD: 2.4 K/UL (ref 1.8–8)
NEUTS SEG NFR BLD: 68.5 % (ref 32–75)
NRBC # BLD: 0 K/UL (ref 0–0.01)
NRBC BLD-RTO: 0 PER 100 WBC
PHOSPHATE SERPL-MCNC: 3.5 MG/DL (ref 2.5–4.5)
PLATELET # BLD AUTO: 274 K/UL (ref 150–400)
PMV BLD AUTO: 9.5 FL (ref 8.9–12.9)
POTASSIUM SERPL-SCNC: 3.4 MMOL/L (ref 3.5–5.1)
PROT SERPL-MCNC: 7.1 G/DL (ref 6.4–8.3)
RBC # BLD AUTO: 3.26 M/UL (ref 3.8–5.2)
RBC MORPH BLD: ABNORMAL
SODIUM SERPL-SCNC: 139 MMOL/L (ref 136–145)
WBC # BLD AUTO: 3.5 K/UL (ref 3.6–11)

## 2025-09-01 PROCEDURE — 85025 COMPLETE CBC W/AUTO DIFF WBC: CPT

## 2025-09-01 PROCEDURE — 83735 ASSAY OF MAGNESIUM: CPT

## 2025-09-01 PROCEDURE — 94640 AIRWAY INHALATION TREATMENT: CPT

## 2025-09-01 PROCEDURE — 94761 N-INVAS EAR/PLS OXIMETRY MLT: CPT

## 2025-09-01 PROCEDURE — 2580000003 HC RX 258: Performed by: INTERNAL MEDICINE

## 2025-09-01 PROCEDURE — 92526 ORAL FUNCTION THERAPY: CPT

## 2025-09-01 PROCEDURE — 2500000003 HC RX 250 WO HCPCS: Performed by: INTERNAL MEDICINE

## 2025-09-01 PROCEDURE — 6370000000 HC RX 637 (ALT 250 FOR IP): Performed by: HOSPITALIST

## 2025-09-01 PROCEDURE — 6370000000 HC RX 637 (ALT 250 FOR IP)

## 2025-09-01 PROCEDURE — 6370000000 HC RX 637 (ALT 250 FOR IP): Performed by: INTERNAL MEDICINE

## 2025-09-01 PROCEDURE — 80053 COMPREHEN METABOLIC PANEL: CPT

## 2025-09-01 PROCEDURE — 2700000000 HC OXYGEN THERAPY PER DAY

## 2025-09-01 PROCEDURE — 84100 ASSAY OF PHOSPHORUS: CPT

## 2025-09-01 PROCEDURE — 1100000000 HC RM PRIVATE

## 2025-09-01 PROCEDURE — 6360000002 HC RX W HCPCS: Performed by: INTERNAL MEDICINE

## 2025-09-01 RX ORDER — TRAZODONE HYDROCHLORIDE 100 MG/1
100 TABLET ORAL NIGHTLY
Status: DISCONTINUED | OUTPATIENT
Start: 2025-09-01 | End: 2025-09-02 | Stop reason: HOSPADM

## 2025-09-01 RX ADMIN — HEPARIN SODIUM 5000 UNITS: 5000 INJECTION, SOLUTION INTRAVENOUS; SUBCUTANEOUS at 21:41

## 2025-09-01 RX ADMIN — Medication 50 MG: at 09:40

## 2025-09-01 RX ADMIN — SODIUM CHLORIDE, PRESERVATIVE FREE 10 ML: 5 INJECTION INTRAVENOUS at 21:43

## 2025-09-01 RX ADMIN — LEVOTHYROXINE SODIUM 44 MCG: 0.09 TABLET ORAL at 05:48

## 2025-09-01 RX ADMIN — OXYCODONE HYDROCHLORIDE AND ACETAMINOPHEN 500 MG: 500 TABLET ORAL at 21:41

## 2025-09-01 RX ADMIN — OXYCODONE HYDROCHLORIDE AND ACETAMINOPHEN 500 MG: 500 TABLET ORAL at 09:34

## 2025-09-01 RX ADMIN — HEPARIN SODIUM 5000 UNITS: 5000 INJECTION, SOLUTION INTRAVENOUS; SUBCUTANEOUS at 09:35

## 2025-09-01 RX ADMIN — BENZONATATE 100 MG: 100 CAPSULE ORAL at 14:15

## 2025-09-01 RX ADMIN — DEXAMETHASONE SODIUM PHOSPHATE 6 MG: 10 INJECTION, SOLUTION INTRAMUSCULAR; INTRAVENOUS at 09:35

## 2025-09-01 RX ADMIN — Medication 100 MG: at 09:34

## 2025-09-01 RX ADMIN — BARICITINIB 4 MG: 2 TABLET, FILM COATED ORAL at 16:04

## 2025-09-01 RX ADMIN — ESCITALOPRAM OXALATE 5 MG: 10 TABLET ORAL at 09:34

## 2025-09-01 RX ADMIN — AZITHROMYCIN MONOHYDRATE 500 MG: 500 INJECTION, POWDER, LYOPHILIZED, FOR SOLUTION INTRAVENOUS at 10:05

## 2025-09-01 RX ADMIN — CARBIDOPA AND LEVODOPA 1 TABLET: 25; 100 TABLET ORAL at 21:41

## 2025-09-01 RX ADMIN — TRAZODONE HYDROCHLORIDE 100 MG: 100 TABLET ORAL at 21:41

## 2025-09-01 RX ADMIN — Medication 1000 UNITS: at 09:34

## 2025-09-01 RX ADMIN — BUDESONIDE AND FORMOTEROL FUMARATE DIHYDRATE 2 PUFF: 160; 4.5 AEROSOL RESPIRATORY (INHALATION) at 07:33

## 2025-09-01 RX ADMIN — BUPROPION HYDROCHLORIDE 75 MG: 75 TABLET, FILM COATED ORAL at 09:40

## 2025-09-01 RX ADMIN — CARBIDOPA AND LEVODOPA 1 TABLET: 25; 100 TABLET ORAL at 16:04

## 2025-09-01 RX ADMIN — CARBIDOPA AND LEVODOPA 1 TABLET: 25; 100 TABLET ORAL at 05:48

## 2025-09-01 RX ADMIN — BUDESONIDE AND FORMOTEROL FUMARATE DIHYDRATE 2 PUFF: 160; 4.5 AEROSOL RESPIRATORY (INHALATION) at 20:32

## 2025-09-01 RX ADMIN — AMLODIPINE BESYLATE 10 MG: 5 TABLET ORAL at 09:34

## 2025-09-01 RX ADMIN — WATER 2000 MG: 1 INJECTION INTRAMUSCULAR; INTRAVENOUS; SUBCUTANEOUS at 09:34

## 2025-09-01 RX ADMIN — SODIUM CHLORIDE, PRESERVATIVE FREE 10 ML: 5 INJECTION INTRAVENOUS at 09:41

## 2025-09-01 RX ADMIN — POTASSIUM BICARBONATE 20 MEQ: 391 TABLET, EFFERVESCENT ORAL at 13:01

## 2025-09-01 RX ADMIN — GUAIFENESIN AND CODEINE PHOSPHATE 5 ML: 100; 10 SOLUTION ORAL at 13:01

## 2025-09-01 ASSESSMENT — PAIN SCALES - GENERAL: PAINLEVEL_OUTOF10: 0

## 2025-09-02 VITALS
HEART RATE: 66 BPM | HEIGHT: 62 IN | BODY MASS INDEX: 18.05 KG/M2 | DIASTOLIC BLOOD PRESSURE: 84 MMHG | SYSTOLIC BLOOD PRESSURE: 167 MMHG | WEIGHT: 98.1 LBS | OXYGEN SATURATION: 98 % | TEMPERATURE: 97.3 F | RESPIRATION RATE: 16 BRPM

## 2025-09-02 LAB
ALBUMIN SERPL-MCNC: 2.8 G/DL (ref 3.5–5.2)
ALBUMIN/GLOB SERPL: 0.8 (ref 1.1–2.2)
ALP SERPL-CCNC: 93 U/L (ref 35–104)
ALT SERPL-CCNC: 5 U/L (ref 10–35)
ANION GAP SERPL CALC-SCNC: 12 MMOL/L (ref 2–14)
AST SERPL-CCNC: 27 U/L (ref 10–35)
BASOPHILS # BLD: 0 K/UL (ref 0–0.1)
BASOPHILS NFR BLD: 0 % (ref 0–1)
BILIRUB SERPL-MCNC: <0.2 MG/DL (ref 0–1.2)
BUN SERPL-MCNC: 26 MG/DL (ref 8–23)
BUN/CREAT SERPL: 36 (ref 12–20)
CALCIUM SERPL-MCNC: 8.2 MG/DL (ref 8.8–10.2)
CHLORIDE SERPL-SCNC: 101 MMOL/L (ref 98–107)
CO2 SERPL-SCNC: 26 MMOL/L (ref 20–29)
CREAT SERPL-MCNC: 0.72 MG/DL (ref 0.6–1)
DIFFERENTIAL METHOD BLD: ABNORMAL
EOSINOPHIL # BLD: 0 K/UL (ref 0–0.4)
EOSINOPHIL NFR BLD: 0 % (ref 0–7)
ERYTHROCYTE [DISTWIDTH] IN BLOOD BY AUTOMATED COUNT: 16.6 % (ref 11.5–14.5)
GLOBULIN SER CALC-MCNC: 3.5 G/DL (ref 2–4)
GLUCOSE SERPL-MCNC: 88 MG/DL (ref 65–100)
HCT VFR BLD AUTO: 28.5 % (ref 35–47)
HGB BLD-MCNC: 9.1 G/DL (ref 11.5–16)
IMM GRANULOCYTES # BLD AUTO: 0.11 K/UL (ref 0–0.04)
IMM GRANULOCYTES NFR BLD AUTO: 3.3 % (ref 0–0.5)
LYMPHOCYTES # BLD: 0.79 K/UL (ref 0.8–3.5)
LYMPHOCYTES NFR BLD: 23.9 % (ref 12–49)
MAGNESIUM SERPL-MCNC: 2.1 MG/DL (ref 1.6–2.4)
MCH RBC QN AUTO: 27.9 PG (ref 26–34)
MCHC RBC AUTO-ENTMCNC: 31.9 G/DL (ref 30–36.5)
MCV RBC AUTO: 87.4 FL (ref 80–99)
MONOCYTES # BLD: 0.34 K/UL (ref 0–1)
MONOCYTES NFR BLD: 10.3 % (ref 5–13)
NEUTS SEG # BLD: 2.06 K/UL (ref 1.8–8)
NEUTS SEG NFR BLD: 62.5 % (ref 32–75)
NRBC # BLD: 0 K/UL (ref 0–0.01)
NRBC BLD-RTO: 0 PER 100 WBC
PHOSPHATE SERPL-MCNC: 2.3 MG/DL (ref 2.5–4.5)
PLATELET # BLD AUTO: 277 K/UL (ref 150–400)
PMV BLD AUTO: 9.9 FL (ref 8.9–12.9)
POTASSIUM SERPL-SCNC: 3 MMOL/L (ref 3.5–5.1)
PROT SERPL-MCNC: 6.3 G/DL (ref 6.4–8.3)
RBC # BLD AUTO: 3.26 M/UL (ref 3.8–5.2)
RBC MORPH BLD: ABNORMAL
SODIUM SERPL-SCNC: 138 MMOL/L (ref 136–145)
WBC # BLD AUTO: 3.3 K/UL (ref 3.6–11)

## 2025-09-02 PROCEDURE — 6370000000 HC RX 637 (ALT 250 FOR IP)

## 2025-09-02 PROCEDURE — 84100 ASSAY OF PHOSPHORUS: CPT

## 2025-09-02 PROCEDURE — 6360000002 HC RX W HCPCS: Performed by: INTERNAL MEDICINE

## 2025-09-02 PROCEDURE — 92526 ORAL FUNCTION THERAPY: CPT

## 2025-09-02 PROCEDURE — 83735 ASSAY OF MAGNESIUM: CPT

## 2025-09-02 PROCEDURE — 80053 COMPREHEN METABOLIC PANEL: CPT

## 2025-09-02 PROCEDURE — 2580000003 HC RX 258: Performed by: INTERNAL MEDICINE

## 2025-09-02 PROCEDURE — 85025 COMPLETE CBC W/AUTO DIFF WBC: CPT

## 2025-09-02 PROCEDURE — 2500000003 HC RX 250 WO HCPCS: Performed by: INTERNAL MEDICINE

## 2025-09-02 PROCEDURE — 6370000000 HC RX 637 (ALT 250 FOR IP): Performed by: INTERNAL MEDICINE

## 2025-09-02 PROCEDURE — 94761 N-INVAS EAR/PLS OXIMETRY MLT: CPT

## 2025-09-02 PROCEDURE — 6370000000 HC RX 637 (ALT 250 FOR IP): Performed by: HOSPITALIST

## 2025-09-02 RX ORDER — THIAMINE MONONITRATE (VIT B1) 100 MG
100 TABLET ORAL DAILY
Qty: 30 TABLET | Refills: 0 | Status: SHIPPED | OUTPATIENT
Start: 2025-09-02

## 2025-09-02 RX ORDER — AMLODIPINE BESYLATE 10 MG/1
10 TABLET ORAL DAILY
Qty: 30 TABLET | Refills: 0 | Status: SHIPPED | OUTPATIENT
Start: 2025-09-02

## 2025-09-02 RX ORDER — CEPHALEXIN 500 MG/1
500 CAPSULE ORAL 2 TIMES DAILY
Qty: 10 CAPSULE | Refills: 0 | Status: SHIPPED | OUTPATIENT
Start: 2025-09-02 | End: 2025-09-07

## 2025-09-02 RX ORDER — LOPERAMIDE HYDROCHLORIDE 2 MG/1
2 CAPSULE ORAL 4 TIMES DAILY PRN
Status: DISCONTINUED | OUTPATIENT
Start: 2025-09-02 | End: 2025-09-02 | Stop reason: HOSPADM

## 2025-09-02 RX ORDER — TRAZODONE HYDROCHLORIDE 100 MG/1
100 TABLET ORAL NIGHTLY
Qty: 30 TABLET | Refills: 0 | Status: SHIPPED | OUTPATIENT
Start: 2025-09-02

## 2025-09-02 RX ORDER — ASCORBIC ACID 500 MG
500 TABLET ORAL 2 TIMES DAILY
Qty: 60 TABLET | Refills: 0 | Status: SHIPPED | OUTPATIENT
Start: 2025-09-02

## 2025-09-02 RX ORDER — GUAIFENESIN 600 MG/1
600 TABLET, EXTENDED RELEASE ORAL 2 TIMES DAILY
Qty: 30 TABLET | Refills: 0 | Status: SHIPPED | OUTPATIENT
Start: 2025-09-02 | End: 2025-09-17

## 2025-09-02 RX ORDER — CARBIDOPA AND LEVODOPA 25; 100 MG/1; MG/1
1 TABLET ORAL
Qty: 60 TABLET | Refills: 0 | Status: SHIPPED | OUTPATIENT
Start: 2025-09-02

## 2025-09-02 RX ORDER — LOPERAMIDE HYDROCHLORIDE 2 MG/1
2 CAPSULE ORAL 4 TIMES DAILY PRN
Qty: 10 CAPSULE | Refills: 0 | Status: SHIPPED | OUTPATIENT
Start: 2025-09-02 | End: 2025-09-12

## 2025-09-02 RX ORDER — MAGNESIUM HYDROXIDE/ALUMINUM HYDROXICE/SIMETHICONE 120; 1200; 1200 MG/30ML; MG/30ML; MG/30ML
30 SUSPENSION ORAL EVERY 6 HOURS PRN
Qty: 1 EACH | Refills: 0 | Status: SHIPPED | OUTPATIENT
Start: 2025-09-02 | End: 2025-09-12

## 2025-09-02 RX ORDER — BENZONATATE 100 MG/1
100 CAPSULE ORAL 3 TIMES DAILY PRN
Qty: 30 CAPSULE | Refills: 0 | Status: SHIPPED | OUTPATIENT
Start: 2025-09-02 | End: 2025-09-12

## 2025-09-02 RX ADMIN — DEXAMETHASONE SODIUM PHOSPHATE 6 MG: 10 INJECTION, SOLUTION INTRAMUSCULAR; INTRAVENOUS at 09:27

## 2025-09-02 RX ADMIN — BARICITINIB 4 MG: 2 TABLET, FILM COATED ORAL at 16:27

## 2025-09-02 RX ADMIN — OXYCODONE HYDROCHLORIDE AND ACETAMINOPHEN 500 MG: 500 TABLET ORAL at 09:31

## 2025-09-02 RX ADMIN — BENZONATATE 100 MG: 100 CAPSULE ORAL at 11:25

## 2025-09-02 RX ADMIN — CARBIDOPA AND LEVODOPA 1 TABLET: 25; 100 TABLET ORAL at 06:45

## 2025-09-02 RX ADMIN — AZITHROMYCIN MONOHYDRATE 500 MG: 500 INJECTION, POWDER, LYOPHILIZED, FOR SOLUTION INTRAVENOUS at 09:53

## 2025-09-02 RX ADMIN — Medication 50 MG: at 09:31

## 2025-09-02 RX ADMIN — WATER 2000 MG: 1 INJECTION INTRAMUSCULAR; INTRAVENOUS; SUBCUTANEOUS at 09:28

## 2025-09-02 RX ADMIN — LEVOTHYROXINE SODIUM 88 MCG: 0.09 TABLET ORAL at 06:45

## 2025-09-02 RX ADMIN — HEPARIN SODIUM 5000 UNITS: 5000 INJECTION, SOLUTION INTRAVENOUS; SUBCUTANEOUS at 09:27

## 2025-09-02 RX ADMIN — POTASSIUM BICARBONATE 40 MEQ: 391 TABLET, EFFERVESCENT ORAL at 13:51

## 2025-09-02 RX ADMIN — ESCITALOPRAM OXALATE 5 MG: 10 TABLET ORAL at 09:31

## 2025-09-02 RX ADMIN — CARBIDOPA AND LEVODOPA 1 TABLET: 25; 100 TABLET ORAL at 16:27

## 2025-09-02 RX ADMIN — LOPERAMIDE HYDROCHLORIDE 2 MG: 2 CAPSULE ORAL at 11:11

## 2025-09-02 RX ADMIN — SODIUM CHLORIDE, PRESERVATIVE FREE 10 ML: 5 INJECTION INTRAVENOUS at 09:32

## 2025-09-02 RX ADMIN — Medication 1000 UNITS: at 09:31

## 2025-09-02 RX ADMIN — BUPROPION HYDROCHLORIDE 75 MG: 75 TABLET, FILM COATED ORAL at 09:31

## 2025-09-02 RX ADMIN — Medication 100 MG: at 09:32

## 2025-09-02 RX ADMIN — POTASSIUM BICARBONATE 40 MEQ: 782 TABLET, EFFERVESCENT ORAL at 16:30

## 2025-09-02 RX ADMIN — AMLODIPINE BESYLATE 10 MG: 5 TABLET ORAL at 09:31

## 2025-09-02 ASSESSMENT — PAIN SCALES - GENERAL: PAINLEVEL_OUTOF10: 0

## 2025-09-05 LAB
BACTERIA SPEC CULT: NORMAL
BACTERIA SPEC CULT: NORMAL
SERVICE CMNT-IMP: NORMAL
SERVICE CMNT-IMP: NORMAL

## (undated) DEVICE — TIP CLEANER: Brand: VALLEYLAB

## (undated) DEVICE — DRAPE XR C ARM 41X74IN LF --

## (undated) DEVICE — LAMINECTOMY RICHMOND-LF: Brand: MEDLINE INDUSTRIES, INC.

## (undated) DEVICE — KIT POS W/ FOAM ARM CRADL SHEARGUARD CHST PD CVR FOR SPNL

## (undated) DEVICE — SYSTEM SKIN CLSR 22CM DERMBND PRINEO

## (undated) DEVICE — STERILE POLYISOPRENE POWDER-FREE SURGICAL GLOVES WITH EMOLLIENT COATING: Brand: PROTEXIS

## (undated) DEVICE — SUTURE VCRL SZ 1 L36IN ABSRB UD L36MM CT-1 1/2 CIR J947H

## (undated) DEVICE — (D)PREP SKN CHLRAPRP APPL 26ML -- CONVERT TO ITEM 371833

## (undated) DEVICE — AMD ANTIMICROBIAL DRAIN SPONGES, 6 PLY, 0.2% POLYHEXAMETHYLENE BIGUANIDE HCI (PHMB): Brand: EXCILON

## (undated) DEVICE — 3000CC GUARDIAN II: Brand: GUARDIAN

## (undated) DEVICE — KENDALL SCD EXPRESS SLEEVES, KNEE LENGTH, MEDIUM: Brand: KENDALL SCD

## (undated) DEVICE — SUTURE MCRYL SZ 3-0 L27IN ABSRB UD L24MM PS-1 3/8 CIR PRIM Y936H

## (undated) DEVICE — SUTURE ABSORBABLE BRAIDED 2-0 CT-1 27 IN UD VICRYL J259H

## (undated) DEVICE — CATHETER IV 14GA L1.25IN TEF STR HUB INTROCAN SFTY

## (undated) DEVICE — SUTURE VCRL SZ 0 L36IN ABSRB UD L36MM CT-1 1/2 CIR J946H

## (undated) DEVICE — NDL SPNE QNCKE 18GX3.5IN LF --

## (undated) DEVICE — COVER,TABLE,60X90,STERILE: Brand: MEDLINE

## (undated) DEVICE — SYRINGE MED 20ML STD CLR PLAS LUERLOCK TIP N CTRL DISP

## (undated) DEVICE — DRAPE,REIN 53X77,STERILE: Brand: MEDLINE

## (undated) DEVICE — STERILE POLYISOPRENE POWDER-FREE SURGICAL GLOVES: Brand: PROTEXIS

## (undated) DEVICE — ASTOUND STANDARD SURGICAL GOWN, XL: Brand: CONVERTORS

## (undated) DEVICE — CODMAN® SURGICAL PATTIES 3/4" X 3/4" (1.91CM X 1.91CM): Brand: CODMAN®

## (undated) DEVICE — (D)SYR 10ML 1/5ML GRAD NSAF -- PKGING CHANGE USE ITEM 338027

## (undated) DEVICE — ACCY PA100-A LEGEND LUB/DIFFUSER 4 PACK: Brand: MIDAS REX®

## (undated) DEVICE — DRAPE,UTILITY,TAPE,15X26,STERILE: Brand: MEDLINE

## (undated) DEVICE — TOOL 14MH30 LEGEND 14CM 3MM: Brand: MIDAS REX ™

## (undated) DEVICE — STOPCOCK IV 3W --

## (undated) DEVICE — ELECTRODE BLDE L4IN NONINSULATED EDGE

## (undated) DEVICE — INTENDED FOR TISSUE SEPARATION, AND OTHER PROCEDURES THAT REQUIRE A SHARP SURGICAL BLADE TO PUNCTURE OR CUT.: Brand: BARD-PARKER ® CARBON RIB-BACK BLADES

## (undated) DEVICE — DRSG PATCH ANTIMIC 1INX4.0MM -- CONVERT TO ITEM 356053

## (undated) DEVICE — MAGNETIC DRAPE: Brand: DEVON

## (undated) DEVICE — WATERPROOF, BACTERIA PROOF DRESSING WITH ABSORBENT SEE THROUGH PAD: Brand: OPSITE POST-OP VISIBLE 25X10CM CTN 20

## (undated) DEVICE — DRAIN KT WND 10FR RND 400ML --

## (undated) DEVICE — NEEDLE HYPO 22GA L1.5IN BLK S STL HUB POLYPR SHLD REG BVL

## (undated) DEVICE — SOLUTION IRRIG 1000ML H2O STRL BLT

## (undated) DEVICE — SUTURE STRATAFIX SPRL SZ 1 L14IN ABSRB VLT L48CM CTX 1/2 SXPD2B405

## (undated) DEVICE — BONE WAX WHITE: Brand: BONE WAX WHITE

## (undated) DEVICE — MEDI-VAC NON-CONDUCTIVE SUCTION TUBING: Brand: CARDINAL HEALTH

## (undated) DEVICE — Z CONVERTED USE 2271043 CONTAINER SPEC COLL 4OZ SCR ON LID PEEL PCH

## (undated) DEVICE — 3M™ TEGADERM™ TRANSPARENT FILM DRESSING FRAME STYLE, 1624W, 2-3/8 IN X 2-3/4 IN (6 CM X 7 CM), 100/CT 4CT/CASE: Brand: 3M™ TEGADERM™

## (undated) DEVICE — Device

## (undated) DEVICE — 1010 S-DRAPE TOWEL DRAPE 10/BX: Brand: STERI-DRAPE™

## (undated) DEVICE — INFECTION CONTROL KIT SYS

## (undated) DEVICE — BIPOLAR FORCEPS CORD,BANANA LEADS: Brand: VALLEYLAB

## (undated) DEVICE — DEVON™ KNEE AND BODY STRAP 60" X 3" (1.5 M X 7.6 CM): Brand: DEVON

## (undated) DEVICE — SYR LR LCK 1ML GRAD NSAF 30ML --

## (undated) DEVICE — TRAY CATH OD16FR SIL URIN M STATLOK STBL DEV SURSTP

## (undated) DEVICE — SUTURE VCRL SZ 0 L27IN ABSRB UD L26MM CT-2 1/2 CIR J270H

## (undated) DEVICE — 3M™ TEGADERM™ TRANSPARENT FILM DRESSING FRAME STYLE, 1626, 4 IN X 4-3/4 IN (10 CM X 12 CM), 50/CT 4CT/CASE: Brand: 3M™ TEGADERM™